# Patient Record
Sex: MALE | Race: WHITE | Employment: OTHER | URBAN - METROPOLITAN AREA
[De-identification: names, ages, dates, MRNs, and addresses within clinical notes are randomized per-mention and may not be internally consistent; named-entity substitution may affect disease eponyms.]

---

## 2017-01-05 ENCOUNTER — OFFICE VISIT (OUTPATIENT)
Dept: SURGERY | Age: 22
End: 2017-01-05

## 2017-01-05 VITALS
HEART RATE: 88 BPM | HEIGHT: 66 IN | DIASTOLIC BLOOD PRESSURE: 66 MMHG | BODY MASS INDEX: 25.23 KG/M2 | WEIGHT: 157 LBS | SYSTOLIC BLOOD PRESSURE: 126 MMHG

## 2017-01-05 DIAGNOSIS — E10.9 TYPE 1 DIABETES MELLITUS WITHOUT COMPLICATION (HCC): Primary | ICD-10-CM

## 2017-01-05 LAB
GLUCOSE BLD-MCNC: 326 MG/DL
HBA1C MFR BLD: 10.5 %

## 2017-01-05 PROCEDURE — 99213 OFFICE O/P EST LOW 20 MIN: CPT | Performed by: INTERNAL MEDICINE

## 2017-01-05 PROCEDURE — 83036 HEMOGLOBIN GLYCOSYLATED A1C: CPT | Performed by: INTERNAL MEDICINE

## 2017-01-05 PROCEDURE — 82962 GLUCOSE BLOOD TEST: CPT | Performed by: INTERNAL MEDICINE

## 2017-01-05 RX ORDER — QUETIAPINE 200 MG/1
400 TABLET, FILM COATED, EXTENDED RELEASE ORAL NIGHTLY
COMMUNITY
End: 2017-10-25 | Stop reason: SDUPTHER

## 2017-02-02 ENCOUNTER — OFFICE VISIT (OUTPATIENT)
Dept: SURGERY | Age: 22
End: 2017-02-02

## 2017-02-02 VITALS
HEIGHT: 66 IN | BODY MASS INDEX: 24.43 KG/M2 | WEIGHT: 152 LBS | HEART RATE: 80 BPM | SYSTOLIC BLOOD PRESSURE: 130 MMHG | DIASTOLIC BLOOD PRESSURE: 70 MMHG

## 2017-02-02 DIAGNOSIS — E10.9 TYPE 1 DIABETES MELLITUS WITHOUT COMPLICATION (HCC): Primary | ICD-10-CM

## 2017-02-02 LAB — GLUCOSE BLD-MCNC: 508 MG/DL

## 2017-02-02 PROCEDURE — 99213 OFFICE O/P EST LOW 20 MIN: CPT | Performed by: INTERNAL MEDICINE

## 2017-02-02 PROCEDURE — 82962 GLUCOSE BLOOD TEST: CPT | Performed by: INTERNAL MEDICINE

## 2017-08-23 ENCOUNTER — OFFICE VISIT (OUTPATIENT)
Dept: SURGERY | Age: 22
End: 2017-08-23

## 2017-08-23 VITALS
HEART RATE: 76 BPM | SYSTOLIC BLOOD PRESSURE: 112 MMHG | WEIGHT: 149 LBS | BODY MASS INDEX: 23.95 KG/M2 | HEIGHT: 66 IN | DIASTOLIC BLOOD PRESSURE: 72 MMHG

## 2017-08-23 DIAGNOSIS — E10.9 TYPE 1 DIABETES MELLITUS WITHOUT COMPLICATION (HCC): Primary | ICD-10-CM

## 2017-08-23 LAB
GLUCOSE BLD-MCNC: 367 MG/DL
HBA1C MFR BLD: 10.9 %

## 2017-08-23 PROCEDURE — 83036 HEMOGLOBIN GLYCOSYLATED A1C: CPT | Performed by: INTERNAL MEDICINE

## 2017-08-23 PROCEDURE — 99213 OFFICE O/P EST LOW 20 MIN: CPT | Performed by: INTERNAL MEDICINE

## 2017-08-23 PROCEDURE — 82962 GLUCOSE BLOOD TEST: CPT | Performed by: INTERNAL MEDICINE

## 2017-10-18 ENCOUNTER — OFFICE VISIT (OUTPATIENT)
Dept: FAMILY MEDICINE CLINIC | Age: 22
End: 2017-10-18

## 2017-10-18 VITALS
SYSTOLIC BLOOD PRESSURE: 120 MMHG | DIASTOLIC BLOOD PRESSURE: 68 MMHG | BODY MASS INDEX: 23.21 KG/M2 | HEART RATE: 90 BPM | WEIGHT: 144.4 LBS | OXYGEN SATURATION: 98 % | TEMPERATURE: 98.3 F | HEIGHT: 66 IN

## 2017-10-18 DIAGNOSIS — F19.10 DRUG ABUSE, EPISODIC USE (HCC): ICD-10-CM

## 2017-10-18 DIAGNOSIS — Z11.3 SCREENING EXAMINATION FOR STD (SEXUALLY TRANSMITTED DISEASE): ICD-10-CM

## 2017-10-18 DIAGNOSIS — E10.9 TYPE 1 DIABETES MELLITUS WITHOUT COMPLICATION (HCC): ICD-10-CM

## 2017-10-18 DIAGNOSIS — Z20.2 EXPOSURE TO CHLAMYDIA: Primary | ICD-10-CM

## 2017-10-18 PROCEDURE — 99213 OFFICE O/P EST LOW 20 MIN: CPT | Performed by: NURSE PRACTITIONER

## 2017-10-18 RX ORDER — AZITHROMYCIN 500 MG/1
1000 TABLET, FILM COATED ORAL ONCE
Qty: 2 TABLET | Refills: 0 | Status: SHIPPED | OUTPATIENT
Start: 2017-10-18 | End: 2017-10-18

## 2017-10-18 RX ORDER — LANCETS 30 GAUGE
EACH MISCELLANEOUS
Qty: 100 EACH | Refills: 3 | Status: SHIPPED | OUTPATIENT
Start: 2017-10-18 | End: 2017-10-20 | Stop reason: ALTCHOICE

## 2017-10-18 NOTE — PROGRESS NOTES
History     Social History    Marital status: Single     Spouse name: N/A    Number of children: N/A    Years of education: N/A     Social History Main Topics    Smoking status: Current Every Day Smoker     Packs/day: 1.00     Years: 5.00     Types: Cigarettes    Smokeless tobacco: Never Used      Comment: pt aware of risk    Alcohol use 0.0 oz/week      Comment: occasionally    Drug use:      Types: Methamphetamines, Marijuana    Sexual activity: Not Asked     Other Topics Concern    None     Social History Narrative    ** Merged History Encounter **          Current Outpatient Prescriptions on File Prior to Visit   Medication Sig Dispense Refill    insulin aspart (NOVOLOG) 100 UNIT/ML injection vial USE PER INSULIN PUMP MAXIMUM DOSE 100 UNITS DAILY please give 3 vials per 30 days 3 vial 3    LUIS CONTOUR NEXT TEST strip TEST BLOOD SUGARS 6 TIMES A  strip 3    Blood Glucose Monitoring Suppl (LUIS CONTOUR NEXT LINK) W/DEVICE KIT As directed 1 kit 00    Blood Glucose Monitoring Suppl (LDR BLOOD GLUCOSE TRUETEST) W/DEVICE KIT 1 each by Does not apply route 4 times daily DX :  E10.9, IDDM 1 kit prn    melatonin 3 MG TABS tablet Take 3 mg by mouth nightly.  QUEtiapine (SEROQUEL XR) 200 MG extended release tablet Take 400 mg by mouth nightly       albuterol (VENTOLIN HFA) 108 (90 BASE) MCG/ACT inhaler Inhale 2 puffs into the lungs every 6 hours as needed for Wheezing 1 Inhaler 3     No current facility-administered medications on file prior to visit. Allergies   Allergen Reactions    Dust Mite Extract      nasal & sinus congestion, itchy watery eyes, sneezing    Mirtazapine      blisters    Other      dander causes him to sneeze, have nasal/sinus congestion, itchy, watery eyes.  Oxcarbazepine Rash       Review of Systems   Constitutional: Negative for chills, fatigue and fever.    Genitourinary: Negative for difficulty urinating, discharge, dysuria, frequency, genital sores and Dispense:  100 each     Refill:  3    glucose blood VI test strips (ASCENSIA AUTODISC VI;ONE TOUCH ULTRA TEST VI) strip     Si each by In Vitro route daily As needed. Dispense:  100 each     Refill:  3     Side effects, adverse effects of the medication prescribed today, as well as treatment plan/ rationale and result expectations have been discussed with the patient who expresses understanding and desires to proceed. Close follow up to evaluate treatment results and for coordination of care. I have reviewed the patient's medical history in detail and updated the computerized patient record. As always, patient is advised that if symptoms worsen in any way they will proceed to the nearest emergency room. Return if symptoms worsen or fail to improve.     Jessica Givens NP

## 2017-10-19 DIAGNOSIS — Z11.3 SCREENING EXAMINATION FOR STD (SEXUALLY TRANSMITTED DISEASE): ICD-10-CM

## 2017-10-19 DIAGNOSIS — Z20.2 EXPOSURE TO CHLAMYDIA: ICD-10-CM

## 2017-10-19 DIAGNOSIS — F19.10 DRUG ABUSE, EPISODIC USE (HCC): ICD-10-CM

## 2017-10-19 LAB
HAV IGM SER IA-ACNC: ABNORMAL
HEPATITIS B CORE IGM ANTIBODY: ABNORMAL
HEPATITIS B SURFACE ANTIGEN INTERPRETATION: ABNORMAL
HEPATITIS C ANTIBODY INTERPRETATION: REACTIVE
HEPATITIS INTERPRETATION:: ABNORMAL

## 2017-10-20 ENCOUNTER — OFFICE VISIT (OUTPATIENT)
Dept: SURGERY | Age: 22
End: 2017-10-20

## 2017-10-20 VITALS
HEART RATE: 94 BPM | BODY MASS INDEX: 22.5 KG/M2 | SYSTOLIC BLOOD PRESSURE: 104 MMHG | HEIGHT: 66 IN | DIASTOLIC BLOOD PRESSURE: 71 MMHG | WEIGHT: 140 LBS

## 2017-10-20 DIAGNOSIS — E10.9 TYPE 1 DIABETES MELLITUS WITHOUT COMPLICATION (HCC): Primary | ICD-10-CM

## 2017-10-20 LAB — GLUCOSE BLD-MCNC: 488 MG/DL

## 2017-10-20 PROCEDURE — 82962 GLUCOSE BLOOD TEST: CPT | Performed by: INTERNAL MEDICINE

## 2017-10-20 PROCEDURE — 99213 OFFICE O/P EST LOW 20 MIN: CPT | Performed by: INTERNAL MEDICINE

## 2017-10-20 RX ORDER — GLUCOSAMINE HCL/CHONDROITIN SU 500-400 MG
1 CAPSULE ORAL 4 TIMES DAILY
Qty: 150 STRIP | Refills: 6 | Status: SHIPPED | OUTPATIENT
Start: 2017-10-20 | End: 2017-11-13

## 2017-10-20 RX ORDER — LANCETS 30 GAUGE
1 EACH MISCELLANEOUS 4 TIMES DAILY
Qty: 150 EACH | Refills: 6 | Status: SHIPPED | OUTPATIENT
Start: 2017-10-20 | End: 2017-11-13

## 2017-10-20 NOTE — PROGRESS NOTES
Subjective:      Patient ID: Leroy Hoffman is a 23 y.o. male. Diabetes   He presents for his follow-up diabetic visit. He has type 1 diabetes mellitus. His disease course has been fluctuating. Hypoglycemia symptoms include dizziness, hunger, nervousness/anxiousness, sweats and tremors. Associated symptoms include fatigue. Hypoglycemia complications include required assistance. Symptoms are worsening. Risk factors for coronary artery disease include diabetes mellitus and male sex. Current diabetic treatment includes insulin injections. He is compliant with treatment most of the time (poor complaince ). He is currently taking insulin pre-breakfast, pre-lunch, pre-dinner and at bedtime. Insulin injections are given by patient. His weight is fluctuating minimally. Blood glucose monitoring compliance is poor. His home blood glucose trend is fluctuating minimally. His breakfast blood glucose range is generally >200 mg/dl. His highest blood glucose is >200 mg/dl. His overall blood glucose range is >200 mg/dl.  (Pt not using pump for the last   2 weeks   Glucose was 488 today )     Patient Active Problem List   Diagnosis    Type 1 diabetes mellitus ([address]    ADHD (attention deficit hyperactivity disorder)    Oppositional defiant disorder    Seasonal allergies    Asthma    Atopic rhinitis    Closed fracture of neck of metacarpal bone    Congenital anomaly of cerebrovascular system    Generalized convulsive epilepsy ([address]    Contusion of hand    Closed fracture of base of other metacarpal bone(s)    Atopic dermatitis    Behavior problem    Congenital vascular nevus       Current Outpatient Prescriptions:     Blood Glucose Monitoring Suppl JUAN, 1 Device by Does not apply route daily, Disp: 1 Device, Rfl: 0    Lancets MISC, Test 4 times a day diabetes mellitus type 1, Disp: 100 each, Rfl: 3    glucose blood VI test strips (ASCENSIA AUTODISC VI;ONE TOUCH ULTRA TEST VI) strip, 1 each by In Vitro route daily As needed. , Disp: 100 each, Rfl: 3    insulin aspart (NOVOLOG) 100 UNIT/ML injection vial, USE PER INSULIN PUMP MAXIMUM DOSE 100 UNITS DAILY please give 3 vials per 30 days, Disp: 3 vial, Rfl: 3    QUEtiapine (SEROQUEL XR) 200 MG extended release tablet, Take 400 mg by mouth nightly , Disp: , Rfl:     Blood Glucose Monitoring Suppl (LUIS CONTOUR NEXT LINK) W/DEVICE KIT, As directed, Disp: 1 kit, Rfl: 00    Blood Glucose Monitoring Suppl (LDR BLOOD GLUCOSE TRUETEST) W/DEVICE KIT, 1 each by Does not apply route 4 times daily DX :  E10.9, IDDM, Disp: 1 kit, Rfl: prn    albuterol (VENTOLIN HFA) 108 (90 BASE) MCG/ACT inhaler, Inhale 2 puffs into the lungs every 6 hours as needed for Wheezing, Disp: 1 Inhaler, Rfl: 3    melatonin 3 MG TABS tablet, Take 3 mg by mouth nightly., Disp: , Rfl:       Review of Systems   Constitutional: Positive for fatigue. Neurological: Positive for dizziness and tremors. Psychiatric/Behavioral: The patient is nervous/anxious. All other systems reviewed and are negative. Vitals:    10/20/17 1300   BP: 104/71   Site: Left Arm   Position: Sitting   Cuff Size: Medium Adult   Pulse: 94   Weight: 140 lb (63.5 kg)   Height: 5' 6\" (1.676 m)       Objective:   Physical Exam   Constitutional: He appears well-developed and well-nourished. HENT:   Head: Normocephalic and atraumatic. Eyes: Conjunctivae are normal.   Neck: Neck supple. Cardiovascular: Normal rate. Pulmonary/Chest: Effort normal.   Musculoskeletal: Normal range of motion. Neurological: He is alert. Psychiatric: His mood appears anxious. Assessment:      1.  Type 1 diabetes mellitus without complication ([address]  POCT Glucose            Plan:        Orders Placed This Encounter   Procedures    Referral To Ophthalmology - ([place]) Bethanie Gonzalez MD - [facility]     Referral Priority:   Routine     Referral Type:   Consult for Advice and Opinion     Referral Reason:   Specialty Services Required Referred to Provider:   Wilmon Boas, MD     Requested Specialty:   Ophthalmology     Number of Visits Requested:   1    POCT Glucose     Orders Placed This Encounter   Medications    insulin glargine (LANTUS SOLOSTAR) 100 UNIT/ML injection pen     Si units at bedtime     Dispense:  15 Pen     Refill:  3    insulin aspart (NOVOLOG FLEXPEN) 100 UNIT/ML injection pen     Sig: 15-20 units at each meals     Dispense:  10 Pen     Refill:  3    Insulin Pen Needle (NOVOFINE) 32G X 6 MM MISC     Sig: qid     Dispense:  300 each     Refill:  3    Blood Glucose Monitoring Suppl JUAN     Si each by Does not apply route 4 times daily DX: E10.65, IDDM (please dispense covered brand)     Dispense:  1 Device     Refill:  0    Glucose Blood (BLOOD GLUCOSE TEST STRIPS) STRP     Si each by In Vitro route 4 times daily DX: E10.65, IDDM (please dispense covered brand)     Dispense:  150 strip     Refill:  6    Lancets MISC     Si each by Does not apply route 4 times daily DX: E10.65, IDDM (please dispense covered brand)     Dispense:  150 each     Refill:  6

## 2017-10-21 LAB — HIV-1 AND HIV-2 ANTIBODIES: NEGATIVE

## 2017-10-24 LAB
C. TRACHOMATIS DNA ,URINE: NEGATIVE
N. GONORRHOEAE DNA, URINE: NEGATIVE

## 2017-10-25 ENCOUNTER — OFFICE VISIT (OUTPATIENT)
Dept: FAMILY MEDICINE CLINIC | Age: 22
End: 2017-10-25

## 2017-10-25 VITALS
OXYGEN SATURATION: 97 % | HEART RATE: 119 BPM | BODY MASS INDEX: 22.28 KG/M2 | HEIGHT: 66 IN | TEMPERATURE: 98.9 F | DIASTOLIC BLOOD PRESSURE: 88 MMHG | SYSTOLIC BLOOD PRESSURE: 120 MMHG | WEIGHT: 138.6 LBS

## 2017-10-25 DIAGNOSIS — F91.3 OPPOSITIONAL DEFIANT DISORDER: ICD-10-CM

## 2017-10-25 DIAGNOSIS — J45.20 MILD INTERMITTENT ASTHMA WITHOUT COMPLICATION: ICD-10-CM

## 2017-10-25 DIAGNOSIS — R76.8 HEPATITIS C ANTIBODY TEST POSITIVE: Primary | ICD-10-CM

## 2017-10-25 DIAGNOSIS — F90.9 ATTENTION DEFICIT HYPERACTIVITY DISORDER (ADHD), UNSPECIFIED ADHD TYPE: ICD-10-CM

## 2017-10-25 PROCEDURE — 99213 OFFICE O/P EST LOW 20 MIN: CPT | Performed by: NURSE PRACTITIONER

## 2017-10-25 RX ORDER — QUETIAPINE 200 MG/1
400 TABLET, FILM COATED, EXTENDED RELEASE ORAL NIGHTLY
Qty: 60 TABLET | Refills: 3 | Status: SHIPPED | OUTPATIENT
Start: 2017-10-25 | End: 2017-11-13

## 2017-10-25 RX ORDER — ALBUTEROL SULFATE 90 UG/1
2 AEROSOL, METERED RESPIRATORY (INHALATION) EVERY 6 HOURS PRN
Qty: 1 INHALER | Refills: 3 | Status: SHIPPED | OUTPATIENT
Start: 2017-10-25 | End: 2018-10-23 | Stop reason: SDUPTHER

## 2017-10-25 ASSESSMENT — ENCOUNTER SYMPTOMS: ABDOMINAL PAIN: 0

## 2017-11-08 NOTE — TELEPHONE ENCOUNTER
Seroquel PA done through [employer]. CMM indicates that medication is available with out authorization. Called [employer] and they attempted to run. On their end alternative was indicated. She named Ritalin and a few others. I ask her to send that information to me, please see 3rd pg of attached. Can you switch med?

## 2017-11-09 ENCOUNTER — HOSPITAL ENCOUNTER (INPATIENT)
Age: 22
LOS: 1 days | Discharge: LEFT BEFORE DISCHARGE | DRG: 638 | End: 2017-11-09
Attending: INTERNAL MEDICINE | Admitting: INTERNAL MEDICINE
Payer: MEDICARE

## 2017-11-09 ENCOUNTER — APPOINTMENT (OUTPATIENT)
Dept: GENERAL RADIOLOGY | Age: 22
DRG: 638 | End: 2017-11-09
Payer: MEDICARE

## 2017-11-09 VITALS
BODY MASS INDEX: 21.33 KG/M2 | TEMPERATURE: 97.5 F | RESPIRATION RATE: 21 BRPM | WEIGHT: 132.72 LBS | DIASTOLIC BLOOD PRESSURE: 60 MMHG | OXYGEN SATURATION: 100 % | HEIGHT: 66 IN | HEART RATE: 101 BPM | SYSTOLIC BLOOD PRESSURE: 107 MMHG

## 2017-11-09 DIAGNOSIS — E10.10 DIABETIC KETOACIDOSIS WITHOUT COMA ASSOCIATED WITH TYPE 1 DIABETES MELLITUS (HCC): Primary | ICD-10-CM

## 2017-11-09 DIAGNOSIS — F11.10 HEROIN ABUSE (HCC): ICD-10-CM

## 2017-11-09 PROBLEM — F84.5 ASPERGER SYNDROME: Chronic | Status: ACTIVE | Noted: 2017-11-09

## 2017-11-09 PROBLEM — E87.5 HYPERKALEMIA, DIMINISHED RENAL EXCRETION: Status: ACTIVE | Noted: 2017-11-09

## 2017-11-09 PROBLEM — E87.20 METABOLIC ACIDOSIS WITH INCREASED ANION GAP AND ACCUMULATION OF ORGANIC ACIDS: Status: ACTIVE | Noted: 2017-11-09

## 2017-11-09 PROBLEM — F19.20 CHEMICAL DEPENDENCY (HCC): Status: ACTIVE | Noted: 2017-11-09

## 2017-11-09 PROBLEM — Z86.59 HISTORY OF OPPOSITIONAL DEFIANT DISORDER: Chronic | Status: ACTIVE | Noted: 2017-11-09

## 2017-11-09 PROBLEM — E10.65 HYPERGLYCEMIA DUE TO TYPE 1 DIABETES MELLITUS (HCC): Status: ACTIVE | Noted: 2017-11-09

## 2017-11-09 PROBLEM — N17.9 AKI (ACUTE KIDNEY INJURY) (HCC): Status: ACTIVE | Noted: 2017-11-09

## 2017-11-09 PROBLEM — E86.0 DEHYDRATION: Status: ACTIVE | Noted: 2017-11-09

## 2017-11-09 PROBLEM — E87.5 HYPERKALEMIA, TRANSCELLULAR SHIFTS: Status: ACTIVE | Noted: 2017-11-09

## 2017-11-09 PROBLEM — E87.20 LACTIC ACID ACIDOSIS: Status: ACTIVE | Noted: 2017-11-09

## 2017-11-09 LAB
AMPHETAMINE SCREEN, URINE: ABNORMAL
ANION GAP SERPL CALCULATED.3IONS-SCNC: 23 MEQ/L (ref 7–13)
ANION GAP SERPL CALCULATED.3IONS-SCNC: 30 MEQ/L (ref 7–13)
ANION GAP SERPL CALCULATED.3IONS-SCNC: 45 MEQ/L (ref 7–13)
ANISOCYTOSIS: ABNORMAL
BACTERIA: NORMAL /HPF
BANDED NEUTROPHILS RELATIVE PERCENT: 8 %
BARBITURATE SCREEN URINE: ABNORMAL
BASE EXCESS ARTERIAL: -20 (ref -3–3)
BASOPHILS ABSOLUTE: 0.3 K/UL (ref 0–0.2)
BASOPHILS RELATIVE PERCENT: 1 %
BENZODIAZEPINE SCREEN, URINE: ABNORMAL
BETA-HYDROXYBUTYRATE: 77 MG/DL (ref 0.2–2.8)
BILIRUBIN URINE: NEGATIVE
BLOOD, URINE: ABNORMAL
BUN BLDV-MCNC: 17 MG/DL (ref 6–20)
BUN BLDV-MCNC: 22 MG/DL (ref 6–20)
BUN BLDV-MCNC: 27 MG/DL (ref 6–20)
CALCIUM IONIZED: 1.17 MMOL/L (ref 1.12–1.32)
CALCIUM SERPL-MCNC: 10.2 MG/DL (ref 8.6–10.2)
CALCIUM SERPL-MCNC: 7.9 MG/DL (ref 8.6–10.2)
CALCIUM SERPL-MCNC: 8.3 MG/DL (ref 8.6–10.2)
CANNABINOID SCREEN URINE: POSITIVE
CHLORIDE BLD-SCNC: 100 MEQ/L (ref 98–107)
CHLORIDE BLD-SCNC: 103 MEQ/L (ref 98–107)
CHLORIDE BLD-SCNC: 84 MEQ/L (ref 98–107)
CLARITY: CLEAR
CO2: 13 MEQ/L (ref 22–29)
CO2: 7 MEQ/L (ref 22–29)
CO2: 9 MEQ/L (ref 22–29)
COCAINE METABOLITE SCREEN URINE: ABNORMAL
COLOR: YELLOW
CREAT SERPL-MCNC: 1.2 MG/DL (ref 0.7–1.2)
CREAT SERPL-MCNC: 1.38 MG/DL (ref 0.7–1.2)
CREAT SERPL-MCNC: 1.75 MG/DL (ref 0.7–1.2)
EKG ATRIAL RATE: 111 BPM
EKG P AXIS: 79 DEGREES
EKG P-R INTERVAL: 130 MS
EKG Q-T INTERVAL: 328 MS
EKG QRS DURATION: 104 MS
EKG QTC CALCULATION (BAZETT): 446 MS
EKG R AXIS: 81 DEGREES
EKG T AXIS: 52 DEGREES
EKG VENTRICULAR RATE: 111 BPM
EOSINOPHILS ABSOLUTE: 0 K/UL (ref 0–0.7)
EOSINOPHILS RELATIVE PERCENT: 0.1 %
GFR AFRICAN AMERICAN: >60
GFR NON-AFRICAN AMERICAN: 49.6
GFR NON-AFRICAN AMERICAN: >60
GLUCOSE BLD-MCNC: 146 MG/DL (ref 60–115)
GLUCOSE BLD-MCNC: 169 MG/DL (ref 60–115)
GLUCOSE BLD-MCNC: 171 MG/DL (ref 60–115)
GLUCOSE BLD-MCNC: 182 MG/DL (ref 74–109)
GLUCOSE BLD-MCNC: 195 MG/DL (ref 60–115)
GLUCOSE BLD-MCNC: 238 MG/DL (ref 60–115)
GLUCOSE BLD-MCNC: 252 MG/DL (ref 74–109)
GLUCOSE BLD-MCNC: 310 MG/DL (ref 60–115)
GLUCOSE BLD-MCNC: 335 MG/DL (ref 60–115)
GLUCOSE BLD-MCNC: 338 MG/DL
GLUCOSE BLD-MCNC: 338 MG/DL (ref 60–115)
GLUCOSE BLD-MCNC: 460 MG/DL
GLUCOSE BLD-MCNC: 460 MG/DL (ref 60–115)
GLUCOSE BLD-MCNC: 462 MG/DL (ref 60–115)
GLUCOSE BLD-MCNC: 563 MG/DL
GLUCOSE BLD-MCNC: 563 MG/DL (ref 60–115)
GLUCOSE BLD-MCNC: 636 MG/DL (ref 74–109)
GLUCOSE BLD-MCNC: >600 MG/DL (ref 60–115)
GLUCOSE BLD-MCNC: >600 MG/DL (ref 60–115)
GLUCOSE URINE: >=1000 MG/DL
HBA1C MFR BLD: 9.6 % (ref 4.8–5.9)
HCO3 ARTERIAL: 8.3 MMOL/L (ref 21–29)
HCT VFR BLD CALC: 56.9 % (ref 42–52)
HEMOGLOBIN: 14.9 GM/DL (ref 13.5–17.5)
HEMOGLOBIN: 17.5 G/DL (ref 14–18)
HYPOCHROMIA: 0
KETONES, URINE: >=80 MG/DL
LACTATE: 1.1 MMOL/L (ref 0.4–2)
LACTIC ACID: 1.5 MMOL/L (ref 0.5–2.2)
LACTIC ACID: 7.7 MMOL/L (ref 0.5–2.2)
LEUKOCYTE ESTERASE, URINE: NEGATIVE
LYMPHOCYTES ABSOLUTE: 1.8 K/UL (ref 1–4.8)
LYMPHOCYTES RELATIVE PERCENT: 7 %
Lab: ABNORMAL
MACROCYTES: 0
MAGNESIUM: 2.1 MG/DL (ref 1.7–2.3)
MCH RBC QN AUTO: 29.8 PG (ref 27–31.3)
MCHC RBC AUTO-ENTMCNC: 30.8 % (ref 33–37)
MCV RBC AUTO: 96.7 FL (ref 80–100)
MICROCYTES: 0
MONOCYTES ABSOLUTE: 1.8 K/UL (ref 0.2–0.8)
MONOCYTES RELATIVE PERCENT: 6.6 %
MYELOCYTE PERCENT: 1 %
NEUTROPHILS ABSOLUTE: 22.1 K/UL (ref 1.4–6.5)
NEUTROPHILS RELATIVE PERCENT: 77 %
NITRITE, URINE: NEGATIVE
O2 SAT, ARTERIAL: 97 % (ref 93–100)
OPIATE SCREEN URINE: ABNORMAL
PCO2 ARTERIAL: 21 MM HG (ref 35–45)
PDW BLD-RTO: 15.1 % (ref 11.5–14.5)
PERFORMED ON: ABNORMAL
PH ARTERIAL: 7.21 (ref 7.35–7.45)
PH UA: 5.5 (ref 5–9)
PHENCYCLIDINE SCREEN URINE: ABNORMAL
PLATELET # BLD: 328 K/UL (ref 130–400)
PLATELET SLIDE REVIEW: ADEQUATE
PO2 ARTERIAL: 111 MM HG (ref 75–108)
POC CHLORIDE: 110 MEQ/L (ref 99–110)
POC CREATININE: 1 MG/DL (ref 0.9–1.3)
POC FIO2: 21
POC HEMATOCRIT: 44 % (ref 41–53)
POC POTASSIUM: 4.3 MEQ/L (ref 3.5–5.1)
POC SAMPLE TYPE: ABNORMAL
POC SODIUM: 131 MEQ/L (ref 136–145)
POIKILOCYTES: 0
POLYCHROMASIA: 0
POTASSIUM SERPL-SCNC: 5 MEQ/L (ref 3.5–5.1)
POTASSIUM SERPL-SCNC: 5.1 MEQ/L (ref 3.5–5.1)
POTASSIUM SERPL-SCNC: 6.1 MEQ/L (ref 3.5–5.1)
PROTEIN UA: 30 MG/DL
RBC # BLD: 5.88 M/UL (ref 4.7–6.1)
RBC UA: NORMAL /HPF (ref 0–2)
SLIDE REVIEW: ABNORMAL
SODIUM BLD-SCNC: 136 MEQ/L (ref 132–144)
SODIUM BLD-SCNC: 139 MEQ/L (ref 132–144)
SODIUM BLD-SCNC: 139 MEQ/L (ref 132–144)
SPECIFIC GRAVITY UA: 1.02 (ref 1–1.03)
TCO2 ARTERIAL: 9 (ref 22–29)
TOTAL CK: 58 U/L (ref 0–190)
TROPONIN: <0.01 NG/ML (ref 0–0.01)
UROBILINOGEN, URINE: 0.2 E.U./DL
WBC # BLD: 25.7 K/UL (ref 4.5–11)
WBC UA: NORMAL /HPF (ref 0–5)

## 2017-11-09 PROCEDURE — 6370000000 HC RX 637 (ALT 250 FOR IP): Performed by: NURSE PRACTITIONER

## 2017-11-09 PROCEDURE — 82565 ASSAY OF CREATININE: CPT

## 2017-11-09 PROCEDURE — 83735 ASSAY OF MAGNESIUM: CPT

## 2017-11-09 PROCEDURE — 99285 EMERGENCY DEPT VISIT HI MDM: CPT

## 2017-11-09 PROCEDURE — 36600 WITHDRAWAL OF ARTERIAL BLOOD: CPT

## 2017-11-09 PROCEDURE — 82435 ASSAY OF BLOOD CHLORIDE: CPT

## 2017-11-09 PROCEDURE — 84132 ASSAY OF SERUM POTASSIUM: CPT

## 2017-11-09 PROCEDURE — 84484 ASSAY OF TROPONIN QUANT: CPT

## 2017-11-09 PROCEDURE — 85025 COMPLETE CBC W/AUTO DIFF WBC: CPT

## 2017-11-09 PROCEDURE — 83036 HEMOGLOBIN GLYCOSYLATED A1C: CPT

## 2017-11-09 PROCEDURE — 83605 ASSAY OF LACTIC ACID: CPT

## 2017-11-09 PROCEDURE — 96375 TX/PRO/DX INJ NEW DRUG ADDON: CPT

## 2017-11-09 PROCEDURE — 6360000002 HC RX W HCPCS: Performed by: NURSE PRACTITIONER

## 2017-11-09 PROCEDURE — 82948 REAGENT STRIP/BLOOD GLUCOSE: CPT

## 2017-11-09 PROCEDURE — 2000000000 HC ICU R&B

## 2017-11-09 PROCEDURE — 6370000000 HC RX 637 (ALT 250 FOR IP): Performed by: INTERNAL MEDICINE

## 2017-11-09 PROCEDURE — 82330 ASSAY OF CALCIUM: CPT

## 2017-11-09 PROCEDURE — 81001 URINALYSIS AUTO W/SCOPE: CPT

## 2017-11-09 PROCEDURE — 2580000003 HC RX 258: Performed by: NURSE PRACTITIONER

## 2017-11-09 PROCEDURE — 82550 ASSAY OF CK (CPK): CPT

## 2017-11-09 PROCEDURE — 99222 1ST HOSP IP/OBS MODERATE 55: CPT | Performed by: INTERNAL MEDICINE

## 2017-11-09 PROCEDURE — 80048 BASIC METABOLIC PNL TOTAL CA: CPT

## 2017-11-09 PROCEDURE — 6360000002 HC RX W HCPCS: Performed by: ANESTHESIOLOGY

## 2017-11-09 PROCEDURE — 36415 COLL VENOUS BLD VENIPUNCTURE: CPT

## 2017-11-09 PROCEDURE — 71010 XR CHEST PORTABLE: CPT

## 2017-11-09 PROCEDURE — 96374 THER/PROPH/DIAG INJ IV PUSH: CPT

## 2017-11-09 PROCEDURE — 82010 KETONE BODYS QUAN: CPT

## 2017-11-09 PROCEDURE — 80307 DRUG TEST PRSMV CHEM ANLYZR: CPT

## 2017-11-09 PROCEDURE — 93005 ELECTROCARDIOGRAM TRACING: CPT

## 2017-11-09 PROCEDURE — 85014 HEMATOCRIT: CPT

## 2017-11-09 PROCEDURE — 93010 ELECTROCARDIOGRAM REPORT: CPT | Performed by: INTERNAL MEDICINE

## 2017-11-09 PROCEDURE — 82803 BLOOD GASES ANY COMBINATION: CPT

## 2017-11-09 PROCEDURE — 2580000003 HC RX 258: Performed by: INTERNAL MEDICINE

## 2017-11-09 RX ORDER — NICOTINE 21 MG/24HR
1 PATCH, TRANSDERMAL 24 HOURS TRANSDERMAL ONCE
Status: DISCONTINUED | OUTPATIENT
Start: 2017-11-09 | End: 2017-11-09 | Stop reason: HOSPADM

## 2017-11-09 RX ORDER — DEXTROSE, SODIUM CHLORIDE, AND POTASSIUM CHLORIDE 5; .45; .15 G/100ML; G/100ML; G/100ML
INJECTION INTRAVENOUS CONTINUOUS PRN
Status: DISCONTINUED | OUTPATIENT
Start: 2017-11-09 | End: 2017-11-09 | Stop reason: HOSPADM

## 2017-11-09 RX ORDER — POTASSIUM CHLORIDE 7.45 MG/ML
10 INJECTION INTRAVENOUS PRN
Status: DISCONTINUED | OUTPATIENT
Start: 2017-11-09 | End: 2017-11-09 | Stop reason: HOSPADM

## 2017-11-09 RX ORDER — 0.9 % SODIUM CHLORIDE 0.9 %
2000 INTRAVENOUS SOLUTION INTRAVENOUS ONCE
Status: COMPLETED | OUTPATIENT
Start: 2017-11-09 | End: 2017-11-09

## 2017-11-09 RX ORDER — ONDANSETRON 2 MG/ML
INJECTION INTRAMUSCULAR; INTRAVENOUS
Status: DISCONTINUED
Start: 2017-11-09 | End: 2017-11-09

## 2017-11-09 RX ORDER — KETOROLAC TROMETHAMINE 30 MG/ML
30 INJECTION, SOLUTION INTRAMUSCULAR; INTRAVENOUS ONCE
Status: COMPLETED | OUTPATIENT
Start: 2017-11-09 | End: 2017-11-09

## 2017-11-09 RX ORDER — DEXTROSE MONOHYDRATE 50 MG/ML
100 INJECTION, SOLUTION INTRAVENOUS PRN
Status: DISCONTINUED | OUTPATIENT
Start: 2017-11-09 | End: 2017-11-09 | Stop reason: HOSPADM

## 2017-11-09 RX ORDER — ONDANSETRON 2 MG/ML
4 INJECTION INTRAMUSCULAR; INTRAVENOUS ONCE
Status: COMPLETED | OUTPATIENT
Start: 2017-11-09 | End: 2017-11-09

## 2017-11-09 RX ORDER — DEXTROSE MONOHYDRATE 25 G/50ML
12.5 INJECTION, SOLUTION INTRAVENOUS PRN
Status: DISCONTINUED | OUTPATIENT
Start: 2017-11-09 | End: 2017-11-09 | Stop reason: HOSPADM

## 2017-11-09 RX ORDER — 0.9 % SODIUM CHLORIDE 0.9 %
1000 INTRAVENOUS SOLUTION INTRAVENOUS ONCE
Status: COMPLETED | OUTPATIENT
Start: 2017-11-09 | End: 2017-11-09

## 2017-11-09 RX ORDER — NICOTINE POLACRILEX 4 MG
15 LOZENGE BUCCAL PRN
Status: DISCONTINUED | OUTPATIENT
Start: 2017-11-09 | End: 2017-11-09 | Stop reason: HOSPADM

## 2017-11-09 RX ORDER — 0.9 % SODIUM CHLORIDE 0.9 %
1000 INTRAVENOUS SOLUTION INTRAVENOUS ONCE
Status: DISCONTINUED | OUTPATIENT
Start: 2017-11-09 | End: 2017-11-09 | Stop reason: HOSPADM

## 2017-11-09 RX ORDER — ONDANSETRON 2 MG/ML
4 INJECTION INTRAMUSCULAR; INTRAVENOUS EVERY 6 HOURS PRN
Status: DISCONTINUED | OUTPATIENT
Start: 2017-11-09 | End: 2017-11-09 | Stop reason: HOSPADM

## 2017-11-09 RX ORDER — SODIUM CHLORIDE 9 MG/ML
INJECTION, SOLUTION INTRAVENOUS CONTINUOUS
Status: DISCONTINUED | OUTPATIENT
Start: 2017-11-09 | End: 2017-11-09 | Stop reason: HOSPADM

## 2017-11-09 RX ADMIN — ONDANSETRON 4 MG: 2 INJECTION INTRAMUSCULAR; INTRAVENOUS at 06:37

## 2017-11-09 RX ADMIN — POTASSIUM CHLORIDE, DEXTROSE MONOHYDRATE AND SODIUM CHLORIDE: 150; 5; 450 INJECTION, SOLUTION INTRAVENOUS at 12:38

## 2017-11-09 RX ADMIN — INSULIN HUMAN 10 UNITS: 100 INJECTION, SOLUTION PARENTERAL at 07:39

## 2017-11-09 RX ADMIN — SODIUM CHLORIDE 2 UNITS: 9 INJECTION, SOLUTION INTRAVENOUS at 08:16

## 2017-11-09 RX ADMIN — SODIUM CHLORIDE 2000 ML: 9 INJECTION, SOLUTION INTRAVENOUS at 06:25

## 2017-11-09 RX ADMIN — SODIUM CHLORIDE 2000 ML: 9 INJECTION, SOLUTION INTRAVENOUS at 09:33

## 2017-11-09 RX ADMIN — SODIUM CHLORIDE 1000 ML: 9 INJECTION, SOLUTION INTRAVENOUS at 11:11

## 2017-11-09 RX ADMIN — INSULIN HUMAN 7 UNITS: 100 INJECTION, SOLUTION PARENTERAL at 11:35

## 2017-11-09 RX ADMIN — KETOROLAC TROMETHAMINE 30 MG: 30 INJECTION, SOLUTION INTRAMUSCULAR at 09:34

## 2017-11-09 RX ADMIN — ONDANSETRON 4 MG: 2 INJECTION INTRAMUSCULAR; INTRAVENOUS at 15:17

## 2017-11-09 ASSESSMENT — PAIN DESCRIPTION - LOCATION
LOCATION: ABDOMEN;CHEST;BACK
LOCATION: BACK;CHEST;ABDOMEN

## 2017-11-09 ASSESSMENT — PAIN DESCRIPTION - ONSET: ONSET: GRADUAL

## 2017-11-09 ASSESSMENT — PAIN DESCRIPTION - DESCRIPTORS: DESCRIPTORS: ACHING;STABBING;THROBBING

## 2017-11-09 ASSESSMENT — PAIN SCALES - GENERAL
PAINLEVEL_OUTOF10: 9
PAINLEVEL_OUTOF10: 0
PAINLEVEL_OUTOF10: 10
PAINLEVEL_OUTOF10: 10

## 2017-11-09 ASSESSMENT — ENCOUNTER SYMPTOMS
DIARRHEA: 0
ABDOMINAL PAIN: 1
VOMITING: 1
NAUSEA: 1
COUGH: 0
SHORTNESS OF BREATH: 0

## 2017-11-09 ASSESSMENT — PAIN DESCRIPTION - FREQUENCY: FREQUENCY: CONTINUOUS

## 2017-11-09 ASSESSMENT — PAIN DESCRIPTION - PAIN TYPE
TYPE: ACUTE PAIN
TYPE: ACUTE PAIN

## 2017-11-09 NOTE — ED PROVIDER NOTES
[facility] ED  eMERGENCY dEPARTMENT eNCOUnter      Pt Name: Adriel Ch  MRN: [id number]  Armsmargarethgfheron 1995  Date of evaluation: 11/9/2017  Provider: Rosalind David       Chief Complaint   Patient presents with    Abdominal Pain     patient complaning of generalzed pain everywhere. Patient last used ghassan yesterday after 8 month sober    Chest Pain    Back Pain         HISTORY OF PRESENT ILLNESS   (Location/Symptom, Timing/Onset, Context/Setting, Quality, Duration, Modifying Factors, Severity)  Note limiting factors. Adriel Ch is a 23 y.o. male who presents to the emergency department With complaint of diffuse body pain for the last several hours. Patient does admit to resuming the use of heroin daily after being sober for 8 months. He also admits to using heroin more frequently over the last few days and not taking his insulin. He admits that he stopped checking his blood sugars after his insulin pump was taken off and that he was just giving himself whatever amount of insulin he felt he needed. He modifying factor is not having insulin as well as heroin use. The history is provided by the patient. Nursing Notes were reviewed. REVIEW OF SYSTEMS    (2-9 systems for level 4, 10 or more for level 5)     Review of Systems   Constitutional: Positive for fatigue. Negative for chills, diaphoresis and fever. HENT: Negative for congestion. Respiratory: Negative for cough and shortness of breath. Cardiovascular: Positive for chest pain. Negative for palpitations. Gastrointestinal: Positive for abdominal pain, nausea and vomiting. Negative for diarrhea. Genitourinary: Negative for dysuria and flank pain. Skin: Negative for rash. Neurological: Negative for dizziness, light-headedness and headaches. Except as noted above the remainder of the review of systems was reviewed and negative.        PAST MEDICAL HISTORY     Past Medical intact. No rash noted. He is diaphoretic. Nursing note and vitals reviewed. DIAGNOSTIC RESULTS     EKG: All EKG's are interpreted by the Emergency Department Physician who either signs or Co-signs this chart in the absence of a cardiologist.    Sinus tachycardia at 111 bpm.  Peaked T waves are present. RADIOLOGY:   Non-plain film images such as CT, Ultrasound and MRI are read by the radiologist. Plain radiographic images are visualized and preliminarily interpreted by the emergency physician with the below findings:    Chest X-Ray demonstrates no acute infiltrate, effusion or pneumothorax.     Interpretation per the Radiologist below, if available at the time of this note:    XR Chest Portable    (Results Pending)         ED BEDSIDE ULTRASOUND:   Performed by ED Physician - none    LABS:  Labs Reviewed   CBC WITH AUTO DIFFERENTIAL - Abnormal; Notable for the following:        Result Value    WBC 25.7 (*)     Hematocrit 56.9 (*)     MCHC 30.8 (*)     RDW 15.1 (*)     Neutrophils # 22.1 (*)     Monocytes # 1.8 (*)     Basophils # 0.3 (*)     All other components within normal limits   BASIC METABOLIC PANEL - Abnormal; Notable for the following:     Potassium 6.1 (*)     Chloride 84 (*)     CO2 7 (*)     Anion Gap 45 (*)     Glucose 636 (*)     BUN 27 (*)     CREATININE 1.75 (*)     GFR Non- 49.6 (*)     All other components within normal limits    Narrative:     CALL  Roland  [facility] tel. [phone],  Critical Glucose,CO2 & Potassium results called to and read back by [facility], 11/09/2017 07:17, by Michael Murillo   LACTIC ACID, PLASMA - Abnormal; Notable for the following:     Lactic Acid 7.7 (*)     All other components within normal limits    Narrative:     CALL  Roland  [facility] tel. [phone],  Critical Lactic Acid results called to and read back by [facility], 11/09/2017  07:16, by Michael Murillo   POCT GLUCOSE - Abnormal; Notable for the following:     POC Glucose >600 (*)     All other components within normal limits   POCT GLUCOSE - Normal   CK    Narrative:     CALL  Roland  [facility] tel. [phone],  Critical Glucose,CO2 & Potassium results called to and read back by [facility], 11/09/2017 07:17, by Johnathan Santos   TROPONIN   URINALYSIS   URINE DRUG SCREEN   LACTIC ACID, PLASMA       All other labs were within normal range or not returned as of this dictation. EMERGENCY DEPARTMENT COURSE and DIFFERENTIAL DIAGNOSIS/MDM:   Vitals:    Vitals:    11/09/17 0554 11/09/17 0644   BP: (!) 145/84 122/78   Pulse: 111 121   Resp: 22 24   Temp: 98.6 °F (37 °C)    SpO2: 100% 99%   Weight: 150 lb (68 kg)    Height: 5' 6\" (1.676 m)          ED Course as of Nov 09 0739   Thu Nov 09, 2017   0722 I discussed the case with Dr. Osman Miramontes, she is agreeable to accepting the patient to her service. The patient will be admitted to the ICU. [DH]      ED Course User Index  [DH] Nona Cruz, MARIEL     MDM  On evaluation the patient is mildly distressed 30 is tachycardic and tachypneic. An immediate concern that the patient may be in DKA. Fluid boluses were ordered and EKG was obtained which did demonstrate peaked T waves. Laboratory studies confirmed the patient to be in DKA and he was given regular insulin bolus as well as drip initiated. Repeat studies will be ordered 2 hours after the drip starts. Patient will be kept nothing by mouth as his bicarb is only 7. Patient will need admitted to the [facility] intensive care unit. This plan of care was discussed with the patient is agreeable to this plan. I called and discussed the case with Dr. Osman Miramontes. She is agreeable to accepting the patient to her service and we'll place the patient in the intensive care unit. At the time of disposition and transition to the floor patient's condition remains critical.      CRITICAL CARE TIME   Critical Care:  The high probability of sudden, clinically significant deterioration in the patient's condition required the highest level of my preparedness to intervene urgently. The services I provided to this patient were to treat and/or prevent clinically significant deterioration. Services included the following: chart data review, reviewing nursing notes and/or old charts, documentation time, consultant collaboration regarding findings and treatment options, medication orders and management, direct patient care, vital sign assessments and ordering, interpreting and reviewing diagnostic studies/lab tests. Aggregate critical care time includes only time during which I was engaged in work directly related to the patient's care, as described above, whether at the bedside or elsewhere in the Emergency Department. It did not include time spent performing other reported procedures. Critical Care: 43 minutes. CONSULTS:  IP CONSULT TO HOSPITALIST    PROCEDURES:  Unless otherwise noted below, none     Procedures    FINAL IMPRESSION      1. Diabetic ketoacidosis without coma associated with type 1 diabetes mellitus ([facility]    2. Heroin abuse          DISPOSITION/PLAN   DISPOSITION Decision to Admit    PATIENT REFERRED TO:  No follow-up provider specified.     DISCHARGE MEDICATIONS:  New Prescriptions    No medications on file          (Please note that portions of this note were completed with a voice recognition program.  Efforts were made to edit the dictations but occasionally words are mis-transcribed.)    Fritz Mustafa CNP (electronically signed)  Attending Emergency Physician         Malcom Oliveira  11/09/17 0740

## 2017-11-09 NOTE — ED TRIAGE NOTES
Patient complaining of generalized pain all over but worse in chest, back and abdomen  Ekg obtained, Accucheck read high  Patient states has been out of his insulin for 3 months

## 2017-11-09 NOTE — ED NOTES
Up to void pale yellow urine UA sent c/o all over cramping Addison James NP informed     Michael Camacho RN  11/09/17 7021

## 2017-11-09 NOTE — FLOWSHEET NOTE
Pt received from er via w/c accompanied by this nurse.  Pt a/ox3 no c/o pain and is w/o s/s of any distress, IVF infusing with Insulin gtt

## 2017-11-09 NOTE — ED NOTES
Bed: 11  Expected date: 11/9/17  Expected time: 5:44 AM  Means of arrival: [facility]  Comments:  20-30m heroin withdrawal, dm, ot [id number] Trae Rasmussen, RN  11/09/17 0547

## 2017-11-09 NOTE — H&P
BMI 24.21 kg/m²   General appearance: alert, appears stated age and cooperative  Skin: Skin color, texture, turgor normal. No rashes or lesions  HEENT: Head: Normal, normocephalic, atraumatic. Neck: no adenopathy, no JVD, supple, symmetrical, trachea midline and thyroid not enlarged, symmetric, no tenderness/mass/nodules  Lungs: clear to auscultation bilaterally  Heart: regular rate and rhythm, S1, S2 normal, no murmur, click, rub or gallop  Abdomen: soft, non-tender; bowel sounds normal; no masses,  no organomegaly  Extremities: extremities normal, atraumatic, no cyanosis or edema  Neurologic: Mental status: alertness: lethargic    Recent Labs      11/09/17 0615   WBC  25.7*   HGB  17.5   PLT  328     Recent Labs      11/09/17 0615 11/09/17   0731   NA  136   --    K  6.1*   --    CL  84*   --    CO2  7*   --    BUN  27*   --    CREATININE  1.75*   --    GLUCOSE  636*  563     Troponin T:   Recent Labs      11/09/17 0615   TROPONINI  <0.010       ABGs:   Lab Results   Component Value Date    PHART 7.322 04/11/2015    PO2ART 79 04/11/2015    OAR0CCS 38 04/11/2015     INR: No results for input(s): INR in the last 72 hours. URINALYSIS:  Recent Labs      11/09/17 0615   COLORU  Yellow   PHUR  5.5   CLARITYU  Clear   SPECGRAV  1.022   LEUKOCYTESUR  Negative   UROBILINOGEN  0.2   BILIRUBINUR  Negative   BLOODU  TRACE*   GLUCOSEU  >=1000*     -----------------------------------------------------------------   Xr Chest Portable    Result Date: 11/9/2017  EXAMINATION: CHEST PORTABLE VIEW  CLINICAL HISTORY: Midsternal chest pain for 2 days COMPARISONS: May 25, 2016  FINDINGS: Single  views of the chest is submitted. The cardiac silhouette is of normal size configuration. The mediastinum is unremarkable. Pulmonary vascular unremarkable. Right sided trachea. No focal infiltrates. No Pneumothoraces.                                                                                    NO ACUTE ACTIVE CARDIOPULMONARY

## 2017-11-10 NOTE — CONSULTS
heroin. ALLERGIES:  Include DUST MITE, MIRTAZAPINE, OXCARBAZEPINE. HOME MEDICATIONS:  Included Ventolin inhaler, Seroquel, Lantus 40 at  bedtime, NovoLog 15-20 at each meals, melatonin. REVIEW OF SYSTEMS:  Other than generalized body aches, hyperglycemia,  14-point of review of system was negative. PHYSICAL EXAMINATION:  GENERAL:  The patient is alert, awake, appears anxious. VITAL SIGNS:  Blood pressure was 127/70, pulse rate was 111, respiratory  was 15, temperature was 97.8. NECK:  Supple. No goiter. Thyromegaly was noted. The tongue was moist.  LUNGS:  Clear to auscultation. HEART:  Sounds were showing tachycardia. ABDOMEN:  Soft, nonobese, nontender. Bowel sounds are present. EXTREMITIES:  Reveal no edema. Scratch is noted over his left arm,  probably due to drug needle tracks. ASSESSMENT:  DKA, type 1 diabetes, dehydration, poor compliance. Polysubstance abuse. PLAN:  Continue the patient on IV fluids, IV insulin drip till he is out of  DKA and then after that we will switch him to Lantus and NovoLog  compliance. Discussed with the patient and advised the patient also to  quit taking different drugs. Advance diet to 2000 kilocalories for now. Monitor BMP every 4-6 hours. Thank you for the consult.   Total time spent  was 50 minutes        Severo Bickers, MD    D: 11/09/2017 15:34:54       T: 11/09/2017 15:52:17     [id number]  Job#: [id number]     Doc#: [id number]

## 2017-11-13 RX ORDER — QUETIAPINE FUMARATE 100 MG/1
100 TABLET, FILM COATED ORAL NIGHTLY
Qty: 30 TABLET | Refills: 3 | Status: ON HOLD | OUTPATIENT
Start: 2017-11-13 | End: 2018-02-06 | Stop reason: HOSPADM

## 2017-11-13 RX ORDER — LANCETS 30 GAUGE
EACH MISCELLANEOUS
Qty: 100 EACH | Refills: 3 | Status: SHIPPED | OUTPATIENT
Start: 2017-11-13 | End: 2019-10-01

## 2017-11-13 NOTE — TELEPHONE ENCOUNTER
Spoke to pt. Pt states that he was not given anything at hospital nor is psych giving him anything for this? Pt states that he has been with [facility] for a yr? Pt states that he needs something, helps with insomnia? Pt also requesting Diabetic supplies.  Notes that he is without machine and that was the main reason he was in hospital?

## 2017-11-15 NOTE — PROGRESS NOTES
Results in chart
Years: 5.00     Types: Cigarettes    Smokeless tobacco: Never Used      Comment: pt aware of risk    Alcohol use 0.0 oz/week      Comment: occasionally    Drug use:      Types: Methamphetamines, Marijuana    Sexual activity: Not Asked     Other Topics Concern    None     Social History Narrative    ** Merged History Encounter **          Current Outpatient Prescriptions on File Prior to Visit   Medication Sig Dispense Refill    insulin glargine (LANTUS SOLOSTAR) 100 UNIT/ML injection pen 40 units at bedtime 15 Pen 3    insulin aspart (NOVOLOG FLEXPEN) 100 UNIT/ML injection pen 15-20 units at each meals 10 Pen 3    Insulin Pen Needle (NOVOFINE) 32G X 6 MM MISC qid 300 each 3    Blood Glucose Monitoring Suppl JUAN 1 each by Does not apply route 4 times daily DX: E10.65, IDDM (please dispense covered brand) 1 Device 0    Glucose Blood (BLOOD GLUCOSE TEST STRIPS) STRP 1 each by In Vitro route 4 times daily DX: E10.65, IDDM (please dispense covered brand) 150 strip 6    Lancets MISC 1 each by Does not apply route 4 times daily DX: E10.65, IDDM (please dispense covered brand) 150 each 6    insulin aspart (NOVOLOG) 100 UNIT/ML injection vial USE PER INSULIN PUMP MAXIMUM DOSE 100 UNITS DAILY please give 3 vials per 30 days 3 vial 3    Blood Glucose Monitoring Suppl (LUIS CONTOUR NEXT LINK) W/DEVICE KIT As directed 1 kit 00    Blood Glucose Monitoring Suppl (LDR BLOOD GLUCOSE TRUETEST) W/DEVICE KIT 1 each by Does not apply route 4 times daily DX :  E10.9, IDDM 1 kit prn    melatonin 3 MG TABS tablet Take 3 mg by mouth nightly. No current facility-administered medications on file prior to visit. Allergies   Allergen Reactions    Dust Mite Extract      nasal & sinus congestion, itchy watery eyes, sneezing    Mirtazapine      blisters    Other      dander causes him to sneeze, have nasal/sinus congestion, itchy, watery eyes.     Oxcarbazepine Rash       Review of Systems   Gastrointestinal:

## 2017-11-16 ENCOUNTER — OFFICE VISIT (OUTPATIENT)
Dept: SURGERY | Age: 22
End: 2017-11-16

## 2017-11-16 VITALS
WEIGHT: 141 LBS | BODY MASS INDEX: 22.66 KG/M2 | DIASTOLIC BLOOD PRESSURE: 82 MMHG | HEART RATE: 97 BPM | HEIGHT: 66 IN | SYSTOLIC BLOOD PRESSURE: 132 MMHG

## 2017-11-16 DIAGNOSIS — E10.65 HYPERGLYCEMIA DUE TO TYPE 1 DIABETES MELLITUS (HCC): Primary | ICD-10-CM

## 2017-11-16 LAB — GLUCOSE BLD-MCNC: 335 MG/DL

## 2017-11-16 PROCEDURE — 82962 GLUCOSE BLOOD TEST: CPT | Performed by: INTERNAL MEDICINE

## 2017-11-16 PROCEDURE — 99213 OFFICE O/P EST LOW 20 MIN: CPT | Performed by: INTERNAL MEDICINE

## 2017-11-16 RX ORDER — LANCETS 28 GAUGE
1 EACH MISCELLANEOUS DAILY
Qty: 300 EACH | Refills: 3 | Status: SHIPPED | OUTPATIENT
Start: 2017-11-16 | End: 2019-10-01

## 2017-11-16 RX ORDER — BLOOD-GLUCOSE METER
KIT MISCELLANEOUS
Qty: 1 DEVICE | Refills: 0 | Status: SHIPPED | OUTPATIENT
Start: 2017-11-16 | End: 2018-09-20 | Stop reason: ALTCHOICE

## 2017-11-24 NOTE — PROGRESS NOTES
Subjective:      Patient ID: Jessica Gar is a 23 y.o. male. Diabetes   He presents for his follow-up diabetic visit. He has type 1 diabetes mellitus. His disease course has been fluctuating. Hypoglycemia symptoms include dizziness, hunger, nervousness/anxiousness, sweats and tremors. Associated symptoms include fatigue. Hypoglycemia complications include required assistance. Symptoms are worsening. Risk factors for coronary artery disease include diabetes mellitus and male sex. Current diabetic treatment includes insulin injections. He is compliant with treatment most of the time (poor complaince ). He is currently taking insulin pre-breakfast, pre-lunch, pre-dinner and at bedtime. Insulin injections are given by patient. His weight is fluctuating minimally. Blood glucose monitoring compliance is poor. His home blood glucose trend is fluctuating minimally. His breakfast blood glucose range is generally >200 mg/dl. His highest blood glucose is >200 mg/dl. His overall blood glucose range is >200 mg/dl.  (Lab Results       Component                Value               Date                       LABA1C                   9.6 (H)             11/09/2017            )     Patient Active Problem List   Diagnosis    DKA, type 1, not at goal [facility])    ADHD (attention deficit hyperactivity disorder)    Oppositional defiant disorder    Seasonal allergies    Asthma    Atopic rhinitis    Closed fracture of neck of metacarpal bone    Congenital anomaly of cerebrovascular system    Generalized convulsive epilepsy ([address]    Contusion of hand    Closed fracture of base of other metacarpal bone(s)    Atopic dermatitis    Adult behavior problems    Congenital vascular nevus    Epilepsy ([address]    Congenital vascular hamartomas    Generalized tonic clonic epilepsy ([address]    Asperger syndrome    Chemical dependency ([address]    History of oppositional defiant disorder    Hyperglycemia due to type 1 diabetes mellitus ([address]  Metabolic acidosis with increased anion gap and accumulation of organic acids    Lactic acid acidosis    Dehydration    Hyperkalemia, diminished renal excretion    Hyperkalemia, transcellular shifts    [facility]WIN [facility]acute [facility]) [facility]    Heroin abuse       Current Outpatient Prescriptions:     insulin glargine (LANTUS SOLOSTAR) 100 UNIT/ML injection pen, 50 units at bedtime, Disp: 15 Pen, Rfl: 3    insulin aspart (NOVOLOG FLEXPEN) 100 UNIT/ML injection pen, 15-20 units at each meals, Disp: 10 Pen, Rfl: 3    Insulin Pen Needle (NOVOFINE) 32G X 6 MM MISC, qid, Disp: 300 each, Rfl: 3    Blood Glucose Monitoring Suppl (FREESTYLE LITE) JUAN, As directed, Disp: 1 Device, Rfl: 0    FREESTYLE LANCETS MISC, 1 each by Does not apply route daily, Disp: 300 each, Rfl: 3    glucose blood VI test strips (FREESTYLE LITE) strip, Qid, Disp: 200 each, Rfl: 3    Blood Glucose Monitoring Suppl JUAN, 1 kit by Does not apply route 3 times daily BRAND Covered by INS, Disp: 1 Device, Rfl: 0    Lancets MISC, testing TID, please order Brand covered by  INS, Disp: 100 each, Rfl: 3    glucose blood VI test strips (ASCENSIA AUTODISC VI;ONE TOUCH ULTRA TEST VI) strip, 1 each by In Vitro route daily TID, please order brand covered by INS, Disp: 100 each, Rfl: 3    QUEtiapine (SEROQUEL) 100 MG tablet, Take 1 tablet by mouth nightly, Disp: 30 tablet, Rfl: 3    albuterol sulfate HFA (VENTOLIN HFA) 108 (90 Base) MCG/ACT inhaler, Inhale 2 puffs into the lungs every 6 hours as needed for Wheezing, Disp: 1 Inhaler, Rfl: 3    Blood Glucose Monitoring Suppl (LUIS CONTOUR NEXT LINK) W/DEVICE KIT, As directed, Disp: 1 kit, Rfl: 00    Blood Glucose Monitoring Suppl (LDR BLOOD GLUCOSE TRUETEST) W/DEVICE KIT, 1 each by Does not apply route 4 times daily DX :  E10.9, IDDM, Disp: 1 kit, Rfl: prn    melatonin 3 MG TABS tablet, Take 3 mg by mouth nightly., Disp: , Rfl:       Review of Systems   Constitutional: Positive for fatigue.

## 2017-12-11 ENCOUNTER — OFFICE VISIT (OUTPATIENT)
Dept: FAMILY MEDICINE CLINIC | Age: 22
End: 2017-12-11

## 2017-12-11 VITALS
BODY MASS INDEX: 23.3 KG/M2 | DIASTOLIC BLOOD PRESSURE: 68 MMHG | WEIGHT: 145 LBS | HEIGHT: 66 IN | SYSTOLIC BLOOD PRESSURE: 116 MMHG | OXYGEN SATURATION: 98 % | HEART RATE: 86 BPM | TEMPERATURE: 97.4 F

## 2017-12-11 DIAGNOSIS — R76.8 HEPATITIS C ANTIBODY POSITIVE IN BLOOD: Primary | ICD-10-CM

## 2017-12-11 DIAGNOSIS — E10.10 DKA, TYPE 1, NOT AT GOAL (HCC): ICD-10-CM

## 2017-12-11 DIAGNOSIS — E10.65 HYPERGLYCEMIA DUE TO TYPE 1 DIABETES MELLITUS (HCC): ICD-10-CM

## 2017-12-11 PROCEDURE — G8427 DOCREV CUR MEDS BY ELIG CLIN: HCPCS | Performed by: NURSE PRACTITIONER

## 2017-12-11 PROCEDURE — G8484 FLU IMMUNIZE NO ADMIN: HCPCS | Performed by: NURSE PRACTITIONER

## 2017-12-11 PROCEDURE — G8420 CALC BMI NORM PARAMETERS: HCPCS | Performed by: NURSE PRACTITIONER

## 2017-12-11 PROCEDURE — 3046F HEMOGLOBIN A1C LEVEL >9.0%: CPT | Performed by: NURSE PRACTITIONER

## 2017-12-11 PROCEDURE — 4004F PT TOBACCO SCREEN RCVD TLK: CPT | Performed by: NURSE PRACTITIONER

## 2017-12-11 PROCEDURE — 99212 OFFICE O/P EST SF 10 MIN: CPT | Performed by: NURSE PRACTITIONER

## 2017-12-11 ASSESSMENT — ENCOUNTER SYMPTOMS
RESPIRATORY NEGATIVE: 1
ABDOMINAL PAIN: 1

## 2017-12-11 NOTE — PROGRESS NOTES
Date    ADHD (attention deficit hyperactivity disorder)     Asperger syndrome     Asthma     Asthma 3/31/2015    Chemical dependency ([facility]     marijuana    Diabetes mellitus ([facility]     Diabetes mellitus type 1 ([facility]     Hyperkalemia, diminished renal excretion 11/9/2017    Mental disorder     ODD (oppositional defiant disorder)     Seasonal allergies 3/31/2015    Seizures ([facility]      No past surgical history on file.   Family History   Problem Relation Age of Onset    Cancer Maternal Aunt      breast    Diabetes Maternal Uncle     Diabetes Paternal Uncle     Cancer Maternal Grandmother     Diabetes Maternal Grandmother     Cancer Maternal Grandfather     Diabetes Maternal Grandfather      Social History     Social History    Marital status: Single     Spouse name: N/A    Number of children: N/A    Years of education: N/A     Social History Main Topics    Smoking status: Current Every Day Smoker     Packs/day: 1.00     Years: 5.00     Types: Cigarettes    Smokeless tobacco: Never Used      Comment: pt aware of risk    Alcohol use 0.0 oz/week      Comment: occasionally    Drug use: Yes     Types: Methamphetamines, Marijuana, IV      Comment: heroin     Sexual activity: No     Other Topics Concern    None     Social History Narrative    ** Merged History Encounter **          Current Outpatient Prescriptions on File Prior to Visit   Medication Sig Dispense Refill    insulin glargine (LANTUS SOLOSTAR) 100 UNIT/ML injection pen 50 units at bedtime 15 Pen 3    insulin aspart (NOVOLOG FLEXPEN) 100 UNIT/ML injection pen 15-20 units at each meals 10 Pen 3    Insulin Pen Needle (NOVOFINE) 32G X 6 MM MISC qid 300 each 3    Blood Glucose Monitoring Suppl (FREESTYLE LITE) JUAN As directed 1 Device 0    FREESTYLE LANCETS MISC 1 each by Does not apply route daily 300 each 3    glucose blood VI test strips (FREESTYLE LITE) strip Qid 200 each 3    Blood Glucose Monitoring Suppl JUAN 1 kit by Does not

## 2017-12-20 ENCOUNTER — OFFICE VISIT (OUTPATIENT)
Dept: SURGERY | Age: 22
End: 2017-12-20

## 2017-12-20 VITALS
DIASTOLIC BLOOD PRESSURE: 68 MMHG | BODY MASS INDEX: 23.14 KG/M2 | SYSTOLIC BLOOD PRESSURE: 111 MMHG | WEIGHT: 144 LBS | HEIGHT: 66 IN | HEART RATE: 70 BPM

## 2017-12-20 LAB — GLUCOSE BLD-MCNC: 600 MG/DL

## 2017-12-20 PROCEDURE — G8428 CUR MEDS NOT DOCUMENT: HCPCS | Performed by: INTERNAL MEDICINE

## 2017-12-20 PROCEDURE — 3046F HEMOGLOBIN A1C LEVEL >9.0%: CPT | Performed by: INTERNAL MEDICINE

## 2017-12-20 PROCEDURE — 4004F PT TOBACCO SCREEN RCVD TLK: CPT | Performed by: INTERNAL MEDICINE

## 2017-12-20 PROCEDURE — 99213 OFFICE O/P EST LOW 20 MIN: CPT | Performed by: INTERNAL MEDICINE

## 2017-12-20 PROCEDURE — G8484 FLU IMMUNIZE NO ADMIN: HCPCS | Performed by: INTERNAL MEDICINE

## 2017-12-20 PROCEDURE — 82962 GLUCOSE BLOOD TEST: CPT | Performed by: INTERNAL MEDICINE

## 2017-12-20 PROCEDURE — G8420 CALC BMI NORM PARAMETERS: HCPCS | Performed by: INTERNAL MEDICINE

## 2017-12-27 NOTE — PROGRESS NOTES
Subjective:      Patient ID: Kartik Tellez is a 23 y.o. male. Diabetes   He presents for his follow-up diabetic visit. He has type 1 diabetes mellitus. His disease course has been fluctuating. Hypoglycemia symptoms include dizziness, hunger, nervousness/anxiousness, sweats and tremors. Hypoglycemia complications include required assistance. Symptoms are worsening. Risk factors for coronary artery disease include diabetes mellitus and male sex. Current diabetic treatment includes insulin injections. He is compliant with treatment most of the time (poor complaince ). He is currently taking insulin pre-breakfast, pre-lunch, pre-dinner and at bedtime. Insulin injections are given by patient. His weight is fluctuating minimally. Blood glucose monitoring compliance is poor. His home blood glucose trend is fluctuating minimally. His breakfast blood glucose range is generally >200 mg/dl. His highest blood glucose is >200 mg/dl. His overall blood glucose range is >200 mg/dl.  (Poor compliance glucose was 600 plus today )     Patient Active Problem List   Diagnosis    DKA, type 1, not at goal [facility])    ADHD (attention deficit hyperactivity disorder)    Oppositional defiant disorder    Seasonal allergies    Asthma    Atopic rhinitis    Closed fracture of neck of metacarpal bone    Congenital anomaly of cerebrovascular system    Generalized convulsive epilepsy ([address]    Contusion of hand    Closed fracture of base of other metacarpal bone(s)    Atopic dermatitis    Adult behavior problems    Congenital vascular nevus    Epilepsy ([address]    Congenital vascular hamartomas    Generalized tonic clonic epilepsy ([address]    Asperger syndrome    Chemical dependency ([address]    History of oppositional defiant disorder    Hyperglycemia due to type 1 diabetes mellitus ([address]    Metabolic acidosis with increased anion gap and accumulation of organic acids    Lactic acid acidosis    Dehydration    Hyperkalemia, diminished Site: Right Arm   Position: Sitting   Cuff Size: Medium Adult   Pulse: 70   Weight: 144 lb (65.3 kg)   Height: 5' 6\" (1.676 m)       Objective:   Physical Exam   Constitutional: He appears well-developed and well-nourished. HENT:   Head: Normocephalic and atraumatic. Eyes: Conjunctivae are normal.   Neck: Neck supple. Cardiovascular: Normal rate. Pulmonary/Chest: Effort normal.   Musculoskeletal: Normal range of motion. Neurological: He is alert. Psychiatric: His mood appears anxious. Assessment:      1.  DM w/ coma type I, uncontrolled ([address]  Basic Metabolic Panel            Plan:        Orders Placed This Encounter   Procedures    Basic Metabolic Panel     Standing Status:   Future     Standing Expiration Date:   12/20/2018    POCT Glucose     continue current insulin regimen   compliance stressed   Discussed compliances of uncontrolled diabetes

## 2018-01-23 ENCOUNTER — OFFICE VISIT (OUTPATIENT)
Dept: ENDOCRINOLOGY | Age: 23
End: 2018-01-23
Payer: MEDICARE

## 2018-01-23 VITALS
WEIGHT: 152 LBS | HEART RATE: 90 BPM | DIASTOLIC BLOOD PRESSURE: 64 MMHG | BODY MASS INDEX: 24.43 KG/M2 | HEIGHT: 66 IN | SYSTOLIC BLOOD PRESSURE: 97 MMHG

## 2018-01-23 LAB — GLUCOSE BLD-MCNC: 57 MG/DL

## 2018-01-23 PROCEDURE — G8420 CALC BMI NORM PARAMETERS: HCPCS | Performed by: INTERNAL MEDICINE

## 2018-01-23 PROCEDURE — 99213 OFFICE O/P EST LOW 20 MIN: CPT | Performed by: INTERNAL MEDICINE

## 2018-01-23 PROCEDURE — 3046F HEMOGLOBIN A1C LEVEL >9.0%: CPT | Performed by: INTERNAL MEDICINE

## 2018-01-23 PROCEDURE — G8427 DOCREV CUR MEDS BY ELIG CLIN: HCPCS | Performed by: INTERNAL MEDICINE

## 2018-01-23 PROCEDURE — 4004F PT TOBACCO SCREEN RCVD TLK: CPT | Performed by: INTERNAL MEDICINE

## 2018-01-23 PROCEDURE — G8484 FLU IMMUNIZE NO ADMIN: HCPCS | Performed by: INTERNAL MEDICINE

## 2018-01-23 PROCEDURE — 82962 GLUCOSE BLOOD TEST: CPT | Performed by: INTERNAL MEDICINE

## 2018-01-31 ENCOUNTER — APPOINTMENT (OUTPATIENT)
Dept: GENERAL RADIOLOGY | Age: 23
DRG: 638 | End: 2018-01-31
Payer: MEDICARE

## 2018-01-31 ENCOUNTER — HOSPITAL ENCOUNTER (INPATIENT)
Age: 23
LOS: 1 days | Discharge: PSYCHIATRIC OR SUBSTANCE USE FACILITY | DRG: 638 | End: 2018-02-01
Attending: EMERGENCY MEDICINE | Admitting: INTERNAL MEDICINE
Payer: MEDICARE

## 2018-01-31 DIAGNOSIS — R45.851 SUICIDAL IDEATION: ICD-10-CM

## 2018-01-31 DIAGNOSIS — E08.10 DIABETIC KETOACIDOSIS WITHOUT COMA ASSOCIATED WITH DIABETES MELLITUS DUE TO UNDERLYING CONDITION (HCC): ICD-10-CM

## 2018-01-31 DIAGNOSIS — F32.1 MODERATE SINGLE CURRENT EPISODE OF MAJOR DEPRESSIVE DISORDER (HCC): ICD-10-CM

## 2018-01-31 DIAGNOSIS — F19.10 POLYSUBSTANCE ABUSE (HCC): Primary | ICD-10-CM

## 2018-01-31 LAB
ACETAMINOPHEN LEVEL: <15 UG/ML (ref 10–30)
ALBUMIN SERPL-MCNC: 4.6 G/DL (ref 3.9–4.9)
ALP BLD-CCNC: 85 U/L (ref 35–104)
ALT SERPL-CCNC: 11 U/L (ref 0–41)
AMPHETAMINE SCREEN, URINE: ABNORMAL
ANION GAP SERPL CALCULATED.3IONS-SCNC: 32 MEQ/L (ref 7–13)
AST SERPL-CCNC: 12 U/L (ref 0–40)
BARBITURATE SCREEN URINE: ABNORMAL
BASE EXCESS ARTERIAL: -10 (ref -3–3)
BASOPHILS ABSOLUTE: 0.1 K/UL (ref 0–0.2)
BASOPHILS RELATIVE PERCENT: 0.8 %
BENZODIAZEPINE SCREEN, URINE: ABNORMAL
BILIRUB SERPL-MCNC: 0.6 MG/DL (ref 0–1.2)
BILIRUBIN URINE: NEGATIVE
BLOOD, URINE: NEGATIVE
BUN BLDV-MCNC: 19 MG/DL (ref 6–20)
CALCIUM SERPL-MCNC: 9.7 MG/DL (ref 8.6–10.2)
CANNABINOID SCREEN URINE: POSITIVE
CHLORIDE BLD-SCNC: 88 MEQ/L (ref 98–107)
CLARITY: CLEAR
CO2: 14 MEQ/L (ref 22–29)
COCAINE METABOLITE SCREEN URINE: POSITIVE
COLOR: YELLOW
CREAT SERPL-MCNC: 0.96 MG/DL (ref 0.7–1.2)
EKG ATRIAL RATE: 109 BPM
EKG P AXIS: 68 DEGREES
EKG P-R INTERVAL: 114 MS
EKG Q-T INTERVAL: 340 MS
EKG QRS DURATION: 106 MS
EKG QTC CALCULATION (BAZETT): 457 MS
EKG R AXIS: 68 DEGREES
EKG T AXIS: 18 DEGREES
EKG VENTRICULAR RATE: 109 BPM
EOSINOPHILS ABSOLUTE: 0.1 K/UL (ref 0–0.7)
EOSINOPHILS RELATIVE PERCENT: 1.5 %
ETHANOL PERCENT: NORMAL G/DL
ETHANOL: <10 MG/DL (ref 0–0.08)
GFR AFRICAN AMERICAN: >60
GFR NON-AFRICAN AMERICAN: >60
GLOBULIN: 2.5 G/DL (ref 2.3–3.5)
GLUCOSE BLD-MCNC: 318 MG/DL
GLUCOSE BLD-MCNC: 398 MG/DL (ref 60–115)
GLUCOSE BLD-MCNC: 523 MG/DL
GLUCOSE BLD-MCNC: 523 MG/DL (ref 60–115)
GLUCOSE BLD-MCNC: 531 MG/DL (ref 74–109)
GLUCOSE URINE: >=1000 MG/DL
HCO3 ARTERIAL: 16.3 MMOL/L (ref 21–29)
HCT VFR BLD CALC: 43.2 % (ref 42–52)
HEMOGLOBIN: 14.5 G/DL (ref 14–18)
KETONES, URINE: >=80 MG/DL
LACTATE: 0.94 MMOL/L (ref 0.4–2)
LACTIC ACID: 2 MMOL/L (ref 0.5–2.2)
LEUKOCYTE ESTERASE, URINE: NEGATIVE
LYMPHOCYTES ABSOLUTE: 1.6 K/UL (ref 1–4.8)
LYMPHOCYTES RELATIVE PERCENT: 24.3 %
Lab: ABNORMAL
MCH RBC QN AUTO: 30.7 PG (ref 27–31.3)
MCHC RBC AUTO-ENTMCNC: 33.6 % (ref 33–37)
MCV RBC AUTO: 91.1 FL (ref 80–100)
MONOCYTES ABSOLUTE: 0.4 K/UL (ref 0.2–0.8)
MONOCYTES RELATIVE PERCENT: 6.2 %
NEUTROPHILS ABSOLUTE: 4.4 K/UL (ref 1.4–6.5)
NEUTROPHILS RELATIVE PERCENT: 67.2 %
NITRITE, URINE: NEGATIVE
O2 SAT, ARTERIAL: 98 % (ref 93–100)
OPIATE SCREEN URINE: ABNORMAL
PCO2 ARTERIAL: 32 MM HG (ref 35–45)
PDW BLD-RTO: 13.2 % (ref 11.5–14.5)
PERFORMED ON: ABNORMAL
PH ARTERIAL: 7.32 (ref 7.35–7.45)
PH UA: 5.5 (ref 5–9)
PHENCYCLIDINE SCREEN URINE: ABNORMAL
PLATELET # BLD: 224 K/UL (ref 130–400)
PO2 ARTERIAL: 103 MM HG (ref 75–108)
POC SAMPLE TYPE: ABNORMAL
POTASSIUM SERPL-SCNC: 4 MEQ/L (ref 3.5–5.1)
PROTEIN UA: NEGATIVE MG/DL
RBC # BLD: 4.74 M/UL (ref 4.7–6.1)
SALICYLATE, SERUM: <0.3 MG/DL (ref 15–30)
SODIUM BLD-SCNC: 134 MEQ/L (ref 132–144)
SPECIFIC GRAVITY UA: 1.03 (ref 1–1.03)
TCO2 ARTERIAL: 17 (ref 22–29)
TOTAL PROTEIN: 7.1 G/DL (ref 6.4–8.1)
URINE REFLEX TO CULTURE: ABNORMAL
UROBILINOGEN, URINE: 0.2 E.U./DL
WBC # BLD: 6.5 K/UL (ref 4.5–11)

## 2018-01-31 PROCEDURE — 80053 COMPREHEN METABOLIC PANEL: CPT

## 2018-01-31 PROCEDURE — 80307 DRUG TEST PRSMV CHEM ANLYZR: CPT

## 2018-01-31 PROCEDURE — 93005 ELECTROCARDIOGRAM TRACING: CPT

## 2018-01-31 PROCEDURE — 36415 COLL VENOUS BLD VENIPUNCTURE: CPT

## 2018-01-31 PROCEDURE — 96374 THER/PROPH/DIAG INJ IV PUSH: CPT

## 2018-01-31 PROCEDURE — 99285 EMERGENCY DEPT VISIT HI MDM: CPT

## 2018-01-31 PROCEDURE — 81003 URINALYSIS AUTO W/O SCOPE: CPT

## 2018-01-31 PROCEDURE — 36600 WITHDRAWAL OF ARTERIAL BLOOD: CPT

## 2018-01-31 PROCEDURE — 82803 BLOOD GASES ANY COMBINATION: CPT

## 2018-01-31 PROCEDURE — G0480 DRUG TEST DEF 1-7 CLASSES: HCPCS

## 2018-01-31 PROCEDURE — 6370000000 HC RX 637 (ALT 250 FOR IP): Performed by: EMERGENCY MEDICINE

## 2018-01-31 PROCEDURE — 82948 REAGENT STRIP/BLOOD GLUCOSE: CPT

## 2018-01-31 PROCEDURE — 83605 ASSAY OF LACTIC ACID: CPT

## 2018-01-31 PROCEDURE — 71045 X-RAY EXAM CHEST 1 VIEW: CPT

## 2018-01-31 PROCEDURE — 85025 COMPLETE CBC W/AUTO DIFF WBC: CPT

## 2018-01-31 PROCEDURE — 96375 TX/PRO/DX INJ NEW DRUG ADDON: CPT

## 2018-01-31 PROCEDURE — 6360000002 HC RX W HCPCS: Performed by: EMERGENCY MEDICINE

## 2018-01-31 PROCEDURE — 2580000003 HC RX 258: Performed by: EMERGENCY MEDICINE

## 2018-01-31 RX ORDER — SODIUM CHLORIDE 0.9 % (FLUSH) 0.9 %
3 SYRINGE (ML) INJECTION EVERY 8 HOURS
Status: DISCONTINUED | OUTPATIENT
Start: 2018-01-31 | End: 2018-02-01 | Stop reason: ALTCHOICE

## 2018-01-31 RX ORDER — 0.9 % SODIUM CHLORIDE 0.9 %
200 INTRAVENOUS SOLUTION INTRAVENOUS ONCE
Status: DISCONTINUED | OUTPATIENT
Start: 2018-01-31 | End: 2018-02-01 | Stop reason: HOSPADM

## 2018-01-31 RX ORDER — NALOXONE HYDROCHLORIDE 1 MG/ML
2 INJECTION INTRAMUSCULAR; INTRAVENOUS; SUBCUTANEOUS ONCE
Status: COMPLETED | OUTPATIENT
Start: 2018-01-31 | End: 2018-01-31

## 2018-01-31 RX ADMIN — NALOXONE HYDROCHLORIDE 2 MG: 1 INJECTION PARENTERAL at 22:18

## 2018-01-31 RX ADMIN — Medication 10 UNITS/HR: at 22:34

## 2018-01-31 RX ADMIN — INSULIN HUMAN 10 UNITS: 100 INJECTION, SOLUTION PARENTERAL at 22:18

## 2018-01-31 RX ADMIN — SODIUM CHLORIDE, PRESERVATIVE FREE 3 ML: 5 INJECTION INTRAVENOUS at 22:21

## 2018-02-01 ENCOUNTER — HOSPITAL ENCOUNTER (INPATIENT)
Age: 23
LOS: 5 days | Discharge: HOME | DRG: 885 | End: 2018-02-06
Attending: PSYCHIATRY & NEUROLOGY | Admitting: PSYCHIATRY & NEUROLOGY
Payer: MEDICARE

## 2018-02-01 VITALS
WEIGHT: 150 LBS | OXYGEN SATURATION: 98 % | SYSTOLIC BLOOD PRESSURE: 108 MMHG | HEIGHT: 66 IN | RESPIRATION RATE: 16 BRPM | TEMPERATURE: 98.1 F | DIASTOLIC BLOOD PRESSURE: 49 MMHG | HEART RATE: 66 BPM | BODY MASS INDEX: 24.11 KG/M2

## 2018-02-01 PROBLEM — R73.9 HYPERGLYCEMIA: Status: ACTIVE | Noted: 2018-02-01

## 2018-02-01 PROBLEM — F19.94 SUBSTANCE INDUCED MOOD DISORDER (HCC): Status: ACTIVE | Noted: 2018-02-01

## 2018-02-01 LAB
ALBUMIN SERPL-MCNC: 4 G/DL (ref 3.9–4.9)
ALBUMIN SERPL-MCNC: 4.3 G/DL (ref 3.9–4.9)
ALP BLD-CCNC: 72 U/L (ref 35–104)
ALP BLD-CCNC: 73 U/L (ref 35–104)
ALT SERPL-CCNC: 9 U/L (ref 0–41)
ALT SERPL-CCNC: 9 U/L (ref 0–41)
ANION GAP SERPL CALCULATED.3IONS-SCNC: 23 MEQ/L (ref 7–13)
ANION GAP SERPL CALCULATED.3IONS-SCNC: 27 MEQ/L (ref 7–13)
AST SERPL-CCNC: 11 U/L (ref 0–40)
AST SERPL-CCNC: 11 U/L (ref 0–40)
BILIRUB SERPL-MCNC: 0.3 MG/DL (ref 0–1.2)
BILIRUB SERPL-MCNC: 0.5 MG/DL (ref 0–1.2)
BUN BLDV-MCNC: 17 MG/DL (ref 6–20)
BUN BLDV-MCNC: 17 MG/DL (ref 6–20)
CALCIUM SERPL-MCNC: 8.6 MG/DL (ref 8.6–10.2)
CALCIUM SERPL-MCNC: 8.9 MG/DL (ref 8.6–10.2)
CHLORIDE BLD-SCNC: 96 MEQ/L (ref 98–107)
CHLORIDE BLD-SCNC: 96 MEQ/L (ref 98–107)
CO2: 13 MEQ/L (ref 22–29)
CO2: 14 MEQ/L (ref 22–29)
CREAT SERPL-MCNC: 0.93 MG/DL (ref 0.7–1.2)
CREAT SERPL-MCNC: 0.97 MG/DL (ref 0.7–1.2)
GFR AFRICAN AMERICAN: >60
GFR AFRICAN AMERICAN: >60
GFR NON-AFRICAN AMERICAN: >60
GFR NON-AFRICAN AMERICAN: >60
GLOBULIN: 2.1 G/DL (ref 2.3–3.5)
GLOBULIN: 2.2 G/DL (ref 2.3–3.5)
GLUCOSE BLD-MCNC: 134 MG/DL (ref 60–115)
GLUCOSE BLD-MCNC: 196 MG/DL (ref 60–115)
GLUCOSE BLD-MCNC: 244 MG/DL (ref 60–115)
GLUCOSE BLD-MCNC: 281 MG/DL (ref 60–115)
GLUCOSE BLD-MCNC: 318 MG/DL (ref 60–115)
GLUCOSE BLD-MCNC: 347 MG/DL (ref 74–109)
GLUCOSE BLD-MCNC: 390 MG/DL (ref 60–115)
GLUCOSE BLD-MCNC: 397 MG/DL (ref 74–109)
GLUCOSE BLD-MCNC: 410 MG/DL (ref 60–115)
HBA1C MFR BLD: 10.9 % (ref 4.8–5.9)
PERFORMED ON: ABNORMAL
POTASSIUM SERPL-SCNC: 4.3 MEQ/L (ref 3.5–5.1)
POTASSIUM SERPL-SCNC: 5.4 MEQ/L (ref 3.5–5.1)
SODIUM BLD-SCNC: 133 MEQ/L (ref 132–144)
SODIUM BLD-SCNC: 136 MEQ/L (ref 132–144)
TOTAL PROTEIN: 6.1 G/DL (ref 6.4–8.1)
TOTAL PROTEIN: 6.5 G/DL (ref 6.4–8.1)

## 2018-02-01 PROCEDURE — 6370000000 HC RX 637 (ALT 250 FOR IP): Performed by: INTERNAL MEDICINE

## 2018-02-01 PROCEDURE — 83036 HEMOGLOBIN GLYCOSYLATED A1C: CPT

## 2018-02-01 PROCEDURE — 80053 COMPREHEN METABOLIC PANEL: CPT

## 2018-02-01 PROCEDURE — S0028 INJECTION, FAMOTIDINE, 20 MG: HCPCS | Performed by: INTERNAL MEDICINE

## 2018-02-01 PROCEDURE — 6360000002 HC RX W HCPCS: Performed by: CLINICAL NURSE SPECIALIST

## 2018-02-01 PROCEDURE — 1210000000 HC MED SURG R&B

## 2018-02-01 PROCEDURE — 1240000000 HC EMOTIONAL WELLNESS R&B

## 2018-02-01 PROCEDURE — 99222 1ST HOSP IP/OBS MODERATE 55: CPT | Performed by: INTERNAL MEDICINE

## 2018-02-01 PROCEDURE — 6360000002 HC RX W HCPCS: Performed by: INTERNAL MEDICINE

## 2018-02-01 PROCEDURE — 36415 COLL VENOUS BLD VENIPUNCTURE: CPT

## 2018-02-01 PROCEDURE — 99222 1ST HOSP IP/OBS MODERATE 55: CPT | Performed by: CLINICAL NURSE SPECIALIST

## 2018-02-01 PROCEDURE — 2500000003 HC RX 250 WO HCPCS: Performed by: INTERNAL MEDICINE

## 2018-02-01 PROCEDURE — 2580000003 HC RX 258: Performed by: INTERNAL MEDICINE

## 2018-02-01 RX ORDER — BENZTROPINE MESYLATE 1 MG/ML
2 INJECTION INTRAMUSCULAR; INTRAVENOUS 2 TIMES DAILY PRN
Status: CANCELLED | OUTPATIENT
Start: 2018-02-01

## 2018-02-01 RX ORDER — DIPHENHYDRAMINE HYDROCHLORIDE 50 MG/ML
50 INJECTION INTRAMUSCULAR; INTRAVENOUS
Status: DISCONTINUED | OUTPATIENT
Start: 2018-02-01 | End: 2018-02-01

## 2018-02-01 RX ORDER — LORAZEPAM 2 MG/ML
2 INJECTION INTRAMUSCULAR
Status: CANCELLED | OUTPATIENT
Start: 2018-02-01 | End: 2018-02-01

## 2018-02-01 RX ORDER — NICOTINE 21 MG/24HR
1 PATCH, TRANSDERMAL 24 HOURS TRANSDERMAL DAILY
Status: CANCELLED | OUTPATIENT
Start: 2018-02-01

## 2018-02-01 RX ORDER — LANOLIN ALCOHOL/MO/W.PET/CERES
3 CREAM (GRAM) TOPICAL NIGHTLY
Status: DISCONTINUED | OUTPATIENT
Start: 2018-02-01 | End: 2018-02-01 | Stop reason: HOSPADM

## 2018-02-01 RX ORDER — DEXTROSE MONOHYDRATE 50 MG/ML
100 INJECTION, SOLUTION INTRAVENOUS PRN
Status: DISCONTINUED | OUTPATIENT
Start: 2018-02-01 | End: 2018-02-01 | Stop reason: HOSPADM

## 2018-02-01 RX ORDER — INSULIN GLARGINE 100 [IU]/ML
40 INJECTION, SOLUTION SUBCUTANEOUS NIGHTLY
Status: DISCONTINUED | OUTPATIENT
Start: 2018-02-01 | End: 2018-02-01 | Stop reason: HOSPADM

## 2018-02-01 RX ORDER — DEXTROSE MONOHYDRATE 25 G/50ML
12.5 INJECTION, SOLUTION INTRAVENOUS PRN
Status: DISCONTINUED | OUTPATIENT
Start: 2018-02-01 | End: 2018-02-01 | Stop reason: HOSPADM

## 2018-02-01 RX ORDER — DEXTROSE MONOHYDRATE 25 G/50ML
12.5 INJECTION, SOLUTION INTRAVENOUS PRN
Status: DISCONTINUED | OUTPATIENT
Start: 2018-02-01 | End: 2018-02-01 | Stop reason: SDUPTHER

## 2018-02-01 RX ORDER — INSULIN GLARGINE 100 [IU]/ML
40 INJECTION, SOLUTION SUBCUTANEOUS NIGHTLY
Status: DISCONTINUED | OUTPATIENT
Start: 2018-02-02 | End: 2018-02-02

## 2018-02-01 RX ORDER — HYDROXYZINE PAMOATE 50 MG/1
50 CAPSULE ORAL EVERY 6 HOURS PRN
Status: DISCONTINUED | OUTPATIENT
Start: 2018-02-01 | End: 2018-02-02

## 2018-02-01 RX ORDER — NICOTINE POLACRILEX 4 MG
15 LOZENGE BUCCAL PRN
Status: DISCONTINUED | OUTPATIENT
Start: 2018-02-01 | End: 2018-02-06 | Stop reason: HOSPADM

## 2018-02-01 RX ORDER — DIPHENHYDRAMINE HYDROCHLORIDE 50 MG/ML
50 INJECTION INTRAMUSCULAR; INTRAVENOUS
Status: CANCELLED | OUTPATIENT
Start: 2018-02-01 | End: 2018-02-01

## 2018-02-01 RX ORDER — ACETAMINOPHEN 325 MG/1
650 TABLET ORAL EVERY 4 HOURS PRN
Status: DISCONTINUED | OUTPATIENT
Start: 2018-02-01 | End: 2018-02-01 | Stop reason: HOSPADM

## 2018-02-01 RX ORDER — INSULIN GLARGINE 100 [IU]/ML
50 INJECTION, SOLUTION SUBCUTANEOUS NIGHTLY
Status: DISCONTINUED | OUTPATIENT
Start: 2018-02-01 | End: 2018-02-01

## 2018-02-01 RX ORDER — MAGNESIUM HYDROXIDE/ALUMINUM HYDROXICE/SIMETHICONE 120; 1200; 1200 MG/30ML; MG/30ML; MG/30ML
30 SUSPENSION ORAL PRN
Status: DISCONTINUED | OUTPATIENT
Start: 2018-02-01 | End: 2018-02-06 | Stop reason: HOSPADM

## 2018-02-01 RX ORDER — NICOTINE POLACRILEX 4 MG
15 LOZENGE BUCCAL PRN
Status: DISCONTINUED | OUTPATIENT
Start: 2018-02-01 | End: 2018-02-01 | Stop reason: HOSPADM

## 2018-02-01 RX ORDER — LORAZEPAM 2 MG/ML
2 INJECTION INTRAMUSCULAR PRN
Status: DISCONTINUED | OUTPATIENT
Start: 2018-02-01 | End: 2018-02-06 | Stop reason: HOSPADM

## 2018-02-01 RX ORDER — INSULIN GLARGINE 100 [IU]/ML
40 INJECTION, SOLUTION SUBCUTANEOUS NIGHTLY
Status: CANCELLED | OUTPATIENT
Start: 2018-02-01

## 2018-02-01 RX ORDER — SODIUM CHLORIDE 9 MG/ML
INJECTION, SOLUTION INTRAVENOUS CONTINUOUS
Status: DISCONTINUED | OUTPATIENT
Start: 2018-02-01 | End: 2018-02-01 | Stop reason: HOSPADM

## 2018-02-01 RX ORDER — ONDANSETRON 2 MG/ML
4 INJECTION INTRAMUSCULAR; INTRAVENOUS EVERY 6 HOURS PRN
Status: DISCONTINUED | OUTPATIENT
Start: 2018-02-01 | End: 2018-02-01 | Stop reason: HOSPADM

## 2018-02-01 RX ORDER — HALOPERIDOL 5 MG/ML
5 INJECTION INTRAMUSCULAR EVERY 4 HOURS PRN
Status: DISCONTINUED | OUTPATIENT
Start: 2018-02-01 | End: 2018-02-01 | Stop reason: HOSPADM

## 2018-02-01 RX ORDER — LORAZEPAM 2 MG/ML
2 INJECTION INTRAMUSCULAR
Status: DISCONTINUED | OUTPATIENT
Start: 2018-02-01 | End: 2018-02-01

## 2018-02-01 RX ORDER — NICOTINE POLACRILEX 4 MG
15 LOZENGE BUCCAL PRN
Status: CANCELLED | OUTPATIENT
Start: 2018-02-01

## 2018-02-01 RX ORDER — SODIUM CHLORIDE 0.9 % (FLUSH) 0.9 %
10 SYRINGE (ML) INJECTION EVERY 12 HOURS SCHEDULED
Status: DISCONTINUED | OUTPATIENT
Start: 2018-02-01 | End: 2018-02-01 | Stop reason: HOSPADM

## 2018-02-01 RX ORDER — NICOTINE POLACRILEX 4 MG
15 LOZENGE BUCCAL PRN
Status: DISCONTINUED | OUTPATIENT
Start: 2018-02-01 | End: 2018-02-01 | Stop reason: SDUPTHER

## 2018-02-01 RX ORDER — TRAZODONE HYDROCHLORIDE 50 MG/1
50 TABLET ORAL NIGHTLY PRN
Status: DISCONTINUED | OUTPATIENT
Start: 2018-02-01 | End: 2018-02-03

## 2018-02-01 RX ORDER — HALOPERIDOL 5 MG/ML
5 INJECTION INTRAMUSCULAR EVERY 4 HOURS PRN
Status: CANCELLED | OUTPATIENT
Start: 2018-02-01

## 2018-02-01 RX ORDER — TRAZODONE HYDROCHLORIDE 50 MG/1
50 TABLET ORAL NIGHTLY PRN
Status: CANCELLED | OUTPATIENT
Start: 2018-02-01

## 2018-02-01 RX ORDER — HALOPERIDOL 5 MG/ML
5 INJECTION INTRAMUSCULAR EVERY 4 HOURS PRN
Status: DISCONTINUED | OUTPATIENT
Start: 2018-02-01 | End: 2018-02-02

## 2018-02-01 RX ORDER — ACETAMINOPHEN 325 MG/1
650 TABLET ORAL EVERY 4 HOURS PRN
Status: CANCELLED | OUTPATIENT
Start: 2018-02-01

## 2018-02-01 RX ORDER — HALOPERIDOL 5 MG
5 TABLET ORAL EVERY 4 HOURS PRN
Status: DISCONTINUED | OUTPATIENT
Start: 2018-02-01 | End: 2018-02-01 | Stop reason: HOSPADM

## 2018-02-01 RX ORDER — MAGNESIUM HYDROXIDE/ALUMINUM HYDROXICE/SIMETHICONE 120; 1200; 1200 MG/30ML; MG/30ML; MG/30ML
30 SUSPENSION ORAL PRN
Status: CANCELLED | OUTPATIENT
Start: 2018-02-01

## 2018-02-01 RX ORDER — HYDROXYZINE PAMOATE 25 MG/1
50 CAPSULE ORAL EVERY 6 HOURS PRN
Status: CANCELLED | OUTPATIENT
Start: 2018-02-01

## 2018-02-01 RX ORDER — DEXTROSE MONOHYDRATE 50 MG/ML
100 INJECTION, SOLUTION INTRAVENOUS PRN
Status: DISCONTINUED | OUTPATIENT
Start: 2018-02-01 | End: 2018-02-01 | Stop reason: SDUPTHER

## 2018-02-01 RX ORDER — SODIUM CHLORIDE 0.9 % (FLUSH) 0.9 %
10 SYRINGE (ML) INJECTION PRN
Status: DISCONTINUED | OUTPATIENT
Start: 2018-02-01 | End: 2018-02-01 | Stop reason: HOSPADM

## 2018-02-01 RX ORDER — BENZTROPINE MESYLATE 1 MG/ML
2 INJECTION INTRAMUSCULAR; INTRAVENOUS 2 TIMES DAILY PRN
Status: DISCONTINUED | OUTPATIENT
Start: 2018-02-01 | End: 2018-02-06 | Stop reason: HOSPADM

## 2018-02-01 RX ORDER — ACETAMINOPHEN 325 MG/1
650 TABLET ORAL EVERY 4 HOURS PRN
Status: DISCONTINUED | OUTPATIENT
Start: 2018-02-01 | End: 2018-02-06 | Stop reason: HOSPADM

## 2018-02-01 RX ORDER — NICOTINE 21 MG/24HR
1 PATCH, TRANSDERMAL 24 HOURS TRANSDERMAL DAILY
Status: DISCONTINUED | OUTPATIENT
Start: 2018-02-02 | End: 2018-02-06 | Stop reason: HOSPADM

## 2018-02-01 RX ORDER — DIPHENHYDRAMINE HYDROCHLORIDE 50 MG/ML
50 INJECTION INTRAMUSCULAR; INTRAVENOUS PRN
Status: DISCONTINUED | OUTPATIENT
Start: 2018-02-01 | End: 2018-02-02

## 2018-02-01 RX ADMIN — Medication 10 ML: at 09:09

## 2018-02-01 RX ADMIN — FAMOTIDINE 20 MG: 10 INJECTION INTRAVENOUS at 09:09

## 2018-02-01 RX ADMIN — HALOPERIDOL LACTATE 5 MG: 5 INJECTION, SOLUTION INTRAMUSCULAR at 16:57

## 2018-02-01 RX ADMIN — SODIUM CHLORIDE: 9 INJECTION, SOLUTION INTRAVENOUS at 03:27

## 2018-02-01 RX ADMIN — ENOXAPARIN SODIUM 40 MG: 40 INJECTION SUBCUTANEOUS at 09:09

## 2018-02-01 RX ADMIN — INSULIN LISPRO 10 UNITS: 100 INJECTION, SOLUTION INTRAVENOUS; SUBCUTANEOUS at 12:38

## 2018-02-01 RX ADMIN — INSULIN HUMAN 15 UNITS: 100 INJECTION, SOLUTION PARENTERAL at 11:41

## 2018-02-01 RX ADMIN — MELATONIN TAB 3 MG 3 MG: 3 TAB at 20:46

## 2018-02-01 RX ADMIN — INSULIN LISPRO 12 UNITS: 100 INJECTION, SOLUTION INTRAVENOUS; SUBCUTANEOUS at 08:48

## 2018-02-01 RX ADMIN — INSULIN GLARGINE 40 UNITS: 100 INJECTION, SOLUTION SUBCUTANEOUS at 20:46

## 2018-02-02 PROBLEM — Z86.59 H/O BIPOLAR DISORDER: Status: ACTIVE | Noted: 2018-02-02

## 2018-02-02 LAB
ALBUMIN SERPL-MCNC: 3.9 G/DL (ref 3.9–4.9)
ALP BLD-CCNC: 84 U/L (ref 35–104)
ALT SERPL-CCNC: 10 U/L (ref 0–41)
ANION GAP SERPL CALCULATED.3IONS-SCNC: 14 MEQ/L (ref 7–13)
AST SERPL-CCNC: 15 U/L (ref 0–40)
BILIRUB SERPL-MCNC: 0.2 MG/DL (ref 0–1.2)
BUN BLDV-MCNC: 14 MG/DL (ref 6–20)
CALCIUM SERPL-MCNC: 8.9 MG/DL (ref 8.6–10.2)
CHLORIDE BLD-SCNC: 102 MEQ/L (ref 98–107)
CO2: 23 MEQ/L (ref 22–29)
CREAT SERPL-MCNC: 0.8 MG/DL (ref 0.7–1.2)
GFR AFRICAN AMERICAN: >60
GFR NON-AFRICAN AMERICAN: >60
GLOBULIN: 2.1 G/DL (ref 2.3–3.5)
GLUCOSE BLD-MCNC: 109 MG/DL (ref 60–115)
GLUCOSE BLD-MCNC: 210 MG/DL (ref 60–115)
GLUCOSE BLD-MCNC: 252 MG/DL (ref 60–115)
GLUCOSE BLD-MCNC: 262 MG/DL (ref 74–109)
GLUCOSE BLD-MCNC: 82 MG/DL (ref 60–115)
PERFORMED ON: ABNORMAL
PERFORMED ON: ABNORMAL
PERFORMED ON: NORMAL
PERFORMED ON: NORMAL
POTASSIUM REFLEX MAGNESIUM: 4.2 MEQ/L (ref 3.5–5.1)
SODIUM BLD-SCNC: 139 MEQ/L (ref 132–144)
TOTAL PROTEIN: 6 G/DL (ref 6.4–8.1)

## 2018-02-02 PROCEDURE — 99223 1ST HOSP IP/OBS HIGH 75: CPT | Performed by: PSYCHIATRY & NEUROLOGY

## 2018-02-02 PROCEDURE — 6370000000 HC RX 637 (ALT 250 FOR IP): Performed by: CLINICAL NURSE SPECIALIST

## 2018-02-02 PROCEDURE — 6370000000 HC RX 637 (ALT 250 FOR IP): Performed by: INTERNAL MEDICINE

## 2018-02-02 PROCEDURE — 99232 SBSQ HOSP IP/OBS MODERATE 35: CPT | Performed by: INTERNAL MEDICINE

## 2018-02-02 PROCEDURE — 6370000000 HC RX 637 (ALT 250 FOR IP): Performed by: PSYCHIATRY & NEUROLOGY

## 2018-02-02 PROCEDURE — 80053 COMPREHEN METABOLIC PANEL: CPT

## 2018-02-02 PROCEDURE — 36415 COLL VENOUS BLD VENIPUNCTURE: CPT

## 2018-02-02 PROCEDURE — 1240000000 HC EMOTIONAL WELLNESS R&B

## 2018-02-02 RX ORDER — HALOPERIDOL 5 MG/ML
5 INJECTION INTRAMUSCULAR EVERY 6 HOURS PRN
Status: DISCONTINUED | OUTPATIENT
Start: 2018-02-02 | End: 2018-02-06 | Stop reason: HOSPADM

## 2018-02-02 RX ORDER — HYDROXYZINE HYDROCHLORIDE 50 MG/ML
50 INJECTION, SOLUTION INTRAMUSCULAR EVERY 6 HOURS PRN
Status: DISCONTINUED | OUTPATIENT
Start: 2018-02-02 | End: 2018-02-06 | Stop reason: HOSPADM

## 2018-02-02 RX ORDER — INSULIN GLARGINE 100 [IU]/ML
30 INJECTION, SOLUTION SUBCUTANEOUS NIGHTLY
Status: DISCONTINUED | OUTPATIENT
Start: 2018-02-02 | End: 2018-02-06 | Stop reason: HOSPADM

## 2018-02-02 RX ORDER — HYDROXYZINE PAMOATE 50 MG/1
50 CAPSULE ORAL EVERY 6 HOURS PRN
Status: DISCONTINUED | OUTPATIENT
Start: 2018-02-02 | End: 2018-02-06 | Stop reason: HOSPADM

## 2018-02-02 RX ORDER — HALOPERIDOL 5 MG
5 TABLET ORAL EVERY 6 HOURS PRN
Status: DISCONTINUED | OUTPATIENT
Start: 2018-02-02 | End: 2018-02-06 | Stop reason: HOSPADM

## 2018-02-02 RX ORDER — DIVALPROEX SODIUM 500 MG/1
500 TABLET, EXTENDED RELEASE ORAL DAILY
Status: DISCONTINUED | OUTPATIENT
Start: 2018-02-02 | End: 2018-02-05

## 2018-02-02 RX ADMIN — HYDROXYZINE PAMOATE 50 MG: 50 CAPSULE ORAL at 18:30

## 2018-02-02 RX ADMIN — DIVALPROEX SODIUM 500 MG: 500 TABLET, FILM COATED, EXTENDED RELEASE ORAL at 11:44

## 2018-02-02 RX ADMIN — INSULIN LISPRO 14 UNITS: 100 INJECTION, SOLUTION INTRAVENOUS; SUBCUTANEOUS at 12:20

## 2018-02-02 RX ADMIN — HYDROXYZINE PAMOATE 50 MG: 50 CAPSULE ORAL at 11:45

## 2018-02-02 RX ADMIN — TRAZODONE HYDROCHLORIDE 50 MG: 50 TABLET ORAL at 22:20

## 2018-02-02 RX ADMIN — INSULIN GLARGINE 30 UNITS: 100 INJECTION, SOLUTION SUBCUTANEOUS at 22:17

## 2018-02-02 RX ADMIN — INSULIN LISPRO 14 UNITS: 100 INJECTION, SOLUTION INTRAVENOUS; SUBCUTANEOUS at 09:29

## 2018-02-02 ASSESSMENT — SLEEP AND FATIGUE QUESTIONNAIRES
DO YOU HAVE DIFFICULTY SLEEPING: NO
DO YOU USE A SLEEP AID: YES
DIFFICULTY STAYING ASLEEP: NO
DIFFICULTY ARISING: NO
DIFFICULTY FALLING ASLEEP: NO
RESTFUL SLEEP: YES
AVERAGE NUMBER OF SLEEP HOURS: 8

## 2018-02-02 ASSESSMENT — LIFESTYLE VARIABLES: HISTORY_ALCOHOL_USE: YES

## 2018-02-02 NOTE — PROGRESS NOTES
Informed mita finch while clothing check did not od. Denies si,hi,hallucinations. Admits to anger management issues. had hs meds before arrival to unit

## 2018-02-02 NOTE — FLOWSHEET NOTE
Pt  Noted pacing in unit. Pt approached, admits to \"some\" improvement from vistaril but admits he just found out a close friend of his just  of an overdose. Pt says this is making him feel depressed.

## 2018-02-02 NOTE — H&P
1 each by Does not apply route daily  glucose blood VI test strips (FREESTYLE LITE) strip, Qid  Blood Glucose Monitoring Suppl JUAN, 1 kit by Does not apply route 3 times daily BRAND Covered by INS  Lancets MISC, testing TID, please order Brand covered by  INS  glucose blood VI test strips (ASCENSIA AUTODISC VI;ONE TOUCH ULTRA TEST VI) strip, 1 each by In Vitro route daily TID, please order brand covered by INS  QUEtiapine (SEROQUEL) 100 MG tablet, Take 1 tablet by mouth nightly  albuterol sulfate HFA (VENTOLIN HFA) 108 (90 Base) MCG/ACT inhaler, Inhale 2 puffs into the lungs every 6 hours as needed for Wheezing  Blood Glucose Monitoring Suppl (LUIS CONTOUR NEXT LINK) W/DEVICE KIT, As directed  Blood Glucose Monitoring Suppl (LDR BLOOD GLUCOSE TRUETEST) W/DEVICE KIT, 1 each by Does not apply route 4 times daily DX :  E10.9, IDDM  melatonin 3 MG TABS tablet, Take 3 mg by mouth nightly.     Compliance:yes    Psychiatric Review of Systems       Depression: denies     Jessica or Hypomania:  no     Panic Attacks:  no     Phobias:  no     Obsessions and Compulsions:  no     PTSD : no     Hallucinations:  no     Delusions:  no    Substance Abuse History:  ETOH: no  Marijuana: on and off  Opiates: no  Other Drugs: no    Past Psychiatric History:  Prior Diagnosis:  Aspergers, ODD, bipolar NOS  Psychiatrist: no  Therapist:no  Hospitalization: yes  Hx of Suicidal Attempts: no, made threats in the past  Hx of violence:  no  ECT: no  Previous discontinued Psychiatric Med Trials:     Past Medical History:        Diagnosis Date    ADHD (attention deficit hyperactivity disorder)     Asperger syndrome     Asthma     Asthma 3/31/2015    Chemical dependency ([facility]     marijuana    Diabetes mellitus ([facility]     Diabetes mellitus type 1 ([facility]     Hepatitis C     Hyperkalemia, diminished renal excretion 11/9/2017    Mental disorder     ODD (oppositional defiant disorder)     Seasonal allergies 3/31/2015    Seizures ([facility] Fund of Knowledge adequate      DIAGNOSIS:     (Axis I): Bipolar disorder Not Otherwise Specified   Autism Spectrum disorder   H/O ADD    Axis II:     Personality D/O Unspecified    RISK ASSESSMENT:    SUICIDE: low, high risk of impuslivity   HOMICIDE: no  AGITATION/VIOLENCE: no  ELOPEMENT: no    LABS:  Recent Labs      01/31/18   2200   WBC  6.5   HGB  14.5   PLT  224     Recent Labs      02/01/18   0551  02/01/18   1112  02/02/18   0558   NA  136  133  139   K  5.4*  4.3  4.2   CL  96*  96*  102   CO2  13*  14*  23   BUN  17  17  14   CREATININE  0.93  0.97  0.80   GLUCOSE  347*  397*  262*     Recent Labs      02/01/18   0551  02/01/18   1112  02/02/18   0558   BILITOT  0.5  0.3  0.2   ALKPHOS  73  72  84   AST  11  11  15   ALT  9  9  10     Lab Results   Component Value Date    LABAMPH Neg 01/31/2018    BARBSCNU Neg 01/31/2018    LABBENZ Neg 01/31/2018    LABBENZ NEGATIVE 09/21/2012    LABMETH NEGATIVE 02/11/2015    OPIATESCREENURINE Neg 01/31/2018    PHENCYCLIDINESCREENURINE Neg 01/31/2018    PPXUR NOT REPORTED 02/11/2015    ETOH <10 01/31/2018     Lab Results   Component Value Date    TSH 1.220 05/25/2016     Lab Results   Component Value Date    LITHIUM <0.1 (L) 05/25/2016     No results found for: VALPROATE, CBMZ  Lab Results   Component Value Date    LITHIUM <0.1 05/25/2016       Radiology  Xr Chest Portable    Result Date: 2/1/2018  EXAMINATION: Portable AP ERECT view of the chest. CLINICAL HISTORY: Hyperglycemia. COMPARISONS: 11/9/2017 FINDINGS: Film(s) was obtained with a poor depth of inspiration. Normal cardiac silhouette. Lungs are clear without consolidation. No pleural effusion or pneumothorax. The bony thorax is unremarkable. NO ACUTE CARDIOPULMONARY ABNORMALITY. NO CHANGE.        TREATMENT PLAN:    Risk Management:  close watch and suicide risk    Collateral Information:  Will obtain collateral information from the family or friends - Vicki Bernal [phone]  Will obtain medical records as appropriate from out patient providers  Will consult the hospitalist for a physical exam to rule out any co-morbid physical condition. Medications:    See orders    Prn Haldol 5mg and Vistaril 50mg q6hr for extreme agitation. Discussed with the patient risk, benefit, alternative and common side effects for the  proposed medication treatment. Patient is consenting to the treatment. Psychotherapy:   Encourage participation in milieu and group therapy  Individual therapy as needed      GENERAL PATIENT/FAMILY EDUCATION    Goals:    Patient will understand basic signs and symptoms  Patient will understand their role in recovery          Behavioral Services  Medicare Certification      Admission Day 1  I certify that this patient's inpatient psychiatric hospital admission is medically necessary for:     (1) treatment which could reasonably be expected to improve this patient's condition, or     (2) diagnostic study or its equivalent.        Electronically signed by Brianna Garber MD on 2/2/2018 at 10:08 AM

## 2018-02-03 LAB
GLUCOSE BLD-MCNC: 113 MG/DL (ref 60–115)
GLUCOSE BLD-MCNC: 270 MG/DL (ref 60–115)
GLUCOSE BLD-MCNC: 293 MG/DL (ref 60–115)
GLUCOSE BLD-MCNC: 364 MG/DL (ref 60–115)
GLUCOSE BLD-MCNC: 70 MG/DL (ref 60–115)
PERFORMED ON: ABNORMAL
PERFORMED ON: NORMAL
PERFORMED ON: NORMAL

## 2018-02-03 PROCEDURE — 1240000000 HC EMOTIONAL WELLNESS R&B

## 2018-02-03 PROCEDURE — 6370000000 HC RX 637 (ALT 250 FOR IP): Performed by: PSYCHIATRY & NEUROLOGY

## 2018-02-03 PROCEDURE — 6370000000 HC RX 637 (ALT 250 FOR IP): Performed by: INTERNAL MEDICINE

## 2018-02-03 PROCEDURE — 6370000000 HC RX 637 (ALT 250 FOR IP): Performed by: CLINICAL NURSE SPECIALIST

## 2018-02-03 RX ORDER — TRAZODONE HYDROCHLORIDE 150 MG/1
75 TABLET ORAL NIGHTLY PRN
Status: DISCONTINUED | OUTPATIENT
Start: 2018-02-03 | End: 2018-02-06 | Stop reason: HOSPADM

## 2018-02-03 RX ORDER — ACETAMINOPHEN 80 MG
TABLET,CHEWABLE ORAL ONCE
Status: COMPLETED | OUTPATIENT
Start: 2018-02-03 | End: 2018-02-03

## 2018-02-03 RX ADMIN — DIVALPROEX SODIUM 500 MG: 500 TABLET, FILM COATED, EXTENDED RELEASE ORAL at 08:50

## 2018-02-03 RX ADMIN — HYDROXYZINE PAMOATE 50 MG: 50 CAPSULE ORAL at 09:55

## 2018-02-03 RX ADMIN — HALOPERIDOL 5 MG: 5 TABLET ORAL at 09:55

## 2018-02-03 RX ADMIN — TRAZODONE HYDROCHLORIDE 75 MG: 150 TABLET ORAL at 20:19

## 2018-02-03 RX ADMIN — HYDROXYZINE PAMOATE 50 MG: 50 CAPSULE ORAL at 16:17

## 2018-02-03 RX ADMIN — ACETAMINOPHEN 650 MG: 325 TABLET, FILM COATED ORAL at 15:37

## 2018-02-03 RX ADMIN — ACETAMINOPHEN 650 MG: 325 TABLET, FILM COATED ORAL at 20:18

## 2018-02-03 RX ADMIN — Medication: at 21:58

## 2018-02-03 RX ADMIN — INSULIN GLARGINE 30 UNITS: 100 INJECTION, SOLUTION SUBCUTANEOUS at 20:21

## 2018-02-03 ASSESSMENT — PAIN SCALES - GENERAL
PAINLEVEL_OUTOF10: 6
PAINLEVEL_OUTOF10: 7

## 2018-02-03 NOTE — PROGRESS NOTES
Pt reported feeling weak ,sweatie B.S. Was 79 checked by Rn , pt requested B.S. Be rechecked after pepsi was given and B.S. 113.

## 2018-02-03 NOTE — PROGRESS NOTES
Pt. attended the 0900 community meeting.  Electronically signed by Lissy Sanchez on 2/3/2018 at 11:09 AM

## 2018-02-04 LAB
GLUCOSE BLD-MCNC: 160 MG/DL (ref 60–115)
GLUCOSE BLD-MCNC: 226 MG/DL (ref 60–115)
GLUCOSE BLD-MCNC: 277 MG/DL (ref 60–115)
GLUCOSE BLD-MCNC: 385 MG/DL (ref 60–115)
GLUCOSE BLD-MCNC: 409 MG/DL (ref 60–115)
PERFORMED ON: ABNORMAL

## 2018-02-04 PROCEDURE — 1240000000 HC EMOTIONAL WELLNESS R&B

## 2018-02-04 PROCEDURE — 6370000000 HC RX 637 (ALT 250 FOR IP): Performed by: CLINICAL NURSE SPECIALIST

## 2018-02-04 PROCEDURE — 6370000000 HC RX 637 (ALT 250 FOR IP): Performed by: PSYCHIATRY & NEUROLOGY

## 2018-02-04 PROCEDURE — 6370000000 HC RX 637 (ALT 250 FOR IP): Performed by: INTERNAL MEDICINE

## 2018-02-04 RX ADMIN — HYDROXYZINE PAMOATE 50 MG: 50 CAPSULE ORAL at 19:51

## 2018-02-04 RX ADMIN — HYDROXYZINE PAMOATE 50 MG: 50 CAPSULE ORAL at 09:17

## 2018-02-04 RX ADMIN — TRAZODONE HYDROCHLORIDE 75 MG: 150 TABLET ORAL at 22:30

## 2018-02-04 RX ADMIN — INSULIN GLARGINE 30 UNITS: 100 INJECTION, SOLUTION SUBCUTANEOUS at 21:12

## 2018-02-04 RX ADMIN — DIVALPROEX SODIUM 500 MG: 500 TABLET, FILM COATED, EXTENDED RELEASE ORAL at 08:31

## 2018-02-04 NOTE — PROGRESS NOTES
Pt. attended the 0900 community meeting. Electronically signed by Abraham Hodge on 2/4/2018 at 9:35 AM

## 2018-02-04 NOTE — CARE COORDINATION
Denies all. Bright affect. Visiting with girlfriend. Denies depression, but anxiety 3/10. Reports ok sleep at 5 hours and good appetite. Anxious about living situation, but feels it will work out. Pt. States grandparents are evicting him, but he will move in with his dad for a few weeks to get back on his feet. Pt. States dad is a \"bad\" person and can't stay long. Pt. Is not permitted to live with mother per her landlord. Pt. Is restless and pacing still.

## 2018-02-05 VITALS
RESPIRATION RATE: 18 BRPM | SYSTOLIC BLOOD PRESSURE: 136 MMHG | OXYGEN SATURATION: 98 % | DIASTOLIC BLOOD PRESSURE: 95 MMHG | TEMPERATURE: 97 F | HEART RATE: 78 BPM

## 2018-02-05 PROBLEM — F31.63 BIPOLAR 1 DISORDER, MIXED, SEVERE (HCC): Status: ACTIVE | Noted: 2018-02-05

## 2018-02-05 LAB
GLUCOSE BLD-MCNC: 111 MG/DL (ref 60–115)
GLUCOSE BLD-MCNC: 113 MG/DL (ref 60–115)
GLUCOSE BLD-MCNC: 282 MG/DL (ref 60–115)
GLUCOSE BLD-MCNC: 79 MG/DL (ref 60–115)
PERFORMED ON: ABNORMAL
PERFORMED ON: NORMAL
VALPROIC ACID LEVEL: 12 UG/ML (ref 50–100)

## 2018-02-05 PROCEDURE — 80164 ASSAY DIPROPYLACETIC ACD TOT: CPT

## 2018-02-05 PROCEDURE — 6370000000 HC RX 637 (ALT 250 FOR IP): Performed by: INTERNAL MEDICINE

## 2018-02-05 PROCEDURE — 6370000000 HC RX 637 (ALT 250 FOR IP): Performed by: PSYCHIATRY & NEUROLOGY

## 2018-02-05 PROCEDURE — 1240000000 HC EMOTIONAL WELLNESS R&B

## 2018-02-05 PROCEDURE — 99232 SBSQ HOSP IP/OBS MODERATE 35: CPT | Performed by: PSYCHIATRY & NEUROLOGY

## 2018-02-05 PROCEDURE — 90833 PSYTX W PT W E/M 30 MIN: CPT | Performed by: PSYCHIATRY & NEUROLOGY

## 2018-02-05 PROCEDURE — 36415 COLL VENOUS BLD VENIPUNCTURE: CPT

## 2018-02-05 PROCEDURE — 6370000000 HC RX 637 (ALT 250 FOR IP): Performed by: CLINICAL NURSE SPECIALIST

## 2018-02-05 PROCEDURE — 99232 SBSQ HOSP IP/OBS MODERATE 35: CPT | Performed by: INTERNAL MEDICINE

## 2018-02-05 RX ORDER — DIVALPROEX SODIUM 500 MG/1
500 TABLET, EXTENDED RELEASE ORAL DAILY
Status: DISCONTINUED | OUTPATIENT
Start: 2018-02-05 | End: 2018-02-06 | Stop reason: HOSPADM

## 2018-02-05 RX ORDER — DIVALPROEX SODIUM 500 MG/1
500 TABLET, EXTENDED RELEASE ORAL ONCE
Status: COMPLETED | OUTPATIENT
Start: 2018-02-05 | End: 2018-02-05

## 2018-02-05 RX ADMIN — TRAZODONE HYDROCHLORIDE 75 MG: 150 TABLET ORAL at 22:23

## 2018-02-05 RX ADMIN — INSULIN GLARGINE 30 UNITS: 100 INJECTION, SOLUTION SUBCUTANEOUS at 21:09

## 2018-02-05 RX ADMIN — DIVALPROEX SODIUM 500 MG: 500 TABLET, FILM COATED, EXTENDED RELEASE ORAL at 12:34

## 2018-02-05 RX ADMIN — DIVALPROEX SODIUM 500 MG: 500 TABLET, FILM COATED, EXTENDED RELEASE ORAL at 09:01

## 2018-02-05 RX ADMIN — HYDROXYZINE PAMOATE 50 MG: 50 CAPSULE ORAL at 18:13

## 2018-02-05 NOTE — FLOWSHEET NOTE
Pt requesting vistaril for anxiety. States he has been talking with his girlfriend who is 16 and his friends will not allow her to come to their home since she is a minor. States he needs to figure out what to do before he is discharged.

## 2018-02-05 NOTE — PROGRESS NOTES
Endocrinology Progress Note    Assessment and Plan:   Assessment-  1. Type 1 diabetes  2. Hyperglycemia  3. Medical noncompliance        Plan-  1. Decrease Lantus to 30 units at bedtime, humalog 10 units 3 times a day with meals  2. Monitor glucose closely    POC Glucose:   Recent Labs      02/04/18   1102  02/04/18   1633  02/04/18   2032  02/05/18   0634  02/05/18   1104   POCGLU  226*  160*  277*  282*  79     HGBA1C:  No results for input(s): LABA1C in the last 72 hours. CBC:   No results for input(s): WBC, HGB, PLT in the last 72 hours. CMP:    No results for input(s): NA, K, CL, CO2, BUN, CREATININE, GLUCOSE, CALCIUM, LABGLOM in the last 72 hours. CC: No chief complaint on file. Subjective: Interval History: The patient is a 82-year-old male known history of type 1 diabetes with multiple psych  history, presenting with high blood sugars. The patient has not been compliant to taking his insulin and his thinking of committing suicide. He was admitted to our behavioral health unit yesterday for. He appears agitated and is pacing walking around constantly.      Review of systems: denies polyuria, polydipsia, ABD pain, flank pain, N/V/D, or diaphoresis  Medications:   Scheduled Meds:   divalproex  500 mg Oral Daily    insulin glargine  30 Units Subcutaneous Nightly    insulin lispro  10 Units Subcutaneous TID WC    nicotine  1 patch Transdermal Daily    insulin lispro  0-12 Units Subcutaneous TID WC    insulin lispro  0-6 Units Subcutaneous Nightly     Continuous Infusions:     Objective:   Vitals: BP (!) 136/95 Comment: Chelsea ELIZABETH notified  Pulse 78   Temp 97 °F (36.1 °C) (Oral)   Resp 18   SpO2 98%    Wt Readings from Last 3 Encounters:   01/31/18 150 lb (68 kg)   01/23/18 152 lb (68.9 kg)   12/20/17 144 lb (65.3 kg)        General appearance: alert, appears stated age, cooperative, distracted and Agitated and restless  Skin: Skin color, texture, turgor normal. No rashes or lesions. Neck: no lymphadenopathy  Lungs: clear to auscultation bilaterally  Heart: regular rate and rhythm, S1, S2 normal, no murmur, click, rub or gallop  Abdomen: soft, non-tender. Bowel sounds normal. No masses,  no organomegaly.   Extremities: extremities normal, atraumatic, no cyanosis or edema    Patient Active Problem List:     DKA, type 1, not at goal [facility])     ADHD (attention deficit hyperactivity disorder)     Oppositional defiant disorder     Seasonal allergies     Asthma     Atopic rhinitis     Closed fracture of neck of metacarpal bone     Congenital anomaly of cerebrovascular system     Generalized convulsive epilepsy ([address]     Contusion of hand     Closed fracture of base of other metacarpal bone(s)     Atopic dermatitis     Adult behavior problems     Congenital vascular nevus     Epilepsy ([address]     Congenital vascular hamartomas     Generalized tonic clonic epilepsy ([address]     Asperger syndrome     Chemical dependency ([address]     History of oppositional defiant disorder     Hyperglycemia due to type 1 diabetes mellitus ([address]     Metabolic acidosis with increased anion gap and accumulation of organic acids     Lactic acid acidosis     Dehydration     Hyperkalemia, diminished renal excretion     Hyperkalemia, transcellular shifts     TWIN (acute kidney injury) ([address]     Heroin abuse     Hyperglycemia     Substance induced mood disorder ([address]     H/O bipolar disorder     Polysubstance abuse     Suicide attempt            Electronically signed by RADHA Interiano on 2/5/2018 at 1:52 PM

## 2018-02-05 NOTE — CARE COORDINATION
Spoke to patients grandfather who sated patient can come to live there upon discharge and he will pick him up when discharged.    Electronically signed by Kiersten Chavez on 2/5/2018 at 12:42 PM

## 2018-02-05 NOTE — PROGRESS NOTES
Grandmother     Cancer Maternal Grandfather     Diabetes Maternal Grandfather      Social History     Social History    Marital status: Single     Spouse name: N/A    Number of children: N/A    Years of education: N/A     Occupational History    Not on file. Social History Main Topics    Smoking status: Current Every Day Smoker     Packs/day: 1.00     Years: 5.00     Types: Cigarettes    Smokeless tobacco: Never Used      Comment: pt aware of risk    Alcohol use 0.0 oz/week      Comment: occasionally    Drug use: Yes     Types: Methamphetamines, Marijuana, IV, Cocaine      Comment: heroin     Sexual activity: No     Other Topics Concern    Not on file     Social History Narrative    ** Merged History Encounter **                ROS:  [x] All negative/unchanged except if checked.  Explain positive(checked items) below:  [] Constitutional  [] Eyes  [] Ear/Nose/Mouth/Throat  [] Respiratory  [] CV  [] GI  []   [] Musculoskeletal  [] Skin/Breast  [] Neurological  [] Endocrine  [] Heme/Lymph  [] Allergic/Immunologic    Explanation:     MEDICATIONS:    Current Facility-Administered Medications:     traZODone (DESYREL) tablet 75 mg, 75 mg, Oral, Nightly PRN, Steven Pollock MD, 75 mg at 02/04/18 2230    haloperidol lactate (HALDOL) injection 5 mg, 5 mg, Intramuscular, Q6H PRN **OR** haloperidol (HALDOL) tablet 5 mg, 5 mg, Oral, Q6H PRN, Hughie Holter, MD, 5 mg at 02/03/18 0955    hydrOXYzine (VISTARIL) injection 50 mg, 50 mg, Intramuscular, Q6H PRN **OR** hydrOXYzine (VISTARIL) capsule 50 mg, 50 mg, Oral, Q6H PRN, Hughie Holter, MD, 50 mg at 02/04/18 1951    divalproex (DEPAKOTE ER) extended release tablet 500 mg, 500 mg, Oral, Daily, Hughie Holter, MD, 500 mg at 02/05/18 0901    insulin glargine (LANTUS) injection vial 30 Units, 30 Units, Subcutaneous, Nightly, Kostas Linn MD, 30 Units at 02/04/18 2112    insulin lispro (HUMALOG) injection vial 10 Units, 10 Units, Subcutaneous, TID

## 2018-02-05 NOTE — PROGRESS NOTES
Department of Psychiatry  Attending Progress Note  Reason for admission:  suicide attempt by overdose  Stole grandfathers gun as well allegedly to sell it and get money for weed  clearly   SUBJECTIVE:  States he has to get out by Tuesday  Feels better not as loud  However paces really fast  We discussed his irresponsible behavior and attitude toward his health  OBJECTIVE very active, pacing, agitated    Physical  VITALS:  /83   Pulse 84   Temp 97 °F (36.1 °C)   Resp 18   SpO2 97%   CONSTITUTIONAL:  awake, alert, cooperative, no apparent distress, and appears stated age  Mental Status Examination:  Level of consciousness:  within normal limits  Appearance:  well-appearing  Behavior/Motor:  Increased physical and psychomotor tone  Attitude toward examiner:  withdrawn, hostile, playful and seductive  Speech:  hyperverbal and pressured  Mood:  irritable  Affect:  mood congruent, intense and angry  Thought processes:  rapid and overabundance of ideas  Thought content:  Preoccupied with going home  Obsessions/instrusive thoughts:  none  Homocidal ideation denies  Suicidal Ideation:  denies suicidal ideation  Delusions:  no evidence of delusions, paranoid and persecutory  Perceptual Disturbance:  denies any perceptual disturbance  Cognition:  oriented to person, place, and time  Concentration flighty  Memory intact  Fund of knowledge:  ok  Abstract thinking:  fair  Insight:  poor  Judgment:  poor    Data  Labs:    CBC:   Lab Results   Component Value Date    WBC 6.5 01/31/2018    RBC 4.74 01/31/2018    RBC 5.79 04/25/2012    HGB 14.5 01/31/2018    HCT 43.2 01/31/2018    MCV 91.1 01/31/2018    MCH 30.7 01/31/2018    MCHC 33.6 01/31/2018    RDW 13.2 01/31/2018     01/31/2018    MPV 8.7 08/20/2015     CMP:    Lab Results   Component Value Date     02/02/2018    K 4.2 02/02/2018     02/02/2018    CO2 23 02/02/2018    BUN 14 02/02/2018    CREATININE 0.80 02/02/2018    GFRAA >60.0 02/02/2018 LABGLOM >60.0 02/02/2018    GLUCOSE 262 02/02/2018    GLUCOSE 208 04/25/2012    PROT 6.0 02/02/2018    LABALBU 3.9 02/02/2018    LABALBU 5.5 04/25/2012    CALCIUM 8.9 02/02/2018    BILITOT 0.2 02/02/2018    ALKPHOS 84 02/02/2018    AST 15 02/02/2018    ALT 10 02/02/2018     Hepatic Function Panel:    Lab Results   Component Value Date    ALKPHOS 84 02/02/2018    ALT 10 02/02/2018    AST 15 02/02/2018    PROT 6.0 02/02/2018    BILITOT 0.2 02/02/2018    BILIDIR 0.0 05/26/2016    IBILI 0.3 05/26/2016    LABALBU 3.9 02/02/2018    LABALBU 5.5 04/25/2012     TSH:    Lab Results   Component Value Date    TSH 1.220 05/25/2016     VITAMIN B12: No components found for: B12  FOLATE:  No results found for: FOLATE    Medications  Current Facility-Administered Medications: traZODone (DESYREL) tablet 75 mg, 75 mg, Oral, Nightly PRN  haloperidol lactate (HALDOL) injection 5 mg, 5 mg, Intramuscular, Q6H PRN **OR** haloperidol (HALDOL) tablet 5 mg, 5 mg, Oral, Q6H PRN  hydrOXYzine (VISTARIL) injection 50 mg, 50 mg, Intramuscular, Q6H PRN **OR** hydrOXYzine (VISTARIL) capsule 50 mg, 50 mg, Oral, Q6H PRN  divalproex (DEPAKOTE ER) extended release tablet 500 mg, 500 mg, Oral, Daily  insulin glargine (LANTUS) injection vial 30 Units, 30 Units, Subcutaneous, Nightly  insulin lispro (HUMALOG) injection vial 10 Units, 10 Units, Subcutaneous, TID WC  acetaminophen (TYLENOL) tablet 650 mg, 650 mg, Oral, Q4H PRN  aluminum & magnesium hydroxide-simethicone (MAALOX) 200-200-20 MG/5ML suspension 30 mL, 30 mL, Oral, PRN  benztropine mesylate (COGENTIN) injection 2 mg, 2 mg, Intramuscular, BID PRN  magnesium hydroxide (MILK OF MAGNESIA) 400 MG/5ML suspension 30 mL, 30 mL, Oral, Daily PRN  nicotine (NICODERM CQ) 21 MG/24HR 1 patch, 1 patch, Transdermal, Daily  glucagon (rDNA) injection 1 mg, 1 mg, Intramuscular, PRN  glucose (GLUTOSE) 40 % oral gel 15 g, 15 g, Oral, PRN  insulin lispro (HUMALOG) injection vial 0-12 Units, 0-12 Units, Subcutaneous,

## 2018-02-05 NOTE — PROGRESS NOTES
Group Therapy Note    Date: 2/5/2018  Start Time: 1440  End Time: 2361    Number of Participants: 9    Type of Group: Psychoeducation    Patient's Goal:  To participate in mood management group. Notes:  Patient learned to set achievable goals. Status After Intervention:  Unchanged    Participation Level: Active Listener    Participation Quality: Appropriate      Speech:  normal      Thought Process/Content: Logical      Affective Functioning: Congruent      Mood: anxious      Level of consciousness:  Alert      Response to Learning: Able to verbalize current knowledge/experience      Endings: None Reported    Modes of Intervention: Education      Discipline Responsible: /Counselor      Signature:   ASHLEY Winn

## 2018-02-05 NOTE — PROGRESS NOTES
Patient complaining of anxiety. Pt pacing in front of nursing desk with irritable affect. Pt accepted PRN vistaril. Pt denies further needs. Will continue to monitor.  Electronically signed by Pricila Rodriguez RN on 2/4/18 at 7:53 PM

## 2018-02-06 LAB
GLUCOSE BLD-MCNC: 308 MG/DL (ref 60–115)
GLUCOSE BLD-MCNC: 58 MG/DL (ref 60–115)
GLUCOSE BLD-MCNC: 87 MG/DL (ref 60–115)
PERFORMED ON: ABNORMAL
PERFORMED ON: ABNORMAL
PERFORMED ON: NORMAL

## 2018-02-06 PROCEDURE — 6370000000 HC RX 637 (ALT 250 FOR IP): Performed by: CLINICAL NURSE SPECIALIST

## 2018-02-06 PROCEDURE — 99239 HOSP IP/OBS DSCHRG MGMT >30: CPT | Performed by: PSYCHIATRY & NEUROLOGY

## 2018-02-06 PROCEDURE — 6370000000 HC RX 637 (ALT 250 FOR IP): Performed by: PSYCHIATRY & NEUROLOGY

## 2018-02-06 RX ORDER — DIVALPROEX SODIUM 500 MG/1
500 TABLET, EXTENDED RELEASE ORAL DAILY
Qty: 15 TABLET | Refills: 1 | Status: SHIPPED | OUTPATIENT
Start: 2018-02-07 | End: 2019-11-27

## 2018-02-06 RX ORDER — TRAZODONE HYDROCHLORIDE 150 MG/1
75 TABLET ORAL NIGHTLY PRN
Qty: 15 TABLET | Refills: 1 | Status: ON HOLD | OUTPATIENT
Start: 2018-02-06 | End: 2019-02-20 | Stop reason: HOSPADM

## 2018-02-06 RX ADMIN — HYDROXYZINE PAMOATE 50 MG: 50 CAPSULE ORAL at 09:04

## 2018-02-06 RX ADMIN — ACETAMINOPHEN 650 MG: 325 TABLET, FILM COATED ORAL at 09:03

## 2018-02-06 RX ADMIN — DIVALPROEX SODIUM 500 MG: 500 TABLET, FILM COATED, EXTENDED RELEASE ORAL at 08:31

## 2018-02-06 ASSESSMENT — PAIN SCALES - GENERAL
PAINLEVEL_OUTOF10: 0
PAINLEVEL_OUTOF10: 10

## 2018-02-06 NOTE — DISCHARGE INSTR - DIET

## 2018-02-06 NOTE — DISCHARGE SUMMARY
DISCHARGE SUMMARY      Patient ID:  Loki Jacques  [id number]  52 y.o.  1995    Admit date: 2018    Discharge date and time: 18    Admitting Physician: Hughie Holter, MD     Discharge Physician: Dr Harish Lutz MD    Admission Diagnoses: Substance induced mood disorder ([facility] [address] [F19.94]  Substance induced mood disorder ([facility] [address] [F19.94]    Admission Condition: poor    Discharged Condition: stable    Admission Circumstance: The patient is a 23 y.o. male with significant past history of bipolar, ODD, aspergers  Pt live with his grandfather  Pt was initially admitted to hospital with raised BS and ketoacidosis  Pt denies that he was ever suicidal or took any overdose before he came here  Pt report that he took seroquel 1 tab to calm his nerves  He also admit that he did not take DM medication. Pt stole a gun from him after finding the safe passcode  His friends was asking for a gun and he knew that was a most expensive item at the house  He was planning to sell it or rent it to his friend for 15 $ for a day. He was planning to use the money for MJ  Use MJ on and off.   He report that his grand father found about his plan even before he took the gun away      Pt denies feeling depressed or suicidal  He report that his Jd Mike is mad at him  Grandfather just bailed him out 2 months ago for fighting, on parole  Car got impounded for  plates  Pt is anxious to get his car out.     Has been talking with his grandfather since coming here  Not hearing or seeing things  Denies feeling paranoid  Denies hopeless and worthless feeling  Pt is anxious being pink slipped  Gun is currently secured  Stressors:relationship issue with grand father     The patient is currently receiving care for the above psychiatric illness.     Medications Prior to Admission:   Prescriptions Prior to Admission: insulin aspart (NOVOLOG FLEXPEN) 100 UNIT/ML injection pen, 15-20 units at each meals  Insulin Pen Needle (NOVOFINE) 32G X 6 MM MISC, qid  insulin glargine (LANTUS SOLOSTAR) 100 UNIT/ML injection pen, 50 units at bedtime  Blood Glucose Monitoring Suppl (FREESTYLE LITE) JUAN, As directed  FREESTYLE LANCETS MISC, 1 each by Does not apply route daily  glucose blood VI test strips (FREESTYLE LITE) strip, Qid  Blood Glucose Monitoring Suppl JUAN, 1 kit by Does not apply route 3 times daily BRAND Covered by INS  Lancets MISC, testing TID, please order Brand covered by  INS  glucose blood VI test strips (ASCENSIA AUTODISC VI;ONE TOUCH ULTRA TEST VI) strip, 1 each by In Vitro route daily TID, please order brand covered by INS  QUEtiapine (SEROQUEL) 100 MG tablet, Take 1 tablet by mouth nightly  albuterol sulfate HFA (VENTOLIN HFA) 108 (90 Base) MCG/ACT inhaler, Inhale 2 puffs into the lungs every 6 hours as needed for Wheezing  Blood Glucose Monitoring Suppl (LUIS CONTOUR NEXT LINK) W/DEVICE KIT, As directed  Blood Glucose Monitoring Suppl (LDR BLOOD GLUCOSE TRUETEST) W/DEVICE KIT, 1 each by Does not apply route 4 times daily DX :  E10.9, IDDM  melatonin 3 MG TABS tablet, Take 3 mg by mouth nightly.     Compliance:yes     Psychiatric Review of Systems       Depression: denies     Jessica or Hypomania:  no     Panic Attacks:  no     Phobias:  no     Obsessions and Compulsions:  no     PTSD : no     Hallucinations:  no     Delusions:  no     Substance Abuse History:  ETOH: no  Marijuana: on and off  Opiates: no  Other Drugs: no     Past Psychiatric History:  Prior Diagnosis:  Aspergers, ODD, bipolar NOS  Psychiatrist: no  Therapist:no  Hospitalization: yes  Hx of Suicidal Attempts: no, made threats in the past  Hx of violence:  no  ECT: no  Previous discontinued Psychiatric Med Trials        PAST MEDICAL/PSYCHIATRIC HISTORY:   Past Medical History:   Diagnosis Date    ADHD (attention deficit hyperactivity disorder)     Asperger syndrome     Asthma     Asthma 3/31/2015    Bipolar 1 disorder, mixed, severe (HCC) 2/5/2018    Chemical

## 2018-02-06 NOTE — PROGRESS NOTES
Affect and mood stable  Denied suicidal/homicidal ideation   reviewed and verbalized understanding of all instructions  belongings returned to patient    D/c with friend for transport home

## 2018-02-06 NOTE — PLAN OF CARE
Problem: Anger Management/Homicidal Ideation  Goal: LTG-Able to display appropriate communication and problem solving  Outcome: Ongoing    Goal: STG-Able to express anger through appropriate verbalization and healthy physical outlets  Outcome: Ongoing    Goal: LTG-Absence of angry outbursts  Outcome: Met This Shift    Goal: STG-Knowledge of positive coping patterns  Outcome: Ongoing      Problem: Depressive Behavior with or without Suicide precautions  Goal: STG-Knowledge of positive coping patterns  Outcome: Ongoing

## 2018-02-07 ENCOUNTER — TELEPHONE (OUTPATIENT)
Dept: FAMILY MEDICINE CLINIC | Age: 23
End: 2018-02-07

## 2018-03-03 ENCOUNTER — HOSPITAL ENCOUNTER (EMERGENCY)
Age: 23
Discharge: HOME | End: 2018-03-03
Payer: MEDICARE

## 2018-03-03 VITALS
RESPIRATION RATE: 16 BRPM | WEIGHT: 145 LBS | BODY MASS INDEX: 23.4 KG/M2 | DIASTOLIC BLOOD PRESSURE: 62 MMHG | TEMPERATURE: 98 F | SYSTOLIC BLOOD PRESSURE: 108 MMHG | HEART RATE: 60 BPM | OXYGEN SATURATION: 97 %

## 2018-03-03 DIAGNOSIS — R45.851 DEPRESSION WITH SUICIDAL IDEATION: ICD-10-CM

## 2018-03-03 DIAGNOSIS — F32.A DEPRESSION WITH SUICIDAL IDEATION: ICD-10-CM

## 2018-03-03 DIAGNOSIS — E05.90 HYPERTHYROIDISM: ICD-10-CM

## 2018-03-03 DIAGNOSIS — F19.10 POLYSUBSTANCE ABUSE (HCC): ICD-10-CM

## 2018-03-03 DIAGNOSIS — F19.94 SUBSTANCE INDUCED MOOD DISORDER (HCC): Primary | ICD-10-CM

## 2018-03-03 LAB
ALBUMIN SERPL-MCNC: 4.4 G/DL (ref 3.9–4.9)
ALP BLD-CCNC: 68 U/L (ref 35–104)
ALT SERPL-CCNC: 10 U/L (ref 0–41)
AMPHETAMINE SCREEN, URINE: ABNORMAL
ANION GAP SERPL CALCULATED.3IONS-SCNC: 12 MEQ/L (ref 7–13)
AST SERPL-CCNC: 15 U/L (ref 0–40)
BARBITURATE SCREEN URINE: ABNORMAL
BASOPHILS ABSOLUTE: 0.1 K/UL (ref 0–0.2)
BASOPHILS RELATIVE PERCENT: 0.7 %
BENZODIAZEPINE SCREEN, URINE: ABNORMAL
BILIRUB SERPL-MCNC: 0.4 MG/DL (ref 0–1.2)
BILIRUBIN URINE: NEGATIVE
BLOOD, URINE: NEGATIVE
BUN BLDV-MCNC: 13 MG/DL (ref 6–20)
CALCIUM SERPL-MCNC: 9.4 MG/DL (ref 8.6–10.2)
CANNABINOID SCREEN URINE: POSITIVE
CHLORIDE BLD-SCNC: 106 MEQ/L (ref 98–107)
CLARITY: CLEAR
CO2: 25 MEQ/L (ref 22–29)
COCAINE METABOLITE SCREEN URINE: POSITIVE
COLOR: YELLOW
CREAT SERPL-MCNC: 0.73 MG/DL (ref 0.7–1.2)
EOSINOPHILS ABSOLUTE: 0 K/UL (ref 0–0.7)
EOSINOPHILS RELATIVE PERCENT: 0.5 %
ETHANOL PERCENT: NORMAL G/DL
ETHANOL: <10 MG/DL (ref 0–0.08)
GFR AFRICAN AMERICAN: >60
GFR NON-AFRICAN AMERICAN: >60
GLOBULIN: 2.1 G/DL (ref 2.3–3.5)
GLUCOSE BLD-MCNC: 140 MG/DL (ref 74–109)
GLUCOSE URINE: 500 MG/DL
HCT VFR BLD CALC: 44 % (ref 42–52)
HEMOGLOBIN: 14.8 G/DL (ref 14–18)
KETONES, URINE: NEGATIVE MG/DL
LEUKOCYTE ESTERASE, URINE: NEGATIVE
LYMPHOCYTES ABSOLUTE: 1.7 K/UL (ref 1–4.8)
LYMPHOCYTES RELATIVE PERCENT: 18.9 %
Lab: ABNORMAL
MCH RBC QN AUTO: 30.1 PG (ref 27–31.3)
MCHC RBC AUTO-ENTMCNC: 33.7 % (ref 33–37)
MCV RBC AUTO: 89.3 FL (ref 80–100)
MONOCYTES ABSOLUTE: 0.6 K/UL (ref 0.2–0.8)
MONOCYTES RELATIVE PERCENT: 6.5 %
NEUTROPHILS ABSOLUTE: 6.7 K/UL (ref 1.4–6.5)
NEUTROPHILS RELATIVE PERCENT: 73.4 %
NITRITE, URINE: NEGATIVE
OPIATE SCREEN URINE: ABNORMAL
PDW BLD-RTO: 13.6 % (ref 11.5–14.5)
PH UA: 6.5 (ref 5–9)
PHENCYCLIDINE SCREEN URINE: ABNORMAL
PLATELET # BLD: 209 K/UL (ref 130–400)
POTASSIUM SERPL-SCNC: 4 MEQ/L (ref 3.5–5.1)
PROTEIN UA: NEGATIVE MG/DL
RBC # BLD: 4.93 M/UL (ref 4.7–6.1)
SODIUM BLD-SCNC: 143 MEQ/L (ref 132–144)
SPECIFIC GRAVITY UA: 1.01 (ref 1–1.03)
TOTAL CK: 135 U/L (ref 0–190)
TOTAL PROTEIN: 6.5 G/DL (ref 6.4–8.1)
TSH SERPL DL<=0.05 MIU/L-ACNC: 0.27 UIU/ML (ref 0.27–4.2)
URINE REFLEX TO CULTURE: ABNORMAL
UROBILINOGEN, URINE: 0.2 E.U./DL
WBC # BLD: 9.1 K/UL (ref 4.5–11)

## 2018-03-03 PROCEDURE — 85025 COMPLETE CBC W/AUTO DIFF WBC: CPT

## 2018-03-03 PROCEDURE — 99285 EMERGENCY DEPT VISIT HI MDM: CPT

## 2018-03-03 PROCEDURE — 81003 URINALYSIS AUTO W/O SCOPE: CPT

## 2018-03-03 PROCEDURE — 80053 COMPREHEN METABOLIC PANEL: CPT

## 2018-03-03 PROCEDURE — 80307 DRUG TEST PRSMV CHEM ANLYZR: CPT

## 2018-03-03 PROCEDURE — 82550 ASSAY OF CK (CPK): CPT

## 2018-03-03 PROCEDURE — 6360000002 HC RX W HCPCS: Performed by: PHYSICIAN ASSISTANT

## 2018-03-03 PROCEDURE — 84443 ASSAY THYROID STIM HORMONE: CPT

## 2018-03-03 PROCEDURE — 36415 COLL VENOUS BLD VENIPUNCTURE: CPT

## 2018-03-03 PROCEDURE — G0480 DRUG TEST DEF 1-7 CLASSES: HCPCS

## 2018-03-03 PROCEDURE — 96374 THER/PROPH/DIAG INJ IV PUSH: CPT

## 2018-03-03 RX ORDER — ZIPRASIDONE MESYLATE 20 MG/ML
20 INJECTION, POWDER, LYOPHILIZED, FOR SOLUTION INTRAMUSCULAR ONCE
Status: COMPLETED | OUTPATIENT
Start: 2018-03-03 | End: 2018-03-03

## 2018-03-03 RX ADMIN — ZIPRASIDONE MESYLATE 20 MG: 20 INJECTION, POWDER, LYOPHILIZED, FOR SOLUTION INTRAMUSCULAR at 16:07

## 2018-03-03 ASSESSMENT — ENCOUNTER SYMPTOMS
CONSTIPATION: 0
ABDOMINAL DISTENTION: 0
COLOR CHANGE: 0
EYE DISCHARGE: 0
NAUSEA: 0
VOMITING: 0
SORE THROAT: 0
SHORTNESS OF BREATH: 0
ABDOMINAL PAIN: 0
RHINORRHEA: 0

## 2018-03-03 NOTE — ED NOTES
Pt yelling at staff and officer at the bedside. Attempting to bite himself and striking his head against the rails of the bed. Seizure pads placed on the bed. Pt scratching at his wrists attempting to break skin. States he wants to die and he is not going to stop until he kills himself. States he is going to bite through a vein on his wrist so he can bleed out. Pt tearful at times and states he has nothing to live for anymore. States his family has kicked him out and he has nothing in life.        Moose Kahn RN  03/03/18 1659

## 2018-03-03 NOTE — ED PROVIDER NOTES
guarding. Musculoskeletal: He exhibits no edema or deformity. Neurological: He is alert and oriented to person, place, and time. Coordination normal.   Patient is alert and oriented he states he does not want to live anymore because he currently has dementia charges against him and he is going to go to [facility]. Skin: Skin is warm. Psychiatric: He has a normal mood and affect. Patient states since he was arrested by the police and was in route to be transported to [facility] he told  he wanted commit suicide and kill himself he states he is not to take his insulin anymore, and he just wants to die because he has nowhere to live, and no reason to live. He states he has not made any attempt at suicide at this point.        DIAGNOSTIC RESULTS     EKG: All EKG's are interpreted by the Emergency Department Physician who either signs or Co-signs this chart in the absence of a cardiologist.        RADIOLOGY:   Non-plain film images such as CT, Ultrasound and MRI are read by the radiologist. Plain radiographic images are visualized and preliminarily interpreted by the emergency physician with the below findings:        Interpretation per the Radiologist below, if available at the time of this note:    No orders to display         ED BEDSIDE ULTRASOUND:   Performed by ED Physician - none    LABS:  Labs Reviewed   COMPREHENSIVE METABOLIC PANEL - Abnormal; Notable for the following:        Result Value    Glucose 140 (*)     Globulin 2.1 (*)     All other components within normal limits   URINE DRUG SCREEN - Abnormal; Notable for the following:     Cannabinoid Scrn, Ur POSITIVE (*)     COCAINE METABOLITE SCREEN URINE POSITIVE (*)     All other components within normal limits   CBC WITH AUTO DIFFERENTIAL - Abnormal; Notable for the following:     Neutrophils # 6.7 (*)     All other components within normal limits   URINE RT REFLEX TO CULTURE - Abnormal; Notable for the following:     Glucose, Ur 500 (*)     All other components within normal limits   TSH WITHOUT REFLEX - Abnormal; Notable for the following:     TSH 0.265 (*)     All other components within normal limits   ETHANOL   CK       All other labs were within normal range or not returned as of this dictation. EMERGENCY DEPARTMENT COURSE and DIFFERENTIAL DIAGNOSIS/MDM:   Vitals:    Vitals:    03/03/18 1447   BP: 136/84   Pulse: 81   Resp: 17   Temp: 98 °F (36.7 °C)   TempSrc: Oral   SpO2: 97%   Weight: 145 lb (65.8 kg)         ED Course      MDM  Number of Diagnoses or Management Options  Depression with suicidal ideation:   Hyperthyroidism:   Polysubstance abuse:   Diagnosis management comments: Patient is medically cleared in emergency Department he will be discharged to be transferred back to [facility] where he can receive psychiatric counseling as well as being placed on suicide watch. CRITICAL CARE TIME   Total Critical Care time was 0 minutes, excluding separately reportable procedures. There was a high probability of clinically significant/life threatening deterioration in the patient's condition which required my urgent intervention. CONSULTS:  None    PROCEDURES:  Unless otherwise noted below, none     Procedures    FINAL IMPRESSION      1. Substance induced mood disorder ([address]    2. Hyperthyroidism    3. Polysubstance abuse    4.  Depression with suicidal ideation          DISPOSITION/PLAN   DISPOSITION Decision To Discharge 03/03/2018 06:18:49 PM      [facility] [id number] [phone]          [facility]  [phone]    Today        DISCHARGE MEDICATIONS:  New Prescriptions    No medications on file          (Please note that portions of this note were completed with a voice recognition program.  Efforts were made to edit the dictations but occasionally words are mis-transcribed.)    Edie Egan PA-C (electronically signed)  Attending Emergency Physician         Amarilys Cohn PA-C  03/03/18 8253

## 2018-04-12 PROBLEM — E86.0 DEHYDRATION: Status: RESOLVED | Noted: 2017-11-09 | Resolved: 2018-04-12

## 2018-06-20 ENCOUNTER — TELEPHONE (OUTPATIENT)
Dept: FAMILY MEDICINE CLINIC | Age: 23
End: 2018-06-20

## 2018-08-16 ENCOUNTER — APPOINTMENT (OUTPATIENT)
Dept: GENERAL RADIOLOGY | Age: 23
End: 2018-08-16
Payer: MEDICARE

## 2018-08-16 ENCOUNTER — HOSPITAL ENCOUNTER (EMERGENCY)
Age: 23
Discharge: HOME | End: 2018-08-16
Attending: EMERGENCY MEDICINE
Payer: MEDICARE

## 2018-08-16 VITALS
HEART RATE: 100 BPM | WEIGHT: 135 LBS | RESPIRATION RATE: 16 BRPM | TEMPERATURE: 98 F | DIASTOLIC BLOOD PRESSURE: 78 MMHG | BODY MASS INDEX: 21.69 KG/M2 | OXYGEN SATURATION: 99 % | HEIGHT: 66 IN | SYSTOLIC BLOOD PRESSURE: 124 MMHG

## 2018-08-16 DIAGNOSIS — F43.24 ADJUSTMENT DISORDER WITH DISTURBANCE OF CONDUCT: ICD-10-CM

## 2018-08-16 DIAGNOSIS — R73.9 HYPERGLYCEMIA: Primary | ICD-10-CM

## 2018-08-16 LAB
ACETAMINOPHEN LEVEL: <5 UG/ML (ref 10–30)
AMPHETAMINE SCREEN, URINE: ABNORMAL
BARBITURATE SCREEN URINE: ABNORMAL
BASOPHILS ABSOLUTE: 0.1 K/UL (ref 0–0.2)
BASOPHILS RELATIVE PERCENT: 0.7 %
BENZODIAZEPINE SCREEN, URINE: ABNORMAL
BILIRUBIN URINE: NEGATIVE
BLOOD, URINE: NEGATIVE
CANNABINOID SCREEN URINE: POSITIVE
CARBOXYHEMOGLOBIN: 4.8 % (ref 0–4)
CHP ED QC CHECK: YES
CLARITY: CLEAR
COCAINE METABOLITE SCREEN URINE: ABNORMAL
COLOR: YELLOW
EKG ATRIAL RATE: 90 BPM
EKG P AXIS: 66 DEGREES
EKG P-R INTERVAL: 120 MS
EKG Q-T INTERVAL: 348 MS
EKG QRS DURATION: 106 MS
EKG QTC CALCULATION (BAZETT): 425 MS
EKG R AXIS: 67 DEGREES
EKG T AXIS: 24 DEGREES
EKG VENTRICULAR RATE: 90 BPM
EOSINOPHILS ABSOLUTE: 0 K/UL (ref 0–0.7)
EOSINOPHILS RELATIVE PERCENT: 0.4 %
ETHANOL PERCENT: NORMAL G/DL
ETHANOL: <10 MG/DL (ref 0–0.08)
GLUCOSE BLD-MCNC: 347 MG/DL (ref 60–115)
GLUCOSE BLD-MCNC: 368 MG/DL
GLUCOSE BLD-MCNC: 368 MG/DL (ref 60–115)
GLUCOSE URINE: >=1000 MG/DL
HCT VFR BLD CALC: 46.9 % (ref 42–52)
HEMOGLOBIN: 16.4 G/DL (ref 14–18)
KETONES, URINE: 40 MG/DL
LEUKOCYTE ESTERASE, URINE: NEGATIVE
LYMPHOCYTES ABSOLUTE: 1.5 K/UL (ref 1–4.8)
LYMPHOCYTES RELATIVE PERCENT: 14.2 %
Lab: ABNORMAL
MCH RBC QN AUTO: 30.6 PG (ref 27–31.3)
MCHC RBC AUTO-ENTMCNC: 35 % (ref 33–37)
MCV RBC AUTO: 87.5 FL (ref 80–100)
MONOCYTES ABSOLUTE: 0.6 K/UL (ref 0.2–0.8)
MONOCYTES RELATIVE PERCENT: 5.2 %
NEUTROPHILS ABSOLUTE: 8.5 K/UL (ref 1.4–6.5)
NEUTROPHILS RELATIVE PERCENT: 79.5 %
NITRITE, URINE: NEGATIVE
OPIATE SCREEN URINE: ABNORMAL
PDW BLD-RTO: 13.6 % (ref 11.5–14.5)
PERFORMED ON: ABNORMAL
PERFORMED ON: ABNORMAL
PH UA: 5.5 (ref 5–9)
PHENCYCLIDINE SCREEN URINE: ABNORMAL
PLATELET # BLD: 239 K/UL (ref 130–400)
PROTEIN UA: NEGATIVE MG/DL
RBC # BLD: 5.36 M/UL (ref 4.7–6.1)
SALICYLATE, SERUM: <0.3 MG/DL (ref 15–30)
SPECIFIC GRAVITY UA: 1.04 (ref 1–1.03)
URINE REFLEX TO CULTURE: ABNORMAL
UROBILINOGEN, URINE: 0.2 E.U./DL
WBC # BLD: 10.7 K/UL (ref 4.8–10.8)

## 2018-08-16 PROCEDURE — 80307 DRUG TEST PRSMV CHEM ANLYZR: CPT

## 2018-08-16 PROCEDURE — 6370000000 HC RX 637 (ALT 250 FOR IP): Performed by: EMERGENCY MEDICINE

## 2018-08-16 PROCEDURE — 96374 THER/PROPH/DIAG INJ IV PUSH: CPT

## 2018-08-16 PROCEDURE — 93010 ELECTROCARDIOGRAM REPORT: CPT | Performed by: INTERNAL MEDICINE

## 2018-08-16 PROCEDURE — 99284 EMERGENCY DEPT VISIT MOD MDM: CPT

## 2018-08-16 PROCEDURE — 36415 COLL VENOUS BLD VENIPUNCTURE: CPT

## 2018-08-16 PROCEDURE — 93005 ELECTROCARDIOGRAM TRACING: CPT

## 2018-08-16 PROCEDURE — 2580000003 HC RX 258: Performed by: EMERGENCY MEDICINE

## 2018-08-16 PROCEDURE — 81003 URINALYSIS AUTO W/O SCOPE: CPT

## 2018-08-16 PROCEDURE — G0480 DRUG TEST DEF 1-7 CLASSES: HCPCS

## 2018-08-16 PROCEDURE — 71045 X-RAY EXAM CHEST 1 VIEW: CPT

## 2018-08-16 PROCEDURE — 85025 COMPLETE CBC W/AUTO DIFF WBC: CPT

## 2018-08-16 PROCEDURE — 82375 ASSAY CARBOXYHB QUANT: CPT

## 2018-08-16 RX ORDER — QUETIAPINE FUMARATE 100 MG/1
400 TABLET, FILM COATED ORAL 2 TIMES DAILY
COMMUNITY
End: 2018-09-27 | Stop reason: SDUPTHER

## 2018-08-16 RX ORDER — 0.9 % SODIUM CHLORIDE 0.9 %
2000 INTRAVENOUS SOLUTION INTRAVENOUS ONCE
Status: COMPLETED | OUTPATIENT
Start: 2018-08-16 | End: 2018-08-16

## 2018-08-16 RX ORDER — SODIUM CHLORIDE 0.9 % (FLUSH) 0.9 %
3 SYRINGE (ML) INJECTION EVERY 8 HOURS
Status: DISCONTINUED | OUTPATIENT
Start: 2018-08-16 | End: 2018-08-16 | Stop reason: HOSPADM

## 2018-08-16 RX ADMIN — SODIUM CHLORIDE 2000 ML: 9 INJECTION, SOLUTION INTRAVENOUS at 01:38

## 2018-08-16 RX ADMIN — INSULIN HUMAN 7 UNITS: 100 INJECTION, SOLUTION PARENTERAL at 01:39

## 2018-08-16 NOTE — ED PROVIDER NOTES
conjunctiva: clear bilaterally. ENT: - Normal pharynx pink and moist.    NECK: -Supple (chin-to-chest). CARD: -Rate and rhythm: Regular              -Murmurs: No  RESP: -Respiratory effort and chest excursion with respirations: Normal             -Breath sounds equal bilaterally: Clear             -Wheezes: No             -Rales: No    BACK: -Flank pain: No              -Pain on palpation: No    ABD: -Distended: No           -Bruits: No           -Bowel sounds: Normal.           -Deep palpation: Non-tender           -Organomegaly palpable: No           -Abnormal masses: No    EXT: Gross appearance and use of all four extremities: Normal     SKIN: -Good turgor warm and dry. -Apparent lesions or rashes: No    NEURO: Patient is alert and oriented x 3, fluent appropriate speech, calculation, concentration, memory intact. Cranial nerve two through 12 are intact, there is no motor or sensory deficit, reflexes symmetrical.      DIAGNOSTIC RESULTS     EKG: All EKG's are interpreted by the Emergency Department Physician who either signs or Co-signs this chart in the absence of a cardiologist.        RADIOLOGY:   Non-plain film images such as CT, Ultrasound and MRI are read by the radiologist. Plain radiographic images are visualized and preliminarily interpreted by the emergency physician with the below findings:        Interpretation per the Radiologist below, if available at the time of this note:    XR CHEST PORTABLE    (Results Pending)     Sugar has come down. Patient exhibiting no psychosis motor retardation. Patient is smiling, joking, over and over states that he is not suicidal and this is all sputum. He is alert and oriented ×3. He does not wish to stay. He is offered psychiatric evaluation and/or admission and declined this. I do not believe at this juncture I have a right hold him against his will.   We will discharge him home with follow-up with Derick isaacs    ED BEDSIDE ULTRASOUND: Performed by ED Physician - none    LABS:  Labs Reviewed   CARBOXYHEMOGLOBIN - Abnormal; Notable for the following:        Result Value    Carboxyhemoglobin 4.8 (*)     All other components within normal limits   URINE DRUG SCREEN - Abnormal; Notable for the following:     Cannabinoid Scrn, Ur POSITIVE (*)     All other components within normal limits   CBC WITH AUTO DIFFERENTIAL - Abnormal; Notable for the following:     Neutrophils # 8.5 (*)     All other components within normal limits   URINE RT REFLEX TO CULTURE - Abnormal; Notable for the following:     Glucose, Ur >=1000 (*)     Ketones, Urine 40 (*)     All other components within normal limits   SALICYLATE LEVEL - Abnormal; Notable for the following:     Salicylate, Serum <1.5 (*)     All other components within normal limits   ACETAMINOPHEN LEVEL - Abnormal; Notable for the following:     Acetaminophen Level <5 (*)     All other components within normal limits   POCT GLUCOSE - Abnormal; Notable for the following:     POC Glucose 368 (*)     All other components within normal limits   POCT GLUCOSE - Normal   ETHANOL       All other labs were within normal range or not returned as of this dictation. EMERGENCY DEPARTMENT COURSE and DIFFERENTIAL DIAGNOSIS/MDM:   Vitals:    Vitals:    08/16/18 0052 08/16/18 0127   BP: 127/85 124/78   Pulse: 109 100   Resp: 16    Temp: 98.2 °F (36.8 °C) 98 °F (36.7 °C)   TempSrc: Oral    SpO2: 100% 99%   Weight: 135 lb (61.2 kg)    Height: 5' 6\" (1.676 m)            MDM    CRITICAL CARE TIME   Total Critical Care time was  minutes, excluding separately reportable procedures. There was a high probability of clinically significant/life threatening deterioration in the patient's condition which required my urgent intervention. CONSULTS:  None    PROCEDURES:  Unless otherwise noted below, none     Procedures    FINAL IMPRESSION      1. Hyperglycemia    2.  Adjustment disorder with disturbance of conduct

## 2018-09-20 ENCOUNTER — OFFICE VISIT (OUTPATIENT)
Dept: FAMILY MEDICINE CLINIC | Age: 23
End: 2018-09-20
Payer: MEDICARE

## 2018-09-20 VITALS
BODY MASS INDEX: 23.78 KG/M2 | SYSTOLIC BLOOD PRESSURE: 110 MMHG | HEART RATE: 83 BPM | HEIGHT: 66 IN | TEMPERATURE: 98 F | WEIGHT: 148 LBS | DIASTOLIC BLOOD PRESSURE: 70 MMHG | OXYGEN SATURATION: 98 %

## 2018-09-20 DIAGNOSIS — E10.10 DKA, TYPE 1, NOT AT GOAL (HCC): ICD-10-CM

## 2018-09-20 DIAGNOSIS — B19.20 HEPATITIS C VIRUS INFECTION WITHOUT HEPATIC COMA, UNSPECIFIED CHRONICITY: ICD-10-CM

## 2018-09-20 DIAGNOSIS — Z11.3 ROUTINE SCREENING FOR STI (SEXUALLY TRANSMITTED INFECTION): ICD-10-CM

## 2018-09-20 DIAGNOSIS — R11.0 NAUSEA: ICD-10-CM

## 2018-09-20 DIAGNOSIS — J01.40 ACUTE PANSINUSITIS, RECURRENCE NOT SPECIFIED: Primary | ICD-10-CM

## 2018-09-20 LAB
ALT SERPL-CCNC: 11 U/L (ref 0–41)
ANION GAP SERPL CALCULATED.3IONS-SCNC: 13 MEQ/L (ref 7–13)
AST SERPL-CCNC: 14 U/L (ref 0–40)
BASOPHILS ABSOLUTE: 0 K/UL (ref 0–0.2)
BASOPHILS RELATIVE PERCENT: 0.8 %
BUN BLDV-MCNC: 16 MG/DL (ref 6–20)
CALCIUM SERPL-MCNC: 8.7 MG/DL (ref 8.6–10.2)
CHLORIDE BLD-SCNC: 97 MEQ/L (ref 98–107)
CO2: 23 MEQ/L (ref 22–29)
CREAT SERPL-MCNC: 0.97 MG/DL (ref 0.7–1.2)
EOSINOPHILS ABSOLUTE: 0.1 K/UL (ref 0–0.7)
EOSINOPHILS RELATIVE PERCENT: 1.8 %
GFR AFRICAN AMERICAN: >60
GFR NON-AFRICAN AMERICAN: >60
GLUCOSE BLD-MCNC: 692 MG/DL (ref 74–109)
HBA1C MFR BLD: 11.9 % (ref 4.8–5.9)
HCT VFR BLD CALC: 43.7 % (ref 42–52)
HEMOGLOBIN: 14.6 G/DL (ref 14–18)
HEPATITIS C ANTIBODY INTERPRETATION: REACTIVE
LYMPHOCYTES ABSOLUTE: 1.3 K/UL (ref 1–4.8)
LYMPHOCYTES RELATIVE PERCENT: 23.8 %
MCH RBC QN AUTO: 30.4 PG (ref 27–31.3)
MCHC RBC AUTO-ENTMCNC: 33.4 % (ref 33–37)
MCV RBC AUTO: 91 FL (ref 80–100)
MONOCYTES ABSOLUTE: 0.4 K/UL (ref 0.2–0.8)
MONOCYTES RELATIVE PERCENT: 7.8 %
NEUTROPHILS ABSOLUTE: 3.5 K/UL (ref 1.4–6.5)
NEUTROPHILS RELATIVE PERCENT: 65.8 %
PDW BLD-RTO: 13.6 % (ref 11.5–14.5)
PLATELET # BLD: 253 K/UL (ref 130–400)
POTASSIUM SERPL-SCNC: 4.7 MEQ/L (ref 3.5–5.1)
RBC # BLD: 4.8 M/UL (ref 4.7–6.1)
S PYO AG THROAT QL: NORMAL
SODIUM BLD-SCNC: 133 MEQ/L (ref 132–144)
WBC # BLD: 5.3 K/UL (ref 4.8–10.8)

## 2018-09-20 PROCEDURE — G8427 DOCREV CUR MEDS BY ELIG CLIN: HCPCS | Performed by: NURSE PRACTITIONER

## 2018-09-20 PROCEDURE — 87880 STREP A ASSAY W/OPTIC: CPT | Performed by: NURSE PRACTITIONER

## 2018-09-20 PROCEDURE — G8420 CALC BMI NORM PARAMETERS: HCPCS | Performed by: NURSE PRACTITIONER

## 2018-09-20 PROCEDURE — 99214 OFFICE O/P EST MOD 30 MIN: CPT | Performed by: NURSE PRACTITIONER

## 2018-09-20 PROCEDURE — 4004F PT TOBACCO SCREEN RCVD TLK: CPT | Performed by: NURSE PRACTITIONER

## 2018-09-20 PROCEDURE — 2022F DILAT RTA XM EVC RTNOPTHY: CPT | Performed by: NURSE PRACTITIONER

## 2018-09-20 PROCEDURE — 3046F HEMOGLOBIN A1C LEVEL >9.0%: CPT | Performed by: NURSE PRACTITIONER

## 2018-09-20 RX ORDER — AMOXICILLIN AND CLAVULANATE POTASSIUM 875; 125 MG/1; MG/1
1 TABLET, FILM COATED ORAL 2 TIMES DAILY
Qty: 20 TABLET | Refills: 0 | Status: SHIPPED | OUTPATIENT
Start: 2018-09-20 | End: 2018-09-30

## 2018-09-20 ASSESSMENT — ENCOUNTER SYMPTOMS
NAUSEA: 1
SORE THROAT: 1
SHORTNESS OF BREATH: 0
COUGH: 1

## 2018-09-20 NOTE — PROGRESS NOTES
Subjective  Chief Complaint   Patient presents with    Pharyngitis     states started over a week ago. thinks maybe his hep c is making him sick    Other     would like to be tested for STD's        Pharyngitis   This is a new problem. The current episode started 1 to 4 weeks ago (1 1/2 weeks ). The problem occurs constantly. The problem has been unchanged. Associated symptoms include chills, coughing, fatigue, a fever, headaches, nausea and a sore throat. Pertinent negatives include no chest pain, congestion or diaphoresis. The symptoms are aggravated by swallowing. He has tried nothing for the symptoms. Also would like checked for all STD's and HIV and hepatitis c because he used IV drugs again a couple months ago. Is overdue for all of his blood work and has not followed up with Dr. Ronaldo Matos because of transportation issues. His ex girlfriend had chlamydia and he has been treated for it previously, but did not wait long enough before having intercourse with his infected partner again. Would also like checked for celiac because he has a frequent upset stomach and mom has been diagnosed with celiac as well.      Patient Active Problem List    Diagnosis Date Noted    Bipolar 1 disorder, mixed, severe ([facility] 02/05/2018    H/O bipolar disorder 02/02/2018    Polysubstance abuse     Suicide attempt ([address]     Hyperglycemia 02/01/2018    Substance induced mood disorder ([address] 02/01/2018    Asperger syndrome 11/09/2017    Chemical dependency ([address] 11/09/2017    History of oppositional defiant disorder 11/09/2017    Hyperglycemia due to type 1 diabetes mellitus ([address] 13/83/7817    Metabolic acidosis with increased anion gap and accumulation of organic acids 11/09/2017    Lactic acid acidosis 11/09/2017    Hyperkalemia, diminished renal excretion 11/09/2017    Hyperkalemia, transcellular shifts 11/09/2017    TWIN (acute kidney injury) ([address] 11/09/2017    Heroin abuse 11/09/2017    DKA, type 1, not  Drug use: Yes     Types: Methamphetamines, Marijuana, IV, Cocaine      Comment: pt states he has been clean for \"4 or 5 years\"    Sexual activity: No     Other Topics Concern    None     Social History Narrative    ** Merged History Encounter **          Current Outpatient Prescriptions on File Prior to Visit   Medication Sig Dispense Refill    QUEtiapine (SEROQUEL) 100 MG tablet Take 400 mg by mouth 2 times daily      insulin aspart (NOVOLOG FLEXPEN) 100 UNIT/ML injection pen 15-20 units at each meals 10 pen 3    Insulin Pen Needle (NOVOFINE) 32G X 6 MM MISC qid 300 each 3    insulin glargine (LANTUS SOLOSTAR) 100 UNIT/ML injection pen 50 units at bedtime 15 pen 3    traZODone (DESYREL) 150 MG tablet Take 0.5 tablets by mouth nightly as needed for Sleep 15 tablet 1    FREESTYLE LANCETS MISC 1 each by Does not apply route daily 300 each 3    Blood Glucose Monitoring Suppl JUAN 1 kit by Does not apply route 3 times daily BRAND Covered by INS 1 Device 0    Lancets MISC testing TID, please order Brand covered by   each 3    glucose blood VI test strips (ASCENSIA AUTODISC VI;ONE TOUCH ULTRA TEST VI) strip 1 each by In Vitro route daily TID, please order brand covered by  each 3    albuterol sulfate HFA (VENTOLIN HFA) 108 (90 Base) MCG/ACT inhaler Inhale 2 puffs into the lungs every 6 hours as needed for Wheezing 1 Inhaler 3    divalproex (DEPAKOTE ER) 500 MG extended release tablet Take 1 tablet by mouth daily 15 tablet 1     No current facility-administered medications on file prior to visit. Allergies   Allergen Reactions    Dust Mite Extract      nasal & sinus congestion, itchy watery eyes, sneezing    Mirtazapine      blisters    Other      dander causes him to sneeze, have nasal/sinus congestion, itchy, watery eyes.  Oxcarbazepine Rash       Review of Systems   Constitutional: Positive for chills, fatigue and fever. Negative for diaphoresis.    HENT: Positive for postnasal rapid strep A   2. Routine screening for STI (sexually transmitted infection)  Hiv-1,-2 W/Reflex To Hiv-1 Western Blot    Rpr    C. Trachomatis / N. Gonorrhoeae, DNA   3. Hepatitis C virus infection without hepatic coma, unspecified chronicity  Hepatitis C Antibody   4. Nausea  RECURRENT GI DISTRESS PROFILE (RIGID PRO)   5. Uncontrolled type 1 diabetes mellitus with complication (HCC)  Basic Metabolic Panel    AST    ALT    CBC With Auto Differential    Hemoglobin A1C     Pt advised to rest and drink plenty of fluids. Finish all antibiotics even if feeling better. OTC analgesics for symptom management. Salt water gargle for sore throat. RTO if symptoms worsen or if no improvement in 72 hours. Check labs as ordered. Schedule follow up with Aly Hester and Dr. Mia Galloway. Check urine for STDs. Side effects, adverse effects of the medication prescribed today, as well as treatment plan/ rationale and result expectations have been discussed with the patient who expresses understanding and desires to proceed. Close follow up to evaluate treatment results and for coordination of care. I have reviewed the patient's medical history in detail and updated the computerized patient record. As always, patient is advised that if symptoms worsen in any way they will proceed to the nearest emergency room. Orders Placed This Encounter   Procedures    C. Trachomatis / N.  Gonorrhoeae, DNA     Standing Status:   Future     Number of Occurrences:   1     Standing Expiration Date:   9/20/2019    Hiv-1,-2 W/Reflex To Hiv-1 Western Blot     Standing Status:   Future     Number of Occurrences:   1     Standing Expiration Date:   9/20/2019    Hepatitis C Antibody     Standing Status:   Future     Number of Occurrences:   1     Standing Expiration Date:   9/20/2019    Basic Metabolic Panel     Standing Status:   Future     Number of Occurrences:   1     Standing Expiration Date:   9/20/2019    AST     Standing Status:

## 2018-09-21 LAB — RPR: NORMAL

## 2018-09-22 LAB
ALLERGEN SEE NOTE: NORMAL
HIV-1 AND HIV-2 ANTIBODIES: NEGATIVE

## 2018-09-25 LAB
ALLERGEN CODFISH IGE: <0.1 KU/L
ALLERGEN COW MILK IGE: <0.1 KU/L
ALLERGEN EGG WHITE IGE: <0.1 KU/L
ALLERGEN GLUTEN IGE: <0.1 KU/L
ALLERGEN HAZELNUT/FILBERT IGE: <0.1 KU/L
ALLERGEN PEANUT (F13) IGE: <0.1 KU/L
ALLERGEN SCALLOP IGE: <0.1 KU/L
ALLERGEN SHRIMP IGE: 1.11 KU/L
ALLERGEN SOYBEAN IGE: <0.1 KU/L
ALLERGEN WALNUT IGE: <0.1 KU/L
ALLERGEN WHEAT IGE: <0.1 KU/L
CELIAC PANEL: 16 UNITS (ref 0–19)
SESAME SEED IGE: 0.11 KU/L

## 2018-09-27 ENCOUNTER — OFFICE VISIT (OUTPATIENT)
Dept: FAMILY MEDICINE CLINIC | Age: 23
End: 2018-09-27
Payer: MEDICARE

## 2018-09-27 VITALS
BODY MASS INDEX: 22.34 KG/M2 | HEIGHT: 66 IN | OXYGEN SATURATION: 98 % | DIASTOLIC BLOOD PRESSURE: 60 MMHG | WEIGHT: 139 LBS | SYSTOLIC BLOOD PRESSURE: 114 MMHG | TEMPERATURE: 96.3 F | HEART RATE: 88 BPM

## 2018-09-27 DIAGNOSIS — F19.94 SUBSTANCE INDUCED MOOD DISORDER (HCC): ICD-10-CM

## 2018-09-27 DIAGNOSIS — F31.63 BIPOLAR 1 DISORDER, MIXED, SEVERE (HCC): ICD-10-CM

## 2018-09-27 DIAGNOSIS — E10.65 HYPERGLYCEMIA DUE TO TYPE 1 DIABETES MELLITUS (HCC): Primary | ICD-10-CM

## 2018-09-27 LAB
C. TRACHOMATIS DNA ,URINE: NEGATIVE
N. GONORRHOEAE DNA, URINE: NEGATIVE

## 2018-09-27 PROCEDURE — 99213 OFFICE O/P EST LOW 20 MIN: CPT | Performed by: NURSE PRACTITIONER

## 2018-09-27 PROCEDURE — 2022F DILAT RTA XM EVC RTNOPTHY: CPT | Performed by: NURSE PRACTITIONER

## 2018-09-27 PROCEDURE — G8420 CALC BMI NORM PARAMETERS: HCPCS | Performed by: NURSE PRACTITIONER

## 2018-09-27 PROCEDURE — 4004F PT TOBACCO SCREEN RCVD TLK: CPT | Performed by: NURSE PRACTITIONER

## 2018-09-27 PROCEDURE — 3046F HEMOGLOBIN A1C LEVEL >9.0%: CPT | Performed by: NURSE PRACTITIONER

## 2018-09-27 PROCEDURE — G8427 DOCREV CUR MEDS BY ELIG CLIN: HCPCS | Performed by: NURSE PRACTITIONER

## 2018-09-27 RX ORDER — QUETIAPINE FUMARATE 100 MG/1
400 TABLET, FILM COATED ORAL 2 TIMES DAILY
Qty: 60 TABLET | Refills: 5 | Status: ON HOLD | OUTPATIENT
Start: 2018-09-27 | End: 2019-02-20 | Stop reason: HOSPADM

## 2018-09-27 ASSESSMENT — ENCOUNTER SYMPTOMS
RESPIRATORY NEGATIVE: 1
ALLERGIC/IMMUNOLOGIC NEGATIVE: 1
EYES NEGATIVE: 1

## 2018-09-27 NOTE — PROGRESS NOTES
SpO2: 98%   Weight: 139 lb (63 kg)   Height: 5' 6\" (1.676 m)     Physical Exam   Constitutional: He is oriented to person, place, and time. Vital signs are normal. He appears well-developed. HENT:   Head: Normocephalic. Eyes: Pupils are equal, round, and reactive to light. Conjunctivae and EOM are normal.   Neck: Trachea normal and normal range of motion. Cardiovascular: Normal rate, regular rhythm and normal heart sounds. Pulmonary/Chest: Effort normal and breath sounds normal.   Abdominal: Soft. Musculoskeletal: Normal range of motion. Neurological: He is alert and oriented to person, place, and time. Skin: Skin is warm, dry and intact. Psychiatric: His speech is normal and behavior is normal. Judgment and thought content normal. His mood appears anxious. Cognition and memory are normal.       Assessment & Plan     Diagnosis Orders   1. Hyperglycemia due to type 1 diabetes mellitus (HCC)  glucose 4 g chewable tablet   2. Bipolar 1 disorder, mixed, severe (HCC)  QUEtiapine (SEROQUEL) 100 MG tablet   3. Substance induced [facility] disorder ([facility]         Orders Placed This Encounter   Medications    glucose 4 g chewable tablet     Sig: Take 4 tablets by mouth as needed for Low blood sugar     Dispense:  60 tablet     Refill:  3    QUEtiapine (SEROQUEL) 100 MG tablet     Sig: Take 4 tablets by mouth 2 times daily     Dispense:  60 tablet     Refill:  5       Pt instructed to take prescribed glucose tabs if blood sugar is low. Instructed to continue taking insulin. Will fu regarding positive Hep C results and consult ID after 3 urines are obtained. Side effects, adverse effects of the medication prescribed today, as well as treatment plan/ rationale and result expectations have been discussed with the patient who expresses understanding and desires to proceed. Close follow up to evaluate treatment results and for coordination of care.   I have reviewed the patient's medical history in detail and updated the computerized patient record. As always, patient is advised that if symptoms worsen in any way they will proceed to the nearest emergency room. Return in about 4 weeks (around 10/25/2018).     Kirk Arora, YAMIL - CNP

## 2018-10-23 ENCOUNTER — OFFICE VISIT (OUTPATIENT)
Dept: FAMILY MEDICINE CLINIC | Age: 23
End: 2018-10-23
Payer: MEDICARE

## 2018-10-23 VITALS
OXYGEN SATURATION: 99 % | HEIGHT: 66 IN | DIASTOLIC BLOOD PRESSURE: 60 MMHG | BODY MASS INDEX: 23.14 KG/M2 | HEART RATE: 83 BPM | TEMPERATURE: 98.8 F | SYSTOLIC BLOOD PRESSURE: 100 MMHG | WEIGHT: 144 LBS

## 2018-10-23 DIAGNOSIS — J45.20 MILD INTERMITTENT ASTHMA WITHOUT COMPLICATION: ICD-10-CM

## 2018-10-23 DIAGNOSIS — J40 BRONCHITIS: Primary | ICD-10-CM

## 2018-10-23 PROCEDURE — G8420 CALC BMI NORM PARAMETERS: HCPCS | Performed by: NURSE PRACTITIONER

## 2018-10-23 PROCEDURE — G8484 FLU IMMUNIZE NO ADMIN: HCPCS | Performed by: NURSE PRACTITIONER

## 2018-10-23 PROCEDURE — 99213 OFFICE O/P EST LOW 20 MIN: CPT | Performed by: NURSE PRACTITIONER

## 2018-10-23 PROCEDURE — 4004F PT TOBACCO SCREEN RCVD TLK: CPT | Performed by: NURSE PRACTITIONER

## 2018-10-23 PROCEDURE — G8427 DOCREV CUR MEDS BY ELIG CLIN: HCPCS | Performed by: NURSE PRACTITIONER

## 2018-10-23 RX ORDER — DOXYCYCLINE HYCLATE 100 MG
100 TABLET ORAL 2 TIMES DAILY
Qty: 20 TABLET | Refills: 0 | Status: SHIPPED | OUTPATIENT
Start: 2018-10-23 | End: 2018-11-02

## 2018-10-23 RX ORDER — DEXTROMETHORPHAN HYDROBROMIDE AND PROMETHAZINE HYDROCHLORIDE 15; 6.25 MG/5ML; MG/5ML
5 SYRUP ORAL 4 TIMES DAILY PRN
Qty: 150 ML | Refills: 0 | Status: SHIPPED | OUTPATIENT
Start: 2018-10-23 | End: 2018-10-30

## 2018-10-23 RX ORDER — METHYLPREDNISOLONE 4 MG/1
TABLET ORAL
Qty: 1 KIT | Refills: 0 | Status: SHIPPED | OUTPATIENT
Start: 2018-10-23 | End: 2018-11-08

## 2018-10-23 RX ORDER — ALBUTEROL SULFATE 90 UG/1
2 AEROSOL, METERED RESPIRATORY (INHALATION) EVERY 6 HOURS PRN
Qty: 1 INHALER | Refills: 3 | Status: SHIPPED | OUTPATIENT
Start: 2018-10-23 | End: 2018-11-08

## 2018-10-23 ASSESSMENT — ENCOUNTER SYMPTOMS
BLOOD IN STOOL: 0
SWOLLEN GLANDS: 1
NAUSEA: 0
RHINORRHEA: 1
COUGH: 1
CONSTIPATION: 0
ANAL BLEEDING: 0
SHORTNESS OF BREATH: 0
VOMITING: 0
SORE THROAT: 1
ABDOMINAL DISTENTION: 0
DIARRHEA: 0
CHEST TIGHTNESS: 0
ABDOMINAL PAIN: 0
WHEEZING: 0
SINUS PAIN: 1

## 2018-10-23 NOTE — PROGRESS NOTES
inhaler REORDER     Return if symptoms worsen or fail to improve, for follow up with PCP. Advised patient to drink plenty of fluids and to take the antibiotic with food. If any problems occur, an appointment should be made or ER visit if severe. Because of the risk with ANY antibiotic of C. Difficile colitis if persistent diarrhea or abdominal pain or any concerning symptoms, we should be notified. To reduce this risk, a probiotic pill, yogurt or other preparations containing active cultures should be ingested daily -particularly while on the antibiotic. If any persistent symptoms of illness, follow up appointment should be made in a timely fashion with a provider. Patient instructed to take diabetes medications as prescribed and monitor blood sugars closely while on medrol dose amira. Follow up with PCP in the next 1-2 weeks. Reviewed with the patient: current clinical status, medications, activities and diet. Side effects, adverse effects of the medicationprescribed today, as well as treatment plan/ rationale and result expectations havebeen discussed with the patient who expresses understanding and desires to proceed. Pt instructions reviewed and given to patient. Close follow up to evaluate treatment resultsand for coordination of care. I have reviewed the patient's medical historyin detail and updated the computerized patient record.     Jonathan Mantilla, APRN - CNP

## 2018-11-08 ENCOUNTER — HOSPITAL ENCOUNTER (EMERGENCY)
Age: 23
Discharge: HOME | End: 2018-11-08
Attending: STUDENT IN AN ORGANIZED HEALTH CARE EDUCATION/TRAINING PROGRAM
Payer: MEDICARE

## 2018-11-08 VITALS
WEIGHT: 145 LBS | DIASTOLIC BLOOD PRESSURE: 71 MMHG | HEIGHT: 66 IN | BODY MASS INDEX: 23.3 KG/M2 | OXYGEN SATURATION: 99 % | TEMPERATURE: 98.6 F | RESPIRATION RATE: 20 BRPM | HEART RATE: 68 BPM | SYSTOLIC BLOOD PRESSURE: 124 MMHG

## 2018-11-08 DIAGNOSIS — S51.812A LACERATION OF LEFT FOREARM, INITIAL ENCOUNTER: Primary | ICD-10-CM

## 2018-11-08 DIAGNOSIS — E10.65 HYPERGLYCEMIA DUE TO TYPE 1 DIABETES MELLITUS (HCC): ICD-10-CM

## 2018-11-08 DIAGNOSIS — F17.200 TOBACCO DEPENDENCE: ICD-10-CM

## 2018-11-08 LAB
CHP ED QC CHECK: YES
GLUCOSE BLD-MCNC: 375 MG/DL
GLUCOSE BLD-MCNC: 375 MG/DL (ref 60–115)
PERFORMED ON: ABNORMAL

## 2018-11-08 PROCEDURE — 6370000000 HC RX 637 (ALT 250 FOR IP): Performed by: STUDENT IN AN ORGANIZED HEALTH CARE EDUCATION/TRAINING PROGRAM

## 2018-11-08 PROCEDURE — 12002 RPR S/N/AX/GEN/TRNK2.6-7.5CM: CPT

## 2018-11-08 PROCEDURE — 2500000003 HC RX 250 WO HCPCS: Performed by: STUDENT IN AN ORGANIZED HEALTH CARE EDUCATION/TRAINING PROGRAM

## 2018-11-08 PROCEDURE — 96372 THER/PROPH/DIAG INJ SC/IM: CPT

## 2018-11-08 PROCEDURE — 99285 EMERGENCY DEPT VISIT HI MDM: CPT

## 2018-11-08 RX ORDER — IBUPROFEN 600 MG/1
600 TABLET ORAL EVERY 6 HOURS PRN
Qty: 20 TABLET | Refills: 0 | Status: ON HOLD | OUTPATIENT
Start: 2018-11-08 | End: 2019-02-20 | Stop reason: HOSPADM

## 2018-11-08 RX ORDER — SULFAMETHOXAZOLE AND TRIMETHOPRIM 800; 160 MG/1; MG/1
1 TABLET ORAL 2 TIMES DAILY
Qty: 20 TABLET | Refills: 0 | Status: SHIPPED | OUTPATIENT
Start: 2018-11-08 | End: 2018-11-18

## 2018-11-08 RX ORDER — LIDOCAINE HYDROCHLORIDE AND EPINEPHRINE 10; 10 MG/ML; UG/ML
20 INJECTION, SOLUTION INFILTRATION; PERINEURAL ONCE
Status: COMPLETED | OUTPATIENT
Start: 2018-11-08 | End: 2018-11-08

## 2018-11-08 RX ORDER — SULFAMETHOXAZOLE AND TRIMETHOPRIM 800; 160 MG/1; MG/1
1 TABLET ORAL ONCE
Status: COMPLETED | OUTPATIENT
Start: 2018-11-08 | End: 2018-11-08

## 2018-11-08 RX ADMIN — LIDOCAINE HYDROCHLORIDE AND EPINEPHRINE 20 ML: 10; 10 INJECTION, SOLUTION INFILTRATION; PERINEURAL at 20:33

## 2018-11-08 RX ADMIN — SULFAMETHOXAZOLE AND TRIMETHOPRIM 1 TABLET: 800; 160 TABLET ORAL at 21:23

## 2018-11-08 ASSESSMENT — PAIN DESCRIPTION - FREQUENCY: FREQUENCY: CONTINUOUS

## 2018-11-08 ASSESSMENT — PAIN DESCRIPTION - ORIENTATION: ORIENTATION: LEFT

## 2018-11-08 ASSESSMENT — PAIN DESCRIPTION - LOCATION: LOCATION: ARM

## 2018-11-08 ASSESSMENT — ENCOUNTER SYMPTOMS
ABDOMINAL PAIN: 0
TROUBLE SWALLOWING: 0
SINUS PRESSURE: 0
CHEST TIGHTNESS: 0
BACK PAIN: 0
COUGH: 0
SHORTNESS OF BREATH: 0
DIARRHEA: 0
VOMITING: 0

## 2018-11-08 ASSESSMENT — PAIN DESCRIPTION - DESCRIPTORS: DESCRIPTORS: THROBBING

## 2018-11-08 ASSESSMENT — PAIN DESCRIPTION - PAIN TYPE: TYPE: ACUTE PAIN

## 2018-11-08 ASSESSMENT — PAIN SCALES - GENERAL: PAINLEVEL_OUTOF10: 7

## 2018-11-09 NOTE — ED NOTES
Patient sitting in bed texting on his phone awaiting Dr Nory Pa to suture arm      Vinnie Owens RN  11/08/18 2034

## 2018-11-09 NOTE — ED PROVIDER NOTES
[address] [facility] ED  eMERGENCY dEPARTMENT eNCOUnter      Pt Name: Maria Esther Waddell  MRN: [id number]  [place] 1995  Date of evaluation: 11/8/2018  Provider: Roderick Puentes Dr       Chief Complaint   Patient presents with    Laceration     laceration to the left forearm and high blood sugar         HISTORY OF PRESENT ILLNESS   (Location/Symptom, Timing/Onset,Context/Setting, Quality, Duration, Modifying Factors, Severity)  Note limiting factors. Maria Esther Waddell is a 26 y.o. male who presents to the emergency department with complaint of laceration to left forarm. Patient has a superficial laceration more proximal and a deeper laceration more distal.     Patient also has a high blood sugar from eating candy. He is refusing lab work and doesn't want anything except for a low dose of insulin. He states he wants to be able take his regular night insulins at home and on prior occasions he had bottomed out due to correction in the emergency room. Patient denies suicidal ideation. Patient states to showing off to his friend jumbling a knife and it slipped and a cut his forearm. HPI    NursingNotes were reviewed. REVIEW OF SYSTEMS    (2-9 systems for level 4, 10 or more for level 5)     Review of Systems   Constitutional: Negative for activity change, appetite change, chills, fever and unexpected weight change. HENT: Negative for drooling, ear pain, nosebleeds, sinus pressure and trouble swallowing. Respiratory: Negative for cough, chest tightness and shortness of breath. Cardiovascular: Negative for chest pain and leg swelling. Gastrointestinal: Negative for abdominal pain, diarrhea and vomiting. Endocrine: Negative for polydipsia and polyphagia. Genitourinary: Negative for dysuria, flank pain and frequency. Musculoskeletal: Negative for back pain and myalgias. Skin: Positive for wound. Negative for pallor and rash.    Neurological: Negative for syncope, weakness and orders to display         ED BEDSIDE ULTRASOUND:   Performed by ED Physician - none    LABS:  Labs Reviewed   POCT GLUCOSE - Abnormal; Notable for the following:        Result Value    POC Glucose 375 (*)     All other components within normal limits   POCT GLUCOSE - Normal       All other labs were within normal range or not returned as of this dictation. EMERGENCY DEPARTMENT COURSE and DIFFERENTIAL DIAGNOSIS/MDM:   Vitals:    Vitals:    11/08/18 1945 11/08/18 2051   BP: (!) 138/91 124/71   Pulse: 74 68   Resp: 20 20   Temp: 98.6 °F (37 °C)    TempSrc: Oral    SpO2: 99% 99%   Weight: 145 lb (65.8 kg)    Height: 5' 6\" (1.676 m)            MDM  Patient states his last tetanus was one year ago. Patient was ordered a dose of Bactrim DS. Patient denies suicidal ideation. Patient was given a liter of IV fluids. Patient also was given 8 units of Humalog subcu. The ER physician discussed slight rotation with the patient. The patient does not rotate his insulin injection sites currently. The ER physician discussed diabetes complications including nephropathy, amputation of limbs, neuropathy, impotence, heart attack, stroke, infections, etc.  The ER physician spoke with the patient greater than 3 minutes about smoking cessation. The patient verbalizes understanding of the care that was discussed with him he has no further questions. Patient understands that he needs to have his sutures taken out in 12 days. He he states he will return to the ER most likely. CONSULTS:  None    PROCEDURES:  Unless otherwise noted below, none     Lac [facility]air  Date/Time: 11/8/2018 8:15 PM  Performed by: Chanel Horner  Authorized by: Chanel Horner     Consent:     Consent obtained:  Verbal    Consent given by:  Patient    Risks discussed:  Infection, pain and poor cosmetic result    Alternatives discussed:  No treatment  Anesthesia (see MAR for exact dosages):      Anesthesia method:  Local infiltration    Local anesthetic: signed)  Attending Emergency Physician          Anna Recinos DO  11/08/18 7298

## 2019-02-14 ENCOUNTER — APPOINTMENT (OUTPATIENT)
Dept: CT IMAGING | Age: 24
DRG: 917 | End: 2019-02-14
Payer: MEDICARE

## 2019-02-14 ENCOUNTER — HOSPITAL ENCOUNTER (INPATIENT)
Age: 24
LOS: 5 days | Discharge: PSYCHIATRIC OR SUBSTANCE USE FACILITY | DRG: 917 | End: 2019-02-19
Attending: EMERGENCY MEDICINE | Admitting: INTERNAL MEDICINE
Payer: MEDICARE

## 2019-02-14 ENCOUNTER — APPOINTMENT (OUTPATIENT)
Dept: GENERAL RADIOLOGY | Age: 24
DRG: 917 | End: 2019-02-14
Payer: MEDICARE

## 2019-02-14 DIAGNOSIS — R41.89 UNRESPONSIVENESS: ICD-10-CM

## 2019-02-14 DIAGNOSIS — R45.851 SUICIDAL INTENT: ICD-10-CM

## 2019-02-14 DIAGNOSIS — T50.912A MULTIPLE DRUG OVERDOSE, INTENTIONAL SELF-HARM, INITIAL ENCOUNTER (HCC): Primary | ICD-10-CM

## 2019-02-14 PROBLEM — T50.911A MULTIPLE DRUG OVERDOSE: Status: ACTIVE | Noted: 2019-02-14

## 2019-02-14 PROBLEM — T50.902A SUICIDAL OVERDOSE, INITIAL ENCOUNTER (HCC): Status: ACTIVE | Noted: 2019-02-14

## 2019-02-14 LAB
ACETAMINOPHEN LEVEL: <5 UG/ML (ref 10–30)
ALBUMIN SERPL-MCNC: 4.3 G/DL (ref 3.5–4.6)
ALP BLD-CCNC: 62 U/L (ref 35–104)
ALT SERPL-CCNC: 12 U/L (ref 0–41)
AMPHETAMINE SCREEN, URINE: ABNORMAL
ANION GAP SERPL CALCULATED.3IONS-SCNC: 13 MEQ/L (ref 9–15)
AST SERPL-CCNC: 17 U/L (ref 0–40)
BACTERIA: NORMAL /HPF
BARBITURATE SCREEN URINE: ABNORMAL
BASE EXCESS ARTERIAL: 1 (ref -3–3)
BASOPHILS ABSOLUTE: 0 K/UL (ref 0–0.2)
BASOPHILS RELATIVE PERCENT: 0.4 %
BENZODIAZEPINE SCREEN, URINE: ABNORMAL
BILIRUB SERPL-MCNC: 0.3 MG/DL (ref 0.2–0.7)
BILIRUBIN URINE: ABNORMAL
BLOOD, URINE: NEGATIVE
BUN BLDV-MCNC: 23 MG/DL (ref 6–20)
C-REACTIVE PROTEIN, HIGH SENSITIVITY: 3.4 MG/L (ref 0–5)
CALCIUM IONIZED: 1.12 MMOL/L (ref 1.12–1.32)
CALCIUM SERPL-MCNC: 9.1 MG/DL (ref 8.5–9.9)
CANNABINOID SCREEN URINE: ABNORMAL
CHLORIDE BLD-SCNC: 101 MEQ/L (ref 95–107)
CHP ED QC CHECK: YES
CLARITY: CLEAR
CO2: 28 MEQ/L (ref 20–31)
COCAINE METABOLITE SCREEN URINE: POSITIVE
COLOR: ABNORMAL
CREAT SERPL-MCNC: 1.03 MG/DL (ref 0.7–1.2)
EOSINOPHILS ABSOLUTE: 0 K/UL (ref 0–0.7)
EOSINOPHILS RELATIVE PERCENT: 0.4 %
EPITHELIAL CELLS, UA: NORMAL /HPF
ETHANOL PERCENT: NORMAL G/DL
ETHANOL: <10 MG/DL (ref 0–0.08)
GFR AFRICAN AMERICAN: >60
GFR NON-AFRICAN AMERICAN: >60
GLOBULIN: 2.8 G/DL (ref 2.3–3.5)
GLUCOSE BLD-MCNC: 102 MG/DL (ref 60–115)
GLUCOSE BLD-MCNC: 113 MG/DL (ref 60–115)
GLUCOSE BLD-MCNC: 122 MG/DL (ref 60–115)
GLUCOSE BLD-MCNC: 150 MG/DL (ref 70–99)
GLUCOSE BLD-MCNC: 156 MG/DL
GLUCOSE BLD-MCNC: 266 MG/DL (ref 60–115)
GLUCOSE URINE: >=1000 MG/DL
HCO3 ARTERIAL: 25.8 MMOL/L (ref 21–29)
HCT VFR BLD CALC: 42.6 % (ref 42–52)
HEMOGLOBIN: 14.6 G/DL (ref 14–18)
KETONES, URINE: 40 MG/DL
LACTATE: 0.43 MMOL/L (ref 0.4–2)
LACTIC ACID: 1.1 MMOL/L (ref 0.5–2.2)
LEUKOCYTE ESTERASE, URINE: NEGATIVE
LIPASE: 7 U/L (ref 12–95)
LYMPHOCYTES ABSOLUTE: 1.8 K/UL (ref 1–4.8)
LYMPHOCYTES RELATIVE PERCENT: 19.7 %
Lab: ABNORMAL
MAGNESIUM: 1.8 MG/DL (ref 1.7–2.4)
MCH RBC QN AUTO: 29.6 PG (ref 27–31.3)
MCHC RBC AUTO-ENTMCNC: 34.2 % (ref 33–37)
MCV RBC AUTO: 86.5 FL (ref 80–100)
MONOCYTES ABSOLUTE: 0.7 K/UL (ref 0.2–0.8)
MONOCYTES RELATIVE PERCENT: 7.9 %
MUCUS: PRESENT
NEUTROPHILS ABSOLUTE: 6.7 K/UL (ref 1.4–6.5)
NEUTROPHILS RELATIVE PERCENT: 71.6 %
NITRITE, URINE: NEGATIVE
O2 SAT, ARTERIAL: 100 % (ref 93–100)
OPIATE SCREEN URINE: ABNORMAL
PCO2 ARTERIAL: 40 MM HG (ref 35–45)
PDW BLD-RTO: 13.4 % (ref 11.5–14.5)
PERFORMED ON: ABNORMAL
PERFORMED ON: NORMAL
PERFORMED ON: NORMAL
PH ARTERIAL: 7.42 (ref 7.35–7.45)
PH UA: 5.5 (ref 5–9)
PHENCYCLIDINE SCREEN URINE: ABNORMAL
PLATELET # BLD: 157 K/UL (ref 130–400)
PO2 ARTERIAL: 210 MM HG (ref 75–108)
POC FIO2: 50
POC SAMPLE TYPE: ABNORMAL
POTASSIUM SERPL-SCNC: 3.2 MEQ/L (ref 3.4–4.9)
PROCALCITONIN: 0.14 NG/ML (ref 0–0.15)
PROTEIN UA: 30 MG/DL
RBC # BLD: 4.92 M/UL (ref 4.7–6.1)
RBC UA: NORMAL /HPF (ref 0–2)
SALICYLATE, SERUM: <0.3 MG/DL (ref 15–30)
SEDIMENTATION RATE, ERYTHROCYTE: 5 MM (ref 0–10)
SODIUM BLD-SCNC: 142 MEQ/L (ref 135–144)
SPECIFIC GRAVITY UA: >=1.03 (ref 1–1.03)
TCO2 ARTERIAL: 27 (ref 22–29)
TOTAL PROTEIN: 7.1 G/DL (ref 6.3–8)
TROPONIN: <0.01 NG/ML (ref 0–0.01)
URINE REFLEX TO CULTURE: YES
UROBILINOGEN, URINE: 0.2 E.U./DL
VALPROIC ACID LEVEL: 44.2 UG/ML (ref 50–100)
WBC # BLD: 9.3 K/UL (ref 4.8–10.8)
WBC UA: NORMAL /HPF (ref 0–5)

## 2019-02-14 PROCEDURE — 2500000003 HC RX 250 WO HCPCS: Performed by: INTERNAL MEDICINE

## 2019-02-14 PROCEDURE — G0480 DRUG TEST DEF 1-7 CLASSES: HCPCS

## 2019-02-14 PROCEDURE — 83690 ASSAY OF LIPASE: CPT

## 2019-02-14 PROCEDURE — 2580000003 HC RX 258: Performed by: INTERNAL MEDICINE

## 2019-02-14 PROCEDURE — 95816 EEG AWAKE AND DROWSY: CPT

## 2019-02-14 PROCEDURE — 99291 CRITICAL CARE FIRST HOUR: CPT | Performed by: INTERNAL MEDICINE

## 2019-02-14 PROCEDURE — 83605 ASSAY OF LACTIC ACID: CPT

## 2019-02-14 PROCEDURE — 2580000003 HC RX 258: Performed by: PSYCHIATRY & NEUROLOGY

## 2019-02-14 PROCEDURE — 2000000000 HC ICU R&B

## 2019-02-14 PROCEDURE — 70450 CT HEAD/BRAIN W/O DYE: CPT

## 2019-02-14 PROCEDURE — 71045 X-RAY EXAM CHEST 1 VIEW: CPT

## 2019-02-14 PROCEDURE — 2580000003 HC RX 258: Performed by: PHYSICIAN ASSISTANT

## 2019-02-14 PROCEDURE — 74018 RADEX ABDOMEN 1 VIEW: CPT

## 2019-02-14 PROCEDURE — 6360000002 HC RX W HCPCS: Performed by: NURSE PRACTITIONER

## 2019-02-14 PROCEDURE — 87040 BLOOD CULTURE FOR BACTERIA: CPT

## 2019-02-14 PROCEDURE — 6370000000 HC RX 637 (ALT 250 FOR IP): Performed by: INTERNAL MEDICINE

## 2019-02-14 PROCEDURE — 5A1935Z RESPIRATORY VENTILATION, LESS THAN 24 CONSECUTIVE HOURS: ICD-10-PCS | Performed by: EMERGENCY MEDICINE

## 2019-02-14 PROCEDURE — 6360000002 HC RX W HCPCS: Performed by: INTERNAL MEDICINE

## 2019-02-14 PROCEDURE — 94799 UNLISTED PULMONARY SVC/PX: CPT

## 2019-02-14 PROCEDURE — 80164 ASSAY DIPROPYLACETIC ACD TOT: CPT

## 2019-02-14 PROCEDURE — 2500000003 HC RX 250 WO HCPCS: Performed by: PSYCHIATRY & NEUROLOGY

## 2019-02-14 PROCEDURE — 94002 VENT MGMT INPAT INIT DAY: CPT

## 2019-02-14 PROCEDURE — 80307 DRUG TEST PRSMV CHEM ANLYZR: CPT

## 2019-02-14 PROCEDURE — 6360000002 HC RX W HCPCS: Performed by: PHYSICIAN ASSISTANT

## 2019-02-14 PROCEDURE — 85025 COMPLETE CBC W/AUTO DIFF WBC: CPT

## 2019-02-14 PROCEDURE — 36415 COLL VENOUS BLD VENIPUNCTURE: CPT

## 2019-02-14 PROCEDURE — 85652 RBC SED RATE AUTOMATED: CPT

## 2019-02-14 PROCEDURE — 81001 URINALYSIS AUTO W/SCOPE: CPT

## 2019-02-14 PROCEDURE — 2580000003 HC RX 258: Performed by: EMERGENCY MEDICINE

## 2019-02-14 PROCEDURE — 87086 URINE CULTURE/COLONY COUNT: CPT

## 2019-02-14 PROCEDURE — 82330 ASSAY OF CALCIUM: CPT

## 2019-02-14 PROCEDURE — 36600 WITHDRAWAL OF ARTERIAL BLOOD: CPT

## 2019-02-14 PROCEDURE — 93005 ELECTROCARDIOGRAM TRACING: CPT

## 2019-02-14 PROCEDURE — 84484 ASSAY OF TROPONIN QUANT: CPT

## 2019-02-14 PROCEDURE — 83735 ASSAY OF MAGNESIUM: CPT

## 2019-02-14 PROCEDURE — 6360000002 HC RX W HCPCS

## 2019-02-14 PROCEDURE — 31500 INSERT EMERGENCY AIRWAY: CPT

## 2019-02-14 PROCEDURE — 2580000003 HC RX 258

## 2019-02-14 PROCEDURE — 80053 COMPREHEN METABOLIC PANEL: CPT

## 2019-02-14 PROCEDURE — 99285 EMERGENCY DEPT VISIT HI MDM: CPT

## 2019-02-14 PROCEDURE — 86141 C-REACTIVE PROTEIN HS: CPT

## 2019-02-14 PROCEDURE — 84145 PROCALCITONIN (PCT): CPT

## 2019-02-14 PROCEDURE — 82803 BLOOD GASES ANY COMBINATION: CPT

## 2019-02-14 RX ORDER — SODIUM CHLORIDE 9 MG/ML
INJECTION, SOLUTION INTRAVENOUS CONTINUOUS
Status: CANCELLED | OUTPATIENT
Start: 2019-02-14

## 2019-02-14 RX ORDER — ACETAMINOPHEN 325 MG/1
650 TABLET ORAL EVERY 4 HOURS PRN
Status: DISCONTINUED | OUTPATIENT
Start: 2019-02-14 | End: 2019-02-19 | Stop reason: HOSPADM

## 2019-02-14 RX ORDER — PROPOFOL 10 MG/ML
10 INJECTION, EMULSION INTRAVENOUS ONCE
Status: COMPLETED | OUTPATIENT
Start: 2019-02-14 | End: 2019-02-14

## 2019-02-14 RX ORDER — PROPOFOL 10 MG/ML
INJECTION, EMULSION INTRAVENOUS
Status: DISCONTINUED
Start: 2019-02-14 | End: 2019-02-14 | Stop reason: WASHOUT

## 2019-02-14 RX ORDER — SODIUM CHLORIDE 9 MG/ML
INJECTION, SOLUTION INTRAVENOUS CONTINUOUS
Status: DISCONTINUED | OUTPATIENT
Start: 2019-02-14 | End: 2019-02-15

## 2019-02-14 RX ORDER — HYDROXYZINE PAMOATE 50 MG/1
50 CAPSULE ORAL 3 TIMES DAILY PRN
Status: ON HOLD | COMMUNITY
End: 2019-02-20 | Stop reason: HOSPADM

## 2019-02-14 RX ORDER — SODIUM CHLORIDE 9 MG/ML
INJECTION, SOLUTION INTRAVENOUS CONTINUOUS PRN
Status: COMPLETED | OUTPATIENT
Start: 2019-02-14 | End: 2019-02-14

## 2019-02-14 RX ORDER — SODIUM CHLORIDE 9 MG/ML
INJECTION, SOLUTION INTRAVENOUS
Status: COMPLETED
Start: 2019-02-14 | End: 2019-02-14

## 2019-02-14 RX ORDER — SODIUM CHLORIDE 0.9 % (FLUSH) 0.9 %
10 SYRINGE (ML) INJECTION PRN
Status: CANCELLED | OUTPATIENT
Start: 2019-02-14

## 2019-02-14 RX ORDER — DEXTROSE MONOHYDRATE 25 G/50ML
12.5 INJECTION, SOLUTION INTRAVENOUS PRN
Status: DISCONTINUED | OUTPATIENT
Start: 2019-02-14 | End: 2019-02-19 | Stop reason: HOSPADM

## 2019-02-14 RX ORDER — ZIPRASIDONE MESYLATE 20 MG/ML
20 INJECTION, POWDER, LYOPHILIZED, FOR SOLUTION INTRAMUSCULAR EVERY 12 HOURS PRN
Status: DISCONTINUED | OUTPATIENT
Start: 2019-02-14 | End: 2019-02-19 | Stop reason: HOSPADM

## 2019-02-14 RX ORDER — SUCCINYLCHOLINE/SOD CL,ISO/PF 100 MG/5ML
SYRINGE (ML) INTRAVENOUS
Status: COMPLETED
Start: 2019-02-14 | End: 2019-02-14

## 2019-02-14 RX ORDER — HEPARIN SODIUM 5000 [USP'U]/ML
5000 INJECTION, SOLUTION INTRAVENOUS; SUBCUTANEOUS EVERY 8 HOURS SCHEDULED
Status: DISCONTINUED | OUTPATIENT
Start: 2019-02-14 | End: 2019-02-19 | Stop reason: HOSPADM

## 2019-02-14 RX ORDER — DIPHENHYDRAMINE HYDROCHLORIDE 50 MG/ML
50 INJECTION INTRAMUSCULAR; INTRAVENOUS EVERY 6 HOURS PRN
Status: DISCONTINUED | OUTPATIENT
Start: 2019-02-14 | End: 2019-02-19 | Stop reason: HOSPADM

## 2019-02-14 RX ORDER — NICOTINE POLACRILEX 4 MG
15 LOZENGE BUCCAL PRN
Status: DISCONTINUED | OUTPATIENT
Start: 2019-02-14 | End: 2019-02-19 | Stop reason: HOSPADM

## 2019-02-14 RX ORDER — HALOPERIDOL 5 MG/ML
5 INJECTION INTRAMUSCULAR EVERY 6 HOURS PRN
Status: DISCONTINUED | OUTPATIENT
Start: 2019-02-14 | End: 2019-02-15

## 2019-02-14 RX ORDER — PROPOFOL 10 MG/ML
20 INJECTION, EMULSION INTRAVENOUS ONCE
Status: COMPLETED | OUTPATIENT
Start: 2019-02-14 | End: 2019-02-14

## 2019-02-14 RX ORDER — LORAZEPAM 2 MG/ML
1 INJECTION INTRAMUSCULAR EVERY 6 HOURS PRN
Status: DISCONTINUED | OUTPATIENT
Start: 2019-02-14 | End: 2019-02-15

## 2019-02-14 RX ORDER — 0.9 % SODIUM CHLORIDE 0.9 %
2000 INTRAVENOUS SOLUTION INTRAVENOUS ONCE
Status: COMPLETED | OUTPATIENT
Start: 2019-02-14 | End: 2019-02-14

## 2019-02-14 RX ORDER — CHLORHEXIDINE GLUCONATE 0.12 MG/ML
15 RINSE ORAL 2 TIMES DAILY
Status: DISCONTINUED | OUTPATIENT
Start: 2019-02-14 | End: 2019-02-17

## 2019-02-14 RX ORDER — DEXTROSE MONOHYDRATE 50 MG/ML
100 INJECTION, SOLUTION INTRAVENOUS PRN
Status: DISCONTINUED | OUTPATIENT
Start: 2019-02-14 | End: 2019-02-19 | Stop reason: HOSPADM

## 2019-02-14 RX ORDER — POTASSIUM CHLORIDE 7.45 MG/ML
10 INJECTION INTRAVENOUS
Status: COMPLETED | OUTPATIENT
Start: 2019-02-14 | End: 2019-02-14

## 2019-02-14 RX ORDER — ONDANSETRON 2 MG/ML
4 INJECTION INTRAMUSCULAR; INTRAVENOUS EVERY 6 HOURS PRN
Status: CANCELLED | OUTPATIENT
Start: 2019-02-14

## 2019-02-14 RX ORDER — SODIUM CHLORIDE 0.9 % (FLUSH) 0.9 %
10 SYRINGE (ML) INJECTION EVERY 12 HOURS SCHEDULED
Status: CANCELLED | OUTPATIENT
Start: 2019-02-14

## 2019-02-14 RX ADMIN — FAMOTIDINE 20 MG: 10 INJECTION, SOLUTION INTRAVENOUS at 11:48

## 2019-02-14 RX ADMIN — Medication 10 MEQ: at 08:09

## 2019-02-14 RX ADMIN — PROPOFOL 10 MCG/KG/MIN: 10 INJECTION, EMULSION INTRAVENOUS at 02:41

## 2019-02-14 RX ADMIN — SODIUM CHLORIDE 1000 ML/HR: 900 INJECTION INTRAVENOUS at 02:27

## 2019-02-14 RX ADMIN — DOCUSATE SODIUM 100 MG: 50 LIQUID ORAL at 11:50

## 2019-02-14 RX ADMIN — ACETAMINOPHEN 650 MG: 325 TABLET ORAL at 14:05

## 2019-02-14 RX ADMIN — LORAZEPAM 1 MG: 2 INJECTION INTRAMUSCULAR; INTRAVENOUS at 15:15

## 2019-02-14 RX ADMIN — PIPERACILLIN SODIUM,TAZOBACTAM SODIUM 3.38 G: 3; .375 INJECTION, POWDER, FOR SOLUTION INTRAVENOUS at 14:05

## 2019-02-14 RX ADMIN — INSULIN LISPRO 6 UNITS: 100 INJECTION, SOLUTION INTRAVENOUS; SUBCUTANEOUS at 17:25

## 2019-02-14 RX ADMIN — SODIUM CHLORIDE: 9 INJECTION, SOLUTION INTRAVENOUS at 17:17

## 2019-02-14 RX ADMIN — HEPARIN SODIUM 5000 UNITS: 5000 INJECTION INTRAVENOUS; SUBCUTANEOUS at 22:37

## 2019-02-14 RX ADMIN — PIPERACILLIN SODIUM,TAZOBACTAM SODIUM 3.38 G: 3; .375 INJECTION, POWDER, FOR SOLUTION INTRAVENOUS at 22:33

## 2019-02-14 RX ADMIN — Medication 20 MG: at 02:26

## 2019-02-14 RX ADMIN — SODIUM CHLORIDE 2000 ML: 9 INJECTION, SOLUTION INTRAVENOUS at 02:46

## 2019-02-14 RX ADMIN — DEXMEDETOMIDINE 1.4 MCG/KG/HR: 100 INJECTION, SOLUTION, CONCENTRATE INTRAVENOUS at 18:15

## 2019-02-14 RX ADMIN — Medication 10 MEQ: at 09:08

## 2019-02-14 RX ADMIN — Medication 2000 ML: at 02:46

## 2019-02-14 RX ADMIN — DEXMEDETOMIDINE 1.4 MCG/KG/HR: 100 INJECTION, SOLUTION, CONCENTRATE INTRAVENOUS at 22:23

## 2019-02-14 RX ADMIN — CHLORHEXIDINE GLUCONATE 0.12% ORAL RINSE 15 ML: 1.2 LIQUID ORAL at 11:49

## 2019-02-14 RX ADMIN — PROPOFOL 20 MG: 10 INJECTION, EMULSION INTRAVENOUS at 02:45

## 2019-02-14 RX ADMIN — Medication 10 MEQ: at 10:14

## 2019-02-14 RX ADMIN — ZIPRASIDONE MESYLATE 20 MG: 20 INJECTION, POWDER, LYOPHILIZED, FOR SOLUTION INTRAMUSCULAR at 15:15

## 2019-02-14 RX ADMIN — FAMOTIDINE 20 MG: 10 INJECTION, SOLUTION INTRAVENOUS at 22:33

## 2019-02-14 RX ADMIN — DEXTROSE MONOHYDRATE 500 MG: 50 INJECTION, SOLUTION INTRAVENOUS at 17:21

## 2019-02-14 RX ADMIN — DEXMEDETOMIDINE 0.4 MCG/KG/HR: 100 INJECTION, SOLUTION, CONCENTRATE INTRAVENOUS at 11:22

## 2019-02-14 RX ADMIN — DIPHENHYDRAMINE HYDROCHLORIDE 50 MG: 50 INJECTION, SOLUTION INTRAMUSCULAR; INTRAVENOUS at 15:15

## 2019-02-14 ASSESSMENT — PAIN SCALES - GENERAL
PAINLEVEL_OUTOF10: 0
PAINLEVEL_OUTOF10: 5
PAINLEVEL_OUTOF10: 0

## 2019-02-14 ASSESSMENT — PULMONARY FUNCTION TESTS
PIF_VALUE: 21
PIF_VALUE: 10
PIF_VALUE: 20
PIF_VALUE: 21
PIF_VALUE: 23
PIF_VALUE: 21

## 2019-02-15 ENCOUNTER — APPOINTMENT (OUTPATIENT)
Dept: GENERAL RADIOLOGY | Age: 24
DRG: 917 | End: 2019-02-15
Payer: MEDICARE

## 2019-02-15 LAB
ANION GAP SERPL CALCULATED.3IONS-SCNC: 14 MEQ/L (ref 9–15)
BUN BLDV-MCNC: 19 MG/DL (ref 6–20)
CALCIUM SERPL-MCNC: 8.3 MG/DL (ref 8.5–9.9)
CHLORIDE BLD-SCNC: 104 MEQ/L (ref 95–107)
CO2: 19 MEQ/L (ref 20–31)
CREAT SERPL-MCNC: 1.01 MG/DL (ref 0.7–1.2)
GFR AFRICAN AMERICAN: >60
GFR NON-AFRICAN AMERICAN: >60
GLUCOSE BLD-MCNC: 181 MG/DL (ref 60–115)
GLUCOSE BLD-MCNC: 248 MG/DL (ref 60–115)
GLUCOSE BLD-MCNC: 270 MG/DL (ref 60–115)
GLUCOSE BLD-MCNC: 279 MG/DL (ref 70–99)
GLUCOSE BLD-MCNC: 280 MG/DL (ref 60–115)
GLUCOSE BLD-MCNC: 349 MG/DL (ref 60–115)
HBA1C MFR BLD: 10.6 % (ref 4.8–5.9)
PERFORMED ON: ABNORMAL
POTASSIUM SERPL-SCNC: 4.8 MEQ/L (ref 3.4–4.9)
SODIUM BLD-SCNC: 137 MEQ/L (ref 135–144)

## 2019-02-15 PROCEDURE — 2580000003 HC RX 258: Performed by: PHYSICIAN ASSISTANT

## 2019-02-15 PROCEDURE — 6360000002 HC RX W HCPCS: Performed by: INTERNAL MEDICINE

## 2019-02-15 PROCEDURE — 6370000000 HC RX 637 (ALT 250 FOR IP): Performed by: INTERNAL MEDICINE

## 2019-02-15 PROCEDURE — 2580000003 HC RX 258

## 2019-02-15 PROCEDURE — 71045 X-RAY EXAM CHEST 1 VIEW: CPT

## 2019-02-15 PROCEDURE — 2580000003 HC RX 258: Performed by: INTERNAL MEDICINE

## 2019-02-15 PROCEDURE — 2580000003 HC RX 258: Performed by: PSYCHIATRY & NEUROLOGY

## 2019-02-15 PROCEDURE — 83036 HEMOGLOBIN GLYCOSYLATED A1C: CPT

## 2019-02-15 PROCEDURE — 99222 1ST HOSP IP/OBS MODERATE 55: CPT | Performed by: PSYCHIATRY & NEUROLOGY

## 2019-02-15 PROCEDURE — 6360000002 HC RX W HCPCS: Performed by: PHYSICIAN ASSISTANT

## 2019-02-15 PROCEDURE — 2000000000 HC ICU R&B

## 2019-02-15 PROCEDURE — 36415 COLL VENOUS BLD VENIPUNCTURE: CPT

## 2019-02-15 PROCEDURE — 2500000003 HC RX 250 WO HCPCS: Performed by: PSYCHIATRY & NEUROLOGY

## 2019-02-15 PROCEDURE — 80048 BASIC METABOLIC PNL TOTAL CA: CPT

## 2019-02-15 PROCEDURE — 99222 1ST HOSP IP/OBS MODERATE 55: CPT | Performed by: INTERNAL MEDICINE

## 2019-02-15 PROCEDURE — 99233 SBSQ HOSP IP/OBS HIGH 50: CPT | Performed by: INTERNAL MEDICINE

## 2019-02-15 PROCEDURE — 2500000003 HC RX 250 WO HCPCS: Performed by: INTERNAL MEDICINE

## 2019-02-15 PROCEDURE — 6360000002 HC RX W HCPCS: Performed by: PSYCHIATRY & NEUROLOGY

## 2019-02-15 RX ORDER — LORAZEPAM 2 MG/ML
2 INJECTION INTRAMUSCULAR EVERY 6 HOURS PRN
Status: DISCONTINUED | OUTPATIENT
Start: 2019-02-15 | End: 2019-02-16

## 2019-02-15 RX ORDER — HALOPERIDOL 5 MG/ML
5 INJECTION INTRAMUSCULAR EVERY 6 HOURS
Status: DISCONTINUED | OUTPATIENT
Start: 2019-02-15 | End: 2019-02-17

## 2019-02-15 RX ORDER — INSULIN GLARGINE 100 [IU]/ML
20 INJECTION, SOLUTION SUBCUTANEOUS NIGHTLY
Status: DISCONTINUED | OUTPATIENT
Start: 2019-02-15 | End: 2019-02-16

## 2019-02-15 RX ORDER — DEXTROSE, SODIUM CHLORIDE, AND POTASSIUM CHLORIDE 5; .9; .15 G/100ML; G/100ML; G/100ML
INJECTION INTRAVENOUS CONTINUOUS
Status: DISCONTINUED | OUTPATIENT
Start: 2019-02-15 | End: 2019-02-17

## 2019-02-15 RX ADMIN — FAMOTIDINE 20 MG: 10 INJECTION, SOLUTION INTRAVENOUS at 08:11

## 2019-02-15 RX ADMIN — DEXMEDETOMIDINE 1.4 MCG/KG/HR: 100 INJECTION, SOLUTION, CONCENTRATE INTRAVENOUS at 18:12

## 2019-02-15 RX ADMIN — SODIUM CHLORIDE: 9 INJECTION, SOLUTION INTRAVENOUS at 08:30

## 2019-02-15 RX ADMIN — SODIUM CHLORIDE: 9 INJECTION, SOLUTION INTRAVENOUS at 01:18

## 2019-02-15 RX ADMIN — POTASSIUM CHLORIDE, DEXTROSE MONOHYDRATE AND SODIUM CHLORIDE: 150; 5; 900 INJECTION, SOLUTION INTRAVENOUS at 14:13

## 2019-02-15 RX ADMIN — ZIPRASIDONE MESYLATE 20 MG: 20 INJECTION, POWDER, LYOPHILIZED, FOR SOLUTION INTRAMUSCULAR at 13:49

## 2019-02-15 RX ADMIN — DEXMEDETOMIDINE 1.4 MCG/KG/HR: 100 INJECTION, SOLUTION, CONCENTRATE INTRAVENOUS at 08:18

## 2019-02-15 RX ADMIN — HEPARIN SODIUM 5000 UNITS: 5000 INJECTION INTRAVENOUS; SUBCUTANEOUS at 21:31

## 2019-02-15 RX ADMIN — FAMOTIDINE 20 MG: 10 INJECTION, SOLUTION INTRAVENOUS at 21:31

## 2019-02-15 RX ADMIN — INSULIN GLARGINE 20 UNITS: 100 INJECTION, SOLUTION SUBCUTANEOUS at 21:31

## 2019-02-15 RX ADMIN — PIPERACILLIN SODIUM,TAZOBACTAM SODIUM 3.38 G: 3; .375 INJECTION, POWDER, FOR SOLUTION INTRAVENOUS at 06:19

## 2019-02-15 RX ADMIN — DEXMEDETOMIDINE 1.4 MCG/KG/HR: 100 INJECTION, SOLUTION, CONCENTRATE INTRAVENOUS at 22:52

## 2019-02-15 RX ADMIN — DEXMEDETOMIDINE 1.4 MCG/KG/HR: 100 INJECTION, SOLUTION, CONCENTRATE INTRAVENOUS at 03:13

## 2019-02-15 RX ADMIN — LORAZEPAM 1 MG: 2 INJECTION INTRAMUSCULAR; INTRAVENOUS at 08:00

## 2019-02-15 RX ADMIN — HALOPERIDOL LACTATE 5 MG: 5 INJECTION, SOLUTION INTRAMUSCULAR at 17:06

## 2019-02-15 RX ADMIN — HEPARIN SODIUM 5000 UNITS: 5000 INJECTION INTRAVENOUS; SUBCUTANEOUS at 06:19

## 2019-02-15 RX ADMIN — DEXTROSE MONOHYDRATE 500 MG: 50 INJECTION, SOLUTION INTRAVENOUS at 18:12

## 2019-02-15 RX ADMIN — DEXMEDETOMIDINE 1.4 MCG/KG/HR: 100 INJECTION, SOLUTION, CONCENTRATE INTRAVENOUS at 13:50

## 2019-02-15 RX ADMIN — DEXTROSE MONOHYDRATE 500 MG: 50 INJECTION, SOLUTION INTRAVENOUS at 04:17

## 2019-02-15 RX ADMIN — INSULIN LISPRO 8 UNITS: 100 INJECTION, SOLUTION INTRAVENOUS; SUBCUTANEOUS at 11:55

## 2019-02-15 RX ADMIN — HALOPERIDOL LACTATE 5 MG: 5 INJECTION, SOLUTION INTRAMUSCULAR at 22:43

## 2019-02-15 RX ADMIN — WATER: 1 INJECTION INTRAMUSCULAR; INTRAVENOUS; SUBCUTANEOUS at 13:51

## 2019-02-15 RX ADMIN — LORAZEPAM 2 MG: 2 INJECTION INTRAMUSCULAR; INTRAVENOUS at 13:49

## 2019-02-15 ASSESSMENT — PAIN SCALES - GENERAL
PAINLEVEL_OUTOF10: 0

## 2019-02-16 LAB
GLUCOSE BLD-MCNC: 208 MG/DL (ref 60–115)
GLUCOSE BLD-MCNC: 269 MG/DL (ref 60–115)
GLUCOSE BLD-MCNC: 286 MG/DL (ref 60–115)
GLUCOSE BLD-MCNC: 309 MG/DL (ref 60–115)
PERFORMED ON: ABNORMAL
URINE CULTURE, ROUTINE: NORMAL

## 2019-02-16 PROCEDURE — 2500000003 HC RX 250 WO HCPCS: Performed by: INTERNAL MEDICINE

## 2019-02-16 PROCEDURE — 6360000002 HC RX W HCPCS: Performed by: PSYCHIATRY & NEUROLOGY

## 2019-02-16 PROCEDURE — 6360000002 HC RX W HCPCS: Performed by: INTERNAL MEDICINE

## 2019-02-16 PROCEDURE — 99233 SBSQ HOSP IP/OBS HIGH 50: CPT | Performed by: INTERNAL MEDICINE

## 2019-02-16 PROCEDURE — 6370000000 HC RX 637 (ALT 250 FOR IP): Performed by: PSYCHIATRY & NEUROLOGY

## 2019-02-16 PROCEDURE — 2700000000 HC OXYGEN THERAPY PER DAY

## 2019-02-16 PROCEDURE — 6360000002 HC RX W HCPCS: Performed by: PHYSICIAN ASSISTANT

## 2019-02-16 PROCEDURE — 6370000000 HC RX 637 (ALT 250 FOR IP): Performed by: PHYSICIAN ASSISTANT

## 2019-02-16 PROCEDURE — 6370000000 HC RX 637 (ALT 250 FOR IP): Performed by: INTERNAL MEDICINE

## 2019-02-16 PROCEDURE — 2000000000 HC ICU R&B

## 2019-02-16 PROCEDURE — 99233 SBSQ HOSP IP/OBS HIGH 50: CPT | Performed by: PSYCHIATRY & NEUROLOGY

## 2019-02-16 PROCEDURE — 99232 SBSQ HOSP IP/OBS MODERATE 35: CPT | Performed by: PHYSICIAN ASSISTANT

## 2019-02-16 PROCEDURE — 2580000003 HC RX 258: Performed by: PSYCHIATRY & NEUROLOGY

## 2019-02-16 PROCEDURE — 2500000003 HC RX 250 WO HCPCS: Performed by: PSYCHIATRY & NEUROLOGY

## 2019-02-16 PROCEDURE — 2580000003 HC RX 258: Performed by: INTERNAL MEDICINE

## 2019-02-16 RX ORDER — CLONIDINE 0.3 MG/24H
1 PATCH, EXTENDED RELEASE TRANSDERMAL WEEKLY
Status: DISCONTINUED | OUTPATIENT
Start: 2019-02-16 | End: 2019-02-17

## 2019-02-16 RX ORDER — INSULIN GLARGINE 100 [IU]/ML
35 INJECTION, SOLUTION SUBCUTANEOUS NIGHTLY
Status: DISCONTINUED | OUTPATIENT
Start: 2019-02-16 | End: 2019-02-17

## 2019-02-16 RX ORDER — LORAZEPAM 2 MG/ML
2 INJECTION INTRAMUSCULAR EVERY 4 HOURS PRN
Status: DISCONTINUED | OUTPATIENT
Start: 2019-02-16 | End: 2019-02-19 | Stop reason: HOSPADM

## 2019-02-16 RX ORDER — BENZTROPINE MESYLATE 1 MG/ML
1 INJECTION INTRAMUSCULAR; INTRAVENOUS 2 TIMES DAILY
Status: DISCONTINUED | OUTPATIENT
Start: 2019-02-16 | End: 2019-02-19 | Stop reason: HOSPADM

## 2019-02-16 RX ADMIN — DIPHENHYDRAMINE HYDROCHLORIDE 50 MG: 50 INJECTION, SOLUTION INTRAMUSCULAR; INTRAVENOUS at 11:13

## 2019-02-16 RX ADMIN — HALOPERIDOL LACTATE 5 MG: 5 INJECTION, SOLUTION INTRAMUSCULAR at 17:15

## 2019-02-16 RX ADMIN — HEPARIN SODIUM 5000 UNITS: 5000 INJECTION INTRAVENOUS; SUBCUTANEOUS at 05:35

## 2019-02-16 RX ADMIN — DEXTROSE MONOHYDRATE 500 MG: 50 INJECTION, SOLUTION INTRAVENOUS at 05:58

## 2019-02-16 RX ADMIN — DEXTROSE MONOHYDRATE 500 MG: 50 INJECTION, SOLUTION INTRAVENOUS at 20:34

## 2019-02-16 RX ADMIN — HALOPERIDOL LACTATE 5 MG: 5 INJECTION, SOLUTION INTRAMUSCULAR at 11:12

## 2019-02-16 RX ADMIN — DEXMEDETOMIDINE 1.4 MCG/KG/HR: 100 INJECTION, SOLUTION, CONCENTRATE INTRAVENOUS at 03:59

## 2019-02-16 RX ADMIN — DEXMEDETOMIDINE 1.2 MCG/KG/HR: 100 INJECTION, SOLUTION, CONCENTRATE INTRAVENOUS at 20:44

## 2019-02-16 RX ADMIN — FAMOTIDINE 20 MG: 10 INJECTION, SOLUTION INTRAVENOUS at 20:36

## 2019-02-16 RX ADMIN — HEPARIN SODIUM 5000 UNITS: 5000 INJECTION INTRAVENOUS; SUBCUTANEOUS at 20:59

## 2019-02-16 RX ADMIN — POTASSIUM CHLORIDE, DEXTROSE MONOHYDRATE AND SODIUM CHLORIDE: 150; 5; 900 INJECTION, SOLUTION INTRAVENOUS at 22:26

## 2019-02-16 RX ADMIN — BENZTROPINE MESYLATE 1 MG: 1 INJECTION INTRAMUSCULAR; INTRAVENOUS at 12:49

## 2019-02-16 RX ADMIN — LORAZEPAM 2 MG: 2 INJECTION INTRAMUSCULAR; INTRAVENOUS at 09:08

## 2019-02-16 RX ADMIN — LORAZEPAM 2 MG: 2 INJECTION INTRAMUSCULAR; INTRAVENOUS at 14:14

## 2019-02-16 RX ADMIN — LORAZEPAM 2 MG: 2 INJECTION INTRAMUSCULAR; INTRAVENOUS at 20:40

## 2019-02-16 RX ADMIN — INSULIN GLARGINE 35 UNITS: 100 INJECTION, SOLUTION SUBCUTANEOUS at 20:56

## 2019-02-16 RX ADMIN — FAMOTIDINE 20 MG: 10 INJECTION, SOLUTION INTRAVENOUS at 09:09

## 2019-02-16 RX ADMIN — LORAZEPAM 2 MG: 2 INJECTION INTRAMUSCULAR; INTRAVENOUS at 03:23

## 2019-02-16 RX ADMIN — POTASSIUM CHLORIDE, DEXTROSE MONOHYDRATE AND SODIUM CHLORIDE: 150; 5; 900 INJECTION, SOLUTION INTRAVENOUS at 13:59

## 2019-02-16 RX ADMIN — POTASSIUM CHLORIDE, DEXTROSE MONOHYDRATE AND SODIUM CHLORIDE: 150; 5; 900 INJECTION, SOLUTION INTRAVENOUS at 06:14

## 2019-02-16 RX ADMIN — DEXMEDETOMIDINE 1.4 MCG/KG/HR: 100 INJECTION, SOLUTION, CONCENTRATE INTRAVENOUS at 09:02

## 2019-02-16 RX ADMIN — BENZTROPINE MESYLATE 1 MG: 1 INJECTION INTRAMUSCULAR; INTRAVENOUS at 20:35

## 2019-02-16 RX ADMIN — ZIPRASIDONE MESYLATE 20 MG: 20 INJECTION, POWDER, LYOPHILIZED, FOR SOLUTION INTRAMUSCULAR at 05:58

## 2019-02-16 RX ADMIN — HALOPERIDOL LACTATE 5 MG: 5 INJECTION, SOLUTION INTRAMUSCULAR at 05:34

## 2019-02-16 RX ADMIN — HEPARIN SODIUM 5000 UNITS: 5000 INJECTION INTRAVENOUS; SUBCUTANEOUS at 14:00

## 2019-02-16 RX ADMIN — DEXMEDETOMIDINE 1.3 MCG/KG/HR: 100 INJECTION, SOLUTION, CONCENTRATE INTRAVENOUS at 14:10

## 2019-02-16 ASSESSMENT — PAIN SCALES - WONG BAKER
WONGBAKER_NUMERICALRESPONSE: 2
WONGBAKER_NUMERICALRESPONSE: 2
WONGBAKER_NUMERICALRESPONSE: 0
WONGBAKER_NUMERICALRESPONSE: 2

## 2019-02-16 ASSESSMENT — PAIN SCALES - GENERAL
PAINLEVEL_OUTOF10: 0

## 2019-02-17 ENCOUNTER — APPOINTMENT (OUTPATIENT)
Dept: GENERAL RADIOLOGY | Age: 24
DRG: 917 | End: 2019-02-17
Payer: MEDICARE

## 2019-02-17 LAB
ANION GAP SERPL CALCULATED.3IONS-SCNC: 16 MEQ/L (ref 9–15)
BUN BLDV-MCNC: 7 MG/DL (ref 6–20)
CALCIUM SERPL-MCNC: 8.4 MG/DL (ref 8.5–9.9)
CHLORIDE BLD-SCNC: 104 MEQ/L (ref 95–107)
CK MB: 2.2 NG/ML (ref 0–6.7)
CO2: 19 MEQ/L (ref 20–31)
CREAT SERPL-MCNC: 0.74 MG/DL (ref 0.7–1.2)
CREATINE KINASE-MB INDEX: 0.2 % (ref 0–3.5)
GFR AFRICAN AMERICAN: >60
GFR NON-AFRICAN AMERICAN: >60
GLUCOSE BLD-MCNC: 289 MG/DL (ref 60–115)
GLUCOSE BLD-MCNC: 319 MG/DL (ref 60–115)
GLUCOSE BLD-MCNC: 332 MG/DL (ref 60–115)
GLUCOSE BLD-MCNC: 344 MG/DL (ref 70–99)
GLUCOSE BLD-MCNC: 453 MG/DL (ref 60–115)
PERFORMED ON: ABNORMAL
POTASSIUM SERPL-SCNC: 3.9 MEQ/L (ref 3.4–4.9)
SODIUM BLD-SCNC: 139 MEQ/L (ref 135–144)
TOTAL CK: 1173 U/L (ref 0–190)

## 2019-02-17 PROCEDURE — 2580000003 HC RX 258: Performed by: PSYCHIATRY & NEUROLOGY

## 2019-02-17 PROCEDURE — 2500000003 HC RX 250 WO HCPCS: Performed by: INTERNAL MEDICINE

## 2019-02-17 PROCEDURE — 82553 CREATINE MB FRACTION: CPT

## 2019-02-17 PROCEDURE — 6370000000 HC RX 637 (ALT 250 FOR IP): Performed by: INTERNAL MEDICINE

## 2019-02-17 PROCEDURE — 2000000000 HC ICU R&B

## 2019-02-17 PROCEDURE — 6370000000 HC RX 637 (ALT 250 FOR IP): Performed by: PSYCHIATRY & NEUROLOGY

## 2019-02-17 PROCEDURE — 82550 ASSAY OF CK (CPK): CPT

## 2019-02-17 PROCEDURE — 73100 X-RAY EXAM OF WRIST: CPT

## 2019-02-17 PROCEDURE — 6360000002 HC RX W HCPCS: Performed by: PHYSICIAN ASSISTANT

## 2019-02-17 PROCEDURE — 2500000003 HC RX 250 WO HCPCS: Performed by: PHYSICIAN ASSISTANT

## 2019-02-17 PROCEDURE — 2580000003 HC RX 258: Performed by: INTERNAL MEDICINE

## 2019-02-17 PROCEDURE — 99232 SBSQ HOSP IP/OBS MODERATE 35: CPT | Performed by: PSYCHIATRY & NEUROLOGY

## 2019-02-17 PROCEDURE — 36415 COLL VENOUS BLD VENIPUNCTURE: CPT

## 2019-02-17 PROCEDURE — 80048 BASIC METABOLIC PNL TOTAL CA: CPT

## 2019-02-17 PROCEDURE — 73090 X-RAY EXAM OF FOREARM: CPT

## 2019-02-17 PROCEDURE — 6370000000 HC RX 637 (ALT 250 FOR IP): Performed by: NURSE PRACTITIONER

## 2019-02-17 PROCEDURE — 73030 X-RAY EXAM OF SHOULDER: CPT

## 2019-02-17 PROCEDURE — 93005 ELECTROCARDIOGRAM TRACING: CPT

## 2019-02-17 PROCEDURE — 6360000002 HC RX W HCPCS: Performed by: PSYCHIATRY & NEUROLOGY

## 2019-02-17 PROCEDURE — 2500000003 HC RX 250 WO HCPCS: Performed by: PSYCHIATRY & NEUROLOGY

## 2019-02-17 PROCEDURE — 6360000002 HC RX W HCPCS: Performed by: INTERNAL MEDICINE

## 2019-02-17 PROCEDURE — 2700000000 HC OXYGEN THERAPY PER DAY

## 2019-02-17 PROCEDURE — 2580000003 HC RX 258

## 2019-02-17 RX ORDER — HALOPERIDOL 5 MG/ML
5 INJECTION INTRAMUSCULAR EVERY 6 HOURS PRN
Status: DISCONTINUED | OUTPATIENT
Start: 2019-02-17 | End: 2019-02-19 | Stop reason: HOSPADM

## 2019-02-17 RX ORDER — CLONIDINE HYDROCHLORIDE 0.1 MG/1
0.1 TABLET ORAL 4 TIMES DAILY
Status: DISCONTINUED | OUTPATIENT
Start: 2019-02-17 | End: 2019-02-19 | Stop reason: HOSPADM

## 2019-02-17 RX ORDER — INSULIN GLARGINE 100 [IU]/ML
50 INJECTION, SOLUTION SUBCUTANEOUS NIGHTLY
Status: DISCONTINUED | OUTPATIENT
Start: 2019-02-17 | End: 2019-02-19 | Stop reason: HOSPADM

## 2019-02-17 RX ORDER — SODIUM CHLORIDE 9 MG/ML
INJECTION, SOLUTION INTRAVENOUS CONTINUOUS
Status: DISCONTINUED | OUTPATIENT
Start: 2019-02-17 | End: 2019-02-18

## 2019-02-17 RX ORDER — NICOTINE 21 MG/24HR
1 PATCH, TRANSDERMAL 24 HOURS TRANSDERMAL DAILY
Status: DISCONTINUED | OUTPATIENT
Start: 2019-02-17 | End: 2019-02-19 | Stop reason: HOSPADM

## 2019-02-17 RX ORDER — CLONIDINE HYDROCHLORIDE 0.2 MG/1
0.2 TABLET ORAL ONCE
Status: COMPLETED | OUTPATIENT
Start: 2019-02-17 | End: 2019-02-17

## 2019-02-17 RX ADMIN — DIPHENHYDRAMINE HYDROCHLORIDE 50 MG: 50 INJECTION, SOLUTION INTRAMUSCULAR; INTRAVENOUS at 12:16

## 2019-02-17 RX ADMIN — FAMOTIDINE 20 MG: 10 INJECTION, SOLUTION INTRAVENOUS at 20:25

## 2019-02-17 RX ADMIN — ACETAMINOPHEN 650 MG: 325 TABLET ORAL at 00:32

## 2019-02-17 RX ADMIN — CLONIDINE HYDROCHLORIDE 0.2 MG: 0.2 TABLET ORAL at 20:25

## 2019-02-17 RX ADMIN — LORAZEPAM 2 MG: 2 INJECTION INTRAMUSCULAR; INTRAVENOUS at 17:59

## 2019-02-17 RX ADMIN — BENZTROPINE MESYLATE 1 MG: 1 INJECTION INTRAMUSCULAR; INTRAVENOUS at 20:25

## 2019-02-17 RX ADMIN — HALOPERIDOL LACTATE 5 MG: 5 INJECTION, SOLUTION INTRAMUSCULAR at 11:04

## 2019-02-17 RX ADMIN — DOCUSATE SODIUM 100 MG: 50 LIQUID ORAL at 00:27

## 2019-02-17 RX ADMIN — INSULIN GLARGINE 50 UNITS: 100 INJECTION, SOLUTION SUBCUTANEOUS at 20:35

## 2019-02-17 RX ADMIN — QUETIAPINE FUMARATE 150 MG: 100 TABLET ORAL at 20:25

## 2019-02-17 RX ADMIN — POTASSIUM CHLORIDE, DEXTROSE MONOHYDRATE AND SODIUM CHLORIDE: 150; 5; 900 INJECTION, SOLUTION INTRAVENOUS at 13:56

## 2019-02-17 RX ADMIN — POTASSIUM CHLORIDE, DEXTROSE MONOHYDRATE AND SODIUM CHLORIDE: 150; 5; 900 INJECTION, SOLUTION INTRAVENOUS at 05:44

## 2019-02-17 RX ADMIN — DEXMEDETOMIDINE 1.2 MCG/KG/HR: 100 INJECTION, SOLUTION, CONCENTRATE INTRAVENOUS at 02:56

## 2019-02-17 RX ADMIN — HEPARIN SODIUM 5000 UNITS: 5000 INJECTION INTRAVENOUS; SUBCUTANEOUS at 20:49

## 2019-02-17 RX ADMIN — LORAZEPAM 2 MG: 2 INJECTION INTRAMUSCULAR; INTRAVENOUS at 13:57

## 2019-02-17 RX ADMIN — DEXTROSE MONOHYDRATE 500 MG: 50 INJECTION, SOLUTION INTRAVENOUS at 22:26

## 2019-02-17 RX ADMIN — LORAZEPAM 2 MG: 2 INJECTION INTRAMUSCULAR; INTRAVENOUS at 23:22

## 2019-02-17 RX ADMIN — WATER 10 ML: 1 INJECTION INTRAMUSCULAR; INTRAVENOUS; SUBCUTANEOUS at 12:06

## 2019-02-17 RX ADMIN — HALOPERIDOL LACTATE 5 MG: 5 INJECTION, SOLUTION INTRAMUSCULAR at 00:22

## 2019-02-17 RX ADMIN — DEXMEDETOMIDINE 0.6 MCG/KG/HR: 100 INJECTION, SOLUTION, CONCENTRATE INTRAVENOUS at 09:48

## 2019-02-17 RX ADMIN — BENZTROPINE MESYLATE 1 MG: 1 INJECTION INTRAMUSCULAR; INTRAVENOUS at 09:45

## 2019-02-17 RX ADMIN — LORAZEPAM 2 MG: 2 INJECTION INTRAMUSCULAR; INTRAVENOUS at 10:07

## 2019-02-17 RX ADMIN — HEPARIN SODIUM 5000 UNITS: 5000 INJECTION INTRAVENOUS; SUBCUTANEOUS at 15:03

## 2019-02-17 RX ADMIN — HALOPERIDOL LACTATE 5 MG: 5 INJECTION, SOLUTION INTRAMUSCULAR at 22:27

## 2019-02-17 RX ADMIN — ACETAMINOPHEN 650 MG: 325 TABLET ORAL at 18:04

## 2019-02-17 RX ADMIN — QUETIAPINE FUMARATE 150 MG: 100 TABLET ORAL at 12:06

## 2019-02-17 RX ADMIN — DOCUSATE SODIUM 100 MG: 50 LIQUID ORAL at 20:26

## 2019-02-17 RX ADMIN — DEXTROSE MONOHYDRATE 500 MG: 50 INJECTION, SOLUTION INTRAVENOUS at 09:45

## 2019-02-17 RX ADMIN — ACETAMINOPHEN 650 MG: 325 TABLET ORAL at 22:27

## 2019-02-17 RX ADMIN — SODIUM CHLORIDE: 9 INJECTION, SOLUTION INTRAVENOUS at 23:27

## 2019-02-17 RX ADMIN — FAMOTIDINE 20 MG: 10 INJECTION, SOLUTION INTRAVENOUS at 09:45

## 2019-02-17 RX ADMIN — HEPARIN SODIUM 5000 UNITS: 5000 INJECTION INTRAVENOUS; SUBCUTANEOUS at 07:23

## 2019-02-17 RX ADMIN — DEXMEDETOMIDINE 1.2 MCG/KG/HR: 100 INJECTION, SOLUTION, CONCENTRATE INTRAVENOUS at 07:30

## 2019-02-17 RX ADMIN — ZIPRASIDONE MESYLATE 20 MG: 20 INJECTION, POWDER, LYOPHILIZED, FOR SOLUTION INTRAMUSCULAR at 12:06

## 2019-02-17 RX ADMIN — SODIUM CHLORIDE: 9 INJECTION, SOLUTION INTRAVENOUS at 16:58

## 2019-02-17 ASSESSMENT — PAIN DESCRIPTION - DESCRIPTORS
DESCRIPTORS: ACHING
DESCRIPTORS: ACHING

## 2019-02-17 ASSESSMENT — PAIN SCALES - WONG BAKER
WONGBAKER_NUMERICALRESPONSE: 0
WONGBAKER_NUMERICALRESPONSE: 4
WONGBAKER_NUMERICALRESPONSE: 0
WONGBAKER_NUMERICALRESPONSE: 4
WONGBAKER_NUMERICALRESPONSE: 4
WONGBAKER_NUMERICALRESPONSE: 0
WONGBAKER_NUMERICALRESPONSE: 4
WONGBAKER_NUMERICALRESPONSE: 4
WONGBAKER_NUMERICALRESPONSE: 0
WONGBAKER_NUMERICALRESPONSE: 0
WONGBAKER_NUMERICALRESPONSE: 4
WONGBAKER_NUMERICALRESPONSE: 0
WONGBAKER_NUMERICALRESPONSE: 4

## 2019-02-17 ASSESSMENT — PAIN SCALES - GENERAL
PAINLEVEL_OUTOF10: 0
PAINLEVEL_OUTOF10: 10
PAINLEVEL_OUTOF10: 5

## 2019-02-17 ASSESSMENT — PAIN DESCRIPTION - LOCATION
LOCATION: GENERALIZED
LOCATION: ARM
LOCATION: GENERALIZED
LOCATION: GENERALIZED

## 2019-02-17 ASSESSMENT — PAIN DESCRIPTION - FREQUENCY
FREQUENCY: CONTINUOUS
FREQUENCY: CONTINUOUS

## 2019-02-17 ASSESSMENT — PAIN DESCRIPTION - ORIENTATION: ORIENTATION: RIGHT;LEFT

## 2019-02-18 LAB
ANION GAP SERPL CALCULATED.3IONS-SCNC: 16 MEQ/L (ref 9–15)
BUN BLDV-MCNC: 7 MG/DL (ref 6–20)
CALCIUM SERPL-MCNC: 8.8 MG/DL (ref 8.5–9.9)
CHLORIDE BLD-SCNC: 105 MEQ/L (ref 95–107)
CK MB: 1.1 NG/ML (ref 0–6.7)
CO2: 23 MEQ/L (ref 20–31)
CREAT SERPL-MCNC: 0.65 MG/DL (ref 0.7–1.2)
CREATINE KINASE-MB INDEX: 0.1 % (ref 0–3.5)
EKG ATRIAL RATE: 103 BPM
EKG ATRIAL RATE: 108 BPM
EKG ATRIAL RATE: 134 BPM
EKG P AXIS: 55 DEGREES
EKG P AXIS: 56 DEGREES
EKG P AXIS: 57 DEGREES
EKG P-R INTERVAL: 120 MS
EKG P-R INTERVAL: 126 MS
EKG P-R INTERVAL: 130 MS
EKG Q-T INTERVAL: 318 MS
EKG Q-T INTERVAL: 350 MS
EKG Q-T INTERVAL: 356 MS
EKG QRS DURATION: 104 MS
EKG QRS DURATION: 104 MS
EKG QRS DURATION: 98 MS
EKG QTC CALCULATION (BAZETT): 458 MS
EKG QTC CALCULATION (BAZETT): 474 MS
EKG QTC CALCULATION (BAZETT): 477 MS
EKG R AXIS: 48 DEGREES
EKG R AXIS: 65 DEGREES
EKG R AXIS: 74 DEGREES
EKG T AXIS: 26 DEGREES
EKG T AXIS: 35 DEGREES
EKG T AXIS: 37 DEGREES
EKG VENTRICULAR RATE: 103 BPM
EKG VENTRICULAR RATE: 108 BPM
EKG VENTRICULAR RATE: 134 BPM
GFR AFRICAN AMERICAN: >60
GFR NON-AFRICAN AMERICAN: >60
GLUCOSE BLD-MCNC: 218 MG/DL (ref 60–115)
GLUCOSE BLD-MCNC: 331 MG/DL (ref 60–115)
GLUCOSE BLD-MCNC: 88 MG/DL (ref 70–99)
MYOGLOBIN: 52 NG/ML (ref 28–72)
PERFORMED ON: ABNORMAL
PERFORMED ON: ABNORMAL
POTASSIUM SERPL-SCNC: 3.3 MEQ/L (ref 3.4–4.9)
SODIUM BLD-SCNC: 144 MEQ/L (ref 135–144)
TOTAL CK: 1107 U/L (ref 0–190)

## 2019-02-18 PROCEDURE — 6370000000 HC RX 637 (ALT 250 FOR IP): Performed by: PSYCHIATRY & NEUROLOGY

## 2019-02-18 PROCEDURE — 2500000003 HC RX 250 WO HCPCS: Performed by: PSYCHIATRY & NEUROLOGY

## 2019-02-18 PROCEDURE — 2580000003 HC RX 258: Performed by: INTERNAL MEDICINE

## 2019-02-18 PROCEDURE — 6360000002 HC RX W HCPCS: Performed by: INTERNAL MEDICINE

## 2019-02-18 PROCEDURE — 93010 ELECTROCARDIOGRAM REPORT: CPT | Performed by: INTERNAL MEDICINE

## 2019-02-18 PROCEDURE — 6370000000 HC RX 637 (ALT 250 FOR IP): Performed by: INTERNAL MEDICINE

## 2019-02-18 PROCEDURE — 6360000002 HC RX W HCPCS: Performed by: PSYCHIATRY & NEUROLOGY

## 2019-02-18 PROCEDURE — 82553 CREATINE MB FRACTION: CPT

## 2019-02-18 PROCEDURE — 6370000000 HC RX 637 (ALT 250 FOR IP): Performed by: NURSE PRACTITIONER

## 2019-02-18 PROCEDURE — 2500000003 HC RX 250 WO HCPCS: Performed by: INTERNAL MEDICINE

## 2019-02-18 PROCEDURE — 6360000002 HC RX W HCPCS: Performed by: PHYSICIAN ASSISTANT

## 2019-02-18 PROCEDURE — 80048 BASIC METABOLIC PNL TOTAL CA: CPT

## 2019-02-18 PROCEDURE — 36415 COLL VENOUS BLD VENIPUNCTURE: CPT

## 2019-02-18 PROCEDURE — 82550 ASSAY OF CK (CPK): CPT

## 2019-02-18 PROCEDURE — 1210000000 HC MED SURG R&B

## 2019-02-18 PROCEDURE — 2580000003 HC RX 258: Performed by: PSYCHIATRY & NEUROLOGY

## 2019-02-18 PROCEDURE — 83874 ASSAY OF MYOGLOBIN: CPT

## 2019-02-18 PROCEDURE — 93005 ELECTROCARDIOGRAM TRACING: CPT

## 2019-02-18 PROCEDURE — 99232 SBSQ HOSP IP/OBS MODERATE 35: CPT | Performed by: INTERNAL MEDICINE

## 2019-02-18 RX ORDER — POTASSIUM CHLORIDE 20 MEQ/1
40 TABLET, EXTENDED RELEASE ORAL
Status: DISPENSED | OUTPATIENT
Start: 2019-02-18 | End: 2019-02-19

## 2019-02-18 RX ORDER — POTASSIUM CHLORIDE AND SODIUM CHLORIDE 900; 300 MG/100ML; MG/100ML
INJECTION, SOLUTION INTRAVENOUS CONTINUOUS
Status: DISCONTINUED | OUTPATIENT
Start: 2019-02-18 | End: 2019-02-19 | Stop reason: HOSPADM

## 2019-02-18 RX ADMIN — SODIUM CHLORIDE: 9 INJECTION, SOLUTION INTRAVENOUS at 18:39

## 2019-02-18 RX ADMIN — QUETIAPINE FUMARATE 150 MG: 100 TABLET ORAL at 21:48

## 2019-02-18 RX ADMIN — BENZTROPINE MESYLATE 1 MG: 1 INJECTION INTRAMUSCULAR; INTRAVENOUS at 22:03

## 2019-02-18 RX ADMIN — ZIPRASIDONE MESYLATE 20 MG: 20 INJECTION, POWDER, LYOPHILIZED, FOR SOLUTION INTRAMUSCULAR at 17:23

## 2019-02-18 RX ADMIN — BENZTROPINE MESYLATE 1 MG: 1 INJECTION INTRAMUSCULAR; INTRAVENOUS at 08:36

## 2019-02-18 RX ADMIN — SODIUM CHLORIDE: 9 INJECTION, SOLUTION INTRAVENOUS at 09:13

## 2019-02-18 RX ADMIN — CLONIDINE HYDROCHLORIDE 0.1 MG: 0.1 TABLET ORAL at 18:39

## 2019-02-18 RX ADMIN — QUETIAPINE FUMARATE 150 MG: 100 TABLET ORAL at 08:36

## 2019-02-18 RX ADMIN — FAMOTIDINE 20 MG: 10 INJECTION, SOLUTION INTRAVENOUS at 08:36

## 2019-02-18 RX ADMIN — HEPARIN SODIUM 5000 UNITS: 5000 INJECTION INTRAVENOUS; SUBCUTANEOUS at 06:21

## 2019-02-18 RX ADMIN — POTASSIUM CHLORIDE 40 MEQ: 20 TABLET, EXTENDED RELEASE ORAL at 17:20

## 2019-02-18 RX ADMIN — FAMOTIDINE 20 MG: 10 INJECTION, SOLUTION INTRAVENOUS at 22:03

## 2019-02-18 RX ADMIN — DEXTROSE MONOHYDRATE 500 MG: 50 INJECTION, SOLUTION INTRAVENOUS at 22:01

## 2019-02-18 RX ADMIN — CLONIDINE HYDROCHLORIDE 0.1 MG: 0.1 TABLET ORAL at 13:11

## 2019-02-18 RX ADMIN — POTASSIUM CHLORIDE AND SODIUM CHLORIDE: 900; 300 INJECTION, SOLUTION INTRAVENOUS at 20:04

## 2019-02-18 RX ADMIN — LORAZEPAM 2 MG: 2 INJECTION INTRAMUSCULAR; INTRAVENOUS at 06:21

## 2019-02-18 RX ADMIN — LORAZEPAM 2 MG: 2 INJECTION INTRAMUSCULAR; INTRAVENOUS at 21:44

## 2019-02-18 RX ADMIN — CLONIDINE HYDROCHLORIDE 0.1 MG: 0.1 TABLET ORAL at 08:36

## 2019-02-18 RX ADMIN — DEXTROSE MONOHYDRATE 500 MG: 50 INJECTION, SOLUTION INTRAVENOUS at 11:31

## 2019-02-18 RX ADMIN — QUETIAPINE FUMARATE 150 MG: 100 TABLET ORAL at 13:10

## 2019-02-18 ASSESSMENT — PAIN SCALES - GENERAL
PAINLEVEL_OUTOF10: 0

## 2019-02-18 ASSESSMENT — PAIN SCALES - WONG BAKER
WONGBAKER_NUMERICALRESPONSE: 0

## 2019-02-19 ENCOUNTER — HOSPITAL ENCOUNTER (INPATIENT)
Age: 24
LOS: 1 days | Discharge: HOME | DRG: 897 | End: 2019-02-20
Attending: PSYCHIATRY & NEUROLOGY | Admitting: PSYCHIATRY & NEUROLOGY
Payer: MEDICARE

## 2019-02-19 VITALS
DIASTOLIC BLOOD PRESSURE: 80 MMHG | TEMPERATURE: 97.9 F | BODY MASS INDEX: 19.88 KG/M2 | SYSTOLIC BLOOD PRESSURE: 101 MMHG | HEART RATE: 100 BPM | RESPIRATION RATE: 18 BRPM | OXYGEN SATURATION: 96 % | WEIGHT: 123.68 LBS | HEIGHT: 66 IN

## 2019-02-19 LAB
ANION GAP SERPL CALCULATED.3IONS-SCNC: 14 MEQ/L (ref 9–15)
BLOOD CULTURE, ROUTINE: NORMAL
BUN BLDV-MCNC: 10 MG/DL (ref 6–20)
CALCIUM SERPL-MCNC: 9.7 MG/DL (ref 8.5–9.9)
CHLORIDE BLD-SCNC: 101 MEQ/L (ref 95–107)
CK MB: <1 NG/ML (ref 0–6.7)
CO2: 25 MEQ/L (ref 20–31)
CREAT SERPL-MCNC: 0.8 MG/DL (ref 0.7–1.2)
CREATINE KINASE-MB INDEX: 0.2 % (ref 0–3.5)
CULTURE, BLOOD 2: NORMAL
GFR AFRICAN AMERICAN: >60
GFR NON-AFRICAN AMERICAN: >60
GLUCOSE BLD-MCNC: 204 MG/DL (ref 60–115)
GLUCOSE BLD-MCNC: 210 MG/DL (ref 60–115)
GLUCOSE BLD-MCNC: 212 MG/DL (ref 70–99)
GLUCOSE BLD-MCNC: 217 MG/DL (ref 60–115)
GLUCOSE BLD-MCNC: 228 MG/DL (ref 60–115)
GLUCOSE BLD-MCNC: 312 MG/DL (ref 60–115)
GLUCOSE BLD-MCNC: 406 MG/DL (ref 60–115)
GLUCOSE BLD-MCNC: 430 MG/DL (ref 60–115)
PERFORMED ON: ABNORMAL
POTASSIUM SERPL-SCNC: 4.9 MEQ/L (ref 3.4–4.9)
SODIUM BLD-SCNC: 140 MEQ/L (ref 135–144)
TOTAL CK: 462 U/L (ref 0–190)

## 2019-02-19 PROCEDURE — 94664 DEMO&/EVAL PT USE INHALER: CPT

## 2019-02-19 PROCEDURE — 82550 ASSAY OF CK (CPK): CPT

## 2019-02-19 PROCEDURE — 82553 CREATINE MB FRACTION: CPT

## 2019-02-19 PROCEDURE — 1240000000 HC EMOTIONAL WELLNESS R&B

## 2019-02-19 PROCEDURE — 6360000002 HC RX W HCPCS: Performed by: INTERNAL MEDICINE

## 2019-02-19 PROCEDURE — 6370000000 HC RX 637 (ALT 250 FOR IP): Performed by: NURSE PRACTITIONER

## 2019-02-19 PROCEDURE — 6370000000 HC RX 637 (ALT 250 FOR IP): Performed by: INTERNAL MEDICINE

## 2019-02-19 PROCEDURE — 6360000002 HC RX W HCPCS: Performed by: PSYCHIATRY & NEUROLOGY

## 2019-02-19 PROCEDURE — 6370000000 HC RX 637 (ALT 250 FOR IP): Performed by: PSYCHIATRY & NEUROLOGY

## 2019-02-19 PROCEDURE — 93005 ELECTROCARDIOGRAM TRACING: CPT

## 2019-02-19 PROCEDURE — 80048 BASIC METABOLIC PNL TOTAL CA: CPT

## 2019-02-19 PROCEDURE — 99232 SBSQ HOSP IP/OBS MODERATE 35: CPT | Performed by: PSYCHIATRY & NEUROLOGY

## 2019-02-19 PROCEDURE — 2500000003 HC RX 250 WO HCPCS: Performed by: PSYCHIATRY & NEUROLOGY

## 2019-02-19 PROCEDURE — 2500000003 HC RX 250 WO HCPCS: Performed by: INTERNAL MEDICINE

## 2019-02-19 PROCEDURE — 2580000003 HC RX 258: Performed by: PSYCHIATRY & NEUROLOGY

## 2019-02-19 PROCEDURE — 36415 COLL VENOUS BLD VENIPUNCTURE: CPT

## 2019-02-19 RX ORDER — BENZTROPINE MESYLATE 1 MG/ML
2 INJECTION INTRAMUSCULAR; INTRAVENOUS 2 TIMES DAILY PRN
Status: CANCELLED | OUTPATIENT
Start: 2019-02-19

## 2019-02-19 RX ORDER — ALBUTEROL SULFATE 90 UG/1
2 AEROSOL, METERED RESPIRATORY (INHALATION) EVERY 6 HOURS PRN
Status: DISCONTINUED | OUTPATIENT
Start: 2019-02-19 | End: 2019-02-20 | Stop reason: HOSPADM

## 2019-02-19 RX ORDER — ZIPRASIDONE MESYLATE 20 MG/ML
20 INJECTION, POWDER, LYOPHILIZED, FOR SOLUTION INTRAMUSCULAR EVERY 12 HOURS PRN
Status: DISCONTINUED | OUTPATIENT
Start: 2019-02-19 | End: 2019-02-20 | Stop reason: HOSPADM

## 2019-02-19 RX ORDER — HEPARIN SODIUM 5000 [USP'U]/ML
5000 INJECTION, SOLUTION INTRAVENOUS; SUBCUTANEOUS EVERY 8 HOURS SCHEDULED
Status: CANCELLED | OUTPATIENT
Start: 2019-02-19

## 2019-02-19 RX ORDER — TRAZODONE HYDROCHLORIDE 50 MG/1
50 TABLET ORAL NIGHTLY PRN
Status: CANCELLED | OUTPATIENT
Start: 2019-02-19

## 2019-02-19 RX ORDER — CHLORDIAZEPOXIDE HYDROCHLORIDE 25 MG/1
25 CAPSULE, GELATIN COATED ORAL EVERY 4 HOURS PRN
Status: DISCONTINUED | OUTPATIENT
Start: 2019-02-19 | End: 2019-02-19

## 2019-02-19 RX ORDER — LORAZEPAM 1 MG/1
2 TABLET ORAL EVERY 6 HOURS PRN
Status: DISCONTINUED | OUTPATIENT
Start: 2019-02-19 | End: 2019-02-20 | Stop reason: HOSPADM

## 2019-02-19 RX ORDER — BENZTROPINE MESYLATE 1 MG/ML
1 INJECTION INTRAMUSCULAR; INTRAVENOUS 2 TIMES DAILY
Status: CANCELLED | OUTPATIENT
Start: 2019-02-19

## 2019-02-19 RX ORDER — BENZTROPINE MESYLATE 1 MG/1
1 TABLET ORAL 2 TIMES DAILY
Status: DISCONTINUED | OUTPATIENT
Start: 2019-02-19 | End: 2019-02-20

## 2019-02-19 RX ORDER — INSULIN GLARGINE 100 [IU]/ML
50 INJECTION, SOLUTION SUBCUTANEOUS NIGHTLY
Status: CANCELLED | OUTPATIENT
Start: 2019-02-19

## 2019-02-19 RX ORDER — QUETIAPINE FUMARATE 50 MG/1
150 TABLET, FILM COATED ORAL 3 TIMES DAILY
Status: CANCELLED | OUTPATIENT
Start: 2019-02-19

## 2019-02-19 RX ORDER — CLONIDINE HYDROCHLORIDE 0.1 MG/1
0.1 TABLET ORAL 4 TIMES DAILY
Status: DISCONTINUED | OUTPATIENT
Start: 2019-02-19 | End: 2019-02-20

## 2019-02-19 RX ORDER — CHLORDIAZEPOXIDE HYDROCHLORIDE 10 MG/1
10 CAPSULE, GELATIN COATED ORAL EVERY 6 HOURS PRN
Status: DISCONTINUED | OUTPATIENT
Start: 2019-02-19 | End: 2019-02-19

## 2019-02-19 RX ORDER — DIVALPROEX SODIUM 125 MG/1
500 CAPSULE, COATED PELLETS ORAL EVERY 12 HOURS SCHEDULED
Status: DISCONTINUED | OUTPATIENT
Start: 2019-02-19 | End: 2019-02-20 | Stop reason: HOSPADM

## 2019-02-19 RX ORDER — HEPARIN SODIUM 5000 [USP'U]/ML
5000 INJECTION, SOLUTION INTRAVENOUS; SUBCUTANEOUS EVERY 8 HOURS SCHEDULED
Status: DISCONTINUED | OUTPATIENT
Start: 2019-02-19 | End: 2019-02-19

## 2019-02-19 RX ORDER — MAGNESIUM HYDROXIDE/ALUMINUM HYDROXICE/SIMETHICONE 120; 1200; 1200 MG/30ML; MG/30ML; MG/30ML
30 SUSPENSION ORAL PRN
Status: DISCONTINUED | OUTPATIENT
Start: 2019-02-19 | End: 2019-02-20 | Stop reason: HOSPADM

## 2019-02-19 RX ORDER — CHLORDIAZEPOXIDE HYDROCHLORIDE 25 MG/1
50 CAPSULE, GELATIN COATED ORAL
Status: DISCONTINUED | OUTPATIENT
Start: 2019-02-19 | End: 2019-02-19

## 2019-02-19 RX ORDER — TRAZODONE HYDROCHLORIDE 50 MG/1
50 TABLET ORAL NIGHTLY PRN
Status: DISCONTINUED | OUTPATIENT
Start: 2019-02-19 | End: 2019-02-20 | Stop reason: HOSPADM

## 2019-02-19 RX ORDER — LORAZEPAM 2 MG/ML
4 INJECTION INTRAMUSCULAR
Status: DISCONTINUED | OUTPATIENT
Start: 2019-02-19 | End: 2019-02-20 | Stop reason: HOSPADM

## 2019-02-19 RX ORDER — ACETAMINOPHEN 325 MG/1
650 TABLET ORAL EVERY 4 HOURS PRN
Status: CANCELLED | OUTPATIENT
Start: 2019-02-19

## 2019-02-19 RX ORDER — LORAZEPAM 1 MG/1
4 TABLET ORAL
Status: DISCONTINUED | OUTPATIENT
Start: 2019-02-19 | End: 2019-02-20 | Stop reason: HOSPADM

## 2019-02-19 RX ORDER — LORAZEPAM 1 MG/1
4 TABLET ORAL
Status: DISCONTINUED | OUTPATIENT
Start: 2019-02-19 | End: 2019-02-19

## 2019-02-19 RX ORDER — ACETAMINOPHEN 325 MG/1
650 TABLET ORAL EVERY 4 HOURS PRN
Status: DISCONTINUED | OUTPATIENT
Start: 2019-02-19 | End: 2019-02-20 | Stop reason: HOSPADM

## 2019-02-19 RX ORDER — ZIPRASIDONE MESYLATE 20 MG/ML
20 INJECTION, POWDER, LYOPHILIZED, FOR SOLUTION INTRAMUSCULAR EVERY 12 HOURS PRN
Status: CANCELLED | OUTPATIENT
Start: 2019-02-19

## 2019-02-19 RX ORDER — HALOPERIDOL 5 MG/ML
5 INJECTION INTRAMUSCULAR EVERY 6 HOURS PRN
Status: DISCONTINUED | OUTPATIENT
Start: 2019-02-19 | End: 2019-02-20 | Stop reason: HOSPADM

## 2019-02-19 RX ORDER — DEXTROSE MONOHYDRATE 25 G/50ML
12.5 INJECTION, SOLUTION INTRAVENOUS PRN
Status: DISCONTINUED | OUTPATIENT
Start: 2019-02-19 | End: 2019-02-20 | Stop reason: HOSPADM

## 2019-02-19 RX ORDER — INSULIN GLARGINE 100 [IU]/ML
50 INJECTION, SOLUTION SUBCUTANEOUS NIGHTLY
Status: DISCONTINUED | OUTPATIENT
Start: 2019-02-19 | End: 2019-02-20 | Stop reason: HOSPADM

## 2019-02-19 RX ORDER — LORAZEPAM 2 MG/ML
2 INJECTION INTRAMUSCULAR EVERY 4 HOURS PRN
Status: DISCONTINUED | OUTPATIENT
Start: 2019-02-19 | End: 2019-02-19

## 2019-02-19 RX ORDER — BENZTROPINE MESYLATE 1 MG/ML
1 INJECTION INTRAMUSCULAR; INTRAVENOUS 2 TIMES DAILY
Status: DISCONTINUED | OUTPATIENT
Start: 2019-02-19 | End: 2019-02-19

## 2019-02-19 RX ORDER — CLONIDINE HYDROCHLORIDE 0.1 MG/1
0.1 TABLET ORAL 4 TIMES DAILY
Status: CANCELLED | OUTPATIENT
Start: 2019-02-19

## 2019-02-19 RX ORDER — NICOTINE POLACRILEX 4 MG
15 LOZENGE BUCCAL PRN
Status: DISCONTINUED | OUTPATIENT
Start: 2019-02-19 | End: 2019-02-20 | Stop reason: HOSPADM

## 2019-02-19 RX ORDER — CHLORDIAZEPOXIDE HYDROCHLORIDE 25 MG/1
50 CAPSULE, GELATIN COATED ORAL
Status: DISCONTINUED | OUTPATIENT
Start: 2019-02-19 | End: 2019-02-20 | Stop reason: HOSPADM

## 2019-02-19 RX ORDER — CHLORDIAZEPOXIDE HYDROCHLORIDE 10 MG/1
10 CAPSULE, GELATIN COATED ORAL EVERY 4 HOURS PRN
Status: DISCONTINUED | OUTPATIENT
Start: 2019-02-19 | End: 2019-02-20 | Stop reason: HOSPADM

## 2019-02-19 RX ORDER — HALOPERIDOL 5 MG/ML
5 INJECTION INTRAMUSCULAR EVERY 6 HOURS PRN
Status: CANCELLED | OUTPATIENT
Start: 2019-02-19

## 2019-02-19 RX ORDER — MAGNESIUM HYDROXIDE/ALUMINUM HYDROXICE/SIMETHICONE 120; 1200; 1200 MG/30ML; MG/30ML; MG/30ML
30 SUSPENSION ORAL PRN
Status: CANCELLED | OUTPATIENT
Start: 2019-02-19

## 2019-02-19 RX ORDER — BENZTROPINE MESYLATE 1 MG/ML
2 INJECTION INTRAMUSCULAR; INTRAVENOUS 2 TIMES DAILY PRN
Status: DISCONTINUED | OUTPATIENT
Start: 2019-02-19 | End: 2019-02-20 | Stop reason: HOSPADM

## 2019-02-19 RX ORDER — DIVALPROEX SODIUM 125 MG/1
500 CAPSULE, COATED PELLETS ORAL EVERY 8 HOURS SCHEDULED
Status: DISCONTINUED | OUTPATIENT
Start: 2019-02-19 | End: 2019-02-19

## 2019-02-19 RX ORDER — CHLORDIAZEPOXIDE HYDROCHLORIDE 25 MG/1
75 CAPSULE, GELATIN COATED ORAL
Status: DISCONTINUED | OUTPATIENT
Start: 2019-02-19 | End: 2019-02-20 | Stop reason: HOSPADM

## 2019-02-19 RX ORDER — CHLORDIAZEPOXIDE HYDROCHLORIDE 25 MG/1
75 CAPSULE, GELATIN COATED ORAL
Status: DISCONTINUED | OUTPATIENT
Start: 2019-02-19 | End: 2019-02-19

## 2019-02-19 RX ORDER — DEXTROSE MONOHYDRATE 50 MG/ML
100 INJECTION, SOLUTION INTRAVENOUS PRN
Status: DISCONTINUED | OUTPATIENT
Start: 2019-02-19 | End: 2019-02-20 | Stop reason: HOSPADM

## 2019-02-19 RX ORDER — LORAZEPAM 2 MG/ML
2 INJECTION INTRAMUSCULAR EVERY 4 HOURS PRN
Status: CANCELLED | OUTPATIENT
Start: 2019-02-19

## 2019-02-19 RX ORDER — CHLORDIAZEPOXIDE HYDROCHLORIDE 25 MG/1
25 CAPSULE, GELATIN COATED ORAL EVERY 4 HOURS PRN
Status: DISCONTINUED | OUTPATIENT
Start: 2019-02-19 | End: 2019-02-20 | Stop reason: HOSPADM

## 2019-02-19 RX ADMIN — LORAZEPAM 2 MG: 2 INJECTION INTRAMUSCULAR; INTRAVENOUS at 03:14

## 2019-02-19 RX ADMIN — CLONIDINE HYDROCHLORIDE 0.1 MG: 0.1 TABLET ORAL at 12:43

## 2019-02-19 RX ADMIN — BENZTROPINE MESYLATE 1 MG: 1 INJECTION INTRAMUSCULAR; INTRAVENOUS at 10:08

## 2019-02-19 RX ADMIN — DIVALPROEX SODIUM 500 MG: 125 CAPSULE, COATED PELLETS ORAL at 20:51

## 2019-02-19 RX ADMIN — BENZTROPINE MESYLATE 1 MG: 1 TABLET ORAL at 20:39

## 2019-02-19 RX ADMIN — HEPARIN SODIUM 5000 UNITS: 5000 INJECTION INTRAVENOUS; SUBCUTANEOUS at 12:42

## 2019-02-19 RX ADMIN — QUETIAPINE FUMARATE 150 MG: 100 TABLET ORAL at 20:37

## 2019-02-19 RX ADMIN — LORAZEPAM 2 MG: 2 INJECTION INTRAMUSCULAR; INTRAVENOUS at 07:43

## 2019-02-19 RX ADMIN — DEXTROSE MONOHYDRATE 500 MG: 50 INJECTION, SOLUTION INTRAVENOUS at 09:18

## 2019-02-19 RX ADMIN — POTASSIUM CHLORIDE AND SODIUM CHLORIDE: 900; 300 INJECTION, SOLUTION INTRAVENOUS at 06:43

## 2019-02-19 RX ADMIN — CHLORASEPTIC 1 SPRAY: 1.5 LIQUID ORAL at 20:51

## 2019-02-19 RX ADMIN — TRAZODONE HYDROCHLORIDE 50 MG: 50 TABLET ORAL at 20:39

## 2019-02-19 RX ADMIN — CLONIDINE HYDROCHLORIDE 0.1 MG: 0.1 TABLET ORAL at 07:42

## 2019-02-19 RX ADMIN — INSULIN GLARGINE 50 UNITS: 100 INJECTION, SOLUTION SUBCUTANEOUS at 20:40

## 2019-02-19 RX ADMIN — HALOPERIDOL LACTATE 5 MG: 5 INJECTION, SOLUTION INTRAMUSCULAR at 09:12

## 2019-02-19 RX ADMIN — QUETIAPINE FUMARATE 150 MG: 100 TABLET ORAL at 07:42

## 2019-02-19 RX ADMIN — CLONIDINE HYDROCHLORIDE 0.1 MG: 0.1 TABLET ORAL at 20:39

## 2019-02-19 RX ADMIN — INSULIN GLARGINE 50 UNITS: 100 INJECTION, SOLUTION SUBCUTANEOUS at 03:28

## 2019-02-19 RX ADMIN — INSULIN LISPRO 10 UNITS: 100 INJECTION, SOLUTION INTRAVENOUS; SUBCUTANEOUS at 20:39

## 2019-02-19 RX ADMIN — QUETIAPINE FUMARATE 150 MG: 100 TABLET ORAL at 12:43

## 2019-02-19 RX ADMIN — FAMOTIDINE 20 MG: 10 INJECTION, SOLUTION INTRAVENOUS at 07:43

## 2019-02-19 RX ADMIN — ACETAMINOPHEN 650 MG: 325 TABLET ORAL at 20:39

## 2019-02-19 ASSESSMENT — PAIN SCALES - GENERAL
PAINLEVEL_OUTOF10: 0
PAINLEVEL_OUTOF10: 0
PAINLEVEL_OUTOF10: 10

## 2019-02-20 VITALS
OXYGEN SATURATION: 97 % | RESPIRATION RATE: 20 BRPM | SYSTOLIC BLOOD PRESSURE: 117 MMHG | TEMPERATURE: 99 F | DIASTOLIC BLOOD PRESSURE: 77 MMHG | HEART RATE: 118 BPM

## 2019-02-20 LAB
GLUCOSE BLD-MCNC: 156 MG/DL (ref 60–115)
GLUCOSE BLD-MCNC: 232 MG/DL (ref 60–115)
GLUCOSE BLD-MCNC: 373 MG/DL (ref 60–115)
HBA1C MFR BLD: 10.1 % (ref 4.8–5.9)
PERFORMED ON: ABNORMAL

## 2019-02-20 PROCEDURE — 99235 HOSP IP/OBS SAME DATE MOD 70: CPT | Performed by: PSYCHIATRY & NEUROLOGY

## 2019-02-20 PROCEDURE — 93010 ELECTROCARDIOGRAM REPORT: CPT | Performed by: INTERNAL MEDICINE

## 2019-02-20 PROCEDURE — 6370000000 HC RX 637 (ALT 250 FOR IP): Performed by: INTERNAL MEDICINE

## 2019-02-20 PROCEDURE — 83036 HEMOGLOBIN GLYCOSYLATED A1C: CPT

## 2019-02-20 PROCEDURE — 36415 COLL VENOUS BLD VENIPUNCTURE: CPT

## 2019-02-20 RX ORDER — INSULIN GLARGINE 100 [IU]/ML
50 INJECTION, SOLUTION SUBCUTANEOUS NIGHTLY
Qty: 1 VIAL | Refills: 3 | Status: SHIPPED | OUTPATIENT
Start: 2019-02-20 | End: 2019-08-10 | Stop reason: SDUPTHER

## 2019-02-20 RX ORDER — QUETIAPINE FUMARATE 50 MG/1
150 TABLET, FILM COATED ORAL 3 TIMES DAILY
Refills: 0 | COMMUNITY
Start: 2019-02-20 | End: 2019-11-13

## 2019-02-20 RX ADMIN — BENZTROPINE MESYLATE 1 MG: 1 TABLET ORAL at 08:53

## 2019-02-20 RX ADMIN — INSULIN LISPRO 10 UNITS: 100 INJECTION, SOLUTION INTRAVENOUS; SUBCUTANEOUS at 09:42

## 2019-02-20 RX ADMIN — DIVALPROEX SODIUM 500 MG: 125 CAPSULE, COATED PELLETS ORAL at 08:53

## 2019-02-20 RX ADMIN — CHLORASEPTIC 1 SPRAY: 1.5 LIQUID ORAL at 06:09

## 2019-02-20 RX ADMIN — CHLORASEPTIC 1 SPRAY: 1.5 LIQUID ORAL at 09:01

## 2019-02-20 RX ADMIN — QUETIAPINE FUMARATE 150 MG: 100 TABLET ORAL at 08:53

## 2019-02-20 RX ADMIN — CLONIDINE HYDROCHLORIDE 0.1 MG: 0.1 TABLET ORAL at 08:53

## 2019-02-20 ASSESSMENT — LIFESTYLE VARIABLES: HISTORY_ALCOHOL_USE: NO

## 2019-03-05 ASSESSMENT — ENCOUNTER SYMPTOMS: SHORTNESS OF BREATH: 0

## 2019-05-30 ENCOUNTER — TELEPHONE (OUTPATIENT)
Dept: FAMILY MEDICINE CLINIC | Age: 24
End: 2019-05-30

## 2019-05-30 ENCOUNTER — CARE COORDINATION (OUTPATIENT)
Dept: CARE COORDINATION | Age: 24
End: 2019-05-30

## 2019-07-24 ENCOUNTER — CARE COORDINATION (OUTPATIENT)
Dept: CARE COORDINATION | Age: 24
End: 2019-07-24

## 2019-08-09 RX ORDER — GLUCOSAMINE HCL/CHONDROITIN SU 500-400 MG
CAPSULE ORAL
Refills: 0 | OUTPATIENT
Start: 2019-08-09

## 2019-08-10 RX ORDER — INSULIN GLARGINE 100 [IU]/ML
50 INJECTION, SOLUTION SUBCUTANEOUS NIGHTLY
Qty: 1 VIAL | Refills: 0 | Status: SHIPPED | OUTPATIENT
Start: 2019-08-10 | End: 2019-10-01 | Stop reason: SDUPTHER

## 2019-08-10 RX ORDER — GLUCOSAMINE HCL/CHONDROITIN SU 500-400 MG
CAPSULE ORAL
Qty: 200 STRIP | Refills: 0 | Status: SHIPPED | OUTPATIENT
Start: 2019-08-10 | End: 2019-10-30

## 2019-08-10 RX ORDER — LANCETS 30 GAUGE
EACH MISCELLANEOUS
Qty: 100 EACH | Refills: 0 | Status: SHIPPED | OUTPATIENT
Start: 2019-08-10 | End: 2019-10-01

## 2019-08-10 RX ORDER — NAPROXEN SODIUM 220 MG
TABLET ORAL
Qty: 100 EACH | Refills: 3 | Status: SHIPPED | OUTPATIENT
Start: 2019-08-10 | End: 2019-10-01 | Stop reason: SDUPTHER

## 2019-08-14 ENCOUNTER — CARE COORDINATION (OUTPATIENT)
Dept: CARE COORDINATION | Age: 24
End: 2019-08-14

## 2019-08-14 NOTE — CARE COORDINATION
[facility] discuss [facility]. He states that currently he is now living and working in [place] and he is looking for a doctor in the [place] area as it is difficult for him to get out to [place]. I did explain that he needs to be vigilant with his health and especially his diabetes in order to decrease any long term complications  He tells me that he now has his meter and insulins and he has been taking them everyday. He has declined care coordination at this time due to changing physicians  I have asked him to make sure that he sees a doctor at least every 3 months for his diabetes.

## 2019-10-01 ENCOUNTER — APPOINTMENT (OUTPATIENT)
Dept: CT IMAGING | Age: 24
DRG: 857 | End: 2019-10-01
Payer: MEDICARE

## 2019-10-01 ENCOUNTER — HOSPITAL ENCOUNTER (EMERGENCY)
Age: 24
Discharge: HOME | DRG: 857 | End: 2019-10-01
Payer: MEDICARE

## 2019-10-01 VITALS
BODY MASS INDEX: 20.89 KG/M2 | WEIGHT: 130 LBS | DIASTOLIC BLOOD PRESSURE: 65 MMHG | SYSTOLIC BLOOD PRESSURE: 117 MMHG | HEART RATE: 107 BPM | RESPIRATION RATE: 28 BRPM | HEIGHT: 66 IN | TEMPERATURE: 98.3 F | OXYGEN SATURATION: 98 %

## 2019-10-01 DIAGNOSIS — L02.211 ABDOMINAL WALL ABSCESS: Primary | ICD-10-CM

## 2019-10-01 DIAGNOSIS — E10.65 HYPERGLYCEMIA DUE TO TYPE 1 DIABETES MELLITUS (HCC): ICD-10-CM

## 2019-10-01 LAB
ALBUMIN SERPL-MCNC: 3.8 G/DL (ref 3.5–4.6)
ALP BLD-CCNC: 156 U/L (ref 35–104)
ALT SERPL-CCNC: 96 U/L (ref 0–41)
ANION GAP SERPL CALCULATED.3IONS-SCNC: 15 MEQ/L (ref 9–15)
AST SERPL-CCNC: 27 U/L (ref 0–40)
BASE EXCESS VENOUS: 1 (ref -3–3)
BASOPHILS ABSOLUTE: 0.1 K/UL (ref 0–0.2)
BASOPHILS RELATIVE PERCENT: 0.6 %
BETA-HYDROXYBUTYRATE: 11.9 MG/DL (ref 0.2–2.8)
BILIRUB SERPL-MCNC: 0.6 MG/DL (ref 0.2–0.7)
BUN BLDV-MCNC: 15 MG/DL (ref 6–20)
CALCIUM IONIZED: 1.04 MMOL/L (ref 1.12–1.32)
CALCIUM SERPL-MCNC: 8.9 MG/DL (ref 8.5–9.9)
CHLORIDE BLD-SCNC: 94 MEQ/L (ref 95–107)
CHP ED QC CHECK: NORMAL
CO2: 24 MEQ/L (ref 20–31)
CREAT SERPL-MCNC: 0.91 MG/DL (ref 0.7–1.2)
EKG ATRIAL RATE: 125 BPM
EKG P AXIS: 64 DEGREES
EKG P-R INTERVAL: 112 MS
EKG Q-T INTERVAL: 314 MS
EKG QRS DURATION: 100 MS
EKG QTC CALCULATION (BAZETT): 453 MS
EKG R AXIS: 56 DEGREES
EKG T AXIS: 26 DEGREES
EKG VENTRICULAR RATE: 125 BPM
EOSINOPHILS ABSOLUTE: 0.1 K/UL (ref 0–0.7)
EOSINOPHILS RELATIVE PERCENT: 1.2 %
GFR AFRICAN AMERICAN: >60
GFR AFRICAN AMERICAN: >60
GFR NON-AFRICAN AMERICAN: >60
GFR NON-AFRICAN AMERICAN: >60
GLOBULIN: 3.2 G/DL (ref 2.3–3.5)
GLUCOSE BLD-MCNC: 366 MG/DL
GLUCOSE BLD-MCNC: 366 MG/DL (ref 60–115)
GLUCOSE BLD-MCNC: 389 MG/DL (ref 60–115)
GLUCOSE BLD-MCNC: 392 MG/DL (ref 60–115)
GLUCOSE BLD-MCNC: 396 MG/DL (ref 70–99)
HBA1C MFR BLD: 12.2 % (ref 4.8–5.9)
HCO3 VENOUS: 24.7 MMOL/L (ref 23–29)
HCT VFR BLD CALC: 46.3 % (ref 42–52)
HEMOGLOBIN: 16.1 G/DL (ref 14–18)
HEMOGLOBIN: 16.4 GM/DL (ref 13.5–17.5)
LACTATE: 1.84 MMOL/L (ref 0.4–2)
LACTIC ACID: 1.5 MMOL/L (ref 0.5–2.2)
LYMPHOCYTES ABSOLUTE: 1.2 K/UL (ref 1–4.8)
LYMPHOCYTES RELATIVE PERCENT: 13.2 %
MCH RBC QN AUTO: 29.8 PG (ref 27–31.3)
MCHC RBC AUTO-ENTMCNC: 34.7 % (ref 33–37)
MCV RBC AUTO: 85.7 FL (ref 80–100)
MONOCYTES ABSOLUTE: 0.3 K/UL (ref 0.2–0.8)
MONOCYTES RELATIVE PERCENT: 3.3 %
NEUTROPHILS ABSOLUTE: 7.5 K/UL (ref 1.4–6.5)
NEUTROPHILS RELATIVE PERCENT: 81.7 %
O2 SAT, VEN: 91 %
PCO2, VEN: 34.7 MM HG (ref 40–50)
PDW BLD-RTO: 13.7 % (ref 11.5–14.5)
PERFORMED ON: ABNORMAL
PH VENOUS: 7.46 (ref 7.35–7.45)
PLATELET # BLD: 157 K/UL (ref 130–400)
PO2, VEN: 58 MM HG
POC CHLORIDE: 99 MEQ/L (ref 99–110)
POC CREATININE WHOLE BLOOD: 0.9
POC CREATININE: 0.8 MG/DL (ref 0.9–1.3)
POC HEMATOCRIT: 48 % (ref 41–53)
POC POTASSIUM: 3.6 MEQ/L (ref 3.5–5.1)
POC SAMPLE TYPE: ABNORMAL
POC SODIUM: 131 MEQ/L (ref 136–145)
POTASSIUM SERPL-SCNC: 3.8 MEQ/L (ref 3.4–4.9)
RBC # BLD: 5.4 M/UL (ref 4.7–6.1)
SODIUM BLD-SCNC: 133 MEQ/L (ref 135–144)
TCO2 CALC VENOUS: 26 MMOL/L
TOTAL PROTEIN: 7 G/DL (ref 6.3–8)
WBC # BLD: 9.2 K/UL (ref 4.8–10.8)

## 2019-10-01 PROCEDURE — 82565 ASSAY OF CREATININE: CPT

## 2019-10-01 PROCEDURE — 93005 ELECTROCARDIOGRAM TRACING: CPT | Performed by: PHYSICIAN ASSISTANT

## 2019-10-01 PROCEDURE — 84295 ASSAY OF SERUM SODIUM: CPT

## 2019-10-01 PROCEDURE — 82435 ASSAY OF BLOOD CHLORIDE: CPT

## 2019-10-01 PROCEDURE — 74177 CT ABD & PELVIS W/CONTRAST: CPT

## 2019-10-01 PROCEDURE — 85025 COMPLETE CBC W/AUTO DIFF WBC: CPT

## 2019-10-01 PROCEDURE — 6370000000 HC RX 637 (ALT 250 FOR IP): Performed by: PHYSICIAN ASSISTANT

## 2019-10-01 PROCEDURE — 85014 HEMATOCRIT: CPT

## 2019-10-01 PROCEDURE — 82803 BLOOD GASES ANY COMBINATION: CPT

## 2019-10-01 PROCEDURE — 96374 THER/PROPH/DIAG INJ IV PUSH: CPT

## 2019-10-01 PROCEDURE — 83036 HEMOGLOBIN GLYCOSYLATED A1C: CPT

## 2019-10-01 PROCEDURE — 36600 WITHDRAWAL OF ARTERIAL BLOOD: CPT

## 2019-10-01 PROCEDURE — 82948 REAGENT STRIP/BLOOD GLUCOSE: CPT

## 2019-10-01 PROCEDURE — 96375 TX/PRO/DX INJ NEW DRUG ADDON: CPT

## 2019-10-01 PROCEDURE — 99284 EMERGENCY DEPT VISIT MOD MDM: CPT

## 2019-10-01 PROCEDURE — 6360000004 HC RX CONTRAST MEDICATION: Performed by: PHYSICIAN ASSISTANT

## 2019-10-01 PROCEDURE — 36415 COLL VENOUS BLD VENIPUNCTURE: CPT

## 2019-10-01 PROCEDURE — 83605 ASSAY OF LACTIC ACID: CPT

## 2019-10-01 PROCEDURE — 84132 ASSAY OF SERUM POTASSIUM: CPT

## 2019-10-01 PROCEDURE — 87040 BLOOD CULTURE FOR BACTERIA: CPT

## 2019-10-01 PROCEDURE — 80053 COMPREHEN METABOLIC PANEL: CPT

## 2019-10-01 PROCEDURE — 82010 KETONE BODYS QUAN: CPT

## 2019-10-01 PROCEDURE — 82330 ASSAY OF CALCIUM: CPT

## 2019-10-01 PROCEDURE — 2580000003 HC RX 258: Performed by: PHYSICIAN ASSISTANT

## 2019-10-01 PROCEDURE — 6360000002 HC RX W HCPCS: Performed by: PHYSICIAN ASSISTANT

## 2019-10-01 RX ORDER — KETOROLAC TROMETHAMINE 30 MG/ML
30 INJECTION, SOLUTION INTRAMUSCULAR; INTRAVENOUS ONCE
Status: COMPLETED | OUTPATIENT
Start: 2019-10-01 | End: 2019-10-01

## 2019-10-01 RX ORDER — 0.9 % SODIUM CHLORIDE 0.9 %
2000 INTRAVENOUS SOLUTION INTRAVENOUS ONCE
Status: COMPLETED | OUTPATIENT
Start: 2019-10-01 | End: 2019-10-01

## 2019-10-01 RX ORDER — ONDANSETRON 2 MG/ML
4 INJECTION INTRAMUSCULAR; INTRAVENOUS ONCE
Status: COMPLETED | OUTPATIENT
Start: 2019-10-01 | End: 2019-10-01

## 2019-10-01 RX ORDER — ETODOLAC 400 MG/1
400 TABLET, FILM COATED ORAL 2 TIMES DAILY
Qty: 14 TABLET | Refills: 0 | Status: ON HOLD | OUTPATIENT
Start: 2019-10-01 | End: 2019-10-04

## 2019-10-01 RX ORDER — SULFAMETHOXAZOLE AND TRIMETHOPRIM 800; 160 MG/1; MG/1
1 TABLET ORAL 2 TIMES DAILY
Qty: 20 TABLET | Refills: 0 | Status: SHIPPED | OUTPATIENT
Start: 2019-10-01 | End: 2019-10-04 | Stop reason: SINTOL

## 2019-10-01 RX ORDER — SULFAMETHOXAZOLE AND TRIMETHOPRIM 800; 160 MG/1; MG/1
1 TABLET ORAL ONCE
Status: COMPLETED | OUTPATIENT
Start: 2019-10-01 | End: 2019-10-01

## 2019-10-01 RX ORDER — NAPROXEN SODIUM 220 MG
TABLET ORAL
Qty: 100 EACH | Refills: 3 | Status: SHIPPED | OUTPATIENT
Start: 2019-10-01 | End: 2019-11-13 | Stop reason: SDUPTHER

## 2019-10-01 RX ORDER — INSULIN GLARGINE 100 [IU]/ML
50 INJECTION, SOLUTION SUBCUTANEOUS NIGHTLY
Qty: 1 VIAL | Refills: 0 | Status: SHIPPED | OUTPATIENT
Start: 2019-10-01 | End: 2019-12-06 | Stop reason: SDUPTHER

## 2019-10-01 RX ADMIN — SULFAMETHOXAZOLE AND TRIMETHOPRIM 1 TABLET: 800; 160 TABLET ORAL at 21:25

## 2019-10-01 RX ADMIN — IOPAMIDOL 100 ML: 612 INJECTION, SOLUTION INTRAVENOUS at 20:29

## 2019-10-01 RX ADMIN — ONDANSETRON 4 MG: 2 INJECTION INTRAMUSCULAR; INTRAVENOUS at 20:02

## 2019-10-01 RX ADMIN — SODIUM CHLORIDE 2000 ML: 9 INJECTION, SOLUTION INTRAVENOUS at 20:02

## 2019-10-01 RX ADMIN — KETOROLAC TROMETHAMINE 30 MG: 30 INJECTION, SOLUTION INTRAMUSCULAR at 20:02

## 2019-10-01 ASSESSMENT — PAIN DESCRIPTION - DESCRIPTORS: DESCRIPTORS: ACHING

## 2019-10-01 ASSESSMENT — ENCOUNTER SYMPTOMS
SHORTNESS OF BREATH: 0
NAUSEA: 0
COLOR CHANGE: 0
APNEA: 0
TROUBLE SWALLOWING: 0
EYE PAIN: 0
DIARRHEA: 0
ALLERGIC/IMMUNOLOGIC NEGATIVE: 1
VOMITING: 0
ABDOMINAL PAIN: 1

## 2019-10-01 ASSESSMENT — PAIN SCALES - GENERAL
PAINLEVEL_OUTOF10: 8

## 2019-10-01 ASSESSMENT — PAIN DESCRIPTION - LOCATION
LOCATION: ABDOMEN
LOCATION: ABDOMEN

## 2019-10-01 ASSESSMENT — PAIN DESCRIPTION - PAIN TYPE
TYPE: ACUTE PAIN
TYPE: ACUTE PAIN

## 2019-10-01 ASSESSMENT — PAIN DESCRIPTION - PROGRESSION: CLINICAL_PROGRESSION: NOT CHANGED

## 2019-10-01 ASSESSMENT — PAIN DESCRIPTION - ORIENTATION: ORIENTATION: LEFT;MID

## 2019-10-01 ASSESSMENT — PAIN DESCRIPTION - FREQUENCY: FREQUENCY: CONTINUOUS

## 2019-10-02 LAB
GFR AFRICAN AMERICAN: >60
GFR NON-AFRICAN AMERICAN: >60
PERFORMED ON: NORMAL
POC CREATININE: 0.9 MG/DL (ref 0.9–1.3)
POC SAMPLE TYPE: NORMAL

## 2019-10-02 PROCEDURE — 93010 ELECTROCARDIOGRAM REPORT: CPT | Performed by: INTERNAL MEDICINE

## 2019-10-04 ENCOUNTER — HOSPITAL ENCOUNTER (INPATIENT)
Age: 24
LOS: 3 days | Discharge: HOME | DRG: 857 | End: 2019-10-07
Attending: EMERGENCY MEDICINE | Admitting: INTERNAL MEDICINE
Payer: MEDICARE

## 2019-10-04 DIAGNOSIS — S30.811A ABRASION OF ABDOMINAL WALL WITH INFECTION, INITIAL ENCOUNTER: Primary | ICD-10-CM

## 2019-10-04 DIAGNOSIS — L08.9 ABRASION OF ABDOMINAL WALL WITH INFECTION, INITIAL ENCOUNTER: Primary | ICD-10-CM

## 2019-10-04 DIAGNOSIS — E87.5 HYPERKALEMIA: ICD-10-CM

## 2019-10-04 PROBLEM — L03.90 CELLULITIS: Status: ACTIVE | Noted: 2019-10-04

## 2019-10-04 LAB
ALBUMIN SERPL-MCNC: 3.6 G/DL (ref 3.5–4.6)
ALP BLD-CCNC: 167 U/L (ref 35–104)
ALT SERPL-CCNC: 34 U/L (ref 0–41)
ANION GAP SERPL CALCULATED.3IONS-SCNC: 12 MEQ/L (ref 9–15)
AST SERPL-CCNC: 17 U/L (ref 0–40)
BASOPHILS ABSOLUTE: 0 K/UL (ref 0–0.2)
BASOPHILS RELATIVE PERCENT: 0.4 %
BETA-HYDROXYBUTYRATE: 7.2 MG/DL (ref 0.2–2.8)
BILIRUB SERPL-MCNC: 0.7 MG/DL (ref 0.2–0.7)
BUN BLDV-MCNC: 10 MG/DL (ref 6–20)
CALCIUM SERPL-MCNC: 9.1 MG/DL (ref 8.5–9.9)
CHLORIDE BLD-SCNC: 90 MEQ/L (ref 95–107)
CO2: 27 MEQ/L (ref 20–31)
CREAT SERPL-MCNC: 1.1 MG/DL (ref 0.7–1.2)
EOSINOPHILS ABSOLUTE: 0.3 K/UL (ref 0–0.7)
EOSINOPHILS RELATIVE PERCENT: 2.4 %
GFR AFRICAN AMERICAN: >60
GFR NON-AFRICAN AMERICAN: >60
GLOBULIN: 3.1 G/DL (ref 2.3–3.5)
GLUCOSE BLD-MCNC: 332 MG/DL (ref 60–115)
GLUCOSE BLD-MCNC: 404 MG/DL (ref 60–115)
GLUCOSE BLD-MCNC: 405 MG/DL (ref 70–99)
HCT VFR BLD CALC: 45.9 % (ref 42–52)
HEMOGLOBIN: 15.4 G/DL (ref 14–18)
LACTIC ACID: 2.5 MMOL/L (ref 0.5–2.2)
LYMPHOCYTES ABSOLUTE: 0.7 K/UL (ref 1–4.8)
LYMPHOCYTES RELATIVE PERCENT: 6 %
MCH RBC QN AUTO: 29 PG (ref 27–31.3)
MCHC RBC AUTO-ENTMCNC: 33.7 % (ref 33–37)
MCV RBC AUTO: 86.2 FL (ref 80–100)
MONOCYTES ABSOLUTE: 0.6 K/UL (ref 0.2–0.8)
MONOCYTES RELATIVE PERCENT: 4.9 %
NEUTROPHILS ABSOLUTE: 10.2 K/UL (ref 1.4–6.5)
NEUTROPHILS RELATIVE PERCENT: 86.3 %
PDW BLD-RTO: 13.5 % (ref 11.5–14.5)
PERFORMED ON: ABNORMAL
PERFORMED ON: ABNORMAL
PLATELET # BLD: 176 K/UL (ref 130–400)
POTASSIUM SERPL-SCNC: 4.3 MEQ/L (ref 3.4–4.9)
RBC # BLD: 5.32 M/UL (ref 4.7–6.1)
SODIUM BLD-SCNC: 129 MEQ/L (ref 135–144)
TOTAL PROTEIN: 6.7 G/DL (ref 6.3–8)
WBC # BLD: 11.8 K/UL (ref 4.8–10.8)

## 2019-10-04 PROCEDURE — 85025 COMPLETE CBC W/AUTO DIFF WBC: CPT

## 2019-10-04 PROCEDURE — 2500000003 HC RX 250 WO HCPCS: Performed by: EMERGENCY MEDICINE

## 2019-10-04 PROCEDURE — 2580000003 HC RX 258: Performed by: EMERGENCY MEDICINE

## 2019-10-04 PROCEDURE — 6370000000 HC RX 637 (ALT 250 FOR IP): Performed by: NURSE PRACTITIONER

## 2019-10-04 PROCEDURE — 96365 THER/PROPH/DIAG IV INF INIT: CPT

## 2019-10-04 PROCEDURE — 87040 BLOOD CULTURE FOR BACTERIA: CPT

## 2019-10-04 PROCEDURE — 82010 KETONE BODYS QUAN: CPT

## 2019-10-04 PROCEDURE — 96375 TX/PRO/DX INJ NEW DRUG ADDON: CPT

## 2019-10-04 PROCEDURE — 6360000002 HC RX W HCPCS: Performed by: NURSE PRACTITIONER

## 2019-10-04 PROCEDURE — 1210000000 HC MED SURG R&B

## 2019-10-04 PROCEDURE — 83605 ASSAY OF LACTIC ACID: CPT

## 2019-10-04 PROCEDURE — 36415 COLL VENOUS BLD VENIPUNCTURE: CPT

## 2019-10-04 PROCEDURE — 99285 EMERGENCY DEPT VISIT HI MDM: CPT

## 2019-10-04 PROCEDURE — 6360000002 HC RX W HCPCS: Performed by: EMERGENCY MEDICINE

## 2019-10-04 PROCEDURE — 6370000000 HC RX 637 (ALT 250 FOR IP): Performed by: EMERGENCY MEDICINE

## 2019-10-04 PROCEDURE — 80053 COMPREHEN METABOLIC PANEL: CPT

## 2019-10-04 PROCEDURE — 2580000003 HC RX 258: Performed by: NURSE PRACTITIONER

## 2019-10-04 RX ORDER — KETOROLAC TROMETHAMINE 30 MG/ML
30 INJECTION, SOLUTION INTRAMUSCULAR; INTRAVENOUS ONCE
Status: COMPLETED | OUTPATIENT
Start: 2019-10-04 | End: 2019-10-04

## 2019-10-04 RX ORDER — DIPHENHYDRAMINE HCL 25 MG
25 TABLET ORAL EVERY 6 HOURS PRN
Status: DISCONTINUED | OUTPATIENT
Start: 2019-10-04 | End: 2019-10-07 | Stop reason: HOSPADM

## 2019-10-04 RX ORDER — ONDANSETRON 2 MG/ML
4 INJECTION INTRAMUSCULAR; INTRAVENOUS EVERY 6 HOURS PRN
Status: DISCONTINUED | OUTPATIENT
Start: 2019-10-04 | End: 2019-10-07 | Stop reason: HOSPADM

## 2019-10-04 RX ORDER — ACETAMINOPHEN 325 MG/1
650 TABLET ORAL EVERY 4 HOURS PRN
Status: CANCELLED | OUTPATIENT
Start: 2019-10-04

## 2019-10-04 RX ORDER — SODIUM CHLORIDE 0.9 % (FLUSH) 0.9 %
10 SYRINGE (ML) INJECTION EVERY 12 HOURS SCHEDULED
Status: DISCONTINUED | OUTPATIENT
Start: 2019-10-04 | End: 2019-10-07 | Stop reason: HOSPADM

## 2019-10-04 RX ORDER — DIPHENHYDRAMINE HYDROCHLORIDE 50 MG/ML
50 INJECTION INTRAMUSCULAR; INTRAVENOUS ONCE
Status: COMPLETED | OUTPATIENT
Start: 2019-10-04 | End: 2019-10-04

## 2019-10-04 RX ORDER — 0.9 % SODIUM CHLORIDE 0.9 %
1000 INTRAVENOUS SOLUTION INTRAVENOUS ONCE
Status: COMPLETED | OUTPATIENT
Start: 2019-10-04 | End: 2019-10-04

## 2019-10-04 RX ORDER — KETOROLAC TROMETHAMINE 30 MG/ML
30 INJECTION, SOLUTION INTRAMUSCULAR; INTRAVENOUS EVERY 6 HOURS PRN
Status: DISCONTINUED | OUTPATIENT
Start: 2019-10-04 | End: 2019-10-07 | Stop reason: HOSPADM

## 2019-10-04 RX ORDER — DEXTROSE MONOHYDRATE 25 G/50ML
12.5 INJECTION, SOLUTION INTRAVENOUS PRN
Status: DISCONTINUED | OUTPATIENT
Start: 2019-10-04 | End: 2019-10-07 | Stop reason: HOSPADM

## 2019-10-04 RX ORDER — NICOTINE POLACRILEX 4 MG
15 LOZENGE BUCCAL PRN
Status: DISCONTINUED | OUTPATIENT
Start: 2019-10-04 | End: 2019-10-07 | Stop reason: HOSPADM

## 2019-10-04 RX ORDER — DEXTROSE MONOHYDRATE 50 MG/ML
100 INJECTION, SOLUTION INTRAVENOUS PRN
Status: DISCONTINUED | OUTPATIENT
Start: 2019-10-04 | End: 2019-10-07 | Stop reason: HOSPADM

## 2019-10-04 RX ORDER — SODIUM CHLORIDE 9 MG/ML
INJECTION, SOLUTION INTRAVENOUS CONTINUOUS
Status: DISCONTINUED | OUTPATIENT
Start: 2019-10-04 | End: 2019-10-07 | Stop reason: HOSPADM

## 2019-10-04 RX ORDER — SODIUM CHLORIDE 0.9 % (FLUSH) 0.9 %
10 SYRINGE (ML) INJECTION PRN
Status: DISCONTINUED | OUTPATIENT
Start: 2019-10-04 | End: 2019-10-07 | Stop reason: HOSPADM

## 2019-10-04 RX ORDER — CLINDAMYCIN PHOSPHATE 600 MG/50ML
600 INJECTION INTRAVENOUS ONCE
Status: COMPLETED | OUTPATIENT
Start: 2019-10-04 | End: 2019-10-04

## 2019-10-04 RX ORDER — LABETALOL HYDROCHLORIDE 5 MG/ML
2.5 INJECTION, SOLUTION INTRAVENOUS EVERY 4 HOURS PRN
Status: DISCONTINUED | OUTPATIENT
Start: 2019-10-04 | End: 2019-10-07 | Stop reason: HOSPADM

## 2019-10-04 RX ORDER — TRAMADOL HYDROCHLORIDE 50 MG/1
50 TABLET ORAL EVERY 6 HOURS PRN
Status: DISCONTINUED | OUTPATIENT
Start: 2019-10-04 | End: 2019-10-07 | Stop reason: HOSPADM

## 2019-10-04 RX ADMIN — VANCOMYCIN HYDROCHLORIDE 1000 MG: 1 INJECTION, POWDER, LYOPHILIZED, FOR SOLUTION INTRAVENOUS at 21:23

## 2019-10-04 RX ADMIN — INSULIN HUMAN 12 UNITS: 100 INJECTION, SOLUTION PARENTERAL at 19:50

## 2019-10-04 RX ADMIN — HYDROMORPHONE HYDROCHLORIDE 0.5 MG: 1 INJECTION, SOLUTION INTRAMUSCULAR; INTRAVENOUS; SUBCUTANEOUS at 19:31

## 2019-10-04 RX ADMIN — SODIUM CHLORIDE 1000 ML: 9 INJECTION, SOLUTION INTRAVENOUS at 19:16

## 2019-10-04 RX ADMIN — DIPHENHYDRAMINE HYDROCHLORIDE 50 MG: 50 INJECTION, SOLUTION INTRAMUSCULAR; INTRAVENOUS at 19:31

## 2019-10-04 RX ADMIN — SODIUM CHLORIDE: 9 INJECTION, SOLUTION INTRAVENOUS at 23:40

## 2019-10-04 RX ADMIN — CLINDAMYCIN PHOSPHATE 600 MG: 600 INJECTION, SOLUTION INTRAVENOUS at 19:30

## 2019-10-04 RX ADMIN — SODIUM CHLORIDE 1000 ML: 9 INJECTION, SOLUTION INTRAVENOUS at 20:26

## 2019-10-04 RX ADMIN — PIPERACILLIN AND TAZOBACTAM 3.38 G: 3; .375 INJECTION, POWDER, LYOPHILIZED, FOR SOLUTION INTRAVENOUS at 23:44

## 2019-10-04 RX ADMIN — TRAMADOL HYDROCHLORIDE 50 MG: 50 TABLET ORAL at 23:44

## 2019-10-04 RX ADMIN — KETOROLAC TROMETHAMINE 30 MG: 30 INJECTION, SOLUTION INTRAMUSCULAR at 19:30

## 2019-10-04 ASSESSMENT — ENCOUNTER SYMPTOMS
SORE THROAT: 0
ABDOMINAL PAIN: 1
SHORTNESS OF BREATH: 0
ABDOMINAL DISTENTION: 0
VOMITING: 0
CHEST TIGHTNESS: 0
COUGH: 0
PHOTOPHOBIA: 0
WHEEZING: 0
EYE DISCHARGE: 0

## 2019-10-04 ASSESSMENT — PAIN SCALES - GENERAL
PAINLEVEL_OUTOF10: 10

## 2019-10-04 ASSESSMENT — PAIN DESCRIPTION - PAIN TYPE: TYPE: ACUTE PAIN

## 2019-10-04 ASSESSMENT — PAIN DESCRIPTION - ORIENTATION: ORIENTATION: LEFT;LOWER

## 2019-10-04 ASSESSMENT — PAIN DESCRIPTION - LOCATION: LOCATION: ABDOMEN

## 2019-10-05 ENCOUNTER — APPOINTMENT (OUTPATIENT)
Dept: ULTRASOUND IMAGING | Age: 24
DRG: 857 | End: 2019-10-05
Payer: MEDICARE

## 2019-10-05 LAB
ANION GAP SERPL CALCULATED.3IONS-SCNC: 13 MEQ/L (ref 9–15)
BUN BLDV-MCNC: 16 MG/DL (ref 6–20)
CALCIUM SERPL-MCNC: 8 MG/DL (ref 8.5–9.9)
CHLORIDE BLD-SCNC: 101 MEQ/L (ref 95–107)
CO2: 22 MEQ/L (ref 20–31)
CREAT SERPL-MCNC: 0.97 MG/DL (ref 0.7–1.2)
CREATININE URINE: 56.5 MG/DL
EKG ATRIAL RATE: 106 BPM
EKG P AXIS: 75 DEGREES
EKG P-R INTERVAL: 122 MS
EKG Q-T INTERVAL: 342 MS
EKG QRS DURATION: 100 MS
EKG QTC CALCULATION (BAZETT): 454 MS
EKG R AXIS: 95 DEGREES
EKG T AXIS: 56 DEGREES
EKG VENTRICULAR RATE: 106 BPM
GFR AFRICAN AMERICAN: >60
GFR NON-AFRICAN AMERICAN: >60
GLUCOSE BLD-MCNC: 201 MG/DL (ref 60–115)
GLUCOSE BLD-MCNC: 287 MG/DL (ref 60–115)
GLUCOSE BLD-MCNC: 300 MG/DL (ref 60–115)
GLUCOSE BLD-MCNC: 331 MG/DL (ref 60–115)
GLUCOSE BLD-MCNC: 336 MG/DL (ref 70–99)
GLUCOSE BLD-MCNC: 524 MG/DL (ref 60–115)
GLUCOSE BLD-MCNC: 91 MG/DL (ref 60–115)
HCT VFR BLD CALC: 40.1 % (ref 42–52)
HEMOGLOBIN: 13.6 G/DL (ref 14–18)
LACTIC ACID: 1 MMOL/L (ref 0.5–2.2)
MCH RBC QN AUTO: 29.8 PG (ref 27–31.3)
MCHC RBC AUTO-ENTMCNC: 33.8 % (ref 33–37)
MCV RBC AUTO: 88.1 FL (ref 80–100)
OSMOLALITY URINE: 630 MOSM/KG (ref 300–900)
PDW BLD-RTO: 13.5 % (ref 11.5–14.5)
PERFORMED ON: ABNORMAL
PERFORMED ON: NORMAL
PLATELET # BLD: 153 K/UL (ref 130–400)
POTASSIUM REFLEX MAGNESIUM: 3.8 MEQ/L (ref 3.4–4.9)
RBC # BLD: 4.55 M/UL (ref 4.7–6.1)
SODIUM BLD-SCNC: 136 MEQ/L (ref 135–144)
SODIUM URINE: <20 MEQ/L
TSH SERPL DL<=0.05 MIU/L-ACNC: 1.28 UIU/ML (ref 0.44–3.86)
WBC # BLD: 9.1 K/UL (ref 4.8–10.8)

## 2019-10-05 PROCEDURE — 84300 ASSAY OF URINE SODIUM: CPT

## 2019-10-05 PROCEDURE — 80048 BASIC METABOLIC PNL TOTAL CA: CPT

## 2019-10-05 PROCEDURE — 85027 COMPLETE CBC AUTOMATED: CPT

## 2019-10-05 PROCEDURE — 82570 ASSAY OF URINE CREATININE: CPT

## 2019-10-05 PROCEDURE — 99222 1ST HOSP IP/OBS MODERATE 55: CPT | Performed by: PHYSICIAN ASSISTANT

## 2019-10-05 PROCEDURE — 6370000000 HC RX 637 (ALT 250 FOR IP): Performed by: INTERNAL MEDICINE

## 2019-10-05 PROCEDURE — 84443 ASSAY THYROID STIM HORMONE: CPT

## 2019-10-05 PROCEDURE — 2580000003 HC RX 258: Performed by: NURSE PRACTITIONER

## 2019-10-05 PROCEDURE — 6360000002 HC RX W HCPCS: Performed by: NURSE PRACTITIONER

## 2019-10-05 PROCEDURE — 99222 1ST HOSP IP/OBS MODERATE 55: CPT | Performed by: INTERNAL MEDICINE

## 2019-10-05 PROCEDURE — 76999 ECHO EXAMINATION PROCEDURE: CPT

## 2019-10-05 PROCEDURE — 6370000000 HC RX 637 (ALT 250 FOR IP): Performed by: NURSE PRACTITIONER

## 2019-10-05 PROCEDURE — 36415 COLL VENOUS BLD VENIPUNCTURE: CPT

## 2019-10-05 PROCEDURE — 83605 ASSAY OF LACTIC ACID: CPT

## 2019-10-05 PROCEDURE — 1210000000 HC MED SURG R&B

## 2019-10-05 PROCEDURE — 83935 ASSAY OF URINE OSMOLALITY: CPT

## 2019-10-05 RX ORDER — IBUPROFEN 400 MG/1
400 TABLET ORAL EVERY 6 HOURS PRN
Status: DISCONTINUED | OUTPATIENT
Start: 2019-10-05 | End: 2019-10-07 | Stop reason: HOSPADM

## 2019-10-05 RX ORDER — NICOTINE 21 MG/24HR
1 PATCH, TRANSDERMAL 24 HOURS TRANSDERMAL DAILY
Status: DISCONTINUED | OUTPATIENT
Start: 2019-10-05 | End: 2019-10-07 | Stop reason: HOSPADM

## 2019-10-05 RX ORDER — INSULIN GLARGINE 100 [IU]/ML
50 INJECTION, SOLUTION SUBCUTANEOUS NIGHTLY
Status: DISCONTINUED | OUTPATIENT
Start: 2019-10-05 | End: 2019-10-07 | Stop reason: HOSPADM

## 2019-10-05 RX ADMIN — KETOROLAC TROMETHAMINE 30 MG: 30 INJECTION, SOLUTION INTRAMUSCULAR; INTRAVENOUS at 06:03

## 2019-10-05 RX ADMIN — VANCOMYCIN HYDROCHLORIDE 1000 MG: 1 INJECTION, POWDER, LYOPHILIZED, FOR SOLUTION INTRAVENOUS at 21:20

## 2019-10-05 RX ADMIN — VANCOMYCIN HYDROCHLORIDE 1000 MG: 1 INJECTION, POWDER, LYOPHILIZED, FOR SOLUTION INTRAVENOUS at 10:23

## 2019-10-05 RX ADMIN — INSULIN GLARGINE 50 UNITS: 100 INJECTION, SOLUTION SUBCUTANEOUS at 13:00

## 2019-10-05 RX ADMIN — PIPERACILLIN AND TAZOBACTAM 3.38 G: 3; .375 INJECTION, POWDER, LYOPHILIZED, FOR SOLUTION INTRAVENOUS at 06:03

## 2019-10-05 RX ADMIN — PIPERACILLIN AND TAZOBACTAM 3.38 G: 3; .375 INJECTION, POWDER, LYOPHILIZED, FOR SOLUTION INTRAVENOUS at 15:32

## 2019-10-05 RX ADMIN — INSULIN LISPRO 18 UNITS: 100 INJECTION, SOLUTION INTRAVENOUS; SUBCUTANEOUS at 13:00

## 2019-10-05 RX ADMIN — TRAMADOL HYDROCHLORIDE 50 MG: 50 TABLET ORAL at 21:20

## 2019-10-05 RX ADMIN — INSULIN LISPRO 9 UNITS: 100 INJECTION, SOLUTION INTRAVENOUS; SUBCUTANEOUS at 17:48

## 2019-10-05 RX ADMIN — TRAMADOL HYDROCHLORIDE 50 MG: 50 TABLET ORAL at 11:02

## 2019-10-05 RX ADMIN — SODIUM CHLORIDE: 9 INJECTION, SOLUTION INTRAVENOUS at 21:20

## 2019-10-05 ASSESSMENT — ENCOUNTER SYMPTOMS
SINUS PRESSURE: 0
VOMITING: 0
EYE PAIN: 0
SORE THROAT: 0
DIARRHEA: 0
ABDOMINAL PAIN: 0
RHINORRHEA: 0
WHEEZING: 0
NAUSEA: 0
COUGH: 0
SHORTNESS OF BREATH: 0
EYE REDNESS: 0

## 2019-10-05 ASSESSMENT — PAIN SCALES - GENERAL
PAINLEVEL_OUTOF10: 10

## 2019-10-06 LAB
ANION GAP SERPL CALCULATED.3IONS-SCNC: 10 MEQ/L (ref 9–15)
BLOOD CULTURE, ROUTINE: NORMAL
BUN BLDV-MCNC: 9 MG/DL (ref 6–20)
CALCIUM SERPL-MCNC: 8.2 MG/DL (ref 8.5–9.9)
CHLORIDE BLD-SCNC: 101 MEQ/L (ref 95–107)
CO2: 24 MEQ/L (ref 20–31)
CREAT SERPL-MCNC: 0.78 MG/DL (ref 0.7–1.2)
CULTURE, BLOOD 2: NORMAL
GFR AFRICAN AMERICAN: >60
GFR NON-AFRICAN AMERICAN: >60
GLUCOSE BLD-MCNC: 121 MG/DL (ref 60–115)
GLUCOSE BLD-MCNC: 212 MG/DL (ref 60–115)
GLUCOSE BLD-MCNC: 227 MG/DL (ref 60–115)
GLUCOSE BLD-MCNC: 276 MG/DL (ref 60–115)
GLUCOSE BLD-MCNC: 324 MG/DL (ref 60–115)
GLUCOSE BLD-MCNC: 388 MG/DL (ref 70–99)
HCT VFR BLD CALC: 38.3 % (ref 42–52)
HEMOGLOBIN: 13 G/DL (ref 14–18)
MCH RBC QN AUTO: 29.6 PG (ref 27–31.3)
MCHC RBC AUTO-ENTMCNC: 34 % (ref 33–37)
MCV RBC AUTO: 87.2 FL (ref 80–100)
PDW BLD-RTO: 13.5 % (ref 11.5–14.5)
PERFORMED ON: ABNORMAL
PLATELET # BLD: 159 K/UL (ref 130–400)
POTASSIUM REFLEX MAGNESIUM: 3.9 MEQ/L (ref 3.4–4.9)
RBC # BLD: 4.39 M/UL (ref 4.7–6.1)
SODIUM BLD-SCNC: 135 MEQ/L (ref 135–144)
VANCOMYCIN TROUGH: <4 UG/ML (ref 10–20)
WBC # BLD: 6.6 K/UL (ref 4.8–10.8)

## 2019-10-06 PROCEDURE — 85027 COMPLETE CBC AUTOMATED: CPT

## 2019-10-06 PROCEDURE — 6360000002 HC RX W HCPCS: Performed by: INTERNAL MEDICINE

## 2019-10-06 PROCEDURE — 99232 SBSQ HOSP IP/OBS MODERATE 35: CPT | Performed by: INTERNAL MEDICINE

## 2019-10-06 PROCEDURE — 6370000000 HC RX 637 (ALT 250 FOR IP): Performed by: INTERNAL MEDICINE

## 2019-10-06 PROCEDURE — 6370000000 HC RX 637 (ALT 250 FOR IP): Performed by: NURSE PRACTITIONER

## 2019-10-06 PROCEDURE — 80048 BASIC METABOLIC PNL TOTAL CA: CPT

## 2019-10-06 PROCEDURE — 36415 COLL VENOUS BLD VENIPUNCTURE: CPT

## 2019-10-06 PROCEDURE — 6360000002 HC RX W HCPCS: Performed by: NURSE PRACTITIONER

## 2019-10-06 PROCEDURE — 2580000003 HC RX 258: Performed by: NURSE PRACTITIONER

## 2019-10-06 PROCEDURE — 1210000000 HC MED SURG R&B

## 2019-10-06 PROCEDURE — 80202 ASSAY OF VANCOMYCIN: CPT

## 2019-10-06 RX ADMIN — DIPHENHYDRAMINE HCL 25 MG: 25 TABLET ORAL at 19:33

## 2019-10-06 RX ADMIN — KETOROLAC TROMETHAMINE 30 MG: 30 INJECTION, SOLUTION INTRAMUSCULAR; INTRAVENOUS at 12:52

## 2019-10-06 RX ADMIN — SODIUM CHLORIDE: 9 INJECTION, SOLUTION INTRAVENOUS at 22:01

## 2019-10-06 RX ADMIN — VANCOMYCIN HYDROCHLORIDE 1000 MG: 1 INJECTION, POWDER, LYOPHILIZED, FOR SOLUTION INTRAVENOUS at 10:13

## 2019-10-06 RX ADMIN — KETOROLAC TROMETHAMINE 30 MG: 30 INJECTION, SOLUTION INTRAMUSCULAR; INTRAVENOUS at 00:37

## 2019-10-06 RX ADMIN — INSULIN LISPRO 9 UNITS: 100 INJECTION, SOLUTION INTRAVENOUS; SUBCUTANEOUS at 17:08

## 2019-10-06 RX ADMIN — INSULIN GLARGINE 50 UNITS: 100 INJECTION, SOLUTION SUBCUTANEOUS at 21:57

## 2019-10-06 RX ADMIN — INSULIN LISPRO 12 UNITS: 100 INJECTION, SOLUTION INTRAVENOUS; SUBCUTANEOUS at 08:30

## 2019-10-06 RX ADMIN — TRAMADOL HYDROCHLORIDE 50 MG: 50 TABLET ORAL at 08:43

## 2019-10-06 RX ADMIN — VANCOMYCIN HYDROCHLORIDE 1500 MG: 5 INJECTION, POWDER, LYOPHILIZED, FOR SOLUTION INTRAVENOUS at 19:30

## 2019-10-06 RX ADMIN — SODIUM CHLORIDE: 9 INJECTION, SOLUTION INTRAVENOUS at 08:28

## 2019-10-06 ASSESSMENT — PAIN DESCRIPTION - DESCRIPTORS
DESCRIPTORS: STABBING;TENDER
DESCRIPTORS: STABBING;TENDER

## 2019-10-06 ASSESSMENT — PAIN DESCRIPTION - LOCATION
LOCATION: ABDOMEN
LOCATION: ABDOMEN

## 2019-10-06 ASSESSMENT — PAIN SCALES - GENERAL
PAINLEVEL_OUTOF10: 10
PAINLEVEL_OUTOF10: 8

## 2019-10-06 ASSESSMENT — PAIN DESCRIPTION - PAIN TYPE
TYPE: ACUTE PAIN
TYPE: ACUTE PAIN

## 2019-10-06 ASSESSMENT — PAIN DESCRIPTION - ORIENTATION
ORIENTATION: LEFT;LOWER
ORIENTATION: LEFT;LOWER

## 2019-10-06 ASSESSMENT — PAIN DESCRIPTION - FREQUENCY
FREQUENCY: CONTINUOUS
FREQUENCY: CONTINUOUS

## 2019-10-07 ENCOUNTER — ANESTHESIA EVENT (OUTPATIENT)
Dept: OPERATING ROOM | Age: 24
DRG: 857 | End: 2019-10-07
Payer: MEDICARE

## 2019-10-07 ENCOUNTER — ANESTHESIA (OUTPATIENT)
Dept: OPERATING ROOM | Age: 24
DRG: 857 | End: 2019-10-07
Payer: MEDICARE

## 2019-10-07 VITALS
HEART RATE: 95 BPM | BODY MASS INDEX: 24.27 KG/M2 | WEIGHT: 151 LBS | OXYGEN SATURATION: 90 % | TEMPERATURE: 99.4 F | HEIGHT: 66 IN | DIASTOLIC BLOOD PRESSURE: 69 MMHG | RESPIRATION RATE: 17 BRPM | SYSTOLIC BLOOD PRESSURE: 115 MMHG

## 2019-10-07 VITALS — SYSTOLIC BLOOD PRESSURE: 86 MMHG | DIASTOLIC BLOOD PRESSURE: 49 MMHG | OXYGEN SATURATION: 97 %

## 2019-10-07 LAB
ANION GAP SERPL CALCULATED.3IONS-SCNC: 11 MEQ/L (ref 9–15)
BUN BLDV-MCNC: 6 MG/DL (ref 6–20)
CALCIUM SERPL-MCNC: 8 MG/DL (ref 8.5–9.9)
CHLORIDE BLD-SCNC: 105 MEQ/L (ref 95–107)
CO2: 22 MEQ/L (ref 20–31)
CREAT SERPL-MCNC: 0.64 MG/DL (ref 0.7–1.2)
GFR AFRICAN AMERICAN: >60
GFR NON-AFRICAN AMERICAN: >60
GLUCOSE BLD-MCNC: 166 MG/DL (ref 60–115)
GLUCOSE BLD-MCNC: 190 MG/DL (ref 70–99)
GLUCOSE BLD-MCNC: 194 MG/DL (ref 60–115)
GLUCOSE BLD-MCNC: 234 MG/DL (ref 60–115)
GLUCOSE BLD-MCNC: 235 MG/DL (ref 60–115)
GLUCOSE BLD-MCNC: 264 MG/DL (ref 60–115)
HCT VFR BLD CALC: 38.2 % (ref 42–52)
HEMOGLOBIN: 13 G/DL (ref 14–18)
MCH RBC QN AUTO: 29.7 PG (ref 27–31.3)
MCHC RBC AUTO-ENTMCNC: 34.1 % (ref 33–37)
MCV RBC AUTO: 87.2 FL (ref 80–100)
PDW BLD-RTO: 13.4 % (ref 11.5–14.5)
PERFORMED ON: ABNORMAL
PLATELET # BLD: 218 K/UL (ref 130–400)
POTASSIUM REFLEX MAGNESIUM: 3.6 MEQ/L (ref 3.4–4.9)
RBC # BLD: 4.38 M/UL (ref 4.7–6.1)
SODIUM BLD-SCNC: 138 MEQ/L (ref 135–144)
WBC # BLD: 8.3 K/UL (ref 4.8–10.8)

## 2019-10-07 PROCEDURE — 85027 COMPLETE CBC AUTOMATED: CPT

## 2019-10-07 PROCEDURE — 6360000002 HC RX W HCPCS: Performed by: INTERNAL MEDICINE

## 2019-10-07 PROCEDURE — 6360000002 HC RX W HCPCS: Performed by: NURSE ANESTHETIST, CERTIFIED REGISTERED

## 2019-10-07 PROCEDURE — 7100000000 HC PACU RECOVERY - FIRST 15 MIN: Performed by: SURGERY

## 2019-10-07 PROCEDURE — 2580000003 HC RX 258: Performed by: SURGERY

## 2019-10-07 PROCEDURE — 80048 BASIC METABOLIC PNL TOTAL CA: CPT

## 2019-10-07 PROCEDURE — 2580000003 HC RX 258: Performed by: NURSE PRACTITIONER

## 2019-10-07 PROCEDURE — 6360000002 HC RX W HCPCS: Performed by: NURSE PRACTITIONER

## 2019-10-07 PROCEDURE — 3600000002 HC SURGERY LEVEL 2 BASE: Performed by: SURGERY

## 2019-10-07 PROCEDURE — 0JB80ZZ EXCISION OF ABDOMEN SUBCUTANEOUS TISSUE AND FASCIA, OPEN APPROACH: ICD-10-PCS | Performed by: SURGERY

## 2019-10-07 PROCEDURE — 3600000012 HC SURGERY LEVEL 2 ADDTL 15MIN: Performed by: SURGERY

## 2019-10-07 PROCEDURE — 2709999900 HC NON-CHARGEABLE SUPPLY: Performed by: SURGERY

## 2019-10-07 PROCEDURE — 87205 SMEAR GRAM STAIN: CPT

## 2019-10-07 PROCEDURE — 99232 SBSQ HOSP IP/OBS MODERATE 35: CPT | Performed by: INTERNAL MEDICINE

## 2019-10-07 PROCEDURE — 87077 CULTURE AEROBIC IDENTIFY: CPT

## 2019-10-07 PROCEDURE — 6370000000 HC RX 637 (ALT 250 FOR IP): Performed by: ANESTHESIOLOGY

## 2019-10-07 PROCEDURE — 87075 CULTR BACTERIA EXCEPT BLOOD: CPT

## 2019-10-07 PROCEDURE — 3700000000 HC ANESTHESIA ATTENDED CARE: Performed by: SURGERY

## 2019-10-07 PROCEDURE — 87186 SC STD MICRODIL/AGAR DIL: CPT

## 2019-10-07 PROCEDURE — 36415 COLL VENOUS BLD VENIPUNCTURE: CPT

## 2019-10-07 PROCEDURE — 6360000002 HC RX W HCPCS: Performed by: SURGERY

## 2019-10-07 PROCEDURE — 6370000000 HC RX 637 (ALT 250 FOR IP): Performed by: SURGERY

## 2019-10-07 PROCEDURE — G0108 DIAB MANAGE TRN  PER INDIV: HCPCS

## 2019-10-07 PROCEDURE — 87147 CULTURE TYPE IMMUNOLOGIC: CPT

## 2019-10-07 PROCEDURE — 7100000001 HC PACU RECOVERY - ADDTL 15 MIN: Performed by: SURGERY

## 2019-10-07 PROCEDURE — 2580000003 HC RX 258: Performed by: NURSE ANESTHETIST, CERTIFIED REGISTERED

## 2019-10-07 PROCEDURE — 3700000001 HC ADD 15 MINUTES (ANESTHESIA): Performed by: SURGERY

## 2019-10-07 PROCEDURE — 87070 CULTURE OTHR SPECIMN AEROBIC: CPT

## 2019-10-07 PROCEDURE — 6360000002 HC RX W HCPCS: Performed by: ANESTHESIOLOGY

## 2019-10-07 RX ORDER — ETODOLAC 400 MG/1
400 TABLET, FILM COATED ORAL 2 TIMES DAILY
Qty: 14 TABLET | Refills: 0 | Status: SHIPPED | OUTPATIENT
Start: 2019-10-07 | End: 2019-11-27

## 2019-10-07 RX ORDER — IBUPROFEN 400 MG/1
400 TABLET ORAL EVERY 6 HOURS PRN
Qty: 120 TABLET | Refills: 3 | Status: SHIPPED | OUTPATIENT
Start: 2019-10-07 | End: 2020-01-25

## 2019-10-07 RX ORDER — MIDAZOLAM HYDROCHLORIDE 1 MG/ML
INJECTION INTRAMUSCULAR; INTRAVENOUS PRN
Status: DISCONTINUED | OUTPATIENT
Start: 2019-10-07 | End: 2019-10-07 | Stop reason: SDUPTHER

## 2019-10-07 RX ORDER — MEPERIDINE HYDROCHLORIDE 25 MG/ML
12.5 INJECTION INTRAMUSCULAR; INTRAVENOUS; SUBCUTANEOUS EVERY 5 MIN PRN
Status: DISCONTINUED | OUTPATIENT
Start: 2019-10-07 | End: 2019-10-07 | Stop reason: HOSPADM

## 2019-10-07 RX ORDER — METOCLOPRAMIDE HYDROCHLORIDE 5 MG/ML
10 INJECTION INTRAMUSCULAR; INTRAVENOUS
Status: DISCONTINUED | OUTPATIENT
Start: 2019-10-07 | End: 2019-10-07 | Stop reason: HOSPADM

## 2019-10-07 RX ORDER — SODIUM CHLORIDE 9 MG/ML
INJECTION, SOLUTION INTRAVENOUS CONTINUOUS PRN
Status: DISCONTINUED | OUTPATIENT
Start: 2019-10-07 | End: 2019-10-07 | Stop reason: SDUPTHER

## 2019-10-07 RX ORDER — HYDROCODONE BITARTRATE AND ACETAMINOPHEN 5; 325 MG/1; MG/1
2 TABLET ORAL PRN
Status: COMPLETED | OUTPATIENT
Start: 2019-10-07 | End: 2019-10-07

## 2019-10-07 RX ORDER — LIDOCAINE HYDROCHLORIDE 20 MG/ML
INJECTION, SOLUTION INTRAVENOUS PRN
Status: DISCONTINUED | OUTPATIENT
Start: 2019-10-07 | End: 2019-10-07 | Stop reason: SDUPTHER

## 2019-10-07 RX ORDER — FENTANYL CITRATE 50 UG/ML
INJECTION, SOLUTION INTRAMUSCULAR; INTRAVENOUS PRN
Status: DISCONTINUED | OUTPATIENT
Start: 2019-10-07 | End: 2019-10-07 | Stop reason: SDUPTHER

## 2019-10-07 RX ORDER — MAGNESIUM HYDROXIDE 1200 MG/15ML
LIQUID ORAL CONTINUOUS PRN
Status: COMPLETED | OUTPATIENT
Start: 2019-10-07 | End: 2019-10-07

## 2019-10-07 RX ORDER — FENTANYL CITRATE 50 UG/ML
50 INJECTION, SOLUTION INTRAMUSCULAR; INTRAVENOUS EVERY 10 MIN PRN
Status: DISCONTINUED | OUTPATIENT
Start: 2019-10-07 | End: 2019-10-07 | Stop reason: HOSPADM

## 2019-10-07 RX ORDER — ONDANSETRON 2 MG/ML
INJECTION INTRAMUSCULAR; INTRAVENOUS PRN
Status: DISCONTINUED | OUTPATIENT
Start: 2019-10-07 | End: 2019-10-07 | Stop reason: SDUPTHER

## 2019-10-07 RX ORDER — DIPHENHYDRAMINE HYDROCHLORIDE 50 MG/ML
12.5 INJECTION INTRAMUSCULAR; INTRAVENOUS
Status: DISCONTINUED | OUTPATIENT
Start: 2019-10-07 | End: 2019-10-07 | Stop reason: HOSPADM

## 2019-10-07 RX ORDER — PROPOFOL 10 MG/ML
INJECTION, EMULSION INTRAVENOUS PRN
Status: DISCONTINUED | OUTPATIENT
Start: 2019-10-07 | End: 2019-10-07 | Stop reason: SDUPTHER

## 2019-10-07 RX ORDER — ONDANSETRON 2 MG/ML
4 INJECTION INTRAMUSCULAR; INTRAVENOUS
Status: DISCONTINUED | OUTPATIENT
Start: 2019-10-07 | End: 2019-10-07 | Stop reason: HOSPADM

## 2019-10-07 RX ORDER — DOXYCYCLINE HYCLATE 100 MG
100 TABLET ORAL 2 TIMES DAILY
Qty: 20 TABLET | Refills: 0 | Status: SHIPPED | OUTPATIENT
Start: 2019-10-07 | End: 2019-10-11

## 2019-10-07 RX ORDER — CEPHALEXIN 500 MG/1
500 CAPSULE ORAL 3 TIMES DAILY
Qty: 30 CAPSULE | Refills: 0 | Status: SHIPPED | OUTPATIENT
Start: 2019-10-07 | End: 2019-10-17

## 2019-10-07 RX ORDER — HYDROCODONE BITARTRATE AND ACETAMINOPHEN 5; 325 MG/1; MG/1
1 TABLET ORAL PRN
Status: COMPLETED | OUTPATIENT
Start: 2019-10-07 | End: 2019-10-07

## 2019-10-07 RX ORDER — KETOROLAC TROMETHAMINE 30 MG/ML
INJECTION, SOLUTION INTRAMUSCULAR; INTRAVENOUS PRN
Status: DISCONTINUED | OUTPATIENT
Start: 2019-10-07 | End: 2019-10-07 | Stop reason: SDUPTHER

## 2019-10-07 RX ADMIN — VANCOMYCIN HYDROCHLORIDE 1500 MG: 5 INJECTION, POWDER, LYOPHILIZED, FOR SOLUTION INTRAVENOUS at 06:44

## 2019-10-07 RX ADMIN — FENTANYL CITRATE 50 MCG: 50 INJECTION, SOLUTION INTRAMUSCULAR; INTRAVENOUS at 10:18

## 2019-10-07 RX ADMIN — KETOROLAC TROMETHAMINE 30 MG: 30 INJECTION, SOLUTION INTRAMUSCULAR; INTRAVENOUS at 06:44

## 2019-10-07 RX ADMIN — MIDAZOLAM HYDROCHLORIDE 2 MG: 1 INJECTION, SOLUTION INTRAMUSCULAR; INTRAVENOUS at 10:14

## 2019-10-07 RX ADMIN — LIDOCAINE HYDROCHLORIDE 100 MG: 20 INJECTION, SOLUTION INTRAVENOUS at 10:20

## 2019-10-07 RX ADMIN — VANCOMYCIN HYDROCHLORIDE 1500 MG: 5 INJECTION, POWDER, LYOPHILIZED, FOR SOLUTION INTRAVENOUS at 16:21

## 2019-10-07 RX ADMIN — ONDANSETRON 4 MG: 2 INJECTION INTRAMUSCULAR; INTRAVENOUS at 10:31

## 2019-10-07 RX ADMIN — HYDROMORPHONE HYDROCHLORIDE 0.5 MG: 1 INJECTION, SOLUTION INTRAMUSCULAR; INTRAVENOUS; SUBCUTANEOUS at 11:33

## 2019-10-07 RX ADMIN — INSULIN LISPRO 3 UNITS: 100 INJECTION, SOLUTION INTRAVENOUS; SUBCUTANEOUS at 18:23

## 2019-10-07 RX ADMIN — INSULIN LISPRO 9 UNITS: 100 INJECTION, SOLUTION INTRAVENOUS; SUBCUTANEOUS at 13:33

## 2019-10-07 RX ADMIN — KETOROLAC TROMETHAMINE 30 MG: 30 INJECTION, SOLUTION INTRAMUSCULAR at 10:31

## 2019-10-07 RX ADMIN — FENTANYL CITRATE 50 MCG: 50 INJECTION, SOLUTION INTRAMUSCULAR; INTRAVENOUS at 10:30

## 2019-10-07 RX ADMIN — PROPOFOL 200 MG: 10 INJECTION, EMULSION INTRAVENOUS at 10:20

## 2019-10-07 RX ADMIN — SODIUM CHLORIDE: 9 INJECTION, SOLUTION INTRAVENOUS at 11:35

## 2019-10-07 RX ADMIN — HYDROCODONE BITARTRATE AND ACETAMINOPHEN 2 TABLET: 5; 325 TABLET ORAL at 12:04

## 2019-10-07 RX ADMIN — SODIUM CHLORIDE: 9 INJECTION, SOLUTION INTRAVENOUS at 10:21

## 2019-10-07 ASSESSMENT — PULMONARY FUNCTION TESTS
PIF_VALUE: 4
PIF_VALUE: 3
PIF_VALUE: 4
PIF_VALUE: 11
PIF_VALUE: 3
PIF_VALUE: 15
PIF_VALUE: 3
PIF_VALUE: 11
PIF_VALUE: 3
PIF_VALUE: 3
PIF_VALUE: 11
PIF_VALUE: 14
PIF_VALUE: 4
PIF_VALUE: 3
PIF_VALUE: 14
PIF_VALUE: 14
PIF_VALUE: 11
PIF_VALUE: 3
PIF_VALUE: 15
PIF_VALUE: 3
PIF_VALUE: 11
PIF_VALUE: 3
PIF_VALUE: 14
PIF_VALUE: 3
PIF_VALUE: 3
PIF_VALUE: 11
PIF_VALUE: 15
PIF_VALUE: 4
PIF_VALUE: 14
PIF_VALUE: 3
PIF_VALUE: 4
PIF_VALUE: 3
PIF_VALUE: 14

## 2019-10-07 ASSESSMENT — PAIN SCALES - GENERAL
PAINLEVEL_OUTOF10: 8
PAINLEVEL_OUTOF10: 10
PAINLEVEL_OUTOF10: 10
PAINLEVEL_OUTOF10: 8

## 2019-10-07 ASSESSMENT — ENCOUNTER SYMPTOMS
GASTROINTESTINAL NEGATIVE: 1
RESPIRATORY NEGATIVE: 1

## 2019-10-08 ENCOUNTER — CARE COORDINATION (OUTPATIENT)
Dept: CASE MANAGEMENT | Age: 24
End: 2019-10-08

## 2019-10-08 ENCOUNTER — OFFICE VISIT (OUTPATIENT)
Dept: ENDOCRINOLOGY | Age: 24
End: 2019-10-08
Payer: MEDICARE

## 2019-10-08 VITALS
OXYGEN SATURATION: 94 % | SYSTOLIC BLOOD PRESSURE: 102 MMHG | HEART RATE: 67 BPM | BODY MASS INDEX: 24.11 KG/M2 | HEIGHT: 66 IN | DIASTOLIC BLOOD PRESSURE: 64 MMHG | RESPIRATION RATE: 16 BRPM | WEIGHT: 150 LBS

## 2019-10-08 DIAGNOSIS — L03.311 CELLULITIS OF ABDOMINAL WALL: Primary | ICD-10-CM

## 2019-10-08 PROCEDURE — 4004F PT TOBACCO SCREEN RCVD TLK: CPT | Performed by: INTERNAL MEDICINE

## 2019-10-08 PROCEDURE — G8427 DOCREV CUR MEDS BY ELIG CLIN: HCPCS | Performed by: INTERNAL MEDICINE

## 2019-10-08 PROCEDURE — 1111F DSCHRG MED/CURRENT MED MERGE: CPT | Performed by: INTERNAL MEDICINE

## 2019-10-08 PROCEDURE — 3046F HEMOGLOBIN A1C LEVEL >9.0%: CPT | Performed by: INTERNAL MEDICINE

## 2019-10-08 PROCEDURE — 99213 OFFICE O/P EST LOW 20 MIN: CPT | Performed by: INTERNAL MEDICINE

## 2019-10-08 PROCEDURE — G8484 FLU IMMUNIZE NO ADMIN: HCPCS | Performed by: INTERNAL MEDICINE

## 2019-10-08 PROCEDURE — 95250 CONT GLUC MNTR PHYS/QHP EQP: CPT | Performed by: INTERNAL MEDICINE

## 2019-10-08 PROCEDURE — G8420 CALC BMI NORM PARAMETERS: HCPCS | Performed by: INTERNAL MEDICINE

## 2019-10-08 PROCEDURE — 2022F DILAT RTA XM EVC RTNOPTHY: CPT | Performed by: INTERNAL MEDICINE

## 2019-10-09 LAB
ANAEROBIC CULTURE: ABNORMAL
CULTURE SURGICAL: ABNORMAL
GRAM STAIN RESULT: ABNORMAL
ORGANISM: ABNORMAL

## 2019-10-10 LAB
BLOOD CULTURE, ROUTINE: NORMAL
CULTURE, BLOOD 2: NORMAL

## 2019-10-11 ENCOUNTER — APPOINTMENT (OUTPATIENT)
Dept: GENERAL RADIOLOGY | Age: 24
End: 2019-10-11
Payer: MEDICARE

## 2019-10-11 ENCOUNTER — APPOINTMENT (OUTPATIENT)
Dept: CT IMAGING | Age: 24
End: 2019-10-11
Payer: MEDICARE

## 2019-10-11 ENCOUNTER — HOSPITAL ENCOUNTER (EMERGENCY)
Age: 24
Discharge: HOME | End: 2019-10-11
Attending: EMERGENCY MEDICINE
Payer: MEDICARE

## 2019-10-11 VITALS
WEIGHT: 150 LBS | HEIGHT: 66 IN | BODY MASS INDEX: 24.11 KG/M2 | DIASTOLIC BLOOD PRESSURE: 70 MMHG | OXYGEN SATURATION: 97 % | RESPIRATION RATE: 19 BRPM | TEMPERATURE: 98.2 F | SYSTOLIC BLOOD PRESSURE: 114 MMHG | HEART RATE: 90 BPM

## 2019-10-11 DIAGNOSIS — L08.9 WOUND INFECTION: ICD-10-CM

## 2019-10-11 DIAGNOSIS — R10.9 ABDOMINAL PAIN, UNSPECIFIED ABDOMINAL LOCATION: Primary | ICD-10-CM

## 2019-10-11 DIAGNOSIS — T14.8XXA WOUND INFECTION: ICD-10-CM

## 2019-10-11 LAB
ALBUMIN SERPL-MCNC: 3.7 G/DL (ref 3.5–4.6)
ALP BLD-CCNC: 219 U/L (ref 35–104)
ALT SERPL-CCNC: 53 U/L (ref 0–41)
ANION GAP SERPL CALCULATED.3IONS-SCNC: 12 MEQ/L (ref 9–15)
AST SERPL-CCNC: 28 U/L (ref 0–40)
BASOPHILS ABSOLUTE: 0 K/UL (ref 0–0.2)
BASOPHILS RELATIVE PERCENT: 0.3 %
BILIRUB SERPL-MCNC: <0.2 MG/DL (ref 0.2–0.7)
BUN BLDV-MCNC: 15 MG/DL (ref 6–20)
CALCIUM SERPL-MCNC: 9.6 MG/DL (ref 8.5–9.9)
CHLORIDE BLD-SCNC: 101 MEQ/L (ref 95–107)
CO2: 27 MEQ/L (ref 20–31)
CREAT SERPL-MCNC: 0.62 MG/DL (ref 0.7–1.2)
EKG ATRIAL RATE: 76 BPM
EKG P AXIS: 51 DEGREES
EKG P-R INTERVAL: 118 MS
EKG Q-T INTERVAL: 376 MS
EKG QRS DURATION: 106 MS
EKG QTC CALCULATION (BAZETT): 423 MS
EKG R AXIS: 75 DEGREES
EKG T AXIS: 50 DEGREES
EKG VENTRICULAR RATE: 76 BPM
EOSINOPHILS ABSOLUTE: 0.9 K/UL (ref 0–0.7)
EOSINOPHILS RELATIVE PERCENT: 8.5 %
GFR AFRICAN AMERICAN: >60
GFR NON-AFRICAN AMERICAN: >60
GLOBULIN: 3.4 G/DL (ref 2.3–3.5)
GLUCOSE BLD-MCNC: 153 MG/DL (ref 70–99)
GLUCOSE BLD-MCNC: 154 MG/DL (ref 60–115)
HCT VFR BLD CALC: 41.2 % (ref 42–52)
HEMOGLOBIN: 14 G/DL (ref 14–18)
LACTIC ACID: 1.3 MMOL/L (ref 0.5–2.2)
LYMPHOCYTES ABSOLUTE: 2.5 K/UL (ref 1–4.8)
LYMPHOCYTES RELATIVE PERCENT: 24 %
MCH RBC QN AUTO: 29.6 PG (ref 27–31.3)
MCHC RBC AUTO-ENTMCNC: 34 % (ref 33–37)
MCV RBC AUTO: 86.9 FL (ref 80–100)
MONOCYTES ABSOLUTE: 0.8 K/UL (ref 0.2–0.8)
MONOCYTES RELATIVE PERCENT: 7.7 %
NEUTROPHILS ABSOLUTE: 6.2 K/UL (ref 1.4–6.5)
NEUTROPHILS RELATIVE PERCENT: 59.5 %
PDW BLD-RTO: 13.5 % (ref 11.5–14.5)
PERFORMED ON: ABNORMAL
PLATELET # BLD: 480 K/UL (ref 130–400)
POTASSIUM SERPL-SCNC: 4.2 MEQ/L (ref 3.4–4.9)
RBC # BLD: 4.75 M/UL (ref 4.7–6.1)
SODIUM BLD-SCNC: 140 MEQ/L (ref 135–144)
TOTAL PROTEIN: 7.1 G/DL (ref 6.3–8)
WBC # BLD: 10.5 K/UL (ref 4.8–10.8)

## 2019-10-11 PROCEDURE — 2580000003 HC RX 258: Performed by: EMERGENCY MEDICINE

## 2019-10-11 PROCEDURE — 83605 ASSAY OF LACTIC ACID: CPT

## 2019-10-11 PROCEDURE — 96374 THER/PROPH/DIAG INJ IV PUSH: CPT

## 2019-10-11 PROCEDURE — 99282 EMERGENCY DEPT VISIT SF MDM: CPT

## 2019-10-11 PROCEDURE — 80053 COMPREHEN METABOLIC PANEL: CPT

## 2019-10-11 PROCEDURE — 93005 ELECTROCARDIOGRAM TRACING: CPT | Performed by: EMERGENCY MEDICINE

## 2019-10-11 PROCEDURE — 71046 X-RAY EXAM CHEST 2 VIEWS: CPT

## 2019-10-11 PROCEDURE — 85025 COMPLETE CBC W/AUTO DIFF WBC: CPT

## 2019-10-11 PROCEDURE — 36415 COLL VENOUS BLD VENIPUNCTURE: CPT

## 2019-10-11 PROCEDURE — 87040 BLOOD CULTURE FOR BACTERIA: CPT

## 2019-10-11 PROCEDURE — 96375 TX/PRO/DX INJ NEW DRUG ADDON: CPT

## 2019-10-11 PROCEDURE — 6360000002 HC RX W HCPCS: Performed by: EMERGENCY MEDICINE

## 2019-10-11 RX ORDER — MORPHINE SULFATE 2 MG/ML
4 INJECTION, SOLUTION INTRAMUSCULAR; INTRAVENOUS
Status: DISCONTINUED | OUTPATIENT
Start: 2019-10-11 | End: 2019-10-11 | Stop reason: HOSPADM

## 2019-10-11 RX ORDER — KETOROLAC TROMETHAMINE 30 MG/ML
30 INJECTION, SOLUTION INTRAMUSCULAR; INTRAVENOUS ONCE
Status: COMPLETED | OUTPATIENT
Start: 2019-10-11 | End: 2019-10-11

## 2019-10-11 RX ORDER — 0.9 % SODIUM CHLORIDE 0.9 %
1000 INTRAVENOUS SOLUTION INTRAVENOUS ONCE
Status: COMPLETED | OUTPATIENT
Start: 2019-10-11 | End: 2019-10-11

## 2019-10-11 RX ORDER — DOXYCYCLINE HYCLATE 100 MG
100 TABLET ORAL 2 TIMES DAILY
Qty: 20 TABLET | Refills: 0 | Status: ON HOLD | OUTPATIENT
Start: 2019-10-11 | End: 2019-10-22 | Stop reason: HOSPADM

## 2019-10-11 RX ORDER — OXYCODONE HYDROCHLORIDE AND ACETAMINOPHEN 5; 325 MG/1; MG/1
1 TABLET ORAL EVERY 6 HOURS PRN
Qty: 12 TABLET | Refills: 0 | Status: SHIPPED | OUTPATIENT
Start: 2019-10-11 | End: 2019-10-14

## 2019-10-11 RX ORDER — ONDANSETRON 2 MG/ML
4 INJECTION INTRAMUSCULAR; INTRAVENOUS ONCE
Status: COMPLETED | OUTPATIENT
Start: 2019-10-11 | End: 2019-10-11

## 2019-10-11 RX ADMIN — ONDANSETRON 4 MG: 2 INJECTION INTRAMUSCULAR; INTRAVENOUS at 13:14

## 2019-10-11 RX ADMIN — SODIUM CHLORIDE 1000 ML: 9 INJECTION, SOLUTION INTRAVENOUS at 13:14

## 2019-10-11 RX ADMIN — KETOROLAC TROMETHAMINE 30 MG: 30 INJECTION, SOLUTION INTRAMUSCULAR at 13:14

## 2019-10-11 RX ADMIN — MORPHINE SULFATE 4 MG: 2 INJECTION, SOLUTION INTRAMUSCULAR; INTRAVENOUS at 13:14

## 2019-10-11 ASSESSMENT — PAIN DESCRIPTION - FREQUENCY: FREQUENCY: CONTINUOUS

## 2019-10-11 ASSESSMENT — PAIN DESCRIPTION - LOCATION: LOCATION: ABDOMEN

## 2019-10-11 ASSESSMENT — PAIN SCALES - GENERAL
PAINLEVEL_OUTOF10: 10
PAINLEVEL_OUTOF10: 10
PAINLEVEL_OUTOF10: 3

## 2019-10-11 ASSESSMENT — PAIN DESCRIPTION - ORIENTATION: ORIENTATION: LEFT;LOWER

## 2019-10-11 ASSESSMENT — ENCOUNTER SYMPTOMS
SORE THROAT: 0
ABDOMINAL PAIN: 1
SHORTNESS OF BREATH: 0
VOMITING: 0
BACK PAIN: 0
COUGH: 0
NAUSEA: 0
DIARRHEA: 0

## 2019-10-11 ASSESSMENT — PAIN DESCRIPTION - PAIN TYPE: TYPE: ACUTE PAIN

## 2019-10-11 ASSESSMENT — PAIN DESCRIPTION - DESCRIPTORS: DESCRIPTORS: ACHING;THROBBING

## 2019-10-14 LAB
GFR AFRICAN AMERICAN: >60
GFR NON-AFRICAN AMERICAN: >60
PERFORMED ON: ABNORMAL
POC CREATININE: 0.6 MG/DL (ref 0.9–1.3)
POC SAMPLE TYPE: ABNORMAL

## 2019-10-14 PROCEDURE — 93010 ELECTROCARDIOGRAM REPORT: CPT | Performed by: INTERNAL MEDICINE

## 2019-10-15 ENCOUNTER — HOSPITAL ENCOUNTER (EMERGENCY)
Age: 24
Discharge: HOME | DRG: 603 | End: 2019-10-15
Payer: MEDICARE

## 2019-10-15 ENCOUNTER — CARE COORDINATION (OUTPATIENT)
Dept: CASE MANAGEMENT | Age: 24
End: 2019-10-15

## 2019-10-15 ENCOUNTER — APPOINTMENT (OUTPATIENT)
Dept: CT IMAGING | Age: 24
DRG: 603 | End: 2019-10-15
Payer: MEDICARE

## 2019-10-15 VITALS
HEART RATE: 89 BPM | WEIGHT: 150 LBS | RESPIRATION RATE: 17 BRPM | SYSTOLIC BLOOD PRESSURE: 124 MMHG | OXYGEN SATURATION: 98 % | DIASTOLIC BLOOD PRESSURE: 84 MMHG | HEIGHT: 66 IN | TEMPERATURE: 98.1 F | BODY MASS INDEX: 24.11 KG/M2

## 2019-10-15 DIAGNOSIS — R10.9 ACUTE POSTOPERATIVE PAIN OF ABDOMEN: Primary | ICD-10-CM

## 2019-10-15 DIAGNOSIS — L02.211 ABSCESS OF ABDOMINAL WALL: ICD-10-CM

## 2019-10-15 DIAGNOSIS — G89.18 ACUTE POSTOPERATIVE PAIN OF ABDOMEN: Primary | ICD-10-CM

## 2019-10-15 DIAGNOSIS — E10.65 HYPERGLYCEMIA DUE TO TYPE 1 DIABETES MELLITUS (HCC): ICD-10-CM

## 2019-10-15 LAB
ALBUMIN SERPL-MCNC: 4.2 G/DL (ref 3.5–4.6)
ALP BLD-CCNC: 195 U/L (ref 35–104)
ALT SERPL-CCNC: 44 U/L (ref 0–41)
ANION GAP SERPL CALCULATED.3IONS-SCNC: 15 MEQ/L (ref 9–15)
AST SERPL-CCNC: 30 U/L (ref 0–40)
BASE EXCESS VENOUS: 3 (ref -3–3)
BASOPHILS ABSOLUTE: 0.1 K/UL (ref 0–0.2)
BASOPHILS RELATIVE PERCENT: 1.3 %
BETA-HYDROXYBUTYRATE: 1.7 MG/DL (ref 0.2–2.8)
BILIRUB SERPL-MCNC: <0.2 MG/DL (ref 0.2–0.7)
BUN BLDV-MCNC: 18 MG/DL (ref 6–20)
CALCIUM IONIZED: 1.2 MMOL/L (ref 1.12–1.32)
CALCIUM SERPL-MCNC: 9.9 MG/DL (ref 8.5–9.9)
CHLORIDE BLD-SCNC: 96 MEQ/L (ref 95–107)
CHP ED QC CHECK: NORMAL
CHP ED QC CHECK: NORMAL
CO2: 26 MEQ/L (ref 20–31)
CREAT SERPL-MCNC: 0.95 MG/DL (ref 0.7–1.2)
EOSINOPHILS ABSOLUTE: 0.1 K/UL (ref 0–0.7)
EOSINOPHILS RELATIVE PERCENT: 1.4 %
GFR AFRICAN AMERICAN: >60
GFR AFRICAN AMERICAN: >60
GFR NON-AFRICAN AMERICAN: >60
GFR NON-AFRICAN AMERICAN: >60
GLOBULIN: 3.4 G/DL (ref 2.3–3.5)
GLUCOSE BLD-MCNC: 384 MG/DL
GLUCOSE BLD-MCNC: 384 MG/DL (ref 60–115)
GLUCOSE BLD-MCNC: 410 MG/DL (ref 60–115)
GLUCOSE BLD-MCNC: 421 MG/DL (ref 70–99)
GLUCOSE BLD-MCNC: 456 MG/DL
GLUCOSE BLD-MCNC: 456 MG/DL (ref 60–115)
HCO3 VENOUS: 27.3 MMOL/L (ref 23–29)
HCT VFR BLD CALC: 41.6 % (ref 42–52)
HEMOGLOBIN: 14.3 G/DL (ref 14–18)
HEMOGLOBIN: 15.3 GM/DL (ref 13.5–17.5)
LACTATE: 2.69 MMOL/L (ref 0.4–2)
LACTIC ACID: 2.9 MMOL/L (ref 0.5–2.2)
LYMPHOCYTES ABSOLUTE: 1.7 K/UL (ref 1–4.8)
LYMPHOCYTES RELATIVE PERCENT: 23.1 %
MCH RBC QN AUTO: 29.8 PG (ref 27–31.3)
MCHC RBC AUTO-ENTMCNC: 34.3 % (ref 33–37)
MCV RBC AUTO: 86.9 FL (ref 80–100)
MONOCYTES ABSOLUTE: 0.7 K/UL (ref 0.2–0.8)
MONOCYTES RELATIVE PERCENT: 9.4 %
NEUTROPHILS ABSOLUTE: 4.7 K/UL (ref 1.4–6.5)
NEUTROPHILS RELATIVE PERCENT: 64.8 %
O2 SAT, VEN: 95 %
PCO2, VEN: 38.4 MM HG (ref 40–50)
PDW BLD-RTO: 13.7 % (ref 11.5–14.5)
PERFORMED ON: ABNORMAL
PH VENOUS: 7.46 (ref 7.35–7.45)
PLATELET # BLD: 484 K/UL (ref 130–400)
PO2, VEN: 72 MM HG
POC CHLORIDE: 97 MEQ/L (ref 99–110)
POC CREATININE WHOLE BLOOD: 0.8
POC CREATININE: 0.8 MG/DL (ref 0.9–1.3)
POC HEMATOCRIT: 45 % (ref 41–53)
POC POTASSIUM: 3.8 MEQ/L (ref 3.5–5.1)
POC SAMPLE TYPE: ABNORMAL
POC SODIUM: 134 MEQ/L (ref 136–145)
POTASSIUM SERPL-SCNC: 4 MEQ/L (ref 3.4–4.9)
RBC # BLD: 4.79 M/UL (ref 4.7–6.1)
SODIUM BLD-SCNC: 137 MEQ/L (ref 135–144)
TCO2 CALC VENOUS: 28 MMOL/L
TOTAL PROTEIN: 7.6 G/DL (ref 6.3–8)
WBC # BLD: 7.2 K/UL (ref 4.8–10.8)

## 2019-10-15 PROCEDURE — 99285 EMERGENCY DEPT VISIT HI MDM: CPT

## 2019-10-15 PROCEDURE — 82330 ASSAY OF CALCIUM: CPT

## 2019-10-15 PROCEDURE — 6360000002 HC RX W HCPCS: Performed by: PHYSICIAN ASSISTANT

## 2019-10-15 PROCEDURE — 87205 SMEAR GRAM STAIN: CPT

## 2019-10-15 PROCEDURE — 96365 THER/PROPH/DIAG IV INF INIT: CPT

## 2019-10-15 PROCEDURE — 2580000003 HC RX 258: Performed by: PHYSICIAN ASSISTANT

## 2019-10-15 PROCEDURE — 87077 CULTURE AEROBIC IDENTIFY: CPT

## 2019-10-15 PROCEDURE — 36600 WITHDRAWAL OF ARTERIAL BLOOD: CPT

## 2019-10-15 PROCEDURE — 84295 ASSAY OF SERUM SODIUM: CPT

## 2019-10-15 PROCEDURE — 2500000003 HC RX 250 WO HCPCS: Performed by: PHYSICIAN ASSISTANT

## 2019-10-15 PROCEDURE — 87186 SC STD MICRODIL/AGAR DIL: CPT

## 2019-10-15 PROCEDURE — 84132 ASSAY OF SERUM POTASSIUM: CPT

## 2019-10-15 PROCEDURE — 6360000004 HC RX CONTRAST MEDICATION: Performed by: PHYSICIAN ASSISTANT

## 2019-10-15 PROCEDURE — 82010 KETONE BODYS QUAN: CPT

## 2019-10-15 PROCEDURE — 82803 BLOOD GASES ANY COMBINATION: CPT

## 2019-10-15 PROCEDURE — 36415 COLL VENOUS BLD VENIPUNCTURE: CPT

## 2019-10-15 PROCEDURE — 87147 CULTURE TYPE IMMUNOLOGIC: CPT

## 2019-10-15 PROCEDURE — 85014 HEMATOCRIT: CPT

## 2019-10-15 PROCEDURE — 80053 COMPREHEN METABOLIC PANEL: CPT

## 2019-10-15 PROCEDURE — 74177 CT ABD & PELVIS W/CONTRAST: CPT

## 2019-10-15 PROCEDURE — 82435 ASSAY OF BLOOD CHLORIDE: CPT

## 2019-10-15 PROCEDURE — 83605 ASSAY OF LACTIC ACID: CPT

## 2019-10-15 PROCEDURE — 85025 COMPLETE CBC W/AUTO DIFF WBC: CPT

## 2019-10-15 PROCEDURE — 82565 ASSAY OF CREATININE: CPT

## 2019-10-15 PROCEDURE — 82948 REAGENT STRIP/BLOOD GLUCOSE: CPT

## 2019-10-15 PROCEDURE — 87075 CULTR BACTERIA EXCEPT BLOOD: CPT

## 2019-10-15 PROCEDURE — 87070 CULTURE OTHR SPECIMN AEROBIC: CPT

## 2019-10-15 PROCEDURE — 96375 TX/PRO/DX INJ NEW DRUG ADDON: CPT

## 2019-10-15 RX ORDER — 0.9 % SODIUM CHLORIDE 0.9 %
1000 INTRAVENOUS SOLUTION INTRAVENOUS ONCE
Status: COMPLETED | OUTPATIENT
Start: 2019-10-15 | End: 2019-10-15

## 2019-10-15 RX ORDER — KETOROLAC TROMETHAMINE 30 MG/ML
30 INJECTION, SOLUTION INTRAMUSCULAR; INTRAVENOUS ONCE
Status: COMPLETED | OUTPATIENT
Start: 2019-10-15 | End: 2019-10-15

## 2019-10-15 RX ADMIN — SODIUM CHLORIDE 1000 ML: 9 INJECTION, SOLUTION INTRAVENOUS at 17:22

## 2019-10-15 RX ADMIN — DOXYCYCLINE 100 MG: 100 INJECTION, POWDER, LYOPHILIZED, FOR SOLUTION INTRAVENOUS at 17:39

## 2019-10-15 RX ADMIN — IOPAMIDOL 100 ML: 612 INJECTION, SOLUTION INTRAVENOUS at 17:48

## 2019-10-15 RX ADMIN — KETOROLAC TROMETHAMINE 30 MG: 30 INJECTION, SOLUTION INTRAMUSCULAR at 17:28

## 2019-10-15 ASSESSMENT — PAIN DESCRIPTION - PROGRESSION: CLINICAL_PROGRESSION: NOT CHANGED

## 2019-10-15 ASSESSMENT — PAIN SCALES - GENERAL
PAINLEVEL_OUTOF10: 10

## 2019-10-15 ASSESSMENT — ENCOUNTER SYMPTOMS
TROUBLE SWALLOWING: 0
APNEA: 0
SHORTNESS OF BREATH: 0
COLOR CHANGE: 0
EYE PAIN: 0
ABDOMINAL PAIN: 1
ALLERGIC/IMMUNOLOGIC NEGATIVE: 1

## 2019-10-15 ASSESSMENT — PAIN DESCRIPTION - PAIN TYPE: TYPE: ACUTE PAIN

## 2019-10-15 ASSESSMENT — PAIN DESCRIPTION - LOCATION: LOCATION: ABDOMEN

## 2019-10-16 ENCOUNTER — CARE COORDINATION (OUTPATIENT)
Dept: CARE COORDINATION | Age: 24
End: 2019-10-16

## 2019-10-16 LAB
BLOOD CULTURE, ROUTINE: NORMAL
CULTURE, BLOOD 2: NORMAL

## 2019-10-17 LAB
GFR AFRICAN AMERICAN: >60
GFR NON-AFRICAN AMERICAN: >60
PERFORMED ON: ABNORMAL
POC CREATININE: 0.8 MG/DL (ref 0.9–1.3)
POC SAMPLE TYPE: ABNORMAL

## 2019-10-18 ENCOUNTER — CARE COORDINATION (OUTPATIENT)
Dept: CASE MANAGEMENT | Age: 24
End: 2019-10-18

## 2019-10-18 ENCOUNTER — HOSPITAL ENCOUNTER (INPATIENT)
Age: 24
LOS: 1 days | Discharge: HOME | DRG: 603 | End: 2019-10-18
Attending: EMERGENCY MEDICINE | Admitting: INTERNAL MEDICINE
Payer: MEDICARE

## 2019-10-18 ENCOUNTER — CARE COORDINATION (OUTPATIENT)
Dept: CARE COORDINATION | Age: 24
End: 2019-10-18

## 2019-10-18 VITALS
DIASTOLIC BLOOD PRESSURE: 75 MMHG | RESPIRATION RATE: 18 BRPM | HEIGHT: 66 IN | WEIGHT: 150 LBS | SYSTOLIC BLOOD PRESSURE: 106 MMHG | OXYGEN SATURATION: 100 % | HEART RATE: 91 BPM | TEMPERATURE: 97.7 F | BODY MASS INDEX: 24.11 KG/M2

## 2019-10-18 DIAGNOSIS — L02.211 ABDOMINAL WALL ABSCESS: Primary | ICD-10-CM

## 2019-10-18 DIAGNOSIS — R73.9 HYPERGLYCEMIA: ICD-10-CM

## 2019-10-18 LAB
ALBUMIN SERPL-MCNC: 4 G/DL (ref 3.5–4.6)
ALP BLD-CCNC: 182 U/L (ref 35–104)
ALT SERPL-CCNC: 29 U/L (ref 0–41)
ANAEROBIC CULTURE: ABNORMAL
ANION GAP SERPL CALCULATED.3IONS-SCNC: 16 MEQ/L (ref 9–15)
AST SERPL-CCNC: 18 U/L (ref 0–40)
BASE EXCESS VENOUS: -1 (ref -3–3)
BASOPHILS ABSOLUTE: 0.1 K/UL (ref 0–0.2)
BASOPHILS RELATIVE PERCENT: 1.4 %
BETA-HYDROXYBUTYRATE: 2.1 MG/DL (ref 0.2–2.8)
BILIRUB SERPL-MCNC: <0.2 MG/DL (ref 0.2–0.7)
BUN BLDV-MCNC: 24 MG/DL (ref 6–20)
CALCIUM SERPL-MCNC: 9.2 MG/DL (ref 8.5–9.9)
CHLORIDE BLD-SCNC: 90 MEQ/L (ref 95–107)
CO2: 23 MEQ/L (ref 20–31)
CREAT SERPL-MCNC: 0.93 MG/DL (ref 0.7–1.2)
EOSINOPHILS ABSOLUTE: 0.3 K/UL (ref 0–0.7)
EOSINOPHILS RELATIVE PERCENT: 4.4 %
GFR AFRICAN AMERICAN: >60
GFR NON-AFRICAN AMERICAN: >60
GLOBULIN: 3 G/DL (ref 2.3–3.5)
GLUCOSE BLD-MCNC: 133 MG/DL (ref 60–115)
GLUCOSE BLD-MCNC: 79 MG/DL (ref 60–115)
GLUCOSE BLD-MCNC: 799 MG/DL (ref 70–99)
GRAM STAIN RESULT: ABNORMAL
HCO3 VENOUS: 23.8 MMOL/L (ref 23–29)
HCT VFR BLD CALC: 42.2 % (ref 42–52)
HEMOGLOBIN: 13.7 G/DL (ref 14–18)
LACTATE: 2.51 MMOL/L (ref 0.4–2)
LYMPHOCYTES ABSOLUTE: 1.6 K/UL (ref 1–4.8)
LYMPHOCYTES RELATIVE PERCENT: 25.7 %
MCH RBC QN AUTO: 29.6 PG (ref 27–31.3)
MCHC RBC AUTO-ENTMCNC: 32.5 % (ref 33–37)
MCV RBC AUTO: 91 FL (ref 80–100)
MONOCYTES ABSOLUTE: 0.5 K/UL (ref 0.2–0.8)
MONOCYTES RELATIVE PERCENT: 8.6 %
NEUTROPHILS ABSOLUTE: 3.7 K/UL (ref 1.4–6.5)
NEUTROPHILS RELATIVE PERCENT: 59.9 %
O2 SAT, VEN: 95 %
ORGANISM: ABNORMAL
ORGANISM: ABNORMAL
PCO2, VEN: 37.4 MM HG (ref 40–50)
PDW BLD-RTO: 13.9 % (ref 11.5–14.5)
PERFORMED ON: ABNORMAL
PERFORMED ON: ABNORMAL
PERFORMED ON: NORMAL
PH VENOUS: 7.41 (ref 7.35–7.45)
PLATELET # BLD: 359 K/UL (ref 130–400)
PO2, VEN: 75 MM HG
POC SAMPLE TYPE: ABNORMAL
POTASSIUM SERPL-SCNC: 4.8 MEQ/L (ref 3.4–4.9)
RBC # BLD: 4.64 M/UL (ref 4.7–6.1)
SODIUM BLD-SCNC: 129 MEQ/L (ref 135–144)
TCO2 CALC VENOUS: 25 MMOL/L
TOTAL PROTEIN: 7 G/DL (ref 6.3–8)
WBC # BLD: 6.2 K/UL (ref 4.8–10.8)
WOUND/ABSCESS: ABNORMAL
WOUND/ABSCESS: ABNORMAL

## 2019-10-18 PROCEDURE — 82010 KETONE BODYS QUAN: CPT

## 2019-10-18 PROCEDURE — 96367 TX/PROPH/DG ADDL SEQ IV INF: CPT

## 2019-10-18 PROCEDURE — 96376 TX/PRO/DX INJ SAME DRUG ADON: CPT

## 2019-10-18 PROCEDURE — 1210000000 HC MED SURG R&B

## 2019-10-18 PROCEDURE — 82803 BLOOD GASES ANY COMBINATION: CPT

## 2019-10-18 PROCEDURE — 2500000003 HC RX 250 WO HCPCS: Performed by: EMERGENCY MEDICINE

## 2019-10-18 PROCEDURE — 6370000000 HC RX 637 (ALT 250 FOR IP): Performed by: EMERGENCY MEDICINE

## 2019-10-18 PROCEDURE — 82948 REAGENT STRIP/BLOOD GLUCOSE: CPT

## 2019-10-18 PROCEDURE — 85025 COMPLETE CBC W/AUTO DIFF WBC: CPT

## 2019-10-18 PROCEDURE — 6360000002 HC RX W HCPCS: Performed by: PHYSICIAN ASSISTANT

## 2019-10-18 PROCEDURE — 80053 COMPREHEN METABOLIC PANEL: CPT

## 2019-10-18 PROCEDURE — 36600 WITHDRAWAL OF ARTERIAL BLOOD: CPT

## 2019-10-18 PROCEDURE — 96375 TX/PRO/DX INJ NEW DRUG ADDON: CPT

## 2019-10-18 PROCEDURE — 6370000000 HC RX 637 (ALT 250 FOR IP): Performed by: INTERNAL MEDICINE

## 2019-10-18 PROCEDURE — 83605 ASSAY OF LACTIC ACID: CPT

## 2019-10-18 PROCEDURE — 99222 1ST HOSP IP/OBS MODERATE 55: CPT | Performed by: INTERNAL MEDICINE

## 2019-10-18 PROCEDURE — 36415 COLL VENOUS BLD VENIPUNCTURE: CPT

## 2019-10-18 PROCEDURE — 99284 EMERGENCY DEPT VISIT MOD MDM: CPT

## 2019-10-18 PROCEDURE — 6360000002 HC RX W HCPCS: Performed by: EMERGENCY MEDICINE

## 2019-10-18 PROCEDURE — 87040 BLOOD CULTURE FOR BACTERIA: CPT

## 2019-10-18 PROCEDURE — 96365 THER/PROPH/DIAG IV INF INIT: CPT

## 2019-10-18 PROCEDURE — 2580000003 HC RX 258: Performed by: EMERGENCY MEDICINE

## 2019-10-18 RX ORDER — KETOROLAC TROMETHAMINE 30 MG/ML
30 INJECTION, SOLUTION INTRAMUSCULAR; INTRAVENOUS ONCE
Status: COMPLETED | OUTPATIENT
Start: 2019-10-18 | End: 2019-10-18

## 2019-10-18 RX ORDER — IBUPROFEN 800 MG/1
400 TABLET ORAL EVERY 6 HOURS PRN
Status: DISCONTINUED | OUTPATIENT
Start: 2019-10-18 | End: 2019-10-19 | Stop reason: HOSPADM

## 2019-10-18 RX ORDER — NICOTINE POLACRILEX 4 MG
15 LOZENGE BUCCAL PRN
Status: DISCONTINUED | OUTPATIENT
Start: 2019-10-18 | End: 2019-10-19 | Stop reason: HOSPADM

## 2019-10-18 RX ORDER — DEXTROSE MONOHYDRATE 50 MG/ML
100 INJECTION, SOLUTION INTRAVENOUS PRN
Status: DISCONTINUED | OUTPATIENT
Start: 2019-10-18 | End: 2019-10-19 | Stop reason: HOSPADM

## 2019-10-18 RX ORDER — INSULIN GLARGINE 100 [IU]/ML
50 INJECTION, SOLUTION SUBCUTANEOUS NIGHTLY
Status: DISCONTINUED | OUTPATIENT
Start: 2019-10-18 | End: 2019-10-19 | Stop reason: HOSPADM

## 2019-10-18 RX ORDER — 0.9 % SODIUM CHLORIDE 0.9 %
1000 INTRAVENOUS SOLUTION INTRAVENOUS ONCE
Status: COMPLETED | OUTPATIENT
Start: 2019-10-18 | End: 2019-10-18

## 2019-10-18 RX ORDER — ONDANSETRON 2 MG/ML
4 INJECTION INTRAMUSCULAR; INTRAVENOUS ONCE
Status: COMPLETED | OUTPATIENT
Start: 2019-10-18 | End: 2019-10-18

## 2019-10-18 RX ORDER — MORPHINE SULFATE 2 MG/ML
4 INJECTION, SOLUTION INTRAMUSCULAR; INTRAVENOUS
Status: COMPLETED | OUTPATIENT
Start: 2019-10-18 | End: 2019-10-18

## 2019-10-18 RX ORDER — DEXTROSE MONOHYDRATE 25 G/50ML
12.5 INJECTION, SOLUTION INTRAVENOUS PRN
Status: DISCONTINUED | OUTPATIENT
Start: 2019-10-18 | End: 2019-10-19 | Stop reason: HOSPADM

## 2019-10-18 RX ORDER — DIPHENHYDRAMINE HYDROCHLORIDE 50 MG/ML
50 INJECTION INTRAMUSCULAR; INTRAVENOUS ONCE
Status: COMPLETED | OUTPATIENT
Start: 2019-10-18 | End: 2019-10-18

## 2019-10-18 RX ADMIN — KETOROLAC TROMETHAMINE 30 MG: 30 INJECTION, SOLUTION INTRAMUSCULAR; INTRAVENOUS at 13:36

## 2019-10-18 RX ADMIN — DIPHENHYDRAMINE HYDROCHLORIDE 50 MG: 50 INJECTION, SOLUTION INTRAMUSCULAR; INTRAVENOUS at 15:08

## 2019-10-18 RX ADMIN — MORPHINE SULFATE 4 MG: 2 INJECTION, SOLUTION INTRAMUSCULAR; INTRAVENOUS at 13:36

## 2019-10-18 RX ADMIN — VANCOMYCIN HYDROCHLORIDE 1000 MG: 1 INJECTION, POWDER, LYOPHILIZED, FOR SOLUTION INTRAVENOUS at 14:21

## 2019-10-18 RX ADMIN — PIPERACILLIN AND TAZOBACTAM 3.38 G: 3; .375 INJECTION, POWDER, LYOPHILIZED, FOR SOLUTION INTRAVENOUS at 13:56

## 2019-10-18 RX ADMIN — INSULIN HUMAN 14 UNITS: 100 INJECTION, SOLUTION PARENTERAL at 15:06

## 2019-10-18 RX ADMIN — FAMOTIDINE 20 MG: 10 INJECTION, SOLUTION INTRAVENOUS at 16:02

## 2019-10-18 RX ADMIN — SODIUM CHLORIDE 1000 ML: 9 INJECTION, SOLUTION INTRAVENOUS at 13:36

## 2019-10-18 RX ADMIN — ONDANSETRON 4 MG: 2 INJECTION INTRAMUSCULAR; INTRAVENOUS at 13:36

## 2019-10-18 RX ADMIN — MORPHINE SULFATE 4 MG: 2 INJECTION, SOLUTION INTRAMUSCULAR; INTRAVENOUS at 15:01

## 2019-10-18 RX ADMIN — IBUPROFEN 400 MG: 800 TABLET, FILM COATED ORAL at 20:18

## 2019-10-18 ASSESSMENT — ENCOUNTER SYMPTOMS
SORE THROAT: 0
VOMITING: 0
NAUSEA: 0
BACK PAIN: 0
COUGH: 0
DIARRHEA: 0
SHORTNESS OF BREATH: 0
ABDOMINAL PAIN: 1
RESPIRATORY NEGATIVE: 1

## 2019-10-18 ASSESSMENT — PAIN SCALES - GENERAL
PAINLEVEL_OUTOF10: 10
PAINLEVEL_OUTOF10: 8
PAINLEVEL_OUTOF10: 10
PAINLEVEL_OUTOF10: 10
PAINLEVEL_OUTOF10: 8
PAINLEVEL_OUTOF10: 10
PAINLEVEL_OUTOF10: 7

## 2019-10-18 ASSESSMENT — PAIN DESCRIPTION - PAIN TYPE: TYPE: SURGICAL PAIN

## 2019-10-18 ASSESSMENT — PAIN DESCRIPTION - LOCATION: LOCATION: ABDOMEN

## 2019-10-19 ENCOUNTER — CARE COORDINATION (OUTPATIENT)
Dept: CASE MANAGEMENT | Age: 24
End: 2019-10-19

## 2019-10-20 ENCOUNTER — CARE COORDINATION (OUTPATIENT)
Dept: CASE MANAGEMENT | Age: 24
End: 2019-10-20

## 2019-10-20 ENCOUNTER — HOSPITAL ENCOUNTER (INPATIENT)
Age: 24
LOS: 2 days | Discharge: HOME | DRG: 571 | End: 2019-10-22
Attending: INTERNAL MEDICINE | Admitting: INTERNAL MEDICINE
Payer: MEDICARE

## 2019-10-20 DIAGNOSIS — L02.211 ABDOMINAL WALL ABSCESS: Primary | ICD-10-CM

## 2019-10-20 LAB
ANION GAP SERPL CALCULATED.3IONS-SCNC: 18 MEQ/L (ref 9–15)
BASOPHILS ABSOLUTE: 0 K/UL (ref 0–0.2)
BASOPHILS RELATIVE PERCENT: 0.6 %
BUN BLDV-MCNC: 18 MG/DL (ref 6–20)
CALCIUM SERPL-MCNC: 9.5 MG/DL (ref 8.5–9.9)
CHLORIDE BLD-SCNC: 91 MEQ/L (ref 95–107)
CO2: 23 MEQ/L (ref 20–31)
CREAT SERPL-MCNC: 1.06 MG/DL (ref 0.7–1.2)
EOSINOPHILS ABSOLUTE: 0.1 K/UL (ref 0–0.7)
EOSINOPHILS RELATIVE PERCENT: 2 %
GFR AFRICAN AMERICAN: >60
GFR NON-AFRICAN AMERICAN: >60
GLUCOSE BLD-MCNC: 199 MG/DL (ref 60–115)
GLUCOSE BLD-MCNC: 209 MG/DL (ref 60–115)
GLUCOSE BLD-MCNC: 247 MG/DL (ref 60–115)
GLUCOSE BLD-MCNC: 292 MG/DL (ref 60–115)
GLUCOSE BLD-MCNC: 296 MG/DL (ref 60–115)
GLUCOSE BLD-MCNC: 351 MG/DL (ref 60–115)
GLUCOSE BLD-MCNC: 38 MG/DL (ref 60–115)
GLUCOSE BLD-MCNC: 573 MG/DL (ref 70–99)
HCT VFR BLD CALC: 43.6 % (ref 42–52)
HEMOGLOBIN: 14.5 G/DL (ref 14–18)
LACTIC ACID: 5.6 MMOL/L (ref 0.5–2.2)
LYMPHOCYTES ABSOLUTE: 1.3 K/UL (ref 1–4.8)
LYMPHOCYTES RELATIVE PERCENT: 18.1 %
MCH RBC QN AUTO: 29.6 PG (ref 27–31.3)
MCHC RBC AUTO-ENTMCNC: 33.1 % (ref 33–37)
MCV RBC AUTO: 89.3 FL (ref 80–100)
MONOCYTES ABSOLUTE: 0.4 K/UL (ref 0.2–0.8)
MONOCYTES RELATIVE PERCENT: 5.4 %
NEUTROPHILS ABSOLUTE: 5.2 K/UL (ref 1.4–6.5)
NEUTROPHILS RELATIVE PERCENT: 73.9 %
PDW BLD-RTO: 14.1 % (ref 11.5–14.5)
PERFORMED ON: ABNORMAL
PLATELET # BLD: 331 K/UL (ref 130–400)
POTASSIUM SERPL-SCNC: 4.1 MEQ/L (ref 3.4–4.9)
RBC # BLD: 4.89 M/UL (ref 4.7–6.1)
SODIUM BLD-SCNC: 132 MEQ/L (ref 135–144)
WBC # BLD: 7.1 K/UL (ref 4.8–10.8)

## 2019-10-20 PROCEDURE — 99284 EMERGENCY DEPT VISIT MOD MDM: CPT

## 2019-10-20 PROCEDURE — 83605 ASSAY OF LACTIC ACID: CPT

## 2019-10-20 PROCEDURE — 6370000000 HC RX 637 (ALT 250 FOR IP): Performed by: NURSE PRACTITIONER

## 2019-10-20 PROCEDURE — 6370000000 HC RX 637 (ALT 250 FOR IP): Performed by: INTERNAL MEDICINE

## 2019-10-20 PROCEDURE — 2580000003 HC RX 258: Performed by: NURSE PRACTITIONER

## 2019-10-20 PROCEDURE — 80048 BASIC METABOLIC PNL TOTAL CA: CPT

## 2019-10-20 PROCEDURE — 6360000002 HC RX W HCPCS: Performed by: NURSE PRACTITIONER

## 2019-10-20 PROCEDURE — 87040 BLOOD CULTURE FOR BACTERIA: CPT

## 2019-10-20 PROCEDURE — 36415 COLL VENOUS BLD VENIPUNCTURE: CPT

## 2019-10-20 PROCEDURE — 85025 COMPLETE CBC W/AUTO DIFF WBC: CPT

## 2019-10-20 PROCEDURE — 1210000000 HC MED SURG R&B

## 2019-10-20 RX ORDER — TRAMADOL HYDROCHLORIDE 50 MG/1
100 TABLET ORAL EVERY 6 HOURS
Status: COMPLETED | OUTPATIENT
Start: 2019-10-20 | End: 2019-10-21

## 2019-10-20 RX ORDER — DEXTROSE MONOHYDRATE 25 G/50ML
12.5 INJECTION, SOLUTION INTRAVENOUS PRN
Status: DISCONTINUED | OUTPATIENT
Start: 2019-10-20 | End: 2019-10-22 | Stop reason: HOSPADM

## 2019-10-20 RX ORDER — ONDANSETRON 2 MG/ML
4 INJECTION INTRAMUSCULAR; INTRAVENOUS EVERY 10 MIN PRN
Status: DISCONTINUED | OUTPATIENT
Start: 2019-10-20 | End: 2019-10-22 | Stop reason: HOSPADM

## 2019-10-20 RX ORDER — CALCIUM CARBONATE 200(500)MG
500 TABLET,CHEWABLE ORAL 3 TIMES DAILY PRN
Status: DISCONTINUED | OUTPATIENT
Start: 2019-10-20 | End: 2019-10-22 | Stop reason: HOSPADM

## 2019-10-20 RX ORDER — TRAMADOL HYDROCHLORIDE 50 MG/1
50 TABLET ORAL EVERY 6 HOURS
Status: DISCONTINUED | OUTPATIENT
Start: 2019-10-21 | End: 2019-10-21

## 2019-10-20 RX ORDER — ACETAMINOPHEN 325 MG/1
650 TABLET ORAL EVERY 4 HOURS PRN
Status: DISCONTINUED | OUTPATIENT
Start: 2019-10-20 | End: 2019-10-21

## 2019-10-20 RX ORDER — KETOROLAC TROMETHAMINE 30 MG/ML
30 INJECTION, SOLUTION INTRAMUSCULAR; INTRAVENOUS EVERY 6 HOURS PRN
Status: DISCONTINUED | OUTPATIENT
Start: 2019-10-20 | End: 2019-10-22 | Stop reason: HOSPADM

## 2019-10-20 RX ORDER — SODIUM CHLORIDE 9 MG/ML
100 INJECTION, SOLUTION INTRAVENOUS CONTINUOUS
Status: DISCONTINUED | OUTPATIENT
Start: 2019-10-20 | End: 2019-10-22 | Stop reason: HOSPADM

## 2019-10-20 RX ORDER — ONDANSETRON 2 MG/ML
4 INJECTION INTRAMUSCULAR; INTRAVENOUS EVERY 6 HOURS PRN
Status: DISCONTINUED | OUTPATIENT
Start: 2019-10-20 | End: 2019-10-22 | Stop reason: HOSPADM

## 2019-10-20 RX ORDER — KETOROLAC TROMETHAMINE 30 MG/ML
30 INJECTION, SOLUTION INTRAMUSCULAR; INTRAVENOUS ONCE
Status: COMPLETED | OUTPATIENT
Start: 2019-10-20 | End: 2019-10-20

## 2019-10-20 RX ORDER — INSULIN GLARGINE 100 [IU]/ML
50 INJECTION, SOLUTION SUBCUTANEOUS NIGHTLY
Status: DISCONTINUED | OUTPATIENT
Start: 2019-10-20 | End: 2019-10-22 | Stop reason: HOSPADM

## 2019-10-20 RX ORDER — SODIUM CHLORIDE 0.9 % (FLUSH) 0.9 %
10 SYRINGE (ML) INJECTION EVERY 12 HOURS SCHEDULED
Status: DISCONTINUED | OUTPATIENT
Start: 2019-10-20 | End: 2019-10-22 | Stop reason: HOSPADM

## 2019-10-20 RX ORDER — SODIUM CHLORIDE 0.9 % (FLUSH) 0.9 %
10 SYRINGE (ML) INJECTION PRN
Status: DISCONTINUED | OUTPATIENT
Start: 2019-10-20 | End: 2019-10-22 | Stop reason: HOSPADM

## 2019-10-20 RX ORDER — MAGNESIUM HYDROXIDE/ALUMINUM HYDROXICE/SIMETHICONE 120; 1200; 1200 MG/30ML; MG/30ML; MG/30ML
30 SUSPENSION ORAL 2 TIMES DAILY PRN
Status: DISCONTINUED | OUTPATIENT
Start: 2019-10-20 | End: 2019-10-22 | Stop reason: HOSPADM

## 2019-10-20 RX ORDER — SODIUM CHLORIDE 9 MG/ML
INJECTION, SOLUTION INTRAVENOUS CONTINUOUS
Status: DISCONTINUED | OUTPATIENT
Start: 2019-10-20 | End: 2019-10-21

## 2019-10-20 RX ORDER — ACETAMINOPHEN 325 MG/1
650 TABLET ORAL EVERY 4 HOURS PRN
Status: DISCONTINUED | OUTPATIENT
Start: 2019-10-20 | End: 2019-10-20

## 2019-10-20 RX ORDER — NICOTINE POLACRILEX 4 MG
15 LOZENGE BUCCAL PRN
Status: DISCONTINUED | OUTPATIENT
Start: 2019-10-20 | End: 2019-10-22 | Stop reason: HOSPADM

## 2019-10-20 RX ORDER — M-VIT,TX,IRON,MINS/CALC/FOLIC 27MG-0.4MG
1 TABLET ORAL DAILY
Status: DISCONTINUED | OUTPATIENT
Start: 2019-10-20 | End: 2019-10-22 | Stop reason: HOSPADM

## 2019-10-20 RX ORDER — NICOTINE 21 MG/24HR
1 PATCH, TRANSDERMAL 24 HOURS TRANSDERMAL DAILY
Status: DISCONTINUED | OUTPATIENT
Start: 2019-10-20 | End: 2019-10-22 | Stop reason: HOSPADM

## 2019-10-20 RX ORDER — DEXTROSE MONOHYDRATE 50 MG/ML
100 INJECTION, SOLUTION INTRAVENOUS PRN
Status: DISCONTINUED | OUTPATIENT
Start: 2019-10-20 | End: 2019-10-22 | Stop reason: HOSPADM

## 2019-10-20 RX ORDER — 0.9 % SODIUM CHLORIDE 0.9 %
30 INTRAVENOUS SOLUTION INTRAVENOUS ONCE
Status: DISCONTINUED | OUTPATIENT
Start: 2019-10-20 | End: 2019-10-22 | Stop reason: HOSPADM

## 2019-10-20 RX ORDER — MORPHINE SULFATE 2 MG/ML
4 INJECTION, SOLUTION INTRAMUSCULAR; INTRAVENOUS EVERY 30 MIN PRN
Status: DISCONTINUED | OUTPATIENT
Start: 2019-10-20 | End: 2019-10-21

## 2019-10-20 RX ADMIN — INSULIN GLARGINE 50 UNITS: 100 INJECTION, SOLUTION SUBCUTANEOUS at 22:00

## 2019-10-20 RX ADMIN — SODIUM CHLORIDE 100 ML/HR: 9 INJECTION, SOLUTION INTRAVENOUS at 14:25

## 2019-10-20 RX ADMIN — INSULIN LISPRO 6 UNITS: 100 INJECTION, SOLUTION INTRAVENOUS; SUBCUTANEOUS at 18:52

## 2019-10-20 RX ADMIN — TRAMADOL HYDROCHLORIDE 100 MG: 50 TABLET ORAL at 18:53

## 2019-10-20 RX ADMIN — DEXTROSE 50 % IN WATER (D50W) INTRAVENOUS SYRINGE 12.5 G: at 23:31

## 2019-10-20 RX ADMIN — ANTACID TABLETS 500 MG: 500 TABLET, CHEWABLE ORAL at 16:46

## 2019-10-20 RX ADMIN — MORPHINE SULFATE 4 MG: 2 INJECTION, SOLUTION INTRAMUSCULAR; INTRAVENOUS at 14:26

## 2019-10-20 RX ADMIN — KETOROLAC TROMETHAMINE 30 MG: 30 INJECTION, SOLUTION INTRAMUSCULAR; INTRAVENOUS at 14:25

## 2019-10-20 RX ADMIN — SODIUM CHLORIDE: 9 INJECTION, SOLUTION INTRAVENOUS at 16:46

## 2019-10-20 RX ADMIN — ONDANSETRON 4 MG: 2 INJECTION INTRAMUSCULAR; INTRAVENOUS at 14:25

## 2019-10-20 RX ADMIN — Medication 15 G: at 23:31

## 2019-10-20 RX ADMIN — PIPERACILLIN SODIUM AND TAZOBACTAM SODIUM 3.38 G: 3; .375 INJECTION, POWDER, LYOPHILIZED, FOR SOLUTION INTRAVENOUS at 22:07

## 2019-10-20 RX ADMIN — PIPERACILLIN AND TAZOBACTAM 3.38 G: 3; .375 INJECTION, POWDER, LYOPHILIZED, FOR SOLUTION INTRAVENOUS at 14:25

## 2019-10-20 RX ADMIN — ENOXAPARIN SODIUM 40 MG: 40 INJECTION SUBCUTANEOUS at 16:47

## 2019-10-20 ASSESSMENT — ENCOUNTER SYMPTOMS
VOMITING: 0
EYE PAIN: 0
PHOTOPHOBIA: 0
RHINORRHEA: 0
ABDOMINAL PAIN: 0
NAUSEA: 0
BACK PAIN: 0
COUGH: 0
SHORTNESS OF BREATH: 0
COLOR CHANGE: 1
SORE THROAT: 0
DIARRHEA: 0

## 2019-10-20 ASSESSMENT — PAIN DESCRIPTION - LOCATION: LOCATION: ABDOMEN

## 2019-10-20 ASSESSMENT — PAIN SCALES - GENERAL
PAINLEVEL_OUTOF10: 10
PAINLEVEL_OUTOF10: 10
PAINLEVEL_OUTOF10: 0
PAINLEVEL_OUTOF10: 0

## 2019-10-20 ASSESSMENT — PAIN DESCRIPTION - DESCRIPTORS: DESCRIPTORS: ACHING;BURNING

## 2019-10-20 ASSESSMENT — PAIN DESCRIPTION - PAIN TYPE: TYPE: ACUTE PAIN

## 2019-10-20 ASSESSMENT — PAIN DESCRIPTION - FREQUENCY: FREQUENCY: CONTINUOUS

## 2019-10-21 ENCOUNTER — ANESTHESIA (OUTPATIENT)
Dept: OPERATING ROOM | Age: 24
DRG: 571 | End: 2019-10-21
Payer: MEDICARE

## 2019-10-21 ENCOUNTER — ANESTHESIA EVENT (OUTPATIENT)
Dept: OPERATING ROOM | Age: 24
DRG: 571 | End: 2019-10-21
Payer: MEDICARE

## 2019-10-21 VITALS
RESPIRATION RATE: 11 BRPM | OXYGEN SATURATION: 100 % | DIASTOLIC BLOOD PRESSURE: 48 MMHG | SYSTOLIC BLOOD PRESSURE: 85 MMHG

## 2019-10-21 LAB
ANION GAP SERPL CALCULATED.3IONS-SCNC: 9 MEQ/L (ref 9–15)
BUN BLDV-MCNC: 13 MG/DL (ref 6–20)
CALCIUM SERPL-MCNC: 8.9 MG/DL (ref 8.5–9.9)
CHLORIDE BLD-SCNC: 105 MEQ/L (ref 95–107)
CO2: 29 MEQ/L (ref 20–31)
CREAT SERPL-MCNC: 0.83 MG/DL (ref 0.7–1.2)
GFR AFRICAN AMERICAN: >60
GFR NON-AFRICAN AMERICAN: >60
GLUCOSE BLD-MCNC: 103 MG/DL (ref 70–99)
GLUCOSE BLD-MCNC: 108 MG/DL (ref 60–115)
GLUCOSE BLD-MCNC: 113 MG/DL (ref 60–115)
GLUCOSE BLD-MCNC: 118 MG/DL (ref 60–115)
GLUCOSE BLD-MCNC: 174 MG/DL (ref 60–115)
GLUCOSE BLD-MCNC: 176 MG/DL (ref 60–115)
GLUCOSE BLD-MCNC: 188 MG/DL (ref 60–115)
GLUCOSE BLD-MCNC: 208 MG/DL (ref 60–115)
GLUCOSE BLD-MCNC: 219 MG/DL (ref 60–115)
GLUCOSE BLD-MCNC: 68 MG/DL (ref 60–115)
GLUCOSE BLD-MCNC: 71 MG/DL (ref 60–115)
GLUCOSE BLD-MCNC: 74 MG/DL (ref 60–115)
GLUCOSE BLD-MCNC: 76 MG/DL (ref 60–115)
GLUCOSE BLD-MCNC: 87 MG/DL (ref 60–115)
HCT VFR BLD CALC: 41.9 % (ref 42–52)
HEMOGLOBIN: 13.5 G/DL (ref 14–18)
LACTIC ACID: 1.7 MMOL/L (ref 0.5–2.2)
MCH RBC QN AUTO: 29 PG (ref 27–31.3)
MCHC RBC AUTO-ENTMCNC: 32.3 % (ref 33–37)
MCV RBC AUTO: 89.9 FL (ref 80–100)
PDW BLD-RTO: 14.1 % (ref 11.5–14.5)
PERFORMED ON: ABNORMAL
PERFORMED ON: NORMAL
PLATELET # BLD: 319 K/UL (ref 130–400)
POTASSIUM REFLEX MAGNESIUM: 4.6 MEQ/L (ref 3.4–4.9)
RBC # BLD: 4.67 M/UL (ref 4.7–6.1)
SODIUM BLD-SCNC: 143 MEQ/L (ref 135–144)
WBC # BLD: 6.4 K/UL (ref 4.8–10.8)

## 2019-10-21 PROCEDURE — 6360000002 HC RX W HCPCS: Performed by: NURSE PRACTITIONER

## 2019-10-21 PROCEDURE — 94150 VITAL CAPACITY TEST: CPT

## 2019-10-21 PROCEDURE — 36415 COLL VENOUS BLD VENIPUNCTURE: CPT

## 2019-10-21 PROCEDURE — 3600000012 HC SURGERY LEVEL 2 ADDTL 15MIN: Performed by: SURGERY

## 2019-10-21 PROCEDURE — 85027 COMPLETE CBC AUTOMATED: CPT

## 2019-10-21 PROCEDURE — 2580000003 HC RX 258: Performed by: NURSE PRACTITIONER

## 2019-10-21 PROCEDURE — 2580000003 HC RX 258: Performed by: INTERNAL MEDICINE

## 2019-10-21 PROCEDURE — 3700000000 HC ANESTHESIA ATTENDED CARE: Performed by: SURGERY

## 2019-10-21 PROCEDURE — 7100000001 HC PACU RECOVERY - ADDTL 15 MIN: Performed by: SURGERY

## 2019-10-21 PROCEDURE — 2580000003 HC RX 258: Performed by: NURSE ANESTHETIST, CERTIFIED REGISTERED

## 2019-10-21 PROCEDURE — 0JB80ZZ EXCISION OF ABDOMEN SUBCUTANEOUS TISSUE AND FASCIA, OPEN APPROACH: ICD-10-PCS | Performed by: SURGERY

## 2019-10-21 PROCEDURE — 2709999900 HC NON-CHARGEABLE SUPPLY: Performed by: SURGERY

## 2019-10-21 PROCEDURE — 3700000001 HC ADD 15 MINUTES (ANESTHESIA): Performed by: SURGERY

## 2019-10-21 PROCEDURE — 6360000002 HC RX W HCPCS: Performed by: NURSE ANESTHETIST, CERTIFIED REGISTERED

## 2019-10-21 PROCEDURE — 6370000000 HC RX 637 (ALT 250 FOR IP): Performed by: INTERNAL MEDICINE

## 2019-10-21 PROCEDURE — 3600000002 HC SURGERY LEVEL 2 BASE: Performed by: SURGERY

## 2019-10-21 PROCEDURE — 83605 ASSAY OF LACTIC ACID: CPT

## 2019-10-21 PROCEDURE — 2580000003 HC RX 258: Performed by: SURGERY

## 2019-10-21 PROCEDURE — 6370000000 HC RX 637 (ALT 250 FOR IP): Performed by: ANESTHESIOLOGY

## 2019-10-21 PROCEDURE — 2580000003 HC RX 258: Performed by: ANESTHESIOLOGY

## 2019-10-21 PROCEDURE — 6370000000 HC RX 637 (ALT 250 FOR IP): Performed by: NURSE PRACTITIONER

## 2019-10-21 PROCEDURE — 1210000000 HC MED SURG R&B

## 2019-10-21 PROCEDURE — 80048 BASIC METABOLIC PNL TOTAL CA: CPT

## 2019-10-21 PROCEDURE — 99222 1ST HOSP IP/OBS MODERATE 55: CPT | Performed by: INTERNAL MEDICINE

## 2019-10-21 PROCEDURE — 7100000000 HC PACU RECOVERY - FIRST 15 MIN: Performed by: SURGERY

## 2019-10-21 RX ORDER — DEXTROSE AND SODIUM CHLORIDE 5; .9 G/100ML; G/100ML
INJECTION, SOLUTION INTRAVENOUS CONTINUOUS
Status: DISCONTINUED | OUTPATIENT
Start: 2019-10-21 | End: 2019-10-22 | Stop reason: HOSPADM

## 2019-10-21 RX ORDER — METOCLOPRAMIDE HYDROCHLORIDE 5 MG/ML
10 INJECTION INTRAMUSCULAR; INTRAVENOUS
Status: DISCONTINUED | OUTPATIENT
Start: 2019-10-21 | End: 2019-10-21 | Stop reason: HOSPADM

## 2019-10-21 RX ORDER — MEPERIDINE HYDROCHLORIDE 25 MG/ML
12.5 INJECTION INTRAMUSCULAR; INTRAVENOUS; SUBCUTANEOUS EVERY 5 MIN PRN
Status: DISCONTINUED | OUTPATIENT
Start: 2019-10-21 | End: 2019-10-21 | Stop reason: HOSPADM

## 2019-10-21 RX ORDER — ONDANSETRON 2 MG/ML
INJECTION INTRAMUSCULAR; INTRAVENOUS PRN
Status: DISCONTINUED | OUTPATIENT
Start: 2019-10-21 | End: 2019-10-21 | Stop reason: SDUPTHER

## 2019-10-21 RX ORDER — DEXTROSE MONOHYDRATE 25 G/50ML
12.5 INJECTION, SOLUTION INTRAVENOUS ONCE
Status: COMPLETED | OUTPATIENT
Start: 2019-10-21 | End: 2019-10-21

## 2019-10-21 RX ORDER — DEXTROSE, SODIUM CHLORIDE, SODIUM LACTATE, POTASSIUM CHLORIDE, AND CALCIUM CHLORIDE 5; .6; .31; .03; .02 G/100ML; G/100ML; G/100ML; G/100ML; G/100ML
INJECTION, SOLUTION INTRAVENOUS CONTINUOUS
Status: DISCONTINUED | OUTPATIENT
Start: 2019-10-21 | End: 2019-10-21

## 2019-10-21 RX ORDER — PROPOFOL 10 MG/ML
INJECTION, EMULSION INTRAVENOUS PRN
Status: DISCONTINUED | OUTPATIENT
Start: 2019-10-21 | End: 2019-10-21 | Stop reason: SDUPTHER

## 2019-10-21 RX ORDER — DEXTROSE, SODIUM CHLORIDE, SODIUM LACTATE, POTASSIUM CHLORIDE, AND CALCIUM CHLORIDE 5; .6; .31; .03; .02 G/100ML; G/100ML; G/100ML; G/100ML; G/100ML
INJECTION, SOLUTION INTRAVENOUS CONTINUOUS
Status: DISPENSED | OUTPATIENT
Start: 2019-10-21 | End: 2019-10-21

## 2019-10-21 RX ORDER — SODIUM CHLORIDE 9 MG/ML
INJECTION, SOLUTION INTRAVENOUS CONTINUOUS PRN
Status: DISCONTINUED | OUTPATIENT
Start: 2019-10-21 | End: 2019-10-21 | Stop reason: SDUPTHER

## 2019-10-21 RX ORDER — LIDOCAINE HYDROCHLORIDE 20 MG/ML
INJECTION, SOLUTION INTRAVENOUS PRN
Status: DISCONTINUED | OUTPATIENT
Start: 2019-10-21 | End: 2019-10-21 | Stop reason: SDUPTHER

## 2019-10-21 RX ORDER — OXYCODONE HYDROCHLORIDE AND ACETAMINOPHEN 5; 325 MG/1; MG/1
1 TABLET ORAL EVERY 6 HOURS PRN
Status: DISCONTINUED | OUTPATIENT
Start: 2019-10-21 | End: 2019-10-21

## 2019-10-21 RX ORDER — DIPHENHYDRAMINE HYDROCHLORIDE 50 MG/ML
12.5 INJECTION INTRAMUSCULAR; INTRAVENOUS
Status: DISCONTINUED | OUTPATIENT
Start: 2019-10-21 | End: 2019-10-21 | Stop reason: HOSPADM

## 2019-10-21 RX ORDER — ACETAMINOPHEN 325 MG/1
650 TABLET ORAL EVERY 6 HOURS SCHEDULED
Status: DISCONTINUED | OUTPATIENT
Start: 2019-10-21 | End: 2019-10-22 | Stop reason: HOSPADM

## 2019-10-21 RX ORDER — OXYCODONE HYDROCHLORIDE AND ACETAMINOPHEN 5; 325 MG/1; MG/1
1 TABLET ORAL EVERY 4 HOURS PRN
Status: DISCONTINUED | OUTPATIENT
Start: 2019-10-21 | End: 2019-10-22 | Stop reason: HOSPADM

## 2019-10-21 RX ORDER — LIDOCAINE 4 G/G
3 PATCH TOPICAL DAILY
Status: DISCONTINUED | OUTPATIENT
Start: 2019-10-21 | End: 2019-10-22 | Stop reason: HOSPADM

## 2019-10-21 RX ORDER — FENTANYL CITRATE 50 UG/ML
INJECTION, SOLUTION INTRAMUSCULAR; INTRAVENOUS PRN
Status: DISCONTINUED | OUTPATIENT
Start: 2019-10-21 | End: 2019-10-21 | Stop reason: SDUPTHER

## 2019-10-21 RX ORDER — MAGNESIUM HYDROXIDE 1200 MG/15ML
LIQUID ORAL CONTINUOUS PRN
Status: COMPLETED | OUTPATIENT
Start: 2019-10-21 | End: 2019-10-21

## 2019-10-21 RX ORDER — FENTANYL CITRATE 50 UG/ML
50 INJECTION, SOLUTION INTRAMUSCULAR; INTRAVENOUS EVERY 10 MIN PRN
Status: DISCONTINUED | OUTPATIENT
Start: 2019-10-21 | End: 2019-10-21 | Stop reason: HOSPADM

## 2019-10-21 RX ORDER — HYDROCODONE BITARTRATE AND ACETAMINOPHEN 5; 325 MG/1; MG/1
1 TABLET ORAL PRN
Status: DISCONTINUED | OUTPATIENT
Start: 2019-10-21 | End: 2019-10-21 | Stop reason: HOSPADM

## 2019-10-21 RX ORDER — ONDANSETRON 2 MG/ML
4 INJECTION INTRAMUSCULAR; INTRAVENOUS
Status: DISCONTINUED | OUTPATIENT
Start: 2019-10-21 | End: 2019-10-21 | Stop reason: HOSPADM

## 2019-10-21 RX ORDER — HYDROCODONE BITARTRATE AND ACETAMINOPHEN 5; 325 MG/1; MG/1
2 TABLET ORAL PRN
Status: DISCONTINUED | OUTPATIENT
Start: 2019-10-21 | End: 2019-10-21 | Stop reason: HOSPADM

## 2019-10-21 RX ORDER — BACLOFEN 10 MG/1
10 TABLET ORAL 2 TIMES DAILY
Status: DISCONTINUED | OUTPATIENT
Start: 2019-10-21 | End: 2019-10-22 | Stop reason: HOSPADM

## 2019-10-21 RX ADMIN — KETOROLAC TROMETHAMINE 30 MG: 30 INJECTION, SOLUTION INTRAMUSCULAR; INTRAVENOUS at 12:18

## 2019-10-21 RX ADMIN — QUETIAPINE FUMARATE 150 MG: 100 TABLET ORAL at 21:32

## 2019-10-21 RX ADMIN — PROPOFOL 200 MG: 10 INJECTION, EMULSION INTRAVENOUS at 15:23

## 2019-10-21 RX ADMIN — PIPERACILLIN SODIUM AND TAZOBACTAM SODIUM 3.38 G: 3; .375 INJECTION, POWDER, LYOPHILIZED, FOR SOLUTION INTRAVENOUS at 13:28

## 2019-10-21 RX ADMIN — DEXTROSE 50 % IN WATER (D50W) INTRAVENOUS SYRINGE 12.5 G: at 14:33

## 2019-10-21 RX ADMIN — SODIUM CHLORIDE: 9 INJECTION, SOLUTION INTRAVENOUS at 12:21

## 2019-10-21 RX ADMIN — OXYCODONE HYDROCHLORIDE AND ACETAMINOPHEN 1 TABLET: 5; 325 TABLET ORAL at 21:33

## 2019-10-21 RX ADMIN — FENTANYL CITRATE 50 MCG: 50 INJECTION, SOLUTION INTRAMUSCULAR; INTRAVENOUS at 15:45

## 2019-10-21 RX ADMIN — OXYCODONE HYDROCHLORIDE AND ACETAMINOPHEN 1 TABLET: 5; 325 TABLET ORAL at 17:39

## 2019-10-21 RX ADMIN — ENOXAPARIN SODIUM 40 MG: 40 INJECTION SUBCUTANEOUS at 08:37

## 2019-10-21 RX ADMIN — TRAMADOL HYDROCHLORIDE 50 MG: 50 TABLET, FILM COATED ORAL at 18:04

## 2019-10-21 RX ADMIN — SODIUM CHLORIDE: 9 INJECTION, SOLUTION INTRAVENOUS at 15:17

## 2019-10-21 RX ADMIN — PROPOFOL 50 MG: 10 INJECTION, EMULSION INTRAVENOUS at 15:37

## 2019-10-21 RX ADMIN — LIDOCAINE HYDROCHLORIDE 100 MG: 20 INJECTION, SOLUTION INTRAVENOUS at 15:23

## 2019-10-21 RX ADMIN — SODIUM CHLORIDE: 9 INJECTION, SOLUTION INTRAVENOUS at 04:09

## 2019-10-21 RX ADMIN — TRAMADOL HYDROCHLORIDE 100 MG: 50 TABLET ORAL at 06:44

## 2019-10-21 RX ADMIN — TRAMADOL HYDROCHLORIDE 100 MG: 50 TABLET ORAL at 12:18

## 2019-10-21 RX ADMIN — PIPERACILLIN SODIUM AND TAZOBACTAM SODIUM 3.38 G: 3; .375 INJECTION, POWDER, LYOPHILIZED, FOR SOLUTION INTRAVENOUS at 15:21

## 2019-10-21 RX ADMIN — SODIUM CHLORIDE 1000 ML: 9 INJECTION, SOLUTION INTRAVENOUS at 14:32

## 2019-10-21 RX ADMIN — DEXTROSE AND SODIUM CHLORIDE: 5; 900 INJECTION, SOLUTION INTRAVENOUS at 16:15

## 2019-10-21 RX ADMIN — ONDANSETRON 4 MG: 2 INJECTION INTRAMUSCULAR; INTRAVENOUS at 15:30

## 2019-10-21 RX ADMIN — PIPERACILLIN SODIUM AND TAZOBACTAM SODIUM 3.38 G: 3; .375 INJECTION, POWDER, LYOPHILIZED, FOR SOLUTION INTRAVENOUS at 06:50

## 2019-10-21 RX ADMIN — TRAMADOL HYDROCHLORIDE 100 MG: 50 TABLET ORAL at 01:36

## 2019-10-21 RX ADMIN — Medication 10 ML: at 08:46

## 2019-10-21 RX ADMIN — FENTANYL CITRATE 50 MCG: 50 INJECTION, SOLUTION INTRAMUSCULAR; INTRAVENOUS at 15:39

## 2019-10-21 RX ADMIN — BACLOFEN 10 MG: 10 TABLET ORAL at 21:32

## 2019-10-21 ASSESSMENT — PULMONARY FUNCTION TESTS
PIF_VALUE: 10
PIF_VALUE: 11
PIF_VALUE: 13
PIF_VALUE: 10
PIF_VALUE: 2
PIF_VALUE: 11
PIF_VALUE: 3
PIF_VALUE: 12
PIF_VALUE: 11
PIF_VALUE: 12
PIF_VALUE: 11
PIF_VALUE: 11
PIF_VALUE: 10
PIF_VALUE: 2
PIF_VALUE: 11
PIF_VALUE: 11
PIF_VALUE: 8
PIF_VALUE: 2
PIF_VALUE: 11
PIF_VALUE: 27
PIF_VALUE: 11
PIF_VALUE: 2
PIF_VALUE: 11
PIF_VALUE: 2
PIF_VALUE: 2
PIF_VALUE: 11
PIF_VALUE: 3

## 2019-10-21 ASSESSMENT — ENCOUNTER SYMPTOMS
CONSTIPATION: 0
RESPIRATORY NEGATIVE: 1
RECTAL PAIN: 0
BLOOD IN STOOL: 0
ABDOMINAL DISTENTION: 0
ALLERGIC/IMMUNOLOGIC NEGATIVE: 1
VOMITING: 0
ABDOMINAL PAIN: 1
ANAL BLEEDING: 0
DIARRHEA: 0
COLOR CHANGE: 1
EYES NEGATIVE: 1
NAUSEA: 0

## 2019-10-21 ASSESSMENT — PAIN DESCRIPTION - DESCRIPTORS: DESCRIPTORS: ACHING;BURNING;CONSTANT

## 2019-10-21 ASSESSMENT — PAIN DESCRIPTION - FREQUENCY: FREQUENCY: CONTINUOUS

## 2019-10-21 ASSESSMENT — PAIN SCALES - GENERAL
PAINLEVEL_OUTOF10: 10
PAINLEVEL_OUTOF10: 10
PAINLEVEL_OUTOF10: 7
PAINLEVEL_OUTOF10: 9
PAINLEVEL_OUTOF10: 7
PAINLEVEL_OUTOF10: 10

## 2019-10-21 ASSESSMENT — PAIN DESCRIPTION - ORIENTATION: ORIENTATION: LEFT;LOWER

## 2019-10-21 ASSESSMENT — PAIN DESCRIPTION - LOCATION: LOCATION: ABDOMEN

## 2019-10-21 ASSESSMENT — PAIN DESCRIPTION - PAIN TYPE: TYPE: ACUTE PAIN

## 2019-10-22 ENCOUNTER — CARE COORDINATION (OUTPATIENT)
Dept: CARE COORDINATION | Age: 24
End: 2019-10-22

## 2019-10-22 VITALS
TEMPERATURE: 98.6 F | HEART RATE: 65 BPM | DIASTOLIC BLOOD PRESSURE: 74 MMHG | HEIGHT: 66 IN | WEIGHT: 150 LBS | RESPIRATION RATE: 10 BRPM | SYSTOLIC BLOOD PRESSURE: 120 MMHG | BODY MASS INDEX: 24.11 KG/M2 | OXYGEN SATURATION: 96 %

## 2019-10-22 LAB
GLUCOSE BLD-MCNC: 222 MG/DL (ref 60–115)
GLUCOSE BLD-MCNC: 99 MG/DL (ref 60–115)
PERFORMED ON: ABNORMAL
PERFORMED ON: NORMAL

## 2019-10-22 PROCEDURE — 6370000000 HC RX 637 (ALT 250 FOR IP): Performed by: INTERNAL MEDICINE

## 2019-10-22 PROCEDURE — 2580000003 HC RX 258: Performed by: NURSE PRACTITIONER

## 2019-10-22 PROCEDURE — 6370000000 HC RX 637 (ALT 250 FOR IP): Performed by: ANESTHESIOLOGY

## 2019-10-22 PROCEDURE — 6360000002 HC RX W HCPCS: Performed by: NURSE PRACTITIONER

## 2019-10-22 PROCEDURE — 6370000000 HC RX 637 (ALT 250 FOR IP): Performed by: NURSE PRACTITIONER

## 2019-10-22 PROCEDURE — 99232 SBSQ HOSP IP/OBS MODERATE 35: CPT | Performed by: INTERNAL MEDICINE

## 2019-10-22 RX ORDER — CEPHALEXIN 500 MG/1
500 CAPSULE ORAL 3 TIMES DAILY
Qty: 42 CAPSULE | Refills: 0 | Status: SHIPPED | OUTPATIENT
Start: 2019-10-22 | End: 2019-11-05

## 2019-10-22 RX ORDER — CIPROFLOXACIN 500 MG/1
500 TABLET, FILM COATED ORAL 2 TIMES DAILY
Qty: 28 TABLET | Refills: 0 | Status: SHIPPED | OUTPATIENT
Start: 2019-10-22 | End: 2019-11-05

## 2019-10-22 RX ADMIN — ACETAMINOPHEN 650 MG: 325 TABLET ORAL at 12:03

## 2019-10-22 RX ADMIN — OXYCODONE HYDROCHLORIDE AND ACETAMINOPHEN 1 TABLET: 5; 325 TABLET ORAL at 10:14

## 2019-10-22 RX ADMIN — INSULIN LISPRO 6 UNITS: 100 INJECTION, SOLUTION INTRAVENOUS; SUBCUTANEOUS at 08:12

## 2019-10-22 RX ADMIN — OXYCODONE HYDROCHLORIDE AND ACETAMINOPHEN 1 TABLET: 5; 325 TABLET ORAL at 02:23

## 2019-10-22 RX ADMIN — ACETAMINOPHEN 650 MG: 325 TABLET ORAL at 06:28

## 2019-10-22 RX ADMIN — ENOXAPARIN SODIUM 40 MG: 40 INJECTION SUBCUTANEOUS at 08:14

## 2019-10-22 RX ADMIN — OXYCODONE HYDROCHLORIDE AND ACETAMINOPHEN 1 TABLET: 5; 325 TABLET ORAL at 06:28

## 2019-10-22 RX ADMIN — PIPERACILLIN SODIUM AND TAZOBACTAM SODIUM 3.38 G: 3; .375 INJECTION, POWDER, LYOPHILIZED, FOR SOLUTION INTRAVENOUS at 02:18

## 2019-10-22 RX ADMIN — MULTIPLE VITAMINS W/ MINERALS TAB 1 TABLET: TAB at 08:14

## 2019-10-22 RX ADMIN — PIPERACILLIN SODIUM AND TAZOBACTAM SODIUM 3.38 G: 3; .375 INJECTION, POWDER, LYOPHILIZED, FOR SOLUTION INTRAVENOUS at 10:15

## 2019-10-22 RX ADMIN — BACLOFEN 10 MG: 10 TABLET ORAL at 08:10

## 2019-10-22 ASSESSMENT — PAIN SCALES - GENERAL
PAINLEVEL_OUTOF10: 10
PAINLEVEL_OUTOF10: 10
PAINLEVEL_OUTOF10: 5
PAINLEVEL_OUTOF10: 10

## 2019-10-22 ASSESSMENT — ENCOUNTER SYMPTOMS
DIARRHEA: 0
NAUSEA: 0
ABDOMINAL PAIN: 1

## 2019-10-23 LAB
BLOOD CULTURE, ROUTINE: NORMAL
CULTURE, BLOOD 2: NORMAL

## 2019-10-24 ENCOUNTER — TELEPHONE (OUTPATIENT)
Dept: FAMILY MEDICINE CLINIC | Age: 24
End: 2019-10-24

## 2019-10-24 ENCOUNTER — CARE COORDINATION (OUTPATIENT)
Dept: CASE MANAGEMENT | Age: 24
End: 2019-10-24

## 2019-10-25 LAB
BLOOD CULTURE, ROUTINE: NORMAL
CULTURE, BLOOD 2: NORMAL

## 2019-10-26 ENCOUNTER — CARE COORDINATION (OUTPATIENT)
Dept: CASE MANAGEMENT | Age: 24
End: 2019-10-26

## 2019-10-27 ENCOUNTER — CARE COORDINATION (OUTPATIENT)
Dept: CASE MANAGEMENT | Age: 24
End: 2019-10-27

## 2019-10-29 ENCOUNTER — CARE COORDINATION (OUTPATIENT)
Dept: CARE COORDINATION | Age: 24
End: 2019-10-29

## 2019-10-30 ENCOUNTER — OFFICE VISIT (OUTPATIENT)
Dept: FAMILY MEDICINE CLINIC | Age: 24
End: 2019-10-30
Payer: MEDICARE

## 2019-10-30 ENCOUNTER — CARE COORDINATION (OUTPATIENT)
Dept: CARE COORDINATION | Age: 24
End: 2019-10-30

## 2019-10-30 VITALS
HEIGHT: 66 IN | TEMPERATURE: 99.4 F | OXYGEN SATURATION: 97 % | WEIGHT: 144 LBS | BODY MASS INDEX: 23.14 KG/M2 | SYSTOLIC BLOOD PRESSURE: 110 MMHG | HEART RATE: 110 BPM | DIASTOLIC BLOOD PRESSURE: 80 MMHG | RESPIRATION RATE: 16 BRPM

## 2019-10-30 DIAGNOSIS — E10.622 TYPE 1 DIABETES MELLITUS WITH OTHER SKIN ULCER (HCC): ICD-10-CM

## 2019-10-30 DIAGNOSIS — E10.10 DKA, TYPE 1, NOT AT GOAL (HCC): Primary | ICD-10-CM

## 2019-10-30 DIAGNOSIS — L98.499 TYPE 1 DIABETES MELLITUS WITH OTHER SKIN ULCER (HCC): ICD-10-CM

## 2019-10-30 DIAGNOSIS — S31.109S OPEN WOUND OF ABDOMINAL WALL, SEQUELA: ICD-10-CM

## 2019-10-30 PROCEDURE — 99495 TRANSJ CARE MGMT MOD F2F 14D: CPT | Performed by: NURSE PRACTITIONER

## 2019-10-30 PROCEDURE — 96372 THER/PROPH/DIAG INJ SC/IM: CPT | Performed by: NURSE PRACTITIONER

## 2019-10-30 RX ORDER — GLUCOSAMINE HCL/CHONDROITIN SU 500-400 MG
CAPSULE ORAL
Qty: 300 STRIP | Refills: 5 | Status: ON HOLD | OUTPATIENT
Start: 2019-10-30 | End: 2020-08-04 | Stop reason: HOSPADM

## 2019-10-30 RX ORDER — IBUPROFEN AND FAMOTIDINE 26.6; 8 MG/1; MG/1
800 TABLET, FILM COATED ORAL EVERY 8 HOURS PRN
Qty: 36 TABLET | Refills: 0 | COMMUNITY
Start: 2019-10-30 | End: 2020-03-24

## 2019-10-30 RX ORDER — KETOROLAC TROMETHAMINE 30 MG/ML
60 INJECTION, SOLUTION INTRAMUSCULAR; INTRAVENOUS ONCE
Status: COMPLETED | OUTPATIENT
Start: 2019-10-30 | End: 2019-10-30

## 2019-10-30 RX ADMIN — KETOROLAC TROMETHAMINE 60 MG: 30 INJECTION, SOLUTION INTRAMUSCULAR; INTRAVENOUS at 17:17

## 2019-10-30 ASSESSMENT — ENCOUNTER SYMPTOMS
ABDOMINAL PAIN: 1
COUGH: 0
CONSTIPATION: 0
ABDOMINAL DISTENTION: 1
SHORTNESS OF BREATH: 0
DIARRHEA: 0

## 2019-10-31 ENCOUNTER — CARE COORDINATION (OUTPATIENT)
Dept: CARE COORDINATION | Age: 24
End: 2019-10-31

## 2019-11-04 ENCOUNTER — CARE COORDINATION (OUTPATIENT)
Dept: CARE COORDINATION | Age: 24
End: 2019-11-04

## 2019-11-04 SDOH — SOCIAL STABILITY: SOCIAL INSECURITY: WITHIN THE LAST YEAR, HAVE YOU BEEN AFRAID OF YOUR PARTNER OR EX-PARTNER?: PATIENT DECLINED

## 2019-11-04 SDOH — ECONOMIC STABILITY: FOOD INSECURITY: WITHIN THE PAST 12 MONTHS, THE FOOD YOU BOUGHT JUST DIDN'T LAST AND YOU DIDN'T HAVE MONEY TO GET MORE.: OFTEN TRUE

## 2019-11-04 SDOH — SOCIAL STABILITY: SOCIAL NETWORK
IN A TYPICAL WEEK, HOW MANY TIMES DO YOU TALK ON THE PHONE WITH FAMILY, FRIENDS, OR NEIGHBORS?: MORE THAN THREE TIMES A WEEK

## 2019-11-04 SDOH — ECONOMIC STABILITY: INCOME INSECURITY: HOW HARD IS IT FOR YOU TO PAY FOR THE VERY BASICS LIKE FOOD, HOUSING, MEDICAL CARE, AND HEATING?: HARD

## 2019-11-04 SDOH — HEALTH STABILITY: MENTAL HEALTH
STRESS IS WHEN SOMEONE FEELS TENSE, NERVOUS, ANXIOUS, OR CAN'T SLEEP AT NIGHT BECAUSE THEIR MIND IS TROUBLED. HOW STRESSED ARE YOU?: VERY MUCH

## 2019-11-04 SDOH — ECONOMIC STABILITY: TRANSPORTATION INSECURITY
IN THE PAST 12 MONTHS, HAS THE LACK OF TRANSPORTATION KEPT YOU FROM MEDICAL APPOINTMENTS OR FROM GETTING MEDICATIONS?: YES

## 2019-11-04 SDOH — SOCIAL STABILITY: SOCIAL NETWORK: HOW OFTEN DO YOU GET TOGETHER WITH FRIENDS OR RELATIVES?: MORE THAN THREE TIMES A WEEK

## 2019-11-04 SDOH — SOCIAL STABILITY: SOCIAL INSECURITY
WITHIN THE LAST YEAR, HAVE YOU BEEN KICKED, HIT, SLAPPED, OR OTHERWISE PHYSICALLY HURT BY YOUR PARTNER OR EX-PARTNER?: PATIENT DECLINED

## 2019-11-04 SDOH — SOCIAL STABILITY: SOCIAL INSECURITY
WITHIN THE LAST YEAR, HAVE YOU BEEN HUMILIATED OR EMOTIONALLY ABUSED IN OTHER WAYS BY YOUR PARTNER OR EX-PARTNER?: PATIENT DECLINED

## 2019-11-04 SDOH — HEALTH STABILITY: MENTAL HEALTH: HOW OFTEN DO YOU HAVE A DRINK CONTAINING ALCOHOL?: NEVER

## 2019-11-04 SDOH — HEALTH STABILITY: PHYSICAL HEALTH: ON AVERAGE, HOW MANY DAYS PER WEEK DO YOU ENGAGE IN MODERATE TO STRENUOUS EXERCISE (LIKE A BRISK WALK)?: 0 DAYS

## 2019-11-04 SDOH — SOCIAL STABILITY: SOCIAL NETWORK
DO YOU BELONG TO ANY CLUBS OR ORGANIZATIONS SUCH AS CHURCH GROUPS UNIONS, FRATERNAL OR ATHLETIC GROUPS, OR SCHOOL GROUPS?: NO

## 2019-11-04 SDOH — ECONOMIC STABILITY: FOOD INSECURITY: WITHIN THE PAST 12 MONTHS, YOU WORRIED THAT YOUR FOOD WOULD RUN OUT BEFORE YOU GOT MONEY TO BUY MORE.: OFTEN TRUE

## 2019-11-04 SDOH — SOCIAL STABILITY: SOCIAL INSECURITY
WITHIN THE LAST YEAR, HAVE TO BEEN RAPED OR FORCED TO HAVE ANY KIND OF SEXUAL ACTIVITY BY YOUR PARTNER OR EX-PARTNER?: PATIENT DECLINED

## 2019-11-04 SDOH — SOCIAL STABILITY: SOCIAL NETWORK: HOW OFTEN DO YOU ATTENT MEETINGS OF THE CLUB OR ORGANIZATION YOU BELONG TO?: NEVER

## 2019-11-04 SDOH — SOCIAL STABILITY: SOCIAL NETWORK: HOW OFTEN DO YOU ATTEND CHURCH OR RELIGIOUS SERVICES?: NEVER

## 2019-11-04 SDOH — HEALTH STABILITY: PHYSICAL HEALTH: ON AVERAGE, HOW MANY MINUTES DO YOU ENGAGE IN EXERCISE AT THIS LEVEL?: 0 MIN

## 2019-11-04 SDOH — ECONOMIC STABILITY: TRANSPORTATION INSECURITY
IN THE PAST 12 MONTHS, HAS LACK OF TRANSPORTATION KEPT YOU FROM MEETINGS, WORK, OR FROM GETTING THINGS NEEDED FOR DAILY LIVING?: YES

## 2019-11-04 ASSESSMENT — PATIENT HEALTH QUESTIONNAIRE - PHQ9: SUM OF ALL RESPONSES TO PHQ QUESTIONS 1-9: 14

## 2019-11-06 ENCOUNTER — CARE COORDINATION (OUTPATIENT)
Dept: CARE COORDINATION | Age: 24
End: 2019-11-06

## 2019-11-06 ENCOUNTER — TELEPHONE (OUTPATIENT)
Dept: FAMILY MEDICINE CLINIC | Age: 24
End: 2019-11-06

## 2019-11-11 ENCOUNTER — OFFICE VISIT (OUTPATIENT)
Dept: FAMILY MEDICINE CLINIC | Age: 24
End: 2019-11-11
Payer: MEDICARE

## 2019-11-11 ENCOUNTER — CARE COORDINATION (OUTPATIENT)
Dept: CARE COORDINATION | Age: 24
End: 2019-11-11

## 2019-11-11 VITALS
HEIGHT: 66 IN | WEIGHT: 141.6 LBS | HEART RATE: 97 BPM | BODY MASS INDEX: 22.76 KG/M2 | SYSTOLIC BLOOD PRESSURE: 118 MMHG | DIASTOLIC BLOOD PRESSURE: 68 MMHG | OXYGEN SATURATION: 98 %

## 2019-11-11 DIAGNOSIS — Z48.00 ENCOUNTER FOR CHANGE OR REMOVAL OF WOUND DRESSING: Primary | ICD-10-CM

## 2019-11-11 PROCEDURE — G8484 FLU IMMUNIZE NO ADMIN: HCPCS | Performed by: NURSE PRACTITIONER

## 2019-11-11 PROCEDURE — 99212 OFFICE O/P EST SF 10 MIN: CPT | Performed by: NURSE PRACTITIONER

## 2019-11-11 PROCEDURE — 1111F DSCHRG MED/CURRENT MED MERGE: CPT | Performed by: NURSE PRACTITIONER

## 2019-11-11 PROCEDURE — 4004F PT TOBACCO SCREEN RCVD TLK: CPT | Performed by: NURSE PRACTITIONER

## 2019-11-11 PROCEDURE — G8427 DOCREV CUR MEDS BY ELIG CLIN: HCPCS | Performed by: NURSE PRACTITIONER

## 2019-11-11 PROCEDURE — G8420 CALC BMI NORM PARAMETERS: HCPCS | Performed by: NURSE PRACTITIONER

## 2019-11-12 ENCOUNTER — CARE COORDINATION (OUTPATIENT)
Dept: CARE COORDINATION | Age: 24
End: 2019-11-12

## 2019-11-13 ENCOUNTER — HOSPITAL ENCOUNTER (EMERGENCY)
Age: 24
Discharge: HOME | End: 2019-11-13
Attending: EMERGENCY MEDICINE
Payer: MEDICARE

## 2019-11-13 ENCOUNTER — APPOINTMENT (OUTPATIENT)
Dept: CT IMAGING | Age: 24
End: 2019-11-13
Payer: MEDICARE

## 2019-11-13 ENCOUNTER — APPOINTMENT (OUTPATIENT)
Dept: GENERAL RADIOLOGY | Age: 24
End: 2019-11-13
Payer: MEDICARE

## 2019-11-13 VITALS
SYSTOLIC BLOOD PRESSURE: 120 MMHG | TEMPERATURE: 99.6 F | WEIGHT: 140 LBS | RESPIRATION RATE: 20 BRPM | HEIGHT: 66 IN | OXYGEN SATURATION: 99 % | HEART RATE: 87 BPM | DIASTOLIC BLOOD PRESSURE: 77 MMHG | BODY MASS INDEX: 22.5 KG/M2

## 2019-11-13 DIAGNOSIS — F14.10 COCAINE ABUSE (HCC): ICD-10-CM

## 2019-11-13 DIAGNOSIS — E10.65 HYPERGLYCEMIA DUE TO TYPE 1 DIABETES MELLITUS (HCC): Primary | ICD-10-CM

## 2019-11-13 DIAGNOSIS — J06.9 UPPER RESPIRATORY TRACT INFECTION, UNSPECIFIED TYPE: ICD-10-CM

## 2019-11-13 LAB
ALBUMIN SERPL-MCNC: 4.6 G/DL (ref 3.5–4.6)
ALP BLD-CCNC: 150 U/L (ref 35–104)
ALT SERPL-CCNC: 18 U/L (ref 0–41)
AMPHETAMINE SCREEN, URINE: ABNORMAL
ANION GAP SERPL CALCULATED.3IONS-SCNC: 15 MEQ/L (ref 9–15)
APTT: 26.1 SEC (ref 24.4–36.8)
AST SERPL-CCNC: 16 U/L (ref 0–40)
BARBITURATE SCREEN URINE: ABNORMAL
BASOPHILS ABSOLUTE: 0 K/UL (ref 0–0.2)
BASOPHILS RELATIVE PERCENT: 0.6 %
BENZODIAZEPINE SCREEN, URINE: ABNORMAL
BETA-HYDROXYBUTYRATE: 7.2 MG/DL (ref 0.2–2.8)
BILIRUB SERPL-MCNC: 0.4 MG/DL (ref 0.2–0.7)
BILIRUBIN URINE: NEGATIVE
BLOOD, URINE: NEGATIVE
BUN BLDV-MCNC: 21 MG/DL (ref 6–20)
CALCIUM SERPL-MCNC: 9.2 MG/DL (ref 8.5–9.9)
CANNABINOID SCREEN URINE: ABNORMAL
CHLORIDE BLD-SCNC: 85 MEQ/L (ref 95–107)
CHP ED QC CHECK: YES
CHP ED QC CHECK: YES
CLARITY: CLEAR
CO2: 26 MEQ/L (ref 20–31)
COCAINE METABOLITE SCREEN URINE: POSITIVE
COLOR: YELLOW
CREAT SERPL-MCNC: 1.16 MG/DL (ref 0.7–1.2)
EKG ATRIAL RATE: 79 BPM
EKG P AXIS: 28 DEGREES
EKG P-R INTERVAL: 126 MS
EKG Q-T INTERVAL: 352 MS
EKG QRS DURATION: 90 MS
EKG QTC CALCULATION (BAZETT): 403 MS
EKG R AXIS: 42 DEGREES
EKG T AXIS: 17 DEGREES
EKG VENTRICULAR RATE: 79 BPM
EOSINOPHILS ABSOLUTE: 0.1 K/UL (ref 0–0.7)
EOSINOPHILS RELATIVE PERCENT: 1 %
GFR AFRICAN AMERICAN: >60
GFR AFRICAN AMERICAN: >60
GFR NON-AFRICAN AMERICAN: >60
GFR NON-AFRICAN AMERICAN: >60
GLOBULIN: 2.9 G/DL (ref 2.3–3.5)
GLUCOSE BLD-MCNC: 249 MG/DL (ref 60–115)
GLUCOSE BLD-MCNC: 360 MG/DL (ref 60–115)
GLUCOSE BLD-MCNC: 369 MG/DL
GLUCOSE BLD-MCNC: 945 MG/DL (ref 70–99)
GLUCOSE BLD-MCNC: >600 MG/DL (ref 60–115)
GLUCOSE BLD-MCNC: NORMAL MG/DL
GLUCOSE URINE: >=1000 MG/DL
HCT VFR BLD CALC: 46.4 % (ref 42–52)
HEMOGLOBIN: 15 G/DL (ref 14–18)
INFLUENZA A BY PCR: NEGATIVE
INFLUENZA B BY PCR: NEGATIVE
INR BLD: 0.9
KETONES, URINE: ABNORMAL MG/DL
LACTIC ACID, SEPSIS: 1.8 MMOL/L (ref 0.5–1.9)
LEUKOCYTE ESTERASE, URINE: NEGATIVE
LIPASE: 28 U/L (ref 12–95)
LYMPHOCYTES ABSOLUTE: 0.7 K/UL (ref 1–4.8)
LYMPHOCYTES RELATIVE PERCENT: 11.7 %
Lab: ABNORMAL
MAGNESIUM: 1.9 MG/DL (ref 1.7–2.4)
MCH RBC QN AUTO: 29.9 PG (ref 27–31.3)
MCHC RBC AUTO-ENTMCNC: 32.3 % (ref 33–37)
MCV RBC AUTO: 92.5 FL (ref 80–100)
METHADONE SCREEN, URINE: ABNORMAL
MONOCYTES ABSOLUTE: 0.4 K/UL (ref 0.2–0.8)
MONOCYTES RELATIVE PERCENT: 6.7 %
NEUTROPHILS ABSOLUTE: 4.9 K/UL (ref 1.4–6.5)
NEUTROPHILS RELATIVE PERCENT: 80 %
NITRITE, URINE: NEGATIVE
OPIATE SCREEN URINE: ABNORMAL
OXYCODONE URINE: ABNORMAL
PDW BLD-RTO: 14.1 % (ref 11.5–14.5)
PERFORMED ON: ABNORMAL
PERFORMED ON: NORMAL
PERFORMED ON: NORMAL
PH UA: 7.5 (ref 5–9)
PH VENOUS: 7.35 (ref 7.35–7.45)
PHENCYCLIDINE SCREEN URINE: ABNORMAL
PLATELET # BLD: 236 K/UL (ref 130–400)
POC CREATININE WHOLE BLOOD: 1.1
POC CREATININE: 1.1 MG/DL (ref 0.9–1.3)
POC SAMPLE TYPE: NORMAL
POC SAMPLE TYPE: NORMAL
POTASSIUM SERPL-SCNC: 5 MEQ/L (ref 3.4–4.9)
PRO-BNP: 31 PG/ML
PROCALCITONIN: 0.06 NG/ML (ref 0–0.15)
PROPOXYPHENE SCREEN: ABNORMAL
PROTEIN UA: NEGATIVE MG/DL
PROTHROMBIN TIME: 12.6 SEC (ref 12.3–14.9)
RBC # BLD: 5.01 M/UL (ref 4.7–6.1)
SODIUM BLD-SCNC: 126 MEQ/L (ref 135–144)
SPECIFIC GRAVITY UA: 1.02 (ref 1–1.03)
STREP GRP A PCR: NEGATIVE
TOTAL CK: 70 U/L (ref 0–190)
TOTAL PROTEIN: 7.5 G/DL (ref 6.3–8)
TROPONIN: <0.01 NG/ML (ref 0–0.01)
TSH SERPL DL<=0.05 MIU/L-ACNC: 0.95 UIU/ML (ref 0.44–3.86)
URINE REFLEX TO CULTURE: ABNORMAL
UROBILINOGEN, URINE: 0.2 E.U./DL
WBC # BLD: 6.1 K/UL (ref 4.8–10.8)

## 2019-11-13 PROCEDURE — 74177 CT ABD & PELVIS W/CONTRAST: CPT

## 2019-11-13 PROCEDURE — 36415 COLL VENOUS BLD VENIPUNCTURE: CPT

## 2019-11-13 PROCEDURE — 80307 DRUG TEST PRSMV CHEM ANLYZR: CPT

## 2019-11-13 PROCEDURE — 82550 ASSAY OF CK (CPK): CPT

## 2019-11-13 PROCEDURE — 83735 ASSAY OF MAGNESIUM: CPT

## 2019-11-13 PROCEDURE — 6360000004 HC RX CONTRAST MEDICATION: Performed by: PERSONAL EMERGENCY RESPONSE ATTENDANT

## 2019-11-13 PROCEDURE — 36600 WITHDRAWAL OF ARTERIAL BLOOD: CPT

## 2019-11-13 PROCEDURE — 82948 REAGENT STRIP/BLOOD GLUCOSE: CPT

## 2019-11-13 PROCEDURE — 82010 KETONE BODYS QUAN: CPT

## 2019-11-13 PROCEDURE — 81003 URINALYSIS AUTO W/O SCOPE: CPT

## 2019-11-13 PROCEDURE — 96376 TX/PRO/DX INJ SAME DRUG ADON: CPT

## 2019-11-13 PROCEDURE — 85730 THROMBOPLASTIN TIME PARTIAL: CPT

## 2019-11-13 PROCEDURE — 85025 COMPLETE CBC W/AUTO DIFF WBC: CPT

## 2019-11-13 PROCEDURE — 84443 ASSAY THYROID STIM HORMONE: CPT

## 2019-11-13 PROCEDURE — 83605 ASSAY OF LACTIC ACID: CPT

## 2019-11-13 PROCEDURE — 84145 PROCALCITONIN (PCT): CPT

## 2019-11-13 PROCEDURE — 99285 EMERGENCY DEPT VISIT HI MDM: CPT

## 2019-11-13 PROCEDURE — 87651 STREP A DNA AMP PROBE: CPT

## 2019-11-13 PROCEDURE — 83880 ASSAY OF NATRIURETIC PEPTIDE: CPT

## 2019-11-13 PROCEDURE — 83690 ASSAY OF LIPASE: CPT

## 2019-11-13 PROCEDURE — 84484 ASSAY OF TROPONIN QUANT: CPT

## 2019-11-13 PROCEDURE — 87040 BLOOD CULTURE FOR BACTERIA: CPT

## 2019-11-13 PROCEDURE — 80053 COMPREHEN METABOLIC PANEL: CPT

## 2019-11-13 PROCEDURE — 85610 PROTHROMBIN TIME: CPT

## 2019-11-13 PROCEDURE — 82800 BLOOD PH: CPT

## 2019-11-13 PROCEDURE — 87502 INFLUENZA DNA AMP PROBE: CPT

## 2019-11-13 PROCEDURE — 96374 THER/PROPH/DIAG INJ IV PUSH: CPT

## 2019-11-13 PROCEDURE — 2580000003 HC RX 258: Performed by: PERSONAL EMERGENCY RESPONSE ATTENDANT

## 2019-11-13 PROCEDURE — 6370000000 HC RX 637 (ALT 250 FOR IP): Performed by: PERSONAL EMERGENCY RESPONSE ATTENDANT

## 2019-11-13 PROCEDURE — 93005 ELECTROCARDIOGRAM TRACING: CPT | Performed by: PERSONAL EMERGENCY RESPONSE ATTENDANT

## 2019-11-13 PROCEDURE — 71045 X-RAY EXAM CHEST 1 VIEW: CPT

## 2019-11-13 RX ORDER — QUETIAPINE FUMARATE 300 MG/1
300 TABLET, FILM COATED ORAL NIGHTLY PRN
Qty: 30 TABLET | Refills: 0 | Status: ON HOLD | OUTPATIENT
Start: 2019-11-13 | End: 2020-08-04 | Stop reason: HOSPADM

## 2019-11-13 RX ORDER — ALBUTEROL SULFATE 90 UG/1
AEROSOL, METERED RESPIRATORY (INHALATION)
Qty: 1 INHALER | Refills: 0 | Status: SHIPPED | OUTPATIENT
Start: 2019-11-13 | End: 2021-09-27 | Stop reason: SDUPTHER

## 2019-11-13 RX ORDER — 0.9 % SODIUM CHLORIDE 0.9 %
2000 INTRAVENOUS SOLUTION INTRAVENOUS ONCE
Status: COMPLETED | OUTPATIENT
Start: 2019-11-13 | End: 2019-11-13

## 2019-11-13 RX ORDER — NAPROXEN SODIUM 220 MG
TABLET ORAL
Qty: 100 EACH | Refills: 1 | Status: SHIPPED | OUTPATIENT
Start: 2019-11-13 | End: 2019-12-06 | Stop reason: SDUPTHER

## 2019-11-13 RX ADMIN — SODIUM CHLORIDE 2000 ML: 9 INJECTION, SOLUTION INTRAVENOUS at 02:17

## 2019-11-13 RX ADMIN — INSULIN HUMAN 15 UNITS: 100 INJECTION, SOLUTION PARENTERAL at 03:10

## 2019-11-13 RX ADMIN — IOPAMIDOL 100 ML: 612 INJECTION, SOLUTION INTRAVENOUS at 02:33

## 2019-11-13 RX ADMIN — INSULIN HUMAN 15 UNITS: 100 INJECTION, SOLUTION PARENTERAL at 04:36

## 2019-11-13 ASSESSMENT — ENCOUNTER SYMPTOMS
SORE THROAT: 1
COUGH: 1
RHINORRHEA: 1
VOMITING: 1
NAUSEA: 1
COLOR CHANGE: 0
SHORTNESS OF BREATH: 1
ABDOMINAL PAIN: 1
BLOOD IN STOOL: 0
DIARRHEA: 0

## 2019-11-13 ASSESSMENT — PAIN DESCRIPTION - PAIN TYPE: TYPE: ACUTE PAIN

## 2019-11-13 ASSESSMENT — PAIN SCALES - GENERAL: PAINLEVEL_OUTOF10: 10

## 2019-11-14 ENCOUNTER — CARE COORDINATION (OUTPATIENT)
Dept: CARE COORDINATION | Age: 24
End: 2019-11-14

## 2019-11-14 PROCEDURE — 93010 ELECTROCARDIOGRAM REPORT: CPT | Performed by: INTERNAL MEDICINE

## 2019-11-18 LAB
BLOOD CULTURE, ROUTINE: NORMAL
CULTURE, BLOOD 2: NORMAL

## 2019-11-20 ENCOUNTER — CARE COORDINATION (OUTPATIENT)
Dept: CARE COORDINATION | Age: 24
End: 2019-11-20

## 2019-11-24 ASSESSMENT — ENCOUNTER SYMPTOMS
VOMITING: 0
ABDOMINAL PAIN: 0
BLOOD IN STOOL: 0
NAUSEA: 0
SHORTNESS OF BREATH: 0

## 2019-11-27 ENCOUNTER — OFFICE VISIT (OUTPATIENT)
Dept: FAMILY MEDICINE CLINIC | Age: 24
End: 2019-11-27
Payer: MEDICARE

## 2019-11-27 VITALS
HEART RATE: 110 BPM | HEIGHT: 66 IN | TEMPERATURE: 98.2 F | WEIGHT: 150.6 LBS | DIASTOLIC BLOOD PRESSURE: 74 MMHG | BODY MASS INDEX: 24.2 KG/M2 | OXYGEN SATURATION: 98 % | SYSTOLIC BLOOD PRESSURE: 120 MMHG

## 2019-11-27 DIAGNOSIS — E10.622 TYPE 1 DIABETES MELLITUS WITH OTHER SKIN ULCER (HCC): Primary | ICD-10-CM

## 2019-11-27 DIAGNOSIS — L98.499 TYPE 1 DIABETES MELLITUS WITH OTHER SKIN ULCER (HCC): Primary | ICD-10-CM

## 2019-11-27 DIAGNOSIS — Z48.00 ENCOUNTER FOR CHANGE OR REMOVAL OF WOUND DRESSING: Primary | ICD-10-CM

## 2019-11-27 PROCEDURE — 99213 OFFICE O/P EST LOW 20 MIN: CPT | Performed by: NURSE PRACTITIONER

## 2019-11-27 PROCEDURE — G8427 DOCREV CUR MEDS BY ELIG CLIN: HCPCS | Performed by: NURSE PRACTITIONER

## 2019-11-27 PROCEDURE — G8484 FLU IMMUNIZE NO ADMIN: HCPCS | Performed by: NURSE PRACTITIONER

## 2019-11-27 PROCEDURE — 4004F PT TOBACCO SCREEN RCVD TLK: CPT | Performed by: NURSE PRACTITIONER

## 2019-11-27 PROCEDURE — G8420 CALC BMI NORM PARAMETERS: HCPCS | Performed by: NURSE PRACTITIONER

## 2019-11-27 RX ORDER — INHALER, ASSIST DEVICES
SPACER (EA) MISCELLANEOUS
Refills: 0 | Status: ON HOLD | COMMUNITY
Start: 2019-11-13 | End: 2020-08-04 | Stop reason: ALTCHOICE

## 2019-11-27 ASSESSMENT — ENCOUNTER SYMPTOMS: ABDOMINAL PAIN: 0

## 2019-12-02 ENCOUNTER — TELEPHONE (OUTPATIENT)
Dept: ENDOCRINOLOGY | Age: 24
End: 2019-12-02

## 2019-12-04 ENCOUNTER — APPOINTMENT (OUTPATIENT)
Dept: GENERAL RADIOLOGY | Age: 24
End: 2019-12-04
Payer: MEDICARE

## 2019-12-04 ENCOUNTER — HOSPITAL ENCOUNTER (EMERGENCY)
Age: 24
Discharge: LEFT BEFORE DISCHARGE | End: 2019-12-04
Attending: STUDENT IN AN ORGANIZED HEALTH CARE EDUCATION/TRAINING PROGRAM
Payer: MEDICARE

## 2019-12-04 VITALS
DIASTOLIC BLOOD PRESSURE: 73 MMHG | HEIGHT: 66 IN | TEMPERATURE: 97.8 F | HEART RATE: 94 BPM | SYSTOLIC BLOOD PRESSURE: 115 MMHG | OXYGEN SATURATION: 97 % | RESPIRATION RATE: 19 BRPM | BODY MASS INDEX: 24.11 KG/M2 | WEIGHT: 150 LBS

## 2019-12-04 DIAGNOSIS — Z91.199 NON COMPLIANCE WITH MEDICAL TREATMENT: ICD-10-CM

## 2019-12-04 DIAGNOSIS — F17.200 TOBACCO DEPENDENCE: ICD-10-CM

## 2019-12-04 DIAGNOSIS — E10.65 HYPERGLYCEMIA DUE TO TYPE 1 DIABETES MELLITUS (HCC): Primary | ICD-10-CM

## 2019-12-04 DIAGNOSIS — E86.0 DEHYDRATION: ICD-10-CM

## 2019-12-04 LAB
ALBUMIN SERPL-MCNC: 4.4 G/DL (ref 3.5–4.6)
ALP BLD-CCNC: 134 U/L (ref 35–104)
ALT SERPL-CCNC: 16 U/L (ref 0–41)
AMPHETAMINE SCREEN, URINE: NORMAL
ANION GAP SERPL CALCULATED.3IONS-SCNC: 13 MEQ/L (ref 9–15)
AST SERPL-CCNC: 15 U/L (ref 0–40)
BARBITURATE SCREEN URINE: NORMAL
BASE EXCESS VENOUS: -1 (ref -3–3)
BASOPHILS ABSOLUTE: 0 K/UL (ref 0–0.2)
BASOPHILS RELATIVE PERCENT: 0.6 %
BENZODIAZEPINE SCREEN, URINE: NORMAL
BETA-HYDROXYBUTYRATE: 1.4 MG/DL (ref 0.2–2.8)
BILIRUB SERPL-MCNC: 0.4 MG/DL (ref 0.2–0.7)
BILIRUBIN URINE: NEGATIVE
BLOOD, URINE: NEGATIVE
BUN BLDV-MCNC: 18 MG/DL (ref 6–20)
CALCIUM IONIZED: 1.16 MMOL/L (ref 1.12–1.32)
CALCIUM SERPL-MCNC: 10 MG/DL (ref 8.5–9.9)
CANNABINOID SCREEN URINE: NORMAL
CHLORIDE BLD-SCNC: 90 MEQ/L (ref 95–107)
CHP ED QC CHECK: YES
CLARITY: CLEAR
CO2: 23 MEQ/L (ref 20–31)
COCAINE METABOLITE SCREEN URINE: NORMAL
COLOR: YELLOW
CREAT SERPL-MCNC: 1.06 MG/DL (ref 0.7–1.2)
EOSINOPHILS ABSOLUTE: 0.1 K/UL (ref 0–0.7)
EOSINOPHILS RELATIVE PERCENT: 1.2 %
GFR AFRICAN AMERICAN: >60
GFR AFRICAN AMERICAN: >60
GFR NON-AFRICAN AMERICAN: >60
GFR NON-AFRICAN AMERICAN: >60
GLOBULIN: 3.2 G/DL (ref 2.3–3.5)
GLUCOSE BLD-MCNC: 267 MG/DL (ref 60–115)
GLUCOSE BLD-MCNC: 319 MG/DL
GLUCOSE BLD-MCNC: 319 MG/DL (ref 60–115)
GLUCOSE BLD-MCNC: 677 MG/DL (ref 70–99)
GLUCOSE BLD-MCNC: 686 MG/DL (ref 60–115)
GLUCOSE BLD-MCNC: >600 MG/DL (ref 60–115)
GLUCOSE URINE: >=1000 MG/DL
HCO3 VENOUS: 22.9 MMOL/L (ref 23–29)
HCT VFR BLD CALC: 44.6 % (ref 42–52)
HEMOGLOBIN: 14.9 G/DL (ref 14–18)
HEMOGLOBIN: 15.2 GM/DL (ref 13.5–17.5)
KETONES, URINE: NEGATIVE MG/DL
LACTATE: 2.97 MMOL/L (ref 0.4–2)
LACTIC ACID: 1.8 MMOL/L (ref 0.5–2.2)
LEUKOCYTE ESTERASE, URINE: NEGATIVE
LYMPHOCYTES ABSOLUTE: 1.1 K/UL (ref 1–4.8)
LYMPHOCYTES RELATIVE PERCENT: 17.9 %
Lab: NORMAL
MCH RBC QN AUTO: 29.4 PG (ref 27–31.3)
MCHC RBC AUTO-ENTMCNC: 33.3 % (ref 33–37)
MCV RBC AUTO: 88.3 FL (ref 80–100)
METHADONE SCREEN, URINE: NORMAL
MONOCYTES ABSOLUTE: 0.4 K/UL (ref 0.2–0.8)
MONOCYTES RELATIVE PERCENT: 6.9 %
NEUTROPHILS ABSOLUTE: 4.7 K/UL (ref 1.4–6.5)
NEUTROPHILS RELATIVE PERCENT: 73.4 %
NITRITE, URINE: NEGATIVE
O2 SAT, VEN: 90 %
OPIATE SCREEN URINE: NORMAL
OXYCODONE URINE: NORMAL
PCO2, VEN: 34.7 MM HG (ref 40–50)
PDW BLD-RTO: 13.6 % (ref 11.5–14.5)
PERFORMED ON: ABNORMAL
PH UA: 7 (ref 5–9)
PH VENOUS: 7.43 (ref 7.35–7.45)
PHENCYCLIDINE SCREEN URINE: NORMAL
PLATELET # BLD: 316 K/UL (ref 130–400)
PO2, VEN: 57 MM HG
POC CHLORIDE: 95 MEQ/L (ref 99–110)
POC CREATININE: 0.9 MG/DL (ref 0.9–1.3)
POC HEMATOCRIT: 45 % (ref 41–53)
POC POTASSIUM: 4.1 MEQ/L (ref 3.5–5.1)
POC SAMPLE TYPE: ABNORMAL
POC SODIUM: 128 MEQ/L (ref 136–145)
POTASSIUM SERPL-SCNC: 4.2 MEQ/L (ref 3.4–4.9)
PROPOXYPHENE SCREEN: NORMAL
PROTEIN UA: NEGATIVE MG/DL
RBC # BLD: 5.05 M/UL (ref 4.7–6.1)
SODIUM BLD-SCNC: 126 MEQ/L (ref 135–144)
SPECIFIC GRAVITY UA: 1.03 (ref 1–1.03)
TCO2 CALC VENOUS: 24 MMOL/L
TOTAL CK: 39 U/L (ref 0–190)
TOTAL PROTEIN: 7.6 G/DL (ref 6.3–8)
URINE REFLEX TO CULTURE: ABNORMAL
UROBILINOGEN, URINE: 0.2 E.U./DL
WBC # BLD: 6.4 K/UL (ref 4.8–10.8)

## 2019-12-04 PROCEDURE — 81003 URINALYSIS AUTO W/O SCOPE: CPT

## 2019-12-04 PROCEDURE — 82330 ASSAY OF CALCIUM: CPT

## 2019-12-04 PROCEDURE — 85014 HEMATOCRIT: CPT

## 2019-12-04 PROCEDURE — 99285 EMERGENCY DEPT VISIT HI MDM: CPT

## 2019-12-04 PROCEDURE — 96372 THER/PROPH/DIAG INJ SC/IM: CPT

## 2019-12-04 PROCEDURE — 82550 ASSAY OF CK (CPK): CPT

## 2019-12-04 PROCEDURE — 80053 COMPREHEN METABOLIC PANEL: CPT

## 2019-12-04 PROCEDURE — 83605 ASSAY OF LACTIC ACID: CPT

## 2019-12-04 PROCEDURE — 85025 COMPLETE CBC W/AUTO DIFF WBC: CPT

## 2019-12-04 PROCEDURE — 82010 KETONE BODYS QUAN: CPT

## 2019-12-04 PROCEDURE — 82565 ASSAY OF CREATININE: CPT

## 2019-12-04 PROCEDURE — 6370000000 HC RX 637 (ALT 250 FOR IP): Performed by: STUDENT IN AN ORGANIZED HEALTH CARE EDUCATION/TRAINING PROGRAM

## 2019-12-04 PROCEDURE — 36415 COLL VENOUS BLD VENIPUNCTURE: CPT

## 2019-12-04 PROCEDURE — 84132 ASSAY OF SERUM POTASSIUM: CPT

## 2019-12-04 PROCEDURE — 96360 HYDRATION IV INFUSION INIT: CPT

## 2019-12-04 PROCEDURE — 80307 DRUG TEST PRSMV CHEM ANLYZR: CPT

## 2019-12-04 PROCEDURE — 82435 ASSAY OF BLOOD CHLORIDE: CPT

## 2019-12-04 PROCEDURE — 82803 BLOOD GASES ANY COMBINATION: CPT

## 2019-12-04 PROCEDURE — 84295 ASSAY OF SERUM SODIUM: CPT

## 2019-12-04 PROCEDURE — 71046 X-RAY EXAM CHEST 2 VIEWS: CPT

## 2019-12-04 PROCEDURE — 2580000003 HC RX 258: Performed by: STUDENT IN AN ORGANIZED HEALTH CARE EDUCATION/TRAINING PROGRAM

## 2019-12-04 PROCEDURE — 36600 WITHDRAWAL OF ARTERIAL BLOOD: CPT

## 2019-12-04 PROCEDURE — 82948 REAGENT STRIP/BLOOD GLUCOSE: CPT

## 2019-12-04 RX ORDER — 0.9 % SODIUM CHLORIDE 0.9 %
1000 INTRAVENOUS SOLUTION INTRAVENOUS ONCE
Status: COMPLETED | OUTPATIENT
Start: 2019-12-04 | End: 2019-12-04

## 2019-12-04 RX ORDER — ACETAMINOPHEN 500 MG
1000 TABLET ORAL ONCE
Status: COMPLETED | OUTPATIENT
Start: 2019-12-04 | End: 2019-12-04

## 2019-12-04 RX ADMIN — SODIUM CHLORIDE 1000 ML: 9 INJECTION, SOLUTION INTRAVENOUS at 18:38

## 2019-12-04 RX ADMIN — SODIUM CHLORIDE 1000 ML: 9 INJECTION, SOLUTION INTRAVENOUS at 18:44

## 2019-12-04 RX ADMIN — ACETAMINOPHEN 1000 MG: 500 TABLET ORAL at 18:39

## 2019-12-04 ASSESSMENT — PAIN DESCRIPTION - ORIENTATION: ORIENTATION: RIGHT

## 2019-12-04 ASSESSMENT — ENCOUNTER SYMPTOMS
DIARRHEA: 0
CHEST TIGHTNESS: 0
BACK PAIN: 0
ABDOMINAL PAIN: 0
VOMITING: 0
SHORTNESS OF BREATH: 0
COUGH: 0
TROUBLE SWALLOWING: 0
SINUS PRESSURE: 0

## 2019-12-04 ASSESSMENT — PAIN DESCRIPTION - LOCATION: LOCATION: SHOULDER

## 2019-12-04 ASSESSMENT — PAIN SCALES - GENERAL
PAINLEVEL_OUTOF10: 10
PAINLEVEL_OUTOF10: 10

## 2019-12-06 ENCOUNTER — OFFICE VISIT (OUTPATIENT)
Dept: ENDOCRINOLOGY | Age: 24
End: 2019-12-06
Payer: MEDICARE

## 2019-12-06 VITALS
HEART RATE: 85 BPM | SYSTOLIC BLOOD PRESSURE: 132 MMHG | HEIGHT: 66 IN | DIASTOLIC BLOOD PRESSURE: 87 MMHG | BODY MASS INDEX: 24.27 KG/M2 | WEIGHT: 151 LBS

## 2019-12-06 DIAGNOSIS — T14.8XXA WOUND INFECTION: ICD-10-CM

## 2019-12-06 DIAGNOSIS — F41.9 ANXIETY: ICD-10-CM

## 2019-12-06 DIAGNOSIS — L08.9 WOUND INFECTION: ICD-10-CM

## 2019-12-06 DIAGNOSIS — E10.622 TYPE 1 DIABETES MELLITUS WITH OTHER SKIN ULCER (HCC): ICD-10-CM

## 2019-12-06 DIAGNOSIS — L98.499 TYPE 1 DIABETES MELLITUS WITH OTHER SKIN ULCER (HCC): ICD-10-CM

## 2019-12-06 LAB
CHP ED QC CHECK: NORMAL
GLUCOSE BLD-MCNC: 137 MG/DL

## 2019-12-06 PROCEDURE — G8427 DOCREV CUR MEDS BY ELIG CLIN: HCPCS | Performed by: INTERNAL MEDICINE

## 2019-12-06 PROCEDURE — 2022F DILAT RTA XM EVC RTNOPTHY: CPT | Performed by: INTERNAL MEDICINE

## 2019-12-06 PROCEDURE — 82962 GLUCOSE BLOOD TEST: CPT | Performed by: INTERNAL MEDICINE

## 2019-12-06 PROCEDURE — G8420 CALC BMI NORM PARAMETERS: HCPCS | Performed by: INTERNAL MEDICINE

## 2019-12-06 PROCEDURE — 99214 OFFICE O/P EST MOD 30 MIN: CPT | Performed by: INTERNAL MEDICINE

## 2019-12-06 PROCEDURE — 4004F PT TOBACCO SCREEN RCVD TLK: CPT | Performed by: INTERNAL MEDICINE

## 2019-12-06 PROCEDURE — 3046F HEMOGLOBIN A1C LEVEL >9.0%: CPT | Performed by: INTERNAL MEDICINE

## 2019-12-06 PROCEDURE — G8484 FLU IMMUNIZE NO ADMIN: HCPCS | Performed by: INTERNAL MEDICINE

## 2019-12-06 RX ORDER — INSULIN GLARGINE 100 [IU]/ML
50 INJECTION, SOLUTION SUBCUTANEOUS NIGHTLY
Qty: 1 VIAL | Refills: 0 | Status: SHIPPED | OUTPATIENT
Start: 2019-12-06 | End: 2019-12-26 | Stop reason: SDUPTHER

## 2019-12-06 RX ORDER — NAPROXEN SODIUM 220 MG
TABLET ORAL
Qty: 100 EACH | Refills: 1 | Status: SHIPPED | OUTPATIENT
Start: 2019-12-06 | End: 2019-12-26 | Stop reason: SDUPTHER

## 2019-12-09 ENCOUNTER — HOSPITAL ENCOUNTER (EMERGENCY)
Age: 24
Discharge: HOME | End: 2019-12-10
Payer: MEDICARE

## 2019-12-09 ENCOUNTER — APPOINTMENT (OUTPATIENT)
Dept: GENERAL RADIOLOGY | Age: 24
End: 2019-12-09
Payer: MEDICARE

## 2019-12-09 DIAGNOSIS — R42 DIZZINESS: ICD-10-CM

## 2019-12-09 DIAGNOSIS — R11.0 NAUSEA: Primary | ICD-10-CM

## 2019-12-09 LAB
ALBUMIN SERPL-MCNC: 4.7 G/DL (ref 3.5–4.6)
ALP BLD-CCNC: 130 U/L (ref 35–104)
ALT SERPL-CCNC: 13 U/L (ref 0–41)
ANION GAP SERPL CALCULATED.3IONS-SCNC: 17 MEQ/L (ref 9–15)
AST SERPL-CCNC: 15 U/L (ref 0–40)
BASOPHILS ABSOLUTE: 0.1 K/UL (ref 0–0.2)
BASOPHILS RELATIVE PERCENT: 0.6 %
BILIRUB SERPL-MCNC: <0.2 MG/DL (ref 0.2–0.7)
BUN BLDV-MCNC: 16 MG/DL (ref 6–20)
CALCIUM SERPL-MCNC: 10.7 MG/DL (ref 8.5–9.9)
CHLORIDE BLD-SCNC: 100 MEQ/L (ref 95–107)
CO2: 24 MEQ/L (ref 20–31)
CREAT SERPL-MCNC: 1.26 MG/DL (ref 0.7–1.2)
EKG ATRIAL RATE: 89 BPM
EKG P AXIS: 46 DEGREES
EKG P-R INTERVAL: 122 MS
EKG Q-T INTERVAL: 354 MS
EKG QRS DURATION: 104 MS
EKG QTC CALCULATION (BAZETT): 430 MS
EKG R AXIS: 53 DEGREES
EKG T AXIS: 23 DEGREES
EKG VENTRICULAR RATE: 89 BPM
EOSINOPHILS ABSOLUTE: 0.2 K/UL (ref 0–0.7)
EOSINOPHILS RELATIVE PERCENT: 2 %
ETHANOL PERCENT: NORMAL G/DL
ETHANOL: <10 MG/DL (ref 0–0.08)
GFR AFRICAN AMERICAN: >60
GFR NON-AFRICAN AMERICAN: >60
GLOBULIN: 3.3 G/DL (ref 2.3–3.5)
GLUCOSE BLD-MCNC: 121 MG/DL (ref 70–99)
HCT VFR BLD CALC: 46.1 % (ref 42–52)
HEMOGLOBIN: 15.9 G/DL (ref 14–18)
LYMPHOCYTES ABSOLUTE: 3.2 K/UL (ref 1–4.8)
LYMPHOCYTES RELATIVE PERCENT: 37.4 %
MAGNESIUM: 2.1 MG/DL (ref 1.7–2.4)
MCH RBC QN AUTO: 30 PG (ref 27–31.3)
MCHC RBC AUTO-ENTMCNC: 34.4 % (ref 33–37)
MCV RBC AUTO: 87.1 FL (ref 80–100)
MONOCYTES ABSOLUTE: 0.5 K/UL (ref 0.2–0.8)
MONOCYTES RELATIVE PERCENT: 6.1 %
NEUTROPHILS ABSOLUTE: 4.6 K/UL (ref 1.4–6.5)
NEUTROPHILS RELATIVE PERCENT: 53.9 %
PDW BLD-RTO: 13.5 % (ref 11.5–14.5)
PLATELET # BLD: 319 K/UL (ref 130–400)
POTASSIUM SERPL-SCNC: 3.5 MEQ/L (ref 3.4–4.9)
RBC # BLD: 5.29 M/UL (ref 4.7–6.1)
SODIUM BLD-SCNC: 141 MEQ/L (ref 135–144)
TOTAL CK: 43 U/L (ref 0–190)
TOTAL PROTEIN: 8 G/DL (ref 6.3–8)
TROPONIN: <0.01 NG/ML (ref 0–0.01)
WBC # BLD: 8.6 K/UL (ref 4.8–10.8)

## 2019-12-09 PROCEDURE — 83735 ASSAY OF MAGNESIUM: CPT

## 2019-12-09 PROCEDURE — 99284 EMERGENCY DEPT VISIT MOD MDM: CPT

## 2019-12-09 PROCEDURE — 2580000003 HC RX 258: Performed by: NURSE PRACTITIONER

## 2019-12-09 PROCEDURE — 71045 X-RAY EXAM CHEST 1 VIEW: CPT

## 2019-12-09 PROCEDURE — 82550 ASSAY OF CK (CPK): CPT

## 2019-12-09 PROCEDURE — 36415 COLL VENOUS BLD VENIPUNCTURE: CPT

## 2019-12-09 PROCEDURE — G0480 DRUG TEST DEF 1-7 CLASSES: HCPCS

## 2019-12-09 PROCEDURE — 85025 COMPLETE CBC W/AUTO DIFF WBC: CPT

## 2019-12-09 PROCEDURE — 84484 ASSAY OF TROPONIN QUANT: CPT

## 2019-12-09 PROCEDURE — 80053 COMPREHEN METABOLIC PANEL: CPT

## 2019-12-09 PROCEDURE — 93005 ELECTROCARDIOGRAM TRACING: CPT | Performed by: NURSE PRACTITIONER

## 2019-12-09 RX ORDER — 0.9 % SODIUM CHLORIDE 0.9 %
2000 INTRAVENOUS SOLUTION INTRAVENOUS ONCE
Status: COMPLETED | OUTPATIENT
Start: 2019-12-09 | End: 2019-12-10

## 2019-12-09 RX ADMIN — SODIUM CHLORIDE 2000 ML: 9 INJECTION, SOLUTION INTRAVENOUS at 23:04

## 2019-12-10 VITALS
DIASTOLIC BLOOD PRESSURE: 59 MMHG | HEIGHT: 66 IN | RESPIRATION RATE: 17 BRPM | WEIGHT: 155 LBS | SYSTOLIC BLOOD PRESSURE: 98 MMHG | TEMPERATURE: 97.5 F | HEART RATE: 76 BPM | BODY MASS INDEX: 24.91 KG/M2 | OXYGEN SATURATION: 96 %

## 2019-12-10 PROCEDURE — 93010 ELECTROCARDIOGRAM REPORT: CPT | Performed by: INTERNAL MEDICINE

## 2019-12-10 ASSESSMENT — ENCOUNTER SYMPTOMS
NAUSEA: 1
EYE PAIN: 0
SORE THROAT: 0
ABDOMINAL PAIN: 0
COUGH: 0
RHINORRHEA: 0
DIARRHEA: 0
BACK PAIN: 0
VOMITING: 0
PHOTOPHOBIA: 0
SHORTNESS OF BREATH: 0

## 2019-12-20 ENCOUNTER — HOSPITAL ENCOUNTER (OUTPATIENT)
Dept: WOUND CARE | Age: 24
Discharge: HOME | End: 2019-12-20
Payer: MEDICARE

## 2019-12-20 VITALS
BODY MASS INDEX: 24.91 KG/M2 | DIASTOLIC BLOOD PRESSURE: 74 MMHG | TEMPERATURE: 98.3 F | SYSTOLIC BLOOD PRESSURE: 139 MMHG | WEIGHT: 155 LBS | RESPIRATION RATE: 16 BRPM | HEART RATE: 97 BPM | HEIGHT: 66 IN

## 2019-12-20 DIAGNOSIS — L92.9 HYPERGRANULATION: ICD-10-CM

## 2019-12-20 LAB
GLUCOSE BLD-MCNC: 93 MG/DL (ref 60–115)
PERFORMED ON: NORMAL

## 2019-12-20 PROCEDURE — 17250 CHEM CAUT OF GRANLTJ TISSUE: CPT | Performed by: SURGERY

## 2019-12-20 PROCEDURE — 99203 OFFICE O/P NEW LOW 30 MIN: CPT | Performed by: SURGERY

## 2019-12-20 PROCEDURE — 99213 OFFICE O/P EST LOW 20 MIN: CPT

## 2019-12-20 PROCEDURE — 17250 CHEM CAUT OF GRANLTJ TISSUE: CPT

## 2019-12-26 ENCOUNTER — OFFICE VISIT (OUTPATIENT)
Dept: ENDOCRINOLOGY | Age: 24
End: 2019-12-26
Payer: MEDICARE

## 2019-12-26 VITALS
SYSTOLIC BLOOD PRESSURE: 132 MMHG | OXYGEN SATURATION: 96 % | RESPIRATION RATE: 18 BRPM | DIASTOLIC BLOOD PRESSURE: 77 MMHG | WEIGHT: 153 LBS | HEIGHT: 66 IN | HEART RATE: 109 BPM | BODY MASS INDEX: 24.59 KG/M2

## 2019-12-26 DIAGNOSIS — E10.65 HYPERGLYCEMIA DUE TO TYPE 1 DIABETES MELLITUS (HCC): ICD-10-CM

## 2019-12-26 LAB
CHP ED QC CHECK: NORMAL
GLUCOSE BLD-MCNC: 475 MG/DL

## 2019-12-26 PROCEDURE — 4004F PT TOBACCO SCREEN RCVD TLK: CPT | Performed by: INTERNAL MEDICINE

## 2019-12-26 PROCEDURE — 2022F DILAT RTA XM EVC RTNOPTHY: CPT | Performed by: INTERNAL MEDICINE

## 2019-12-26 PROCEDURE — G8484 FLU IMMUNIZE NO ADMIN: HCPCS | Performed by: INTERNAL MEDICINE

## 2019-12-26 PROCEDURE — G8420 CALC BMI NORM PARAMETERS: HCPCS | Performed by: INTERNAL MEDICINE

## 2019-12-26 PROCEDURE — 3046F HEMOGLOBIN A1C LEVEL >9.0%: CPT | Performed by: INTERNAL MEDICINE

## 2019-12-26 PROCEDURE — 82962 GLUCOSE BLOOD TEST: CPT | Performed by: INTERNAL MEDICINE

## 2019-12-26 PROCEDURE — G8427 DOCREV CUR MEDS BY ELIG CLIN: HCPCS | Performed by: INTERNAL MEDICINE

## 2019-12-26 PROCEDURE — 99213 OFFICE O/P EST LOW 20 MIN: CPT | Performed by: INTERNAL MEDICINE

## 2019-12-26 RX ORDER — INSULIN GLARGINE 100 [IU]/ML
50 INJECTION, SOLUTION SUBCUTANEOUS NIGHTLY
Qty: 1 VIAL | Refills: 0 | Status: SHIPPED | OUTPATIENT
Start: 2019-12-26 | End: 2020-02-03

## 2019-12-26 RX ORDER — NAPROXEN SODIUM 220 MG
TABLET ORAL
Qty: 100 EACH | Refills: 1 | Status: ON HOLD | OUTPATIENT
Start: 2019-12-26 | End: 2020-08-04 | Stop reason: HOSPADM

## 2019-12-30 ENCOUNTER — CARE COORDINATION (OUTPATIENT)
Dept: CARE COORDINATION | Age: 24
End: 2019-12-30

## 2020-01-03 ENCOUNTER — HOSPITAL ENCOUNTER (OUTPATIENT)
Dept: WOUND CARE | Age: 25
Discharge: HOME | End: 2020-01-03
Payer: MEDICARE

## 2020-01-03 VITALS
DIASTOLIC BLOOD PRESSURE: 82 MMHG | SYSTOLIC BLOOD PRESSURE: 130 MMHG | RESPIRATION RATE: 16 BRPM | HEIGHT: 66 IN | WEIGHT: 153 LBS | TEMPERATURE: 97.9 F | HEART RATE: 91 BPM | BODY MASS INDEX: 24.59 KG/M2

## 2020-01-03 PROCEDURE — 99211 OFF/OP EST MAY X REQ PHY/QHP: CPT

## 2020-01-03 PROCEDURE — 99212 OFFICE O/P EST SF 10 MIN: CPT | Performed by: SURGERY

## 2020-01-03 ASSESSMENT — PAIN SCALES - GENERAL: PAINLEVEL_OUTOF10: 0

## 2020-01-03 NOTE — PROGRESS NOTES
[address]                                                   Progress Note and Procedure Note      [address] RECORD NUMBER:  [id number]  AGE: 27 y.o. GENDER: male  : 1995  EPISODE DATE:  1/3/2020    Subjective:     Chief Complaint   Patient presents with    Wound Check     abdomen         HISTORY of PRESENT ILLNESS HPI     Toni Pinedo is a 27 y.o. male who presents today for wound/ulcer evaluation. History of Wound Context: Patient is doing well after abscess drainage with Dr. Fab Rowe in October. No fevers or chills. Patient withy type I DM with an insulin pump. Patient states he was reusing he needles and that's how he got an infection. Patient was seen 19 and had silver nitrate applied to hypergranulation tissue.     Wound/Ulcer Pain Timing/Severity: none  Quality of pain: N/A  Severity:  0 / 10   Modifying Factors: None  Associated Signs/Symptoms: none    Ulcer Identification:  Ulcer Type: abscess  Contributing Factors: diabetes    Wound: N/A        PAST MEDICAL HISTORY        Diagnosis Date    ADHD (attention deficit hyperactivity disorder)     Asperger syndrome     Asthma     Asthma 3/31/2015    Bipolar 1 disorder, mixed, severe ([address] 2018    Chemical dependency ([address]     marijuana    Diabetes mellitus ([address]     Diabetes mellitus type 1 ([address]     Hepatitis C     Hepatitis C     Hyperkalemia, diminished renal excretion 2017    Mental disorder     ODD (oppositional defiant disorder)     Seasonal allergies 3/31/2015    Seizures ([address]        PAST SURGICAL HISTORY    Past Surgical History:   Procedure Laterality Date    ABDOMEN SURGERY Left 10/7/2019    INCISION  & DRAINAGE OF ABSCESS LEFT ABDOMINAL WALL performed by Oanh Goodrich MD at [address] N/A 10/21/2019    INCISION AND DRAINAGE OF RECURRENT ABDOMINAL WALL ABSCESS ROOM 475 performed by Oanh Goodrich MD at [address]    Family History   Problem Relation Age of Onset    Cancer Maternal Aunt         breast    Diabetes Maternal Uncle     Diabetes Paternal Uncle     Cancer Maternal Grandmother     Diabetes Maternal Grandmother     Cancer Maternal Grandfather     Diabetes Maternal Grandfather        SOCIAL HISTORY    Social History     Tobacco Use    Smoking status: Current Every Day Smoker     Packs/day: 1.00     Years: 5.00     Pack years: 5.00     Types: Cigarettes    Smokeless tobacco: Never Used    Tobacco comment: pt aware of risk   Substance Use Topics    Alcohol use: Not Currently     Alcohol/week: 0.0 standard drinks     Frequency: Never     Comment: Had been drinking whiskey and vodka on a regular basis about 6 months ago. Nothing since.  Drug use: Yes     Types: Methamphetamines, Marijuana, IV, Cocaine     Comment: pt states he has been clean for \"4 or 5 years\"       ALLERGIES    Allergies   Allergen Reactions    Bactrim [Sulfamethoxazole-Trimethoprim] Rash    Dust Mite Extract      nasal & sinus congestion, itchy watery eyes, sneezing    Mirtazapine      blisters    Other      dander causes him to sneeze, have nasal/sinus congestion, itchy, watery eyes.     Oxcarbazepine Rash    Shrimp Flavor Nausea And Vomiting       MEDICATIONS    Current Outpatient Medications on File Prior to Encounter   Medication Sig Dispense Refill    Insulin Syringe-Needle U-100 (INSULIN SYRINGE .5CC/31GX5/16\") 31G X 5/16\" 0.5 ML MISC Use 4 daily 100 each 1    insulin glargine (LANTUS) 100 UNIT/ML injection vial Inject 50 Units into the skin nightly 1 vial 0    Insulin Pen Needle (NOVOFINE) 32G X 6 MM MISC qid 300 each 1    glucose 4 g chewable tablet Take 4 tablets by mouth as needed for Low blood sugar 60 tablet 3    insulin aspart (NOVOLOG) 100 UNIT/ML injection vial 15-25 units with each meals 2 vial 3    Spacer/Aero-Holding Chambers (EASIVENT) MISC USE AS INSTRUCTED WITH VENTOLIN INHALER.  0    QUEtiapine (SEROQUEL) 300 MG tablet Take 1 tablet wound assessment. The wound is hypergranulated. Psychiatric:  Judgement and insight intact. Short and long term memory intact. No evidence of depression, anxiety, or agitation. Patient is calm, cooperative, and communicative. Appropriate interactions and affect. Assessment:      Abdominal wall hypergranulation tissue after abscess drainage, which is now healed. Wound Care Documentation:  Wound Abdomen Left; Lower Open wound s/p surgery that was \"weeks ago\" (Active)   Number of days:        Wound 12/20/19 Abdomen Left; Lower #1 (Active)   Wound Image   1/3/2020  1:21 PM   Wound Non-Healing Surgical 1/3/2020  1:21 PM   Wound Cleansed Rinsed/Irrigated with saline 1/3/2020  1:21 PM   Wound Length (cm) 0 cm 1/3/2020  1:21 PM   Wound Width (cm) 0 cm 1/3/2020  1:21 PM   Wound Depth (cm) 0 cm 1/3/2020  1:21 PM   Wound Surface Area (cm^2) 0 cm^2 1/3/2020  1:21 PM   Change in Wound Size % (l*w) 100 1/3/2020  1:21 PM   Wound Volume (cm^3) 0 cm^3 1/3/2020  1:21 PM   Wound Healing % 100 1/3/2020  1:21 PM   Drainage Amount None 1/3/2020  1:21 PM   Drainage Description Yellow 12/20/2019  2:08 PM   Odor None 12/20/2019  2:08 PM   Margins Defined edges 12/20/2019  2:08 PM   Love-wound Assessment Dry; Intact 12/20/2019  2:08 PM   Non-staged Wound Description Full thickness 12/20/2019  2:08 PM   [place]%Wound Bed 60 12/20/2019  2:08 PM   Yellow%Wound Bed 40 12/20/2019  2:08 PM   Number of days: 13         Plan:   Continue to pad and protect  F/u prn      Treatment Note please see attached Discharge Instructions    Written patient dismissal instructions given to patient and signed by patient or POA. Discharge Instructions         Wound Clinic Physician Orders and Discharge [address] [facility] [address]   [place], [address] [facility]  Telephone: [phone]    NAME:  Yolande Reason OF BIRTH:  1995  MEDICAL RECORD NUMBER:  [id number]  DATE:  1/3/2020    Congratulations!!  You have completed your treatment. 1. Return to your Primary Care Physician for all your health issues. 2. Resume your ordinary activities as tolerated. 3. Take your medications as prescribed by your primary care physician. 4. Check your skin daily for cracks, bruises, sores, or dryness. Use a moisturizer as needed. 5. Clean and dry your skin, using mild soap and warm water (not hot). 6. Avoid alcohol and caffeine and do not smoke. 7. Maintain a nutritious diet. 8. Avoid pressure on your wound site. Keep your legs elevated above the level of the heart whenever possible. 9. Continue to use wraps/stockings/compression as prescribed. 10. Replace compression stockings every four to six months as needed to ensure proper fit. 11. Wear well-fitting shoes and leg garments. THANK YOU FOR ALLOWING US TO SERVE YOU.  PLEASE CALL IF YOU DEVELOP ANOTHER WOUND. [phone]                 Electronically signed by Chanel Siddiqui MD on 1/3/2020 at 1:34 PM               Electronically signed by Chanel Siddiqui MD on 1/3/2020 at 1:35 PM

## 2020-01-03 NOTE — PLAN OF CARE
Problem: Wound:  Goal: Will show signs of wound healing; wound closure and no evidence of infection  Description  Will show signs of wound healing; wound closure and no evidence of infection  Outcome: Ongoing     Problem: Blood Glucose:  Goal: Ability to maintain appropriate glucose levels will improve  Description  Ability to maintain appropriate glucose levels will improve  Outcome: Ongoing

## 2020-01-09 ENCOUNTER — HOSPITAL ENCOUNTER (EMERGENCY)
Age: 25
Discharge: HOME | End: 2020-01-09
Payer: MEDICARE

## 2020-01-09 VITALS
HEART RATE: 116 BPM | DIASTOLIC BLOOD PRESSURE: 87 MMHG | RESPIRATION RATE: 16 BRPM | SYSTOLIC BLOOD PRESSURE: 128 MMHG | TEMPERATURE: 98.4 F | BODY MASS INDEX: 24.11 KG/M2 | HEIGHT: 66 IN | WEIGHT: 150 LBS | OXYGEN SATURATION: 96 %

## 2020-01-09 LAB
ALBUMIN SERPL-MCNC: 4.9 G/DL (ref 3.5–4.6)
ALP BLD-CCNC: 126 U/L (ref 35–104)
ALT SERPL-CCNC: 15 U/L (ref 0–41)
ANION GAP SERPL CALCULATED.3IONS-SCNC: 23 MEQ/L (ref 9–15)
AST SERPL-CCNC: 19 U/L (ref 0–40)
BASE EXCESS VENOUS: 0 (ref -3–3)
BASOPHILS ABSOLUTE: 0 K/UL (ref 0–0.2)
BASOPHILS RELATIVE PERCENT: 0.5 %
BILIRUB SERPL-MCNC: 0.5 MG/DL (ref 0.2–0.7)
BUN BLDV-MCNC: 21 MG/DL (ref 6–20)
CALCIUM IONIZED: 1.19 MMOL/L (ref 1.12–1.32)
CALCIUM SERPL-MCNC: 10.4 MG/DL (ref 8.5–9.9)
CHLORIDE BLD-SCNC: 90 MEQ/L (ref 95–107)
CO2: 21 MEQ/L (ref 20–31)
CREAT SERPL-MCNC: 1.28 MG/DL (ref 0.7–1.2)
EOSINOPHILS ABSOLUTE: 0.1 K/UL (ref 0–0.7)
EOSINOPHILS RELATIVE PERCENT: 0.7 %
GFR AFRICAN AMERICAN: >60
GFR AFRICAN AMERICAN: >60
GFR NON-AFRICAN AMERICAN: >60
GFR NON-AFRICAN AMERICAN: >60
GLOBULIN: 3.1 G/DL (ref 2.3–3.5)
GLUCOSE BLD-MCNC: 351 MG/DL (ref 60–115)
GLUCOSE BLD-MCNC: 538 MG/DL (ref 70–99)
GLUCOSE BLD-MCNC: 608 MG/DL (ref 60–115)
HCO3 VENOUS: 25.7 MMOL/L (ref 23–29)
HCT VFR BLD CALC: 47.5 % (ref 42–52)
HEMOGLOBIN: 16.2 G/DL (ref 14–18)
HEMOGLOBIN: 17.2 GM/DL (ref 13.5–17.5)
LACTATE: 5.72 MMOL/L (ref 0.4–2)
LYMPHOCYTES ABSOLUTE: 1.2 K/UL (ref 1–4.8)
LYMPHOCYTES RELATIVE PERCENT: 15 %
MCH RBC QN AUTO: 29.9 PG (ref 27–31.3)
MCHC RBC AUTO-ENTMCNC: 34 % (ref 33–37)
MCV RBC AUTO: 87.8 FL (ref 80–100)
MONOCYTES ABSOLUTE: 0.3 K/UL (ref 0.2–0.8)
MONOCYTES RELATIVE PERCENT: 4.4 %
NEUTROPHILS ABSOLUTE: 6.2 K/UL (ref 1.4–6.5)
NEUTROPHILS RELATIVE PERCENT: 79.4 %
O2 SAT, VEN: 82 %
PCO2, VEN: 44.7 MM HG (ref 40–50)
PDW BLD-RTO: 13.2 % (ref 11.5–14.5)
PERFORMED ON: ABNORMAL
PERFORMED ON: ABNORMAL
PH VENOUS: 7.37 (ref 7.35–7.45)
PLATELET # BLD: 300 K/UL (ref 130–400)
PO2, VEN: 48 MM HG
POC CHLORIDE: 94 MEQ/L (ref 99–110)
POC CREATININE: 1.3 MG/DL (ref 0.9–1.3)
POC HEMATOCRIT: 51 % (ref 41–53)
POC POTASSIUM: 3.6 MEQ/L (ref 3.5–5.1)
POC SAMPLE TYPE: ABNORMAL
POC SODIUM: 133 MEQ/L (ref 136–145)
POTASSIUM SERPL-SCNC: 3.8 MEQ/L (ref 3.4–4.9)
RBC # BLD: 5.41 M/UL (ref 4.7–6.1)
SODIUM BLD-SCNC: 134 MEQ/L (ref 135–144)
TCO2 CALC VENOUS: 27 MMOL/L
TOTAL PROTEIN: 8 G/DL (ref 6.3–8)
WBC # BLD: 7.8 K/UL (ref 4.8–10.8)

## 2020-01-09 PROCEDURE — 80053 COMPREHEN METABOLIC PANEL: CPT

## 2020-01-09 PROCEDURE — 36600 WITHDRAWAL OF ARTERIAL BLOOD: CPT

## 2020-01-09 PROCEDURE — 84132 ASSAY OF SERUM POTASSIUM: CPT

## 2020-01-09 PROCEDURE — 84295 ASSAY OF SERUM SODIUM: CPT

## 2020-01-09 PROCEDURE — 82435 ASSAY OF BLOOD CHLORIDE: CPT

## 2020-01-09 PROCEDURE — 82948 REAGENT STRIP/BLOOD GLUCOSE: CPT

## 2020-01-09 PROCEDURE — 99283 EMERGENCY DEPT VISIT LOW MDM: CPT

## 2020-01-09 PROCEDURE — 82330 ASSAY OF CALCIUM: CPT

## 2020-01-09 PROCEDURE — 82803 BLOOD GASES ANY COMBINATION: CPT

## 2020-01-09 PROCEDURE — 36415 COLL VENOUS BLD VENIPUNCTURE: CPT

## 2020-01-09 PROCEDURE — 85014 HEMATOCRIT: CPT

## 2020-01-09 PROCEDURE — 83605 ASSAY OF LACTIC ACID: CPT

## 2020-01-09 PROCEDURE — 85025 COMPLETE CBC W/AUTO DIFF WBC: CPT

## 2020-01-09 PROCEDURE — 82565 ASSAY OF CREATININE: CPT

## 2020-01-09 RX ORDER — 0.9 % SODIUM CHLORIDE 0.9 %
3000 INTRAVENOUS SOLUTION INTRAVENOUS ONCE
Status: DISCONTINUED | OUTPATIENT
Start: 2020-01-09 | End: 2020-01-09 | Stop reason: HOSPADM

## 2020-01-09 ASSESSMENT — ENCOUNTER SYMPTOMS
RHINORRHEA: 0
SHORTNESS OF BREATH: 0
ABDOMINAL PAIN: 0
EYE PAIN: 0
PHOTOPHOBIA: 0
NAUSEA: 0
DIARRHEA: 0
VOMITING: 0
COUGH: 0
SORE THROAT: 0
BACK PAIN: 0

## 2020-01-09 NOTE — ED PROVIDER NOTES
hyperactivity disorder)     Asperger syndrome     Asthma     Asthma 3/31/2015    Bipolar 1 disorder, mixed, severe ([facility] [address] 2/5/2018    Chemical dependency ([facility] [address]     marijuana    Diabetes mellitus ([facility] [address]     Diabetes mellitus type 1 ([facility] [address]     Hepatitis C     Hepatitis C     Hyperkalemia, diminished renal excretion 11/9/2017    Mental disorder     ODD (oppositional defiant disorder)     Seasonal allergies 3/31/2015    Seizures ([facility] [address]      Past Surgical History:   Procedure Laterality Date    ABDOMEN SURGERY Left 10/7/2019    INCISION  & DRAINAGE OF ABSCESS LEFT ABDOMINAL WALL performed by Oralia Aguilera MD at [address] N/A 10/21/2019    INCISION AND DRAINAGE OF RECURRENT ABDOMINAL WALL ABSCESS ROOM 475 [facility] History     Socioeconomic History    Marital status: Single     Spouse name: Not on file    Number of children: 0    Years of education: 6    Highest education level: Not on file   Occupational History    Not on file   Social Needs    Financial resource strain: Hard    Food insecurity:     Worry: Often true     Inability: Often true    Transportation needs:     Medical: Yes     Non-medical: Yes   Tobacco Use    Smoking status: Current Every Day Smoker     Packs/day: 1.00     Years: 5.00     Pack years: 5.00     Types: Cigarettes    Smokeless tobacco: Never Used    Tobacco comment: pt aware of risk   Substance and Sexual Activity    Alcohol use: Not Currently     Alcohol/week: 0.0 standard drinks     Frequency: Never     Comment: Had been drinking whiskey and vodka on a regular basis about 6 months ago. Nothing since.     Drug use: Yes     Types: Methamphetamines, Marijuana, IV, Cocaine     Comment: pt states he has been clean for \"4 or 5 years\"    Sexual activity: Yes     Partners: Female     Comment: No protection or birth-xontroll plan   Lifestyle    Physical activity:     Days per week: 0 days     Minutes per session: 0 refill takes less than 2 seconds. Findings: No rash. Neurological:      Mental Status: He is alert and oriented to person, place, and time. Psychiatric:         Thought Content: Thought content normal.         Judgment: Judgment normal.         RESULTS     EKG: All EKG's are interpreted by the Emergency Department Physician who either signs or Co-signsthis chart in the absence of a cardiologist.        RADIOLOGY:   Lavone Lolling such as CT, Ultrasound and MRI are read by the radiologist. Plain radiographic images are visualized and preliminarily interpreted by the emergency physician with the below findings:        Interpretation per the Radiologist below, if available at the time ofthis note:    No orders to display         ED BEDSIDE ULTRASOUND:   Performed by ED Physician - none    LABS:  Labs Reviewed   COMPREHENSIVE METABOLIC PANEL - Abnormal; Notable for the following components:       Result Value    Sodium 134 (*)     Chloride 90 (*)     Anion Gap 23 (*)     Glucose 538 (*)     BUN 21 (*)     CREATININE 1.28 (*)     Calcium 10.4 (*)     Alb 4.9 (*)     Alkaline Phosphatase 126 (*)     All other components within normal limits    Narrative:     Rachael Vickers  [facility] tel. [id number],  Glucose results called to and read back by Demetrio Regan, 01/09/2020 18:28,  by Amita Miller   POCT VENOUS - Abnormal; Notable for the following components:    POC Sodium 133 (*)     POC Chloride 94 (*)     POC Glucose 608 (*)     Lactate 5.72 (*)     All other components within normal limits   POCT GLUCOSE - Abnormal; Notable for the following components:    POC Glucose 351 (*)     All other components within normal limits   CBC WITH AUTO DIFFERENTIAL   URINE RT REFLEX TO CULTURE   URINE DRUG SCREEN       All other labs were within normal range or not returned as of this dictation.     EMERGENCY DEPARTMENT COURSE and DIFFERENTIAL DIAGNOSIS/MDM:   Vitals:    Vitals:    01/09/20 1744   BP: 128/87   Pulse: 116   Resp: 16   Temp: 98.4 °F (36.9 °C)   TempSrc: Oral   SpO2: 96%   Weight: 150 lb (68 kg)   Height: 5' 6\" (1.676 m)            MDM patient admitted states he used additional insulin prior to evaluation. On the recheck of his glucose it is down to 300. Upon final estimation the patient states that he no longer wants to be seen in the ER. He is refused further work-up and evaluation. Concern is for the patient but he is already attempting to leave the room stating he does not want to see anyone else and if his evaluation does not require admission he like to leave. Patient has refused to speak to this provider any further refuses further work-up and evaluation I have explained that he should have further evaluation due to his medical history he has declined and demanded to be discharged [address] He states he does understand that he is at risk for further possible medical decline. Patient has signed out [address] and has refused to speak any further to the provider or nursing staff. CRITICAL CARE TIME       CONSULTS:  None    PROCEDURES:  Unless otherwise noted below, none     Procedures    FINAL IMPRESSION      1. Hyperglycemia due to type 1 diabetes mellitus ([facility] [address]          DISPOSITION/PLAN   DISPOSITION        PATIENT REFERRED TO:  No follow-up provider specified.     DISCHARGE MEDICATIONS:  Discharge Medication List as of 1/9/2020  6:17 PM             (Please notethat portions of this note were completed with a voice recognition program.  Efforts were made to edit the dictations but occasionally words are mis-transcribed.)    YAMIL Santos CNP (electronically signed)  Attending Emergency Physician          YAMIL Santos CNP  01/10/20 9721

## 2020-01-09 NOTE — ED NOTES
Bed: 15  Expected date: 1/9/20  Expected time: 5:18 PM  Means of arrival: [facility]  Comments:  27 M - hyperglycemia - reading high at home. Took insulin.  now. 130/80,80,99%.  18G RAC/fluid     Isai Ceja RN  01/09/20 1331

## 2020-01-09 NOTE — ED NOTES
Blood labeled and sent to lab. . Pt unable to urinate at this time; aware of need for urine sample.      Yoandy Lau RN  01/09/20 5713

## 2020-01-25 ENCOUNTER — APPOINTMENT (OUTPATIENT)
Dept: GENERAL RADIOLOGY | Age: 25
End: 2020-01-25
Payer: MEDICARE

## 2020-01-25 ENCOUNTER — HOSPITAL ENCOUNTER (EMERGENCY)
Age: 25
Discharge: HOME | End: 2020-01-25
Attending: EMERGENCY MEDICINE
Payer: MEDICARE

## 2020-01-25 VITALS
HEART RATE: 78 BPM | TEMPERATURE: 98.8 F | SYSTOLIC BLOOD PRESSURE: 120 MMHG | HEIGHT: 66 IN | DIASTOLIC BLOOD PRESSURE: 70 MMHG | WEIGHT: 145 LBS | RESPIRATION RATE: 15 BRPM | OXYGEN SATURATION: 98 % | BODY MASS INDEX: 23.3 KG/M2

## 2020-01-25 LAB
ALBUMIN SERPL-MCNC: 3.9 G/DL (ref 3.5–4.6)
ALP BLD-CCNC: 81 U/L (ref 35–104)
ALT SERPL-CCNC: 14 U/L (ref 0–41)
ANION GAP SERPL CALCULATED.3IONS-SCNC: 15 MEQ/L (ref 9–15)
AST SERPL-CCNC: 21 U/L (ref 0–40)
BASOPHILS ABSOLUTE: 0 K/UL (ref 0–0.2)
BASOPHILS RELATIVE PERCENT: 0.4 %
BILIRUB SERPL-MCNC: <0.2 MG/DL (ref 0.2–0.7)
BILIRUBIN URINE: NEGATIVE
BLOOD, URINE: NEGATIVE
BUN BLDV-MCNC: 12 MG/DL (ref 6–20)
CALCIUM SERPL-MCNC: 8.8 MG/DL (ref 8.5–9.9)
CHLORIDE BLD-SCNC: 94 MEQ/L (ref 95–107)
CHP ED QC CHECK: NORMAL
CLARITY: CLEAR
CO2: 22 MEQ/L (ref 20–31)
COLOR: YELLOW
CREAT SERPL-MCNC: 0.79 MG/DL (ref 0.7–1.2)
EOSINOPHILS ABSOLUTE: 0 K/UL (ref 0–0.7)
EOSINOPHILS RELATIVE PERCENT: 0.8 %
GFR AFRICAN AMERICAN: >60
GFR NON-AFRICAN AMERICAN: >60
GLOBULIN: 2.6 G/DL (ref 2.3–3.5)
GLUCOSE BLD-MCNC: 278 MG/DL
GLUCOSE BLD-MCNC: 278 MG/DL (ref 60–115)
GLUCOSE BLD-MCNC: 310 MG/DL (ref 70–99)
GLUCOSE URINE: >=1000 MG/DL
HCT VFR BLD CALC: 43.9 % (ref 42–52)
HEMOGLOBIN: 15.3 G/DL (ref 14–18)
INFLUENZA A BY PCR: NEGATIVE
INFLUENZA B BY PCR: POSITIVE
KETONES, URINE: ABNORMAL MG/DL
LACTIC ACID: 2 MMOL/L (ref 0.5–2.2)
LEUKOCYTE ESTERASE, URINE: NEGATIVE
LYMPHOCYTES ABSOLUTE: 0.3 K/UL (ref 1–4.8)
LYMPHOCYTES RELATIVE PERCENT: 5.7 %
MCH RBC QN AUTO: 30.1 PG (ref 27–31.3)
MCHC RBC AUTO-ENTMCNC: 34.8 % (ref 33–37)
MCV RBC AUTO: 86.4 FL (ref 80–100)
MONOCYTES ABSOLUTE: 0.4 K/UL (ref 0.2–0.8)
MONOCYTES RELATIVE PERCENT: 6.2 %
NEUTROPHILS ABSOLUTE: 4.9 K/UL (ref 1.4–6.5)
NEUTROPHILS RELATIVE PERCENT: 86.9 %
NITRITE, URINE: NEGATIVE
PDW BLD-RTO: 13.1 % (ref 11.5–14.5)
PERFORMED ON: ABNORMAL
PH UA: 7.5 (ref 5–9)
PLATELET # BLD: 196 K/UL (ref 130–400)
POTASSIUM SERPL-SCNC: 4.4 MEQ/L (ref 3.4–4.9)
PROTEIN UA: NEGATIVE MG/DL
RBC # BLD: 5.08 M/UL (ref 4.7–6.1)
SODIUM BLD-SCNC: 131 MEQ/L (ref 135–144)
SPECIFIC GRAVITY UA: 1.04 (ref 1–1.03)
TOTAL PROTEIN: 6.5 G/DL (ref 6.3–8)
URINE REFLEX TO CULTURE: ABNORMAL
UROBILINOGEN, URINE: 0.2 E.U./DL
WBC # BLD: 5.7 K/UL (ref 4.8–10.8)

## 2020-01-25 PROCEDURE — 99283 EMERGENCY DEPT VISIT LOW MDM: CPT

## 2020-01-25 PROCEDURE — 80053 COMPREHEN METABOLIC PANEL: CPT

## 2020-01-25 PROCEDURE — 85025 COMPLETE CBC W/AUTO DIFF WBC: CPT

## 2020-01-25 PROCEDURE — 81003 URINALYSIS AUTO W/O SCOPE: CPT

## 2020-01-25 PROCEDURE — 87491 CHLMYD TRACH DNA AMP PROBE: CPT

## 2020-01-25 PROCEDURE — 87502 INFLUENZA DNA AMP PROBE: CPT

## 2020-01-25 PROCEDURE — 2580000003 HC RX 258: Performed by: NURSE PRACTITIONER

## 2020-01-25 PROCEDURE — 83605 ASSAY OF LACTIC ACID: CPT

## 2020-01-25 PROCEDURE — 71045 X-RAY EXAM CHEST 1 VIEW: CPT

## 2020-01-25 PROCEDURE — 6370000000 HC RX 637 (ALT 250 FOR IP): Performed by: NURSE PRACTITIONER

## 2020-01-25 PROCEDURE — 36415 COLL VENOUS BLD VENIPUNCTURE: CPT

## 2020-01-25 PROCEDURE — 87591 N.GONORRHOEAE DNA AMP PROB: CPT

## 2020-01-25 RX ORDER — IBUPROFEN 600 MG/1
600 TABLET ORAL EVERY 6 HOURS PRN
Qty: 20 TABLET | Refills: 0 | Status: SHIPPED | OUTPATIENT
Start: 2020-01-25 | End: 2020-03-24

## 2020-01-25 RX ORDER — ACETAMINOPHEN 500 MG
1000 TABLET ORAL EVERY 6 HOURS PRN
Qty: 30 TABLET | Refills: 0 | Status: SHIPPED | OUTPATIENT
Start: 2020-01-25 | End: 2020-03-24

## 2020-01-25 RX ORDER — 0.9 % SODIUM CHLORIDE 0.9 %
2000 INTRAVENOUS SOLUTION INTRAVENOUS ONCE
Status: COMPLETED | OUTPATIENT
Start: 2020-01-25 | End: 2020-01-25

## 2020-01-25 RX ORDER — CLINDAMYCIN HYDROCHLORIDE 300 MG/1
300 CAPSULE ORAL 4 TIMES DAILY
Qty: 28 CAPSULE | Refills: 0 | Status: SHIPPED | OUTPATIENT
Start: 2020-01-25 | End: 2020-02-01

## 2020-01-25 RX ORDER — OSELTAMIVIR PHOSPHATE 75 MG/1
75 CAPSULE ORAL 2 TIMES DAILY
Qty: 10 CAPSULE | Refills: 0 | Status: SHIPPED | OUTPATIENT
Start: 2020-01-25 | End: 2020-01-30

## 2020-01-25 RX ORDER — ACETAMINOPHEN 500 MG
1000 TABLET ORAL ONCE
Status: COMPLETED | OUTPATIENT
Start: 2020-01-25 | End: 2020-01-25

## 2020-01-25 RX ORDER — OSELTAMIVIR PHOSPHATE 75 MG/1
75 CAPSULE ORAL ONCE
Status: COMPLETED | OUTPATIENT
Start: 2020-01-25 | End: 2020-01-25

## 2020-01-25 RX ADMIN — OSELTAMIVIR PHOSPHATE 75 MG: 75 CAPSULE ORAL at 13:53

## 2020-01-25 RX ADMIN — ACETAMINOPHEN 1000 MG: 500 TABLET ORAL at 12:59

## 2020-01-25 RX ADMIN — SODIUM CHLORIDE 2000 ML: 9 INJECTION, SOLUTION INTRAVENOUS at 12:59

## 2020-01-25 ASSESSMENT — ENCOUNTER SYMPTOMS
SHORTNESS OF BREATH: 0
DIARRHEA: 0
NAUSEA: 0
ABDOMINAL PAIN: 0
VOMITING: 0
RHINORRHEA: 1
BACK PAIN: 0
COUGH: 1
SORE THROAT: 0
PHOTOPHOBIA: 0
EYE PAIN: 0

## 2020-01-25 ASSESSMENT — PAIN SCALES - GENERAL
PAINLEVEL_OUTOF10: 10
PAINLEVEL_OUTOF10: 10

## 2020-01-25 ASSESSMENT — PAIN DESCRIPTION - PAIN TYPE: TYPE: ACUTE PAIN

## 2020-01-25 ASSESSMENT — PAIN DESCRIPTION - LOCATION: LOCATION: ABDOMEN;GENERALIZED

## 2020-01-25 ASSESSMENT — PAIN DESCRIPTION - FREQUENCY: FREQUENCY: CONTINUOUS

## 2020-01-25 ASSESSMENT — PAIN DESCRIPTION - ONSET: ONSET: SUDDEN

## 2020-01-25 NOTE — ED NOTES
IV started. Patient tolerated well. All blood work obtained and sent to lab. Flu swab obtained and sent. Patient is aware of the need for urine. Urinal at bedside.       Micky Spicer RN  01/25/20 1949

## 2020-01-25 NOTE — ED NOTES
Pt up to restroom to give urine sample. Ambulatory with steady gait. Did not want to use the urinal in the room. Advised that sample was needed and to bring it back to the room.       Rikki Cruz RN  01/25/20 [address] Monica Stauffer  01/25/20 1167

## 2020-01-25 NOTE — ED PROVIDER NOTES
[address] ED  EMERGENCY DEPARTMENT ENCOUNTER      Pt Name: Noe Fraga  MRN: [id number]  [place] 1995  Date of evaluation: 1/25/2020  Provider: Sena Petit       Chief Complaint   Patient presents with    Fever         HISTORY OF PRESENT ILLNESS   (Location/Symptom, Timing/Onset,Context/Setting, Quality, Duration, Modifying Factors, Severity)  Note limiting factors. Noe Fraga is a 27 y.o. male who presents to the emergency department with complaints of cough, congestion, runny nose, fever that began yesterday. He admits to runny nose \"like a leaky faucet\" and concern for std due to rash. No headache, dizziness, lightheadedness, sweats, cp, sob, palpitations, nvdc or abdominal pain. Location/Symptom - cough  Onset - yesterday  Context/Setting - as above  Quality - harsh, np cough  Duration - 1 day  Modifying Factors - none  Severity - moderate        Nursing Notes were reviewed. REVIEW OF SYSTEMS    (2-9 systems for level 4, 10 or more for level 5)     Review of Systems   Constitutional: Positive for fever. Negative for chills, diaphoresis and fatigue. HENT: Positive for congestion and rhinorrhea. Negative for sore throat. Eyes: Negative for photophobia and pain. Respiratory: Positive for cough. Negative for shortness of breath. Cardiovascular: Negative for chest pain and palpitations. Gastrointestinal: Negative for abdominal pain, diarrhea, nausea and vomiting. Genitourinary: Negative for dysuria and flank pain. Musculoskeletal: Negative for back pain. Skin: Positive for rash. Neurological: Negative for dizziness, light-headedness and headaches. Psychiatric/Behavioral: Negative. All other systems reviewed and are negative. Except as noted above the remainder of the review of systems was reviewed and negative.        PAST MEDICAL HISTORY     Past Medical History:   Diagnosis Date    ADHD (attention deficit hyperactivity disorder)     Asperger syndrome     Asthma     Asthma 3/31/2015    Bipolar 1 disorder, mixed, severe ([facility] [address] 2/5/2018    Chemical dependency ([facility] [address]     marijuana    Diabetes mellitus ([facility] [address]     Diabetes mellitus type 1 ([facility] [address]     Hepatitis C     Hepatitis C     Hyperkalemia, diminished renal excretion 11/9/2017    Mental disorder     ODD (oppositional defiant disorder)     Seasonal allergies 3/31/2015    Seizures ([facility] [address]      Past Surgical History:   Procedure Laterality Date    ABDOMEN SURGERY Left 10/7/2019    INCISION  & DRAINAGE OF ABSCESS LEFT ABDOMINAL WALL performed by Cody Woo MD at [facility] N/A 10/21/2019    INCISION AND DRAINAGE OF RECURRENT ABDOMINAL WALL ABSCESS ROOM 475 performed by Cody Woo MD at [address] [place] [address] History     Socioeconomic History    Marital status: Single     Spouse name: None    Number of children: 0    Years of education: 6    Highest education level: None   Occupational History    None   Social Needs    Financial resource strain: Hard    Food insecurity:     Worry: Often true     Inability: Often true    Transportation needs:     Medical: Yes     Non-medical: Yes   Tobacco Use    Smoking status: Current Every Day Smoker     Packs/day: 1.00     Years: 5.00     Pack years: 5.00     Types: Cigarettes    Smokeless tobacco: Never Used    Tobacco comment: pt aware of risk   Substance and Sexual Activity    Alcohol use: Not Currently     Alcohol/week: 0.0 standard drinks     Frequency: Never     Comment: Had been drinking whiskey and vodka on a regular basis about 6 months ago. Nothing since.     Drug use: Yes     Types: Methamphetamines, Marijuana, IV, Cocaine     Comment: pt states he has been clean for \"4 or 5 years\"    Sexual activity: Yes     Partners: Female     Comment: No protection or birth-xontroll plan   Lifestyle    Physical activity:     Days per week: 0 days     Minutes per session: 0 min    Stress: Very open lesions. Neurological:      Mental Status: He is alert and oriented to person, place, and time. Psychiatric:         Thought Content: Thought content normal.         Judgment: Judgment normal.         RESULTS     EKG: All EKG's are interpreted by the Emergency Department Physician who either signs or Co-signsthis chart in the absence of a cardiologist.        RADIOLOGY:   [place] such as CT, Ultrasound and MRI are read by the radiologist. Plain radiographic images are visualized and preliminarily interpreted by the emergency physician with the below findings:    No inf, eff or ptx    Interpretation per the Radiologist below, if available at the time ofthis note:    XR CHEST PORTABLE   Final Result      Subtle hazy opacity of the left midlung may relate to overlapping structures with infiltrate not excluded. Correlation is recommended.                   ED BEDSIDE ULTRASOUND:   Performed by ED Physician - none    LABS:  Labs Reviewed   RAPID INFLUENZA A/B ANTIGENS - Abnormal; Notable for the following components:       Result Value    Influenza B by PCR POSITIVE (*)     All other components within normal limits   CBC WITH AUTO DIFFERENTIAL - Abnormal; Notable for the following components:    Lymphocytes Absolute 0.3 (*)     All other components within normal limits   COMPREHENSIVE METABOLIC PANEL - Abnormal; Notable for the following components:    Sodium 131 (*)     Chloride 94 (*)     Glucose 310 (*)     All other components within normal limits   URINE RT REFLEX TO CULTURE - Abnormal; Notable for the following components:    Glucose, Ur >=1000 (*)     Ketones, Urine TRACE (*)     All other components within normal limits   POCT GLUCOSE - Abnormal; Notable for the following components:    POC Glucose 278 (*)     All other components within normal limits   POCT GLUCOSE - Normal   C.TRACHOMATIS N.GONORRHOEAE DNA, URINE   LACTIC ACID, PLASMA   LACTIC ACID, PLASMA       All other labs were within normal range or not returned as of this dictation. EMERGENCY DEPARTMENT COURSE and DIFFERENTIAL DIAGNOSIS/MDM:   Vitals:    Vitals:    01/25/20 1231 01/25/20 1408   BP: (!) 115/48 120/70   Pulse: 104 78   Resp: 17 15   Temp: 101.5 °F (38.6 °C) 98.8 °F (37.1 °C)   TempSrc: Oral Oral   SpO2: 96% 98%   Weight: 145 lb (65.8 kg)    Height: 5' 6\" (1.676 m)             MDM on exam pt nontoxic and in no distress. He does appear ill. Exam is consistent with influenza will obtain labs/rads for evaluation of acute pathology. Rash is evaluated and found to be most representative of a folliculitis. Labs/rads reviewed. He reports to feel significantly better. PO intake tolerated well by pt. Extended discharge instructions provided to patient including signs, symptoms and red flags that they should return to the emergency department. They express good understanding. Standard anticipatory guidance given to patient upon discharge. Have given them a specific time frame in which to follow-up and who to follow-up with. I have also advised them that they should return to the emergency department if they get worse, or not getting better or develop any new or concerning symptoms. Patient demonstrates understanding and all questions were answered. CRITICAL CARE TIME       CONSULTS:  None    PROCEDURES:  Unless otherwise noted below, none     Procedures    FINAL IMPRESSION      1. Influenza B    2.  Acute folliculitis          DISPOSITION/PLAN   DISPOSITION Decision To Discharge 01/25/2020 01:54:49 PM      PATIENT REFERRED TO:  YAMIL Barker CNP  [address], [address]  [place] [address]  [phone]    Call in 1 day  For continued evaluation and management      DISCHARGE MEDICATIONS:  Discharge Medication List as of 1/25/2020  1:57 PM      START taking these medications    Details   oseltamivir (TAMIFLU) 75 MG capsule Take 1 capsule by mouth 2 times daily for 5 days, Disp-10 capsule, R-0Print acetaminophen (APAP EXTRA STRENGTH) 500 MG tablet Take 2 tablets by mouth every 6 hours as needed for Pain, Disp-30 tablet, R-0Print      clindamycin (CLEOCIN) 300 MG capsule Take 1 capsule by mouth 4 times daily for 7 days May sub Generic and 150 mg capsules and 2x the amount, Disp-28 capsule, R-0Print                (Please notethat portions of this note were completed with a voice recognition program.  Efforts were made to edit the dictations but occasionally words are mis-transcribed.)    YAMIL Rodriguez CNP (electronically signed)  Attending Emergency Physician          YAMIL Rodriguez CNP  01/25/20 7455

## 2020-01-25 NOTE — ED NOTES
Pt states feeling much better. Given another sandwich and diet pepsi prior to discharge. Skin is pink warm and dry. Resps even and unlabored. Pt given discharge instructions and prescriptions. Verbalized understanding. Ambulatory out of er with steady gait.      Shiela Gonzales RN  01/25/20 9994

## 2020-01-30 LAB
C. TRACHOMATIS DNA ,URINE: NEGATIVE
N. GONORRHOEAE DNA, URINE: NEGATIVE

## 2020-02-03 RX ORDER — INSULIN GLARGINE 100 [IU]/ML
INJECTION, SOLUTION SUBCUTANEOUS
Qty: 10 ML | Refills: 3 | Status: ON HOLD | OUTPATIENT
Start: 2020-02-03 | End: 2020-08-04 | Stop reason: HOSPADM

## 2020-02-16 ENCOUNTER — APPOINTMENT (OUTPATIENT)
Dept: CT IMAGING | Age: 25
End: 2020-02-16
Payer: MEDICARE

## 2020-02-16 ENCOUNTER — HOSPITAL ENCOUNTER (EMERGENCY)
Age: 25
Discharge: HOME HEALTH | End: 2020-02-16
Payer: MEDICARE

## 2020-02-16 VITALS
BODY MASS INDEX: 23.4 KG/M2 | WEIGHT: 145 LBS | TEMPERATURE: 98.7 F | SYSTOLIC BLOOD PRESSURE: 118 MMHG | HEART RATE: 105 BPM | DIASTOLIC BLOOD PRESSURE: 80 MMHG | OXYGEN SATURATION: 96 % | RESPIRATION RATE: 16 BRPM

## 2020-02-16 LAB
ALBUMIN SERPL-MCNC: 4.6 G/DL (ref 3.5–4.6)
ALP BLD-CCNC: 124 U/L (ref 35–104)
ALT SERPL-CCNC: 15 U/L (ref 0–41)
ANION GAP SERPL CALCULATED.3IONS-SCNC: 16 MEQ/L (ref 9–15)
AST SERPL-CCNC: 12 U/L (ref 0–40)
BASE EXCESS VENOUS: 2 (ref -3–3)
BASOPHILS ABSOLUTE: 0.1 K/UL (ref 0–0.2)
BASOPHILS RELATIVE PERCENT: 1 %
BILIRUB SERPL-MCNC: <0.2 MG/DL (ref 0.2–0.7)
BUN BLDV-MCNC: 22 MG/DL (ref 6–20)
CALCIUM IONIZED: 1.17 MMOL/L (ref 1.12–1.32)
CALCIUM SERPL-MCNC: 10.3 MG/DL (ref 8.5–9.9)
CHLORIDE BLD-SCNC: 93 MEQ/L (ref 95–107)
CO2: 24 MEQ/L (ref 20–31)
CREAT SERPL-MCNC: 1.04 MG/DL (ref 0.7–1.2)
EOSINOPHILS ABSOLUTE: 0.1 K/UL (ref 0–0.7)
EOSINOPHILS RELATIVE PERCENT: 1 %
GFR AFRICAN AMERICAN: >60
GFR AFRICAN AMERICAN: >60
GFR NON-AFRICAN AMERICAN: >60
GFR NON-AFRICAN AMERICAN: >60
GLOBULIN: 3 G/DL (ref 2.3–3.5)
GLUCOSE BLD-MCNC: 237 MG/DL (ref 60–115)
GLUCOSE BLD-MCNC: 391 MG/DL (ref 70–99)
GLUCOSE BLD-MCNC: 408 MG/DL (ref 60–115)
GLUCOSE BLD-MCNC: 429 MG/DL (ref 60–115)
HCO3 VENOUS: 25.9 MMOL/L (ref 23–29)
HCT VFR BLD CALC: 46.4 % (ref 42–52)
HEMOGLOBIN: 15.8 G/DL (ref 14–18)
HEMOGLOBIN: 16.6 GM/DL (ref 13.5–17.5)
LACTATE: 2.35 MMOL/L (ref 0.4–2)
LACTIC ACID: 2.2 MMOL/L (ref 0.5–2.2)
LIPASE: 15 U/L (ref 12–95)
LYMPHOCYTES ABSOLUTE: 1.4 K/UL (ref 1–4.8)
LYMPHOCYTES RELATIVE PERCENT: 26.5 %
MCH RBC QN AUTO: 28.5 PG (ref 27–31.3)
MCHC RBC AUTO-ENTMCNC: 34 % (ref 33–37)
MCV RBC AUTO: 83.8 FL (ref 80–100)
MONOCYTES ABSOLUTE: 0.5 K/UL (ref 0.2–0.8)
MONOCYTES RELATIVE PERCENT: 8.8 %
NEUTROPHILS ABSOLUTE: 3.4 K/UL (ref 1.4–6.5)
NEUTROPHILS RELATIVE PERCENT: 62.7 %
O2 SAT, VEN: 99 %
PCO2, VEN: 36.6 MM HG (ref 40–50)
PDW BLD-RTO: 12.7 % (ref 11.5–14.5)
PERFORMED ON: ABNORMAL
PH VENOUS: 7.46 (ref 7.35–7.45)
PLATELET # BLD: 258 K/UL (ref 130–400)
PO2, VEN: 116 MM HG
POC CHLORIDE: 99 MEQ/L (ref 99–110)
POC CREATININE: 1.1 MG/DL (ref 0.9–1.3)
POC HEMATOCRIT: 49 % (ref 41–53)
POC POTASSIUM: 3.9 MEQ/L (ref 3.5–5.1)
POC SAMPLE TYPE: ABNORMAL
POC SODIUM: 134 MEQ/L (ref 136–145)
POTASSIUM SERPL-SCNC: 4.1 MEQ/L (ref 3.4–4.9)
RBC # BLD: 5.54 M/UL (ref 4.7–6.1)
SODIUM BLD-SCNC: 133 MEQ/L (ref 135–144)
TCO2 CALC VENOUS: 27 MMOL/L
TOTAL PROTEIN: 7.6 G/DL (ref 6.3–8)
WBC # BLD: 5.4 K/UL (ref 4.8–10.8)

## 2020-02-16 PROCEDURE — 36600 WITHDRAWAL OF ARTERIAL BLOOD: CPT

## 2020-02-16 PROCEDURE — 83690 ASSAY OF LIPASE: CPT

## 2020-02-16 PROCEDURE — 85025 COMPLETE CBC W/AUTO DIFF WBC: CPT

## 2020-02-16 PROCEDURE — 36415 COLL VENOUS BLD VENIPUNCTURE: CPT

## 2020-02-16 PROCEDURE — 82330 ASSAY OF CALCIUM: CPT

## 2020-02-16 PROCEDURE — 80053 COMPREHEN METABOLIC PANEL: CPT

## 2020-02-16 PROCEDURE — 84132 ASSAY OF SERUM POTASSIUM: CPT

## 2020-02-16 PROCEDURE — 85014 HEMATOCRIT: CPT

## 2020-02-16 PROCEDURE — 83605 ASSAY OF LACTIC ACID: CPT

## 2020-02-16 PROCEDURE — 82803 BLOOD GASES ANY COMBINATION: CPT

## 2020-02-16 PROCEDURE — 82435 ASSAY OF BLOOD CHLORIDE: CPT

## 2020-02-16 PROCEDURE — 82565 ASSAY OF CREATININE: CPT

## 2020-02-16 PROCEDURE — 2580000003 HC RX 258: Performed by: PHYSICIAN ASSISTANT

## 2020-02-16 PROCEDURE — 84295 ASSAY OF SERUM SODIUM: CPT

## 2020-02-16 PROCEDURE — 99284 EMERGENCY DEPT VISIT MOD MDM: CPT

## 2020-02-16 PROCEDURE — 82948 REAGENT STRIP/BLOOD GLUCOSE: CPT

## 2020-02-16 RX ORDER — 0.9 % SODIUM CHLORIDE 0.9 %
2000 INTRAVENOUS SOLUTION INTRAVENOUS ONCE
Status: COMPLETED | OUTPATIENT
Start: 2020-02-16 | End: 2020-02-16

## 2020-02-16 RX ORDER — DOXYCYCLINE 100 MG/1
100 TABLET ORAL 2 TIMES DAILY
Qty: 20 TABLET | Refills: 0 | Status: SHIPPED | OUTPATIENT
Start: 2020-02-16 | End: 2020-02-26

## 2020-02-16 RX ADMIN — SODIUM CHLORIDE 2000 ML: 9 INJECTION, SOLUTION INTRAVENOUS at 16:34

## 2020-02-16 ASSESSMENT — ENCOUNTER SYMPTOMS
TROUBLE SWALLOWING: 0
ALLERGIC/IMMUNOLOGIC NEGATIVE: 1
SHORTNESS OF BREATH: 0
APNEA: 0
ABDOMINAL PAIN: 1
EYE PAIN: 0
COLOR CHANGE: 0

## 2020-02-16 ASSESSMENT — PAIN DESCRIPTION - LOCATION: LOCATION: ABDOMEN

## 2020-02-16 ASSESSMENT — PAIN DESCRIPTION - DESCRIPTORS: DESCRIPTORS: TINGLING

## 2020-02-16 ASSESSMENT — PAIN SCALES - GENERAL: PAINLEVEL_OUTOF10: 0

## 2020-02-16 ASSESSMENT — PAIN DESCRIPTION - ORIENTATION: ORIENTATION: RIGHT

## 2020-02-16 NOTE — ED TRIAGE NOTES
Patient states blood sugar read high at home, took 45 units Novalog & 35 units Lantus at 1500 today, blood sugar was 414 at approximately 1545

## 2020-02-16 NOTE — ED PROVIDER NOTES
[address] [facility] ED  eMERGENCYdEPARTMENT eNCOUnter      Pt Name: Federico Hill  MRN: [id number]  [place] 1995  Date of evaluation: 2/16/2020  Provider:Romulo Queen PA-C    CHIEF COMPLAINT       Chief Complaint   Patient presents with    Hyperglycemia    Abdominal Pain         HISTORY OF PRESENT ILLNESS  (Location/Symptom, Timing/Onset, Context/Setting, Quality, Duration, Modifying Factors, Severity.)   Federico Hill is a 27 y.o. male who presents to the emergency department with concerns of possible recurrent cellulitis or abscess to the right abdominal wall second to reusing his insulin syringes and not cleaning prior to injection. Patient has a history of an abscess that required surgical intervention in October for the same complaints on the left and he states that he is feeling the same as when he had that. Patient states that his sugars been elevated but he has been increasing coverage with his fast acting insulin. Patient denies any known fevers. Patient complains of some nausea but no vomiting or diarrhea. Patient denies any hematuria or dysuria. HPI    Nursing Notes were reviewed and I agree. REVIEW OF SYSTEMS    (2-9 systems for level 4, 10 or more for level 5)     Review of Systems   Constitutional: Negative for diaphoresis and fever. HENT: Negative for hearing loss and trouble swallowing. Eyes: Negative for pain. Respiratory: Negative for apnea and shortness of breath. Cardiovascular: Negative for chest pain. Gastrointestinal: Positive for abdominal pain (abd wall). Endocrine: Negative. Genitourinary: Negative for hematuria. Musculoskeletal: Negative for neck pain and neck stiffness. Skin: Negative for color change. Allergic/Immunologic: Negative. Neurological: Negative for dizziness and numbness. Hematological: Negative. Psychiatric/Behavioral: Negative. All other systems reviewed and are negative.        Except as noted above the remainder of the review of systems was reviewed and negative. PAST MEDICAL HISTORY     Past Medical History:   Diagnosis Date    ADHD (attention deficit hyperactivity disorder)     Asperger syndrome     Asthma     Asthma 3/31/2015    Bipolar 1 disorder, mixed, severe ([facility] [address] 2/5/2018    Chemical dependency ([facility] [address]     marijuana    Diabetes mellitus ([facility] [address]     Diabetes mellitus type 1 ([facility] [address]     Hepatitis C     Hepatitis C     Hyperkalemia, diminished renal excretion 11/9/2017    Mental disorder     ODD (oppositional defiant disorder)     Seasonal allergies 3/31/2015    Seizures ([facility] [address]          SURGICAL HISTORY       Past Surgical History:   Procedure Laterality Date    ABDOMEN SURGERY Left 10/7/2019    INCISION  & DRAINAGE OF ABSCESS LEFT ABDOMINAL WALL performed by Haley Wynn MD at [address] N/A 10/21/2019    INCISION AND DRAINAGE OF RECURRENT ABDOMINAL WALL ABSCESS ROOM 475 performed by Haley Wynn MD at [address] [facility]       Previous Medications    ACETAMINOPHEN (APAP EXTRA STRENGTH) 500 MG TABLET    Take 2 tablets by mouth every 6 hours as needed for Pain    ALBUTEROL SULFATE  (90 BASE) MCG/ACT INHALER    Use 2 puffs 4 times daily for 7 days then as needed for wheezing. Dispense with Spacer and instruct in use. At patient's preference may use 60 dose MDI. May Sub Pro-Air or Proventil as needed per insurance. BLOOD GLUCOSE MONITOR KIT AND SUPPLIES    Give 1 meter Dx E11.65    BLOOD GLUCOSE MONITOR STRIPS    Test 4 times a day & as needed for symptoms of irregular blood glucose.     GLUCOSE 4 G CHEWABLE TABLET    Take 4 tablets by mouth as needed for Low blood sugar    IBUPROFEN (ADVIL;MOTRIN) 600 MG TABLET    Take 1 tablet by mouth every 6 hours as needed for Pain    IBUPROFEN-FAMOTIDINE (DUEXIS) 800-26.6 MG TABS    Take 800 mg by mouth every 8 hours as needed (pain)    INSULIN ASPART (NOVOLOG FLEXPEN) 100 UNIT/ML INJECTION PEN    Inject into the skin 3 times daily (before meals) Patient takes only when he feels it is necessary    INSULIN ASPART (NOVOLOG) 100 UNIT/ML INJECTION VIAL    15-25 units with each meals    INSULIN PEN NEEDLE (NOVOFINE) 32G X 6 MM MISC    qid    INSULIN SYRINGE-NEEDLE U-100 (INSULIN SYRINGE .5CC/31GX5/16\") 31G X 5/16\" 0.5 ML MISC    Use 4 daily    LANTUS 100 UNIT/ML INJECTION VIAL    INJECT 50 UNITS INTO THE SKIN NIGHTLY. QUETIAPINE (SEROQUEL) 300 MG TABLET    Take 1 tablet by mouth nightly as needed (sleep)    SPACER/AERO-HOLDING CHAMBERS (EASIVENT) MISC    USE AS INSTRUCTED WITH VENTOLIN INHALER. ALLERGIES     Bactrim [sulfamethoxazole-trimethoprim]; Dust mite extract; Mirtazapine; Other; Oxcarbazepine; and Shrimp flavor    FAMILY HISTORY       Family History   Problem Relation Age of Onset    Cancer Maternal Aunt         breast    Diabetes Maternal Uncle     Diabetes Paternal Uncle     Cancer Maternal Grandmother     Diabetes Maternal Grandmother     Cancer Maternal Grandfather     Diabetes Maternal Grandfather           SOCIAL HISTORY       Social History     Socioeconomic History    Marital status: Single     Spouse name: None    Number of children: 0    Years of education: 6    Highest education level: None   Occupational History    None   Social Needs    Financial resource strain: Hard    Food insecurity:     Worry: Often true     Inability: Often true    Transportation needs:     Medical: Yes     Non-medical: Yes   Tobacco Use    Smoking status: Current Every Day Smoker     Packs/day: 1.00     Years: 5.00     Pack years: 5.00     Types: Cigarettes    Smokeless tobacco: Never Used    Tobacco comment: pt aware of risk   Substance and Sexual Activity    Alcohol use: Not Currently     Alcohol/week: 0.0 standard drinks     Frequency: Never     Comment: Had been drinking whiskey and vodka on a regular basis about 6 months ago. Nothing since.     Drug use: Yes     Types: Methamphetamines, Marijuana, IV, Cocaine Comment: occassionally, last use 2 weeks ago    Sexual activity: Yes     Partners: Female     Comment: No protection or birth-control plan   Lifestyle    Physical activity:     Days per week: 0 days     Minutes per session: 0 min    Stress: Very much   Relationships    Social connections:     Talks on phone: More than three times a week     Gets together: More than three times a week     Attends [employer] service: Never     Active member of club or organization: No     Attends meetings of clubs or organizations: Never     Relationship status: None    Intimate partner violence:     Fear of current or ex partner: Patient refused     Emotionally abused: Patient refused     Physically abused: Patient refused     Forced sexual activity: Patient refused   Other Topics Concern    None   Social History Narrative    ** Merged History Encounter **            SCREENINGS           PHYSICAL EXAM    (up to 7 forlevel 4, 8 or more for level 5)     ED Triage Vitals [02/16/20 1604]   BP Temp Temp Source Pulse Resp SpO2 Height Weight   125/81 98.7 °F (37.1 °C) Oral 131 20 96 % -- 145 lb (65.8 kg)       Physical Exam  Vitals signs and nursing note reviewed. Constitutional:       General: He is not in acute distress. Appearance: He is well-developed. He is not diaphoretic. HENT:      Head: Normocephalic and atraumatic. Mouth/Throat:      Pharynx: No oropharyngeal exudate. Eyes:      General: No scleral icterus. Conjunctiva/sclera: Conjunctivae normal.      Pupils: Pupils are equal, round, and reactive to light. Neck:      Musculoskeletal: Normal range of motion and neck supple. Trachea: No tracheal deviation. Cardiovascular:      Rate and Rhythm: Normal rate. Heart sounds: Normal heart sounds. Pulmonary:      Effort: Pulmonary effort is normal. No respiratory distress. Breath sounds: Normal breath sounds. Abdominal:      General: Bowel sounds are normal. There is no distension. Palpations: Abdomen is soft. Tenderness: There is abdominal tenderness in the right lower quadrant. Musculoskeletal: Normal range of motion. Skin:     General: Skin is warm and dry. Findings: No erythema or rash. Neurological:      Mental Status: He is alert and oriented to person, place, and time. Cranial Nerves: No cranial nerve deficit. Motor: No abnormal muscle tone. Psychiatric:         Behavior: Behavior normal.         Thought Content: Thought content normal.         Judgment: Judgment normal.           DIAGNOSTIC RESULTS     RADIOLOGY:   Non-plain film images such as CT, Ultrasound and MRI are read by the radiologist. Moris Horne radiographic images are visualized and preliminarilyinterpreted by Raj Mcfarland PA-C with the below findings:      Interpretation per the Radiologist below, if available at the time of this note:    CT ABDOMEN PELVIS W IV CONTRAST Additional Contrast? None    (Results Pending)       LABS:  Labs Reviewed   POCT GLUCOSE - Abnormal; Notable for the following components:       Result Value    POC Glucose 408 (*)     All other components within normal limits   POCT VENOUS - Abnormal; Notable for the following components:    POC Sodium 134 (*)     POC Glucose 429 (*)     pH, Darryl 7.458 (*)     pCO2, Darryl 36.6 (*)     Lactate 2.35 (*)     All other components within normal limits   CBC WITH AUTO DIFFERENTIAL   LACTIC ACID, PLASMA   COMPREHENSIVE METABOLIC PANEL   LIPASE   URINE RT REFLEX TO CULTURE   POCT EPOC BLOOD GAS, LACTIC ACID, ICA       All other labs were within normal range or not returnedas of this dictation.     EMERGENCYDEPARTMENT COURSE and DIFFERENTIAL DIAGNOSIS/MDM:   Vitals:    Vitals:    02/16/20 1604 02/16/20 1630   BP: 125/81 118/80   Pulse: 131 105   Resp: 20 16   Temp: 98.7 °F (37.1 °C)    TempSrc: Oral    SpO2: 96% 96%   Weight: 145 lb (65.8 kg)        REASSESSMENT        Patient had a family emergency developed and had to sign out AMA prior to

## 2020-02-17 LAB
GFR AFRICAN AMERICAN: >60
GFR NON-AFRICAN AMERICAN: >60
PERFORMED ON: NORMAL
POC CREATININE: 1.1 MG/DL (ref 0.9–1.3)
POC SAMPLE TYPE: NORMAL

## 2020-03-12 ENCOUNTER — HOSPITAL ENCOUNTER (EMERGENCY)
Age: 25
Discharge: HOME | End: 2020-03-12
Payer: MEDICARE

## 2020-03-12 VITALS
RESPIRATION RATE: 16 BRPM | WEIGHT: 150 LBS | HEART RATE: 76 BPM | SYSTOLIC BLOOD PRESSURE: 120 MMHG | BODY MASS INDEX: 24.11 KG/M2 | OXYGEN SATURATION: 99 % | HEIGHT: 66 IN | DIASTOLIC BLOOD PRESSURE: 78 MMHG | TEMPERATURE: 97.9 F

## 2020-03-12 LAB
ALBUMIN SERPL-MCNC: 4.1 G/DL (ref 3.5–4.6)
ALP BLD-CCNC: 120 U/L (ref 35–104)
ALT SERPL-CCNC: 18 U/L (ref 0–41)
ANION GAP SERPL CALCULATED.3IONS-SCNC: 14 MEQ/L (ref 9–15)
AST SERPL-CCNC: 13 U/L (ref 0–40)
BASOPHILS ABSOLUTE: 0 K/UL (ref 0–0.2)
BASOPHILS RELATIVE PERCENT: 0.8 %
BILIRUB SERPL-MCNC: <0.2 MG/DL (ref 0.2–0.7)
BILIRUBIN URINE: NEGATIVE
BLOOD, URINE: NEGATIVE
BUN BLDV-MCNC: 20 MG/DL (ref 6–20)
CALCIUM SERPL-MCNC: 9.2 MG/DL (ref 8.5–9.9)
CHLORIDE BLD-SCNC: 97 MEQ/L (ref 95–107)
CHP ED QC CHECK: YES
CLARITY: CLEAR
CO2: 25 MEQ/L (ref 20–31)
COLOR: YELLOW
CREAT SERPL-MCNC: 0.88 MG/DL (ref 0.7–1.2)
EOSINOPHILS ABSOLUTE: 0.2 K/UL (ref 0–0.7)
EOSINOPHILS RELATIVE PERCENT: 3 %
GFR AFRICAN AMERICAN: >60
GFR NON-AFRICAN AMERICAN: >60
GLOBULIN: 2.9 G/DL (ref 2.3–3.5)
GLUCOSE BLD-MCNC: 181 MG/DL
GLUCOSE BLD-MCNC: 181 MG/DL (ref 60–115)
GLUCOSE BLD-MCNC: 323 MG/DL (ref 60–115)
GLUCOSE BLD-MCNC: 370 MG/DL (ref 60–115)
GLUCOSE BLD-MCNC: 418 MG/DL (ref 70–99)
GLUCOSE URINE: >=1000 MG/DL
HCT VFR BLD CALC: 43.7 % (ref 42–52)
HEMOGLOBIN: 14.9 G/DL (ref 14–18)
KETONES, URINE: NEGATIVE MG/DL
LACTIC ACID: 1.3 MMOL/L (ref 0.5–2.2)
LEUKOCYTE ESTERASE, URINE: NEGATIVE
LYMPHOCYTES ABSOLUTE: 1.9 K/UL (ref 1–4.8)
LYMPHOCYTES RELATIVE PERCENT: 34.8 %
MCH RBC QN AUTO: 29.3 PG (ref 27–31.3)
MCHC RBC AUTO-ENTMCNC: 34.2 % (ref 33–37)
MCV RBC AUTO: 85.6 FL (ref 80–100)
MONOCYTES ABSOLUTE: 0.4 K/UL (ref 0.2–0.8)
MONOCYTES RELATIVE PERCENT: 8.5 %
NEUTROPHILS ABSOLUTE: 2.8 K/UL (ref 1.4–6.5)
NEUTROPHILS RELATIVE PERCENT: 52.9 %
NITRITE, URINE: NEGATIVE
PDW BLD-RTO: 13.2 % (ref 11.5–14.5)
PERFORMED ON: ABNORMAL
PH UA: 6.5 (ref 5–9)
PLATELET # BLD: 278 K/UL (ref 130–400)
POTASSIUM SERPL-SCNC: 4.1 MEQ/L (ref 3.4–4.9)
PROTEIN UA: NEGATIVE MG/DL
RBC # BLD: 5.1 M/UL (ref 4.7–6.1)
SODIUM BLD-SCNC: 136 MEQ/L (ref 135–144)
SPECIFIC GRAVITY UA: 1.04 (ref 1–1.03)
STREP GRP A PCR: NEGATIVE
TOTAL PROTEIN: 7 G/DL (ref 6.3–8)
URINE REFLEX TO CULTURE: ABNORMAL
UROBILINOGEN, URINE: 0.2 E.U./DL
WBC # BLD: 5.3 K/UL (ref 4.8–10.8)

## 2020-03-12 PROCEDURE — 36415 COLL VENOUS BLD VENIPUNCTURE: CPT

## 2020-03-12 PROCEDURE — 99284 EMERGENCY DEPT VISIT MOD MDM: CPT

## 2020-03-12 PROCEDURE — 85025 COMPLETE CBC W/AUTO DIFF WBC: CPT

## 2020-03-12 PROCEDURE — 81003 URINALYSIS AUTO W/O SCOPE: CPT

## 2020-03-12 PROCEDURE — 2580000003 HC RX 258: Performed by: NURSE PRACTITIONER

## 2020-03-12 PROCEDURE — 96374 THER/PROPH/DIAG INJ IV PUSH: CPT

## 2020-03-12 PROCEDURE — 87651 STREP A DNA AMP PROBE: CPT

## 2020-03-12 PROCEDURE — 83605 ASSAY OF LACTIC ACID: CPT

## 2020-03-12 PROCEDURE — 6370000000 HC RX 637 (ALT 250 FOR IP): Performed by: NURSE PRACTITIONER

## 2020-03-12 PROCEDURE — 80053 COMPREHEN METABOLIC PANEL: CPT

## 2020-03-12 RX ORDER — NAPROXEN 250 MG/1
250 TABLET ORAL 2 TIMES DAILY WITH MEALS
Qty: 60 TABLET | Refills: 0 | Status: SHIPPED | OUTPATIENT
Start: 2020-03-12 | End: 2020-03-24

## 2020-03-12 RX ORDER — BENZONATATE 100 MG/1
100 CAPSULE ORAL 3 TIMES DAILY PRN
Qty: 20 CAPSULE | Refills: 0 | Status: SHIPPED | OUTPATIENT
Start: 2020-03-12 | End: 2020-03-24

## 2020-03-12 RX ORDER — 0.9 % SODIUM CHLORIDE 0.9 %
2000 INTRAVENOUS SOLUTION INTRAVENOUS ONCE
Status: COMPLETED | OUTPATIENT
Start: 2020-03-12 | End: 2020-03-12

## 2020-03-12 RX ADMIN — INSULIN HUMAN 10 UNITS: 100 INJECTION, SOLUTION PARENTERAL at 10:00

## 2020-03-12 RX ADMIN — SODIUM CHLORIDE 2000 ML: 9 INJECTION, SOLUTION INTRAVENOUS at 20:20

## 2020-03-12 ASSESSMENT — PAIN DESCRIPTION - LOCATION: LOCATION: HEAD;THROAT

## 2020-03-12 ASSESSMENT — PAIN SCALES - GENERAL: PAINLEVEL_OUTOF10: 6

## 2020-03-13 ASSESSMENT — ENCOUNTER SYMPTOMS
COUGH: 0
VOMITING: 0
SORE THROAT: 1
TROUBLE SWALLOWING: 0
BLOOD IN STOOL: 0
BACK PAIN: 0
SHORTNESS OF BREATH: 0
EYE PAIN: 0
NAUSEA: 0
ABDOMINAL PAIN: 0
DIARRHEA: 0
EYE REDNESS: 0
CONSTIPATION: 0
RHINORRHEA: 0
EYE DISCHARGE: 0
COLOR CHANGE: 0
WHEEZING: 0

## 2020-03-13 NOTE — ED PROVIDER NOTES
[address] [facility] ED  EMERGENCY DEPARTMENT ENCOUNTER      Pt Name: Adriana Lamb  MRN: [id number]  [place] 1995  Date of evaluation: 3/12/2020  Provider: YAMIL Ozuna CNP    CHIEF COMPLAINT       Chief Complaint   Patient presents with    Hyperglycemia    Pharyngitis         HISTORY OF PRESENT ILLNESS   (Location/Symptom, Timing/Onset,Context/Setting, Quality, Duration, Modifying Factors, Severity)  Note limiting factors. Adriana Lamb is a 27 y.o. male who presents to the emergency department with a chief complaint of sore throat started 3 days ago rating 7 out of 10 described as a burning sensation with no alleviating or modifying factors. He also reports that his sugar has not been controlled and he ran out of his the strips and has not been able to be taking his insulin as appropriate. He denies having any shortness of breath, diaphoresis, chest pain, nausea or vomiting. Nursing Notes were reviewed. REVIEW OF SYSTEMS    (2-9 systems for level 4, 10 or more for level 5)     Review of Systems   Constitutional: Negative for activity change, appetite change, fatigue and fever. HENT: Positive for sore throat. Negative for congestion, ear pain, rhinorrhea and trouble swallowing. Eyes: Negative for pain, discharge and redness. Respiratory: Negative for cough, shortness of breath and wheezing. Cardiovascular: Negative for chest pain and palpitations. Gastrointestinal: Negative for abdominal pain, blood in stool, constipation, diarrhea, nausea and vomiting. Endocrine: Negative for polydipsia and polyuria. Genitourinary: Negative for decreased urine volume, dysuria, flank pain and hematuria. Musculoskeletal: Negative for arthralgias, back pain and myalgias. Skin: Negative for color change, rash and wound. Neurological: Negative for dizziness, syncope, weakness, light-headedness and headaches. Psychiatric/Behavioral: Negative for behavioral problems.    All other systems reviewed and are negative. Except as noted above the remainder of the review of systems was reviewed and negative. PAST MEDICAL HISTORY     Past Medical History:   Diagnosis Date    ADHD (attention deficit hyperactivity disorder)     Asperger syndrome     Asthma     Asthma 3/31/2015    Bipolar 1 disorder, mixed, severe ([facility] [address] 2/5/2018    Chemical dependency ([facility] [address]     marijuana    Diabetes mellitus ([facility] [address]     Diabetes mellitus type 1 ([facility] [address]     Hepatitis C     Hepatitis C     Hyperkalemia, diminished renal excretion 11/9/2017    Mental disorder     ODD (oppositional defiant disorder)     Seasonal allergies 3/31/2015    Seizures ([facility] [address]      Past Surgical History:   Procedure Laterality Date    ABDOMEN SURGERY Left 10/7/2019    INCISION  & DRAINAGE OF ABSCESS LEFT ABDOMINAL WALL performed by [facility] at [facility] N/A 10/21/2019    INCISION AND DRAINAGE OF RECURRENT ABDOMINAL WALL ABSCESS ROOM 475 performed by Aakash Shukla MD at [address] [place] [address] History     Socioeconomic History    Marital status: Single     Spouse name: None    Number of children: 0    Years of education: 6    Highest education level: None   Occupational History    None   Social Needs    Financial resource strain: Hard    Food insecurity     Worry: Often true     Inability: Often true    Transportation needs     Medical: Yes     Non-medical: Yes   Tobacco Use    Smoking status: Current Every Day Smoker     Packs/day: 1.00     Years: 5.00     Pack years: 5.00     Types: Cigarettes    Smokeless tobacco: Never Used    Tobacco comment: pt aware of risk   Substance and Sexual Activity    Alcohol use: Not Currently     Alcohol/week: 0.0 standard drinks     Frequency: Never     Comment: Had been drinking whiskey and vodka on a regular basis about 6 months ago. Nothing since.     Drug use: Yes     Types: Methamphetamines, Marijuana, IV, Cocaine     Comment: occassionally, last use 2 weeks ago    Sexual activity: Yes     Partners: Female     Comment: No protection or birth-control plan   Lifestyle    Physical activity     Days per week: 0 days     Minutes per session: 0 min    Stress: Very much   Relationships    Social connections     Talks on phone: More than three times a week     Gets together: More than three times a week     Attends Catholic service: Never     Active member of club or organization: No     Attends meetings of clubs or organizations: Never     Relationship status: None    Intimate partner violence     Fear of current or ex partner: Patient refused     Emotionally abused: Patient refused     Physically abused: Patient refused     Forced sexual activity: Patient refused   Other Topics Concern    None   Social History Narrative    ** Merged History Encounter **            SCREENINGS             PHYSICAL EXAM    (up to 7 for level 4, 8 or more for level 5)     ED Triage Vitals [03/12/20 1940]   BP Temp Temp Source Pulse Resp SpO2 Height Weight   122/87 97.9 °F (36.6 °C) Oral 88 19 97 % 5' 6\" (1.676 m) 150 lb (68 kg)       Physical Exam  Vitals signs and nursing note reviewed. Constitutional:       General: He is not in acute distress. Appearance: He is well-developed. He is not diaphoretic. HENT:      Head: Normocephalic and atraumatic. Nose: Nose normal.   Eyes:      Conjunctiva/sclera: Conjunctivae normal.      Pupils: Pupils are equal, round, and reactive to light. Neck:      Musculoskeletal: Normal range of motion and neck supple. Cardiovascular:      Rate and Rhythm: Normal rate and regular rhythm. Heart sounds: Normal heart sounds. Pulmonary:      Effort: Pulmonary effort is normal. No respiratory distress. Breath sounds: Normal breath sounds. No wheezing. Abdominal:      General: Bowel sounds are normal.      Palpations: Abdomen is soft. Tenderness: There is no abdominal tenderness. Skin:     General: Skin is warm and dry. Capillary Refill: Capillary refill takes less than 2 seconds. Findings: No rash. Neurological:      Mental Status: He is alert and oriented to person, place, and time. Cranial Nerves: No cranial nerve deficit. Psychiatric:         Behavior: Behavior normal.         RESULTS     EKG: All EKG's are interpreted by the Emergency Department Physician who either signs or Co-signsthis chart in the absence of a cardiologist.        RADIOLOGY:   [place] such as CT, Ultrasound and MRI are read by the radiologist. Plain radiographic images are visualized and preliminarily interpreted by the emergency physician with the below findings:        Interpretation per the Radiologist below, if available at the time ofthis note:    No orders to display         ED BEDSIDE ULTRASOUND:   Performed by ED Physician - none    LABS:  Labs Reviewed   COMPREHENSIVE METABOLIC PANEL - Abnormal; Notable for the following components:       Result Value    Glucose 418 (*)     Alkaline Phosphatase 120 (*)     All other components within normal limits    Narrative:     Gilda Caban tel. [phone],  Chemistry results called to and read back by dr Sheyla Soler, 03/12/2020 20:53, by  Lisa Mcdonnell   URINE RT REFLEX TO CULTURE - Abnormal; Notable for the following components:    Glucose, Ur >=1000 (*)     All other components within normal limits   POCT GLUCOSE - Abnormal; Notable for the following components:    POC Glucose 370 (*)     All other components within normal limits   POCT GLUCOSE - Abnormal; Notable for the following components:    POC Glucose 323 (*)     All other components within normal limits   POCT GLUCOSE - Abnormal; Notable for the following components:    POC Glucose 181 (*)     All other components within normal limits   POCT GLUCOSE - Normal   RAPID STREP SCREEN   CBC WITH AUTO DIFFERENTIAL   LACTIC ACID, PLASMA       All other labs were within normal range or not returned as of this dictation.     EMERGENCY

## 2020-03-24 ENCOUNTER — HOSPITAL ENCOUNTER (EMERGENCY)
Age: 25
Discharge: HOME | End: 2020-03-24
Payer: MEDICARE

## 2020-03-24 VITALS
RESPIRATION RATE: 18 BRPM | DIASTOLIC BLOOD PRESSURE: 84 MMHG | HEART RATE: 105 BPM | TEMPERATURE: 98.7 F | OXYGEN SATURATION: 97 % | SYSTOLIC BLOOD PRESSURE: 143 MMHG

## 2020-03-24 PROCEDURE — 99284 EMERGENCY DEPT VISIT MOD MDM: CPT

## 2020-03-24 ASSESSMENT — ENCOUNTER SYMPTOMS
EYE DISCHARGE: 0
COUGH: 1
PHOTOPHOBIA: 0
VOICE CHANGE: 0
ABDOMINAL DISTENTION: 0
BACK PAIN: 0
ANAL BLEEDING: 0
NAUSEA: 0
VOMITING: 0
APNEA: 0

## 2020-03-24 NOTE — ED NOTES
Patient refusing to let this nurse check his blood sugar, or to change. Patient is asking to leave. Denies SI/HI, A/V hallucinations.      Carmine Cedillo RN  03/24/20 1946

## 2020-03-25 ENCOUNTER — CARE COORDINATION (OUTPATIENT)
Dept: CARE COORDINATION | Age: 25
End: 2020-03-25

## 2020-03-25 NOTE — ED PROVIDER NOTES
[address] [facility] ED  eMERGENCY dEPARTMENT eNCOUnter      Pt Name: Jeanne Sauceda  MRN: [id number]  [place] 1995  Date of evaluation: 3/24/2020  Provider: Brenna Grande PA-C    CHIEF COMPLAINT       Chief Complaint   Patient presents with   [address] Problem     pt sent by [facility]pd   for eval  was arguing with hios mom and her boyfriend          HISTORY OF PRESENT ILLNESS   (Location/Symptom, Timing/Onset,Context/Setting, Quality, Duration, Modifying Factors, Severity)  Note limiting factors. Jeanne Sauceda is a 23 y.o. male who presents to the emergency department patient presents by [facility] for evaluation he had gotten into an argument with his mother. Patient denies suicidal ideation homicidal ideation he denies hallucinations he denies intent to harm himself or anyone else. Patient denies fever chills nausea vomiting denies chest pain shortness of breath. He does smoke we discussed smoking cessation at length including benefits for greater than 5 minutes. He is to follow-up with primary care and his endocrinologist for this. Symptoms mild severity nothing improves or worsens his symptoms. HPI    NursingNotes were reviewed. REVIEW OF SYSTEMS    (2-9 systems for level 4, 10 or more for level 5)     Review of Systems   Constitutional: Negative for activity change, appetite change, chills, fever and unexpected weight change. HENT: Negative for ear discharge, nosebleeds and voice change. Eyes: Negative for photophobia and discharge. Respiratory: Positive for cough. Negative for apnea. Cardiovascular: Negative for chest pain. Gastrointestinal: Negative for abdominal distention, anal bleeding, nausea and vomiting. Endocrine: Negative for cold intolerance, heat intolerance and polyphagia. Genitourinary: Negative for genital sores. Musculoskeletal: Negative for back pain, joint swelling, neck pain and neck stiffness. Skin: Negative for pallor and rash. Allergic/Immunologic: Negative for immunocompromised state. Neurological: Negative for seizures and facial asymmetry. Hematological: Does not bruise/bleed easily. Psychiatric/Behavioral: Negative for behavioral problems, confusion, sleep disturbance and suicidal ideas. All other systems reviewed and are negative. Except as noted above the remainder of the review of systems was reviewed and negative. PAST MEDICAL HISTORY     Past Medical History:   Diagnosis Date    ADHD (attention deficit hyperactivity disorder)     Asperger syndrome     Asthma     Asthma 3/31/2015    Bipolar 1 disorder, mixed, severe ([facility] [address] 2/5/2018    Chemical dependency ([facility] [address]     marijuana    Diabetes mellitus ([facility] [address]     Diabetes mellitus type 1 ([facility] [address]     Hepatitis C     Hepatitis C     Hyperkalemia, diminished renal excretion 11/9/2017    Mental disorder     ODD (oppositional defiant disorder)     Seasonal allergies 3/31/2015    Seizures ([facility] [address]          SURGICALHISTORY       Past Surgical History:   Procedure Laterality Date    ABDOMEN SURGERY Left 10/7/2019    INCISION  & DRAINAGE OF ABSCESS LEFT ABDOMINAL WALL performed by Aurelio Cam MD at [address] N/A 10/21/2019    INCISION AND DRAINAGE OF RECURRENT ABDOMINAL WALL ABSCESS ROOM 475 performed by Aurelio Cam MD at [facility]       Previous Medications    ALBUTEROL SULFATE  (90 BASE) MCG/ACT INHALER    Use 2 puffs 4 times daily for 7 days then as needed for wheezing. Dispense with Spacer and instruct in use. At patient's preference may use 60 dose MDI. May Sub Pro-Air or Proventil as needed per insurance. BLOOD GLUCOSE MONITOR KIT AND SUPPLIES    Give 1 meter Dx E11.65    BLOOD GLUCOSE MONITOR STRIPS    Test 4 times a day & as needed for symptoms of irregular blood glucose.     GLUCOSE 4 G CHEWABLE TABLET    Take 4 tablets by mouth as needed for Low blood sugar    INSULIN ASPART (NOVOLOG FLEXPEN) 100 No stridor. No wheezing or rhonchi. Abdominal:      General: Bowel sounds are normal. There is no distension. Palpations: Abdomen is soft. Musculoskeletal: Normal range of motion. Skin:     General: Skin is warm. Findings: No erythema. Comments: Patient has left and right forearm old sanguinous crusted lacerations no active bleeding no recent erythematous lesions noted. Neurological:      General: No focal deficit present. Mental Status: He is alert and oriented to person, place, and time. Psychiatric:         Mood and Affect: Mood normal.         DIAGNOSTIC RESULTS     EKG: All EKG's are interpreted by the Emergency Department Physician who either signs or Co-signsthis chart in the absence of a cardiologist.         RADIOLOGY:   Braga Hashimoto such as CT, Ultrasound and MRI are read by the radiologist. Plain radiographic images are visualized and preliminarily interpreted by the emergency physician with the below findings:         Interpretation per the Radiologist below, if available at the time ofthis note:    No orders to display         ED BEDSIDE ULTRASOUND:   Performed by ED Physician - none    LABS:  Labs Reviewed - No data to display    All other labs were within normal range or not returned as of this dictation. EMERGENCY DEPARTMENT COURSE and DIFFERENTIAL DIAGNOSIS/MDM:   Vitals:    Vitals:    03/24/20 1934   BP: (!) 143/84   Pulse: 105   Resp: 18   Temp: 98.7 °F (37.1 °C)   TempSrc: Oral   SpO2: 97%            MDM  Number of Diagnoses or Management Options  Adjustment disorder, unspecified type:   Encounter for smoking cessation counseling:   Diagnosis management comments: Patient has consistently stated is not suicidal homicidal to multiple staff members upon questioning.   He is calm cooperative alert and oriented x4 we discussed smoking cessation at length greater than 5 minutes patient is a type I diabetic notes that he does have a history of infections including

## 2020-04-14 RX ORDER — PERPHENAZINE 16 MG/1
TABLET, FILM COATED ORAL
Qty: 200 EACH | Refills: 3 | Status: SHIPPED | OUTPATIENT
Start: 2020-04-14 | End: 2020-04-17 | Stop reason: SDUPTHER

## 2020-04-17 RX ORDER — PERPHENAZINE 16 MG/1
TABLET, FILM COATED ORAL
Qty: 200 EACH | Refills: 3 | Status: SHIPPED | OUTPATIENT
Start: 2020-04-17

## 2020-04-21 RX ORDER — INSULIN GLARGINE 100 [IU]/ML
INJECTION, SOLUTION SUBCUTANEOUS
Qty: 10 ML | Refills: 3 | OUTPATIENT
Start: 2020-04-21

## 2020-05-04 RX ORDER — INSULIN GLARGINE 100 [IU]/ML
INJECTION, SOLUTION SUBCUTANEOUS
Qty: 10 ML | Refills: 3 | OUTPATIENT
Start: 2020-05-04

## 2020-05-30 ENCOUNTER — TELEPHONE (OUTPATIENT)
Dept: FAMILY MEDICINE CLINIC | Age: 25
End: 2020-05-30

## 2020-06-18 ENCOUNTER — VIRTUAL VISIT (OUTPATIENT)
Dept: ENDOCRINOLOGY | Age: 25
End: 2020-06-18
Payer: MEDICARE

## 2020-06-18 PROCEDURE — 3046F HEMOGLOBIN A1C LEVEL >9.0%: CPT | Performed by: INTERNAL MEDICINE

## 2020-06-18 PROCEDURE — 2022F DILAT RTA XM EVC RTNOPTHY: CPT | Performed by: INTERNAL MEDICINE

## 2020-06-18 PROCEDURE — 99213 OFFICE O/P EST LOW 20 MIN: CPT | Performed by: INTERNAL MEDICINE

## 2020-06-18 PROCEDURE — G8428 CUR MEDS NOT DOCUMENT: HCPCS | Performed by: INTERNAL MEDICINE

## 2020-06-18 NOTE — PROGRESS NOTES
181 (H)   Calcium Latest Ref Range: 8.5 - 9.9 mg/dL  9.2      Total Protein Latest Ref Range: 6.3 - 8.0 g/dL  7.0      QC OK?  Unknown     Yes   Albumin Latest Ref Range: 3.5 - 4.6 g/dL  4.1      Globulin Latest Ref Range: 2.3 - 3.5 g/dL  2.9      Alk Phos Latest Ref Range: 35 - 104 U/L  120 (H)      ALT Latest Ref Range: 0 - 41 U/L  18      AST Latest Ref Range: 0 - 40 U/L  13      Bilirubin Latest Ref Range: 0.2 - 0.7 mg/dL  <0.2            Patient Active Problem List   Diagnosis    DKA, type 1, not at goal [facility])    ADHD (attention deficit hyperactivity disorder)    Oppositional defiant disorder    Seasonal allergies    Asthma    Atopic rhinitis    Closed fracture of neck of metacarpal bone    Congenital anomaly of cerebrovascular system    Generalized convulsive epilepsy ([address]    Contusion of hand    Closed fracture of base of other metacarpal bone(s)    Atopic dermatitis    Adult behavior problems    Congenital vascular nevus    Epilepsy (HCC)    Congenital vascular nevus    Generalized tonic clonic epilepsy (HCC)    Asperger syndrome    Chemical dependency (HCC)    History of oppositional defiant disorder    Hyperglycemia due to type 1 diabetes mellitus (HCC)    Metabolic acidosis with increased anion gap and accumulation of organic acids    Lactic acid acidosis    Hyperkalemia, diminished renal excretion    Hyperkalemia, transcellular shifts    TWIN (acute kidney injury) ([address]    Heroin abuse (HCC)    Hyperglycemia    Substance induced mood disorder ([address]    H/O bipolar disorder    Polysubstance abuse ([address]    Suicide attempt ([address]    Bipolar 1 disorder, mixed, severe ([address]    Multiple drug overdose    Respiratory insufficiency    DM w/ coma type I, uncontrolled ([address]    Seizures ([address]    Hepatitis C    Diabetes mellitus type 1 ([address]    Cellulitis    Abdominal wall abscess    Hypergranulation         Review of Systems    Prior to Visit Medications    Medication Sig whiskey and vodka on a regular basis about 6 months ago. Nothing since.  Drug use: Yes     Types: Methamphetamines, Marijuana, IV, Cocaine     Comment: occassionally, last use 2 weeks ago            PHYSICAL EXAMINATION:  [ INSTRUCTIONS:  \"[x]\" Indicates a positive item  \"[]\" Indicates a negative item  -- DELETE ALL ITEMS NOT EXAMINED]  [] Alert  [] Oriented to person/place/time    [] No apparent distress  [] Toxic appearing    [] Face flushed appearing [] Sclera clear  [] Lips are cyanotic      [] Breathing appears normal  [] Appears tachypneic      [] Rash on visible skin    [] Cranial Nerves II-XII grossly intact    [] Motor grossly intact in visible upper extremities    [] Motor grossly intact in visible lower extremities    [] Normal Mood  [] Anxious appearing    [] Depressed appearing  [] Confused appearing      [] Poor short term memory  [] Poor long term memory    [] OTHER:      Due to this being a TeleHealth encounter, evaluation of the following organ systems is limited: Vitals/Constitutional/EENT/Resp/CV/GI//MS/Neuro/Skin/Heme-Lymph-Imm. ASSESSMENT/PLAN:     Diagnosis Orders   1. Type 1 diabetes mellitus with other specified complication [facility])         Patient advised to come to the office for follow-up and also get his pump downloaded    Continue NovoLog via pump as per current setting    To test blood sugars 4-5 times daily    Patient to follow-up to get his labs done also when he is here    Total time spent with patient was 16 minutes    An  electronic signature was used to authenticate this note.     --Salina Loja MD on 6/18/2020 at 2:34 PM        Pursuant to the emergency declaration under the [facility] waiver authority and the [employer] Act, this Virtual  Visit was conducted, with patient's consent, to reduce the patient's risk of exposure to COVID-19 and provide continuity of care for an established patient. Services were provided through a video synchronous discussion virtually to substitute for in-person clinic visit.

## 2020-07-28 ENCOUNTER — HOSPITAL ENCOUNTER (INPATIENT)
Age: 25
LOS: 1 days | Discharge: PSYCHIATRIC OR SUBSTANCE USE FACILITY | DRG: 917 | End: 2020-07-30
Attending: INTERNAL MEDICINE | Admitting: INTERNAL MEDICINE
Payer: MEDICARE

## 2020-07-28 PROBLEM — T50.901A DRUG OVERDOSE, ACCIDENTAL OR UNINTENTIONAL, INITIAL ENCOUNTER: Status: ACTIVE | Noted: 2020-07-28

## 2020-07-28 LAB
ACETAMINOPHEN LEVEL: <5 UG/ML (ref 10–30)
ALBUMIN SERPL-MCNC: 3.8 G/DL (ref 3.5–4.6)
ALP BLD-CCNC: 63 U/L (ref 35–104)
ALT SERPL-CCNC: 37 U/L (ref 0–41)
ANION GAP SERPL CALCULATED.3IONS-SCNC: 18 MEQ/L (ref 9–15)
AST SERPL-CCNC: 58 U/L (ref 0–40)
BASOPHILS ABSOLUTE: 0 K/UL (ref 0–0.2)
BASOPHILS RELATIVE PERCENT: 0.9 %
BILIRUB SERPL-MCNC: 0.3 MG/DL (ref 0.2–0.7)
BUN BLDV-MCNC: 9 MG/DL (ref 6–20)
CALCIUM SERPL-MCNC: 8.8 MG/DL (ref 8.5–9.9)
CHLORIDE BLD-SCNC: 95 MEQ/L (ref 95–107)
CHP ED QC CHECK: NORMAL
CK MB: 1.3 NG/ML (ref 0–6.7)
CK MB: 1.4 NG/ML (ref 0–6.7)
CK MB: 1.5 NG/ML (ref 0–6.7)
CO2: 20 MEQ/L (ref 20–31)
CREAT SERPL-MCNC: 1.02 MG/DL (ref 0.7–1.2)
CREATINE KINASE-MB INDEX: 0.1 % (ref 0–3.5)
CREATINE KINASE-MB INDEX: 0.1 % (ref 0–3.5)
CREATINE KINASE-MB INDEX: 0.2 % (ref 0–3.5)
EKG ATRIAL RATE: 106 BPM
EKG ATRIAL RATE: 150 BPM
EKG ATRIAL RATE: 96 BPM
EKG P AXIS: 64 DEGREES
EKG P AXIS: 66 DEGREES
EKG P AXIS: 71 DEGREES
EKG P-R INTERVAL: 122 MS
EKG P-R INTERVAL: 132 MS
EKG P-R INTERVAL: 136 MS
EKG Q-T INTERVAL: 282 MS
EKG Q-T INTERVAL: 364 MS
EKG Q-T INTERVAL: 376 MS
EKG QRS DURATION: 112 MS
EKG QRS DURATION: 112 MS
EKG QRS DURATION: 94 MS
EKG QTC CALCULATION (BAZETT): 445 MS
EKG QTC CALCULATION (BAZETT): 475 MS
EKG QTC CALCULATION (BAZETT): 483 MS
EKG R AXIS: 71 DEGREES
EKG R AXIS: 71 DEGREES
EKG R AXIS: 81 DEGREES
EKG T AXIS: 12 DEGREES
EKG T AXIS: 25 DEGREES
EKG T AXIS: 45 DEGREES
EKG VENTRICULAR RATE: 106 BPM
EKG VENTRICULAR RATE: 150 BPM
EKG VENTRICULAR RATE: 96 BPM
EOSINOPHILS ABSOLUTE: 0.1 K/UL (ref 0–0.7)
EOSINOPHILS RELATIVE PERCENT: 1 %
ETHANOL PERCENT: NORMAL G/DL
ETHANOL: <10 MG/DL (ref 0–0.08)
GFR AFRICAN AMERICAN: >60
GFR NON-AFRICAN AMERICAN: >60
GLOBULIN: 2.8 G/DL (ref 2.3–3.5)
GLUCOSE BLD-MCNC: 119 MG/DL (ref 60–115)
GLUCOSE BLD-MCNC: 149 MG/DL (ref 60–115)
GLUCOSE BLD-MCNC: 214 MG/DL (ref 60–115)
GLUCOSE BLD-MCNC: 337 MG/DL
GLUCOSE BLD-MCNC: 337 MG/DL (ref 60–115)
GLUCOSE BLD-MCNC: 353 MG/DL (ref 60–115)
GLUCOSE BLD-MCNC: 358 MG/DL (ref 60–115)
GLUCOSE BLD-MCNC: 368 MG/DL (ref 70–99)
GLUCOSE BLD-MCNC: 390 MG/DL (ref 60–115)
HCT VFR BLD CALC: 43.6 % (ref 42–52)
HEMOGLOBIN: 14.9 G/DL (ref 14–18)
LYMPHOCYTES ABSOLUTE: 1.4 K/UL (ref 1–4.8)
LYMPHOCYTES RELATIVE PERCENT: 25.8 %
MAGNESIUM: 1.8 MG/DL (ref 1.7–2.4)
MCH RBC QN AUTO: 29 PG (ref 27–31.3)
MCHC RBC AUTO-ENTMCNC: 34.2 % (ref 33–37)
MCV RBC AUTO: 84.6 FL (ref 80–100)
MONOCYTES ABSOLUTE: 0.4 K/UL (ref 0.2–0.8)
MONOCYTES RELATIVE PERCENT: 6.7 %
NEUTROPHILS ABSOLUTE: 3.7 K/UL (ref 1.4–6.5)
NEUTROPHILS RELATIVE PERCENT: 65.6 %
PDW BLD-RTO: 13.4 % (ref 11.5–14.5)
PERFORMED ON: ABNORMAL
PLATELET # BLD: 261 K/UL (ref 130–400)
POTASSIUM SERPL-SCNC: 3.5 MEQ/L (ref 3.4–4.9)
RBC # BLD: 5.16 M/UL (ref 4.7–6.1)
SALICYLATE, SERUM: <0.3 MG/DL (ref 15–30)
SARS-COV-2, NAAT: NOT DETECTED
SODIUM BLD-SCNC: 133 MEQ/L (ref 135–144)
TOTAL CK: 1253 U/L (ref 0–190)
TOTAL CK: 619 U/L (ref 0–190)
TOTAL CK: 996 U/L (ref 0–190)
TOTAL PROTEIN: 6.6 G/DL (ref 6.3–8)
TSH SERPL DL<=0.05 MIU/L-ACNC: 1.24 UIU/ML (ref 0.44–3.86)
WBC # BLD: 5.6 K/UL (ref 4.8–10.8)

## 2020-07-28 PROCEDURE — G0480 DRUG TEST DEF 1-7 CLASSES: HCPCS

## 2020-07-28 PROCEDURE — 83735 ASSAY OF MAGNESIUM: CPT

## 2020-07-28 PROCEDURE — 96361 HYDRATE IV INFUSION ADD-ON: CPT

## 2020-07-28 PROCEDURE — 6370000000 HC RX 637 (ALT 250 FOR IP): Performed by: NURSE PRACTITIONER

## 2020-07-28 PROCEDURE — 99285 EMERGENCY DEPT VISIT HI MDM: CPT

## 2020-07-28 PROCEDURE — 6370000000 HC RX 637 (ALT 250 FOR IP): Performed by: INTERNAL MEDICINE

## 2020-07-28 PROCEDURE — 82550 ASSAY OF CK (CPK): CPT

## 2020-07-28 PROCEDURE — 80053 COMPREHEN METABOLIC PANEL: CPT

## 2020-07-28 PROCEDURE — 6360000002 HC RX W HCPCS: Performed by: NURSE PRACTITIONER

## 2020-07-28 PROCEDURE — 85025 COMPLETE CBC W/AUTO DIFF WBC: CPT

## 2020-07-28 PROCEDURE — 93010 ELECTROCARDIOGRAM REPORT: CPT | Performed by: INTERNAL MEDICINE

## 2020-07-28 PROCEDURE — 96374 THER/PROPH/DIAG INJ IV PUSH: CPT

## 2020-07-28 PROCEDURE — 82553 CREATINE MB FRACTION: CPT

## 2020-07-28 PROCEDURE — 96375 TX/PRO/DX INJ NEW DRUG ADDON: CPT

## 2020-07-28 PROCEDURE — G0378 HOSPITAL OBSERVATION PER HR: HCPCS

## 2020-07-28 PROCEDURE — 6360000002 HC RX W HCPCS: Performed by: INTERNAL MEDICINE

## 2020-07-28 PROCEDURE — 84443 ASSAY THYROID STIM HORMONE: CPT

## 2020-07-28 PROCEDURE — 96372 THER/PROPH/DIAG INJ SC/IM: CPT

## 2020-07-28 PROCEDURE — 2580000003 HC RX 258: Performed by: NURSE PRACTITIONER

## 2020-07-28 PROCEDURE — 36415 COLL VENOUS BLD VENIPUNCTURE: CPT

## 2020-07-28 PROCEDURE — 93005 ELECTROCARDIOGRAM TRACING: CPT | Performed by: NURSE PRACTITIONER

## 2020-07-28 PROCEDURE — 2580000003 HC RX 258: Performed by: PHYSICIAN ASSISTANT

## 2020-07-28 PROCEDURE — U0002 COVID-19 LAB TEST NON-CDC: HCPCS

## 2020-07-28 PROCEDURE — 96376 TX/PRO/DX INJ SAME DRUG ADON: CPT

## 2020-07-28 RX ORDER — SODIUM CHLORIDE 0.9 % (FLUSH) 0.9 %
10 SYRINGE (ML) INJECTION PRN
Status: DISCONTINUED | OUTPATIENT
Start: 2020-07-28 | End: 2020-07-30 | Stop reason: HOSPADM

## 2020-07-28 RX ORDER — NALOXONE HYDROCHLORIDE 1 MG/ML
2 INJECTION INTRAMUSCULAR; INTRAVENOUS; SUBCUTANEOUS ONCE
Status: COMPLETED | OUTPATIENT
Start: 2020-07-28 | End: 2020-07-28

## 2020-07-28 RX ORDER — LORAZEPAM 2 MG/ML
2 INJECTION INTRAMUSCULAR ONCE
Status: COMPLETED | OUTPATIENT
Start: 2020-07-28 | End: 2020-07-28

## 2020-07-28 RX ORDER — ACETAMINOPHEN 650 MG/1
650 SUPPOSITORY RECTAL EVERY 6 HOURS PRN
Status: DISCONTINUED | OUTPATIENT
Start: 2020-07-28 | End: 2020-07-30 | Stop reason: HOSPADM

## 2020-07-28 RX ORDER — KETOROLAC TROMETHAMINE 30 MG/ML
30 INJECTION, SOLUTION INTRAMUSCULAR; INTRAVENOUS EVERY 6 HOURS PRN
Status: DISCONTINUED | OUTPATIENT
Start: 2020-07-28 | End: 2020-07-30

## 2020-07-28 RX ORDER — DEXTROSE MONOHYDRATE 25 G/50ML
12.5 INJECTION, SOLUTION INTRAVENOUS PRN
Status: DISCONTINUED | OUTPATIENT
Start: 2020-07-28 | End: 2020-07-30 | Stop reason: HOSPADM

## 2020-07-28 RX ORDER — CLONIDINE HYDROCHLORIDE 0.1 MG/1
0.1 TABLET ORAL 2 TIMES DAILY
Status: DISCONTINUED | OUTPATIENT
Start: 2020-07-28 | End: 2020-07-28

## 2020-07-28 RX ORDER — POLYETHYLENE GLYCOL 3350 17 G/17G
17 POWDER, FOR SOLUTION ORAL DAILY PRN
Status: DISCONTINUED | OUTPATIENT
Start: 2020-07-28 | End: 2020-07-30

## 2020-07-28 RX ORDER — INSULIN GLARGINE 100 [IU]/ML
25 INJECTION, SOLUTION SUBCUTANEOUS NIGHTLY
Status: DISCONTINUED | OUTPATIENT
Start: 2020-07-28 | End: 2020-07-30

## 2020-07-28 RX ORDER — DEXTROSE MONOHYDRATE 50 MG/ML
100 INJECTION, SOLUTION INTRAVENOUS PRN
Status: DISCONTINUED | OUTPATIENT
Start: 2020-07-28 | End: 2020-07-30 | Stop reason: HOSPADM

## 2020-07-28 RX ORDER — 0.9 % SODIUM CHLORIDE 0.9 %
1000 INTRAVENOUS SOLUTION INTRAVENOUS ONCE
Status: COMPLETED | OUTPATIENT
Start: 2020-07-28 | End: 2020-07-28

## 2020-07-28 RX ORDER — LORAZEPAM 2 MG/ML
0.5 INJECTION INTRAMUSCULAR EVERY 6 HOURS PRN
Status: DISCONTINUED | OUTPATIENT
Start: 2020-07-28 | End: 2020-07-28

## 2020-07-28 RX ORDER — KETOROLAC TROMETHAMINE 30 MG/ML
30 INJECTION, SOLUTION INTRAMUSCULAR; INTRAVENOUS ONCE
Status: COMPLETED | OUTPATIENT
Start: 2020-07-28 | End: 2020-07-28

## 2020-07-28 RX ORDER — SODIUM CHLORIDE 9 MG/ML
INJECTION, SOLUTION INTRAVENOUS CONTINUOUS
Status: DISCONTINUED | OUTPATIENT
Start: 2020-07-28 | End: 2020-07-29

## 2020-07-28 RX ORDER — ACETAMINOPHEN 325 MG/1
650 TABLET ORAL EVERY 6 HOURS PRN
Status: DISCONTINUED | OUTPATIENT
Start: 2020-07-28 | End: 2020-07-30 | Stop reason: HOSPADM

## 2020-07-28 RX ORDER — CLONIDINE 0.2 MG/24H
1 PATCH, EXTENDED RELEASE TRANSDERMAL WEEKLY
Status: DISCONTINUED | OUTPATIENT
Start: 2020-07-28 | End: 2020-07-30

## 2020-07-28 RX ORDER — ONDANSETRON 2 MG/ML
4 INJECTION INTRAMUSCULAR; INTRAVENOUS EVERY 6 HOURS PRN
Status: DISCONTINUED | OUTPATIENT
Start: 2020-07-28 | End: 2020-07-30 | Stop reason: HOSPADM

## 2020-07-28 RX ORDER — NICOTINE POLACRILEX 4 MG
15 LOZENGE BUCCAL PRN
Status: DISCONTINUED | OUTPATIENT
Start: 2020-07-28 | End: 2020-07-30 | Stop reason: HOSPADM

## 2020-07-28 RX ORDER — DIPHENHYDRAMINE HYDROCHLORIDE 50 MG/ML
50 INJECTION INTRAMUSCULAR; INTRAVENOUS ONCE
Status: COMPLETED | OUTPATIENT
Start: 2020-07-28 | End: 2020-07-28

## 2020-07-28 RX ORDER — LORAZEPAM 2 MG/ML
1 INJECTION INTRAMUSCULAR EVERY 6 HOURS PRN
Status: DISCONTINUED | OUTPATIENT
Start: 2020-07-28 | End: 2020-07-29

## 2020-07-28 RX ORDER — SODIUM CHLORIDE 0.9 % (FLUSH) 0.9 %
10 SYRINGE (ML) INJECTION EVERY 12 HOURS SCHEDULED
Status: DISCONTINUED | OUTPATIENT
Start: 2020-07-28 | End: 2020-07-30

## 2020-07-28 RX ORDER — PROMETHAZINE HYDROCHLORIDE 12.5 MG/1
12.5 TABLET ORAL EVERY 6 HOURS PRN
Status: DISCONTINUED | OUTPATIENT
Start: 2020-07-28 | End: 2020-07-30 | Stop reason: HOSPADM

## 2020-07-28 RX ADMIN — INSULIN LISPRO 12 UNITS: 100 INJECTION, SOLUTION INTRAVENOUS; SUBCUTANEOUS at 19:24

## 2020-07-28 RX ADMIN — LORAZEPAM 2 MG: 2 INJECTION INTRAMUSCULAR; INTRAVENOUS at 00:22

## 2020-07-28 RX ADMIN — INSULIN HUMAN 10 UNITS: 100 INJECTION, SOLUTION PARENTERAL at 00:44

## 2020-07-28 RX ADMIN — SODIUM CHLORIDE: 9 INJECTION, SOLUTION INTRAVENOUS at 16:40

## 2020-07-28 RX ADMIN — KETOROLAC TROMETHAMINE 30 MG: 30 INJECTION, SOLUTION INTRAMUSCULAR at 00:23

## 2020-07-28 RX ADMIN — DIPHENHYDRAMINE HYDROCHLORIDE 50 MG: 50 INJECTION, SOLUTION INTRAMUSCULAR; INTRAVENOUS at 00:23

## 2020-07-28 RX ADMIN — SODIUM CHLORIDE 1000 ML: 9 INJECTION, SOLUTION INTRAVENOUS at 01:36

## 2020-07-28 RX ADMIN — LORAZEPAM 1 MG: 2 INJECTION INTRAMUSCULAR at 17:49

## 2020-07-28 RX ADMIN — NALOXONE HYDROCHLORIDE 2 MG: 1 INJECTION PARENTERAL at 00:23

## 2020-07-28 RX ADMIN — SODIUM CHLORIDE 1000 ML: 9 INJECTION, SOLUTION INTRAVENOUS at 00:22

## 2020-07-28 ASSESSMENT — ENCOUNTER SYMPTOMS
COUGH: 0
SHORTNESS OF BREATH: 0
DIARRHEA: 0
WHEEZING: 0
SORE THROAT: 0
RHINORRHEA: 0
VOMITING: 0
CHEST TIGHTNESS: 0
ABDOMINAL PAIN: 0
ABDOMINAL DISTENTION: 0
BACK PAIN: 0
NAUSEA: 0
COLOR CHANGE: 0
TROUBLE SWALLOWING: 0

## 2020-07-28 ASSESSMENT — PAIN SCALES - GENERAL
PAINLEVEL_OUTOF10: 10
PAINLEVEL_OUTOF10: 0

## 2020-07-28 ASSESSMENT — PAIN SCALES - WONG BAKER: WONGBAKER_NUMERICALRESPONSE: 0

## 2020-07-28 NOTE — ED NOTES
Pt medicated per orders, wilmar. Well. Pt sleeping, arousable on voice command, skin w/d/pink, pluses palp, st on monitor, 0 problems, 0 c/o, will monitor, closely.      Yenni Ortiz RN  07/28/20 9508

## 2020-07-28 NOTE — ED NOTES
Pt medically cleared for [facility]. Nurse notified [facility] RN sending staffing now to sit with pt.       Shea Munoz RN  07/28/20 2720

## 2020-07-28 NOTE — ED NOTES
Pt lying on cart. Continues to scream out randomly. Pt attempting to bite cords.       Bina Sherman, GLENN  07/28/20 1821

## 2020-07-28 NOTE — ED NOTES
Pt remains in room 10 with [facility] RN at his bedside pt asleep at this time      Estefany García RN  07/28/20 4534

## 2020-07-28 NOTE — ED NOTES
Bed: 10  Expected date: 7/28/20  Expected time: 12:05 AM  Means of arrival: [facility]  Comments:  24yo M, seroquel overdose, SI     Al Bowen, [facility]  07/28/20 0883

## 2020-07-28 NOTE — ED NOTES
Refusing to stay on cart. Yelling out loudly. Refuses to remain on cart. Eyes closed. Difficult to redirect. Swinging arms @ Staff & [facility]. Chauncey Olszewski APRN notified of increased agitation.      Corrina Franklin RN  07/28/20 2787

## 2020-07-28 NOTE — PROGRESS NOTES
Patient came out of his L arm restraint. [employer] called. Security came up, and was able to put patient back in restraints. Bilateral pedal and radial pulse +2, pink and warm, capillary refill <3. Patient continues to be restful for moments, then thrashing around in bed violently. Screaming out randomly. Is unable to give name or birthday, but will say \"stop saying my name when trying to redirect him\" by saying \"Mendel\". Patient is a 1:1. Patient has home insulin pump locked in pharmacy. Dr. Aleida Marte stated patient allow to advance diet as tolerated. Tolerating sips of water x1.      Electronically signed by David Aguilar RN on 7/28/2020 at 8:05 PM

## 2020-07-28 NOTE — ED NOTES
IV placed in pt right foot Abran NP notified and ok with placement      Bobo Martin RN  07/28/20 8193

## 2020-07-28 NOTE — ED NOTES
Awoke briefly while repositioning, swinging arms @ Staff. [facility] police @ bedside. Eyes remain closed & mumbling.       Rose Marie Sy RN  07/28/20 9814

## 2020-07-28 NOTE — H&P
Hospital Medicine History & Physical      PCP: YAMIL Singh CNP    Date of Admission: 7/28/2020    Date of Service: 7/28/20      Chief Complaint:  Drug overdose      History Of Present Illness:  23 y.o. male who presented to [facility] ED after being pink slipped by [employer] for self mutilation and an intentional overdose. The patient was brought in by EMS with a decreased level of consciousness. He has a known history of heroin use, DM type 1, and Hep-C. The patient was medicated with iv narcan by EMS which improved his level of consciousness. He was alert and oriented x's 3 agitated and aggressive to ED staff. The patient admitted to cutting his arm intentionally. And, to taking an unknown quantity of seroquel and heroin in an attempt to hurt himself. The patient was restrained in the ED for being agitated and aggressive to staff    Past Medical History:          Diagnosis Date    ADHD (attention deficit hyperactivity disorder)     Asperger syndrome     Asthma     Asthma 3/31/2015    Bipolar 1 disorder, mixed, severe ([address] 2/5/2018    Chemical dependency ([address]     marijuana    Diabetes mellitus ([address]     Diabetes mellitus type 1 ([address]     Hepatitis C     Hepatitis C     Hyperkalemia, diminished renal excretion 11/9/2017    Mental disorder     ODD (oppositional defiant disorder)     Seasonal allergies 3/31/2015    Seizures ([address]        Past Surgical History:          Procedure Laterality Date    ABDOMEN SURGERY Left 10/7/2019    INCISION  & DRAINAGE OF ABSCESS LEFT ABDOMINAL WALL performed by Chaitanya Lepe MD at [address] [place] [address] N/A 10/21/2019    INCISION AND DRAINAGE OF RECURRENT ABDOMINAL WALL ABSCESS ROOM [facility] performed by Chaitanya Lepe MD at [facility]       Medications Prior to Admission:      Prior to Admission medications    Medication Sig Start Date End Date Taking?  Authorizing Provider   insulin aspart (NOVOLOG) 100 UNIT/ML injection vial 15-25 units with each meals 12/26/19  Yes Raqcuel Stein MD   CONTOUR NEXT TEST strip Test 6x daily Dx E10.65 4/17/20   Racquel Stein MD   NOVOLOG 100 UNIT/ML injection vial Via insulin pump max daily dose 100 units daily 4/14/20   Racquel Stein MD   LANTUS 100 UNIT/ML injection vial INJECT 50 UNITS INTO THE SKIN NIGHTLY. 2/3/20   Racquel Stein MD   Insulin Syringe-Needle U-100 (INSULIN SYRINGE .5CC/31GX5/16\") 31G X 5/16\" 0.5 ML MISC Use 4 daily 12/26/19   Racquel Stein MD   Insulin Pen Needle (NOVOFINE) 32G X 6 MM MISC qid 12/26/19   Racquel Stein MD   glucose 4 g chewable tablet Take 4 tablets by mouth as needed for Low blood sugar 12/26/19   Racquel Stein MD   Spacer/Aero-Holding Chambers (EASIVENT) MISC USE AS INSTRUCTED WITH VENTOLIN INHALER. 11/13/19   Historical Provider, MD   QUEtiapine (SEROQUEL) 300 MG tablet Take 1 tablet by mouth nightly as needed (sleep) 11/13/19   RADHA Stratton   albuterol sulfate  (90 Base) MCG/ACT inhaler Use 2 puffs 4 times daily for 7 days then as needed for wheezing. Dispense with Spacer and instruct in use. At patient's preference may use 60 dose MDI. May Sub Pro-Air or Proventil as needed per insurance. 11/13/19   RADHA Stratton   blood glucose monitor strips Test 4 times a day & as needed for symptoms of irregular blood glucose. 10/30/19   YAMIL Fishman CNP   blood glucose monitor kit and supplies Give 1 meter Dx E11.65 8/10/19   Racquel Stein MD       Allergies:  Bactrim [sulfamethoxazole-trimethoprim]; Dust mite extract; Mirtazapine; Other; Oxcarbazepine; and Shrimp flavor    Social History:      The patient currently lives home    TOBACCO:   reports that he has been smoking cigarettes. He has a 5.00 pack-year smoking history. He has never used smokeless tobacco.  ETOH:   reports previous alcohol use.       Family History:       Positive as follows:        Problem Relation Age of Onset    Cancer Maternal Aunt         breast    Diabetes Maternal Uncle     Diabetes Paternal Uncle     Cancer Maternal Grandmother     Diabetes Maternal Grandmother     Cancer Maternal Grandfather     Diabetes Maternal Grandfather        REVIEW OF SYSTEMS:   Pertinent positives as noted in the HPI. All other systems reviewed and negative. PHYSICAL EXAM:    /89   Pulse 120   Temp 98.3 °F (36.8 °C) (Temporal)   Resp 18   Ht 5' 7\" (1.702 m)   Wt 130 lb (59 kg)   SpO2 98%   BMI 20.36 kg/m²     General appearance:  No apparent distress, appears stated age and cooperative. HEENT:  Normal cephalic, atraumatic without obvious deformity. Pupils equal, round, and reactive to light. Extra ocular muscles intact. Conjunctivae/corneas clear. Neck: Supple, with full range of motion. No jugular venous distention. Trachea midline. Respiratory:  Normal respiratory effort. Clear to auscultation, bilaterally without Rales/Wheezes/Rhonchi. Cardiovascular:  Regular rate and rhythm with normal S1/S2 without murmurs, rubs or gallops. Abdomen: Soft, non-tender, non-distended with normal bowel sounds. Musculoskeletal:  No clubbing, cyanosis or edema bilaterally. Full range of motion without deformity. Skin: Skin color, texture, turgor normal.  No rashes or lesions. Neurologic:  Neurovascularly intact without any focal sensory/motor deficits. Cranial nerves: II-XII intact, grossly non-focal.  Psychiatric:  Alert and oriented, agitated and aggressive  Capillary Refill: Brisk,< 3 seconds   Peripheral Pulses: +2 palpable, equal bilaterally       Labs:     Recent Labs     07/28/20  0015   WBC 5.6   HGB 14.9   HCT 43.6        Recent Labs     07/28/20  0015   *   K 3.5   CL 95   CO2 20   BUN 9   CREATININE 1.02   CALCIUM 8.8     Recent Labs     07/28/20  0015   AST 58*   ALT 37   BILITOT 0.3   ALKPHOS 63     No results for input(s): INR in the last 72 hours.   Recent Labs     07/28/20  0015 07/28/20  0400   CKTOTAL 1,253* 996*       Urinalysis:      Lab Results   Component Value Date    NITRU Negative 03/12/2020 WBCUA 3-5 02/14/2019    BACTERIA Few 02/14/2019    RBCUA 0-2 02/14/2019    BLOODU Negative 03/12/2020    SPECGRAV 1.036 03/12/2020    GLUCOSEU >=1000 03/12/2020    GLUCOSEU GT 1 04/25/2012       Radiology:     CXR: I have reviewed the CXR with the following interpretation: pending  EKG:  I have reviewed the EKG with the following interpretation: pending    No orders to display       ASSESSMENT:    Active Hospital Problems    Diagnosis Date Noted    Drug overdose, accidental or unintentional, initial encounter Mack Healy 07/28/2020       PLAN:        DVT Prophylaxis: lovenox  Diet: No diet orders on file  Code Status: Prior    PT/OT Eval Status: eval and tx    1. Intentional drug overdose-the patient is pink slipped by [employer]. Will admit and consult psych. Will have sitter in the room  2. Self mutilation  3. DM2 with hyperglycemia-accuchecks with ss coverage  4. Hx of Hep-C       Toma [place], [place]    Thank you YAMIL Ryan - MARIEL for the opportunity to be involved in this patient's care. If you have any questions or concerns please feel free to contact me.

## 2020-07-28 NOTE — ED NOTES
Pt sitting up jerking him self and yelling conitnues at various times and then pt lays back with closed eyes and deep breathes.       Larissa Armstrong RN  07/28/20 5061

## 2020-07-28 NOTE — ED NOTES
Pt medicated per orders, wilmar. Well. Pt resting in bed, drowsy, arousable, skin w/d/pink, pulses palp, st on monitor, Nelida Ruiz np aware of it, will monitor.      Cameron Nobles RN  07/28/20 0744

## 2020-07-28 NOTE — ED NOTES
Blood glucose 119. Brent Catherine np aware of it, 0 new orders, pt stable, 0 distress, sleeping, arousable, skin w/d/pink, st on monitor, will monitor.       Phan Cantrell RN  07/28/20 9514

## 2020-07-28 NOTE — ED PROVIDER NOTES
[address] [facility] ED  EMERGENCY DEPARTMENT ENCOUNTER      Pt Name: Nel Fernández  MRN: [id number]  [place] 1995  Date of evaluation: 7/28/2020  Provider: YAMIL Ashley CNP    CHIEF COMPLAINT       Chief Complaint   Patient presents with    Drug Overdose     Patient is pink slipped by [facility] for suicidal ideations self-harm cutting and intentional overdose of home medication    HISTORY OF PRESENT ILLNESS   (Location/Symptom, Timing/Onset,Context/Setting, Quality, Duration, Modifying Factors, Severity)  Note limiting factors. Nel Fernández is a 23 y.o. male who presents to the emergency department brought in by [facility] ambulance as well as [facility] for intentional overdose and self-harm. EMS states that the patient had a decreased level responsiveness on arrival and were informed by the officers that the patient is a known heroin user. IV was established patient labs obtained and patient was given 2 mg of IV Narcan which rapidly increases level responsiveness. Patient alert and oriented x3 agitated and aggressive. He does admit to self-harm states he was cutting his arm yesterday and admits today to taking an unknown quantity of Seroquel as well as using heroin. Patient denies alcohol use or other drug use. Patient states he is in pain but does not rate this pain states this is a chronic pain he has all over however he denies any recent injuries denies any recent illnesses. Nursing Notes were reviewed. REVIEW OF SYSTEMS    (2-9 systems for level 4, 10 or more for level 5)     Review of Systems   Constitutional: Negative for activity change, appetite change, chills, diaphoresis, fatigue and fever. HENT: Negative for congestion, rhinorrhea, sore throat and trouble swallowing. Eyes: Negative for visual disturbance. Respiratory: Negative for cough, chest tightness, shortness of breath and wheezing.     Cardiovascular: Negative for chest pain and palpitations. Gastrointestinal: Negative for abdominal distention, abdominal pain, diarrhea, nausea and vomiting. Genitourinary: Negative for difficulty urinating, flank pain and urgency. Musculoskeletal: Positive for arthralgias and myalgias. Negative for back pain, neck pain and neck stiffness. Skin: Negative for color change and rash. Neurological: Negative for dizziness, tremors, seizures, syncope, speech difficulty, weakness, light-headedness, numbness and headaches. Psychiatric/Behavioral: Positive for agitation, dysphoric mood, self-injury and suicidal ideas. Negative for hallucinations. The patient is not hyperactive. Except as noted above the remainder of the review of systems was reviewed and negative.        PAST MEDICAL HISTORY     Past Medical History:   Diagnosis Date    ADHD (attention deficit hyperactivity disorder)     Asperger syndrome     Asthma     Asthma 3/31/2015    Bipolar 1 disorder, mixed, severe ([address] 2/5/2018    Chemical dependency ([facility] [address]     marijuana    Diabetes mellitus ([address]     Diabetes mellitus type 1 ([address]     Hepatitis C     Hepatitis C     Hyperkalemia, diminished renal excretion 11/9/2017    Mental disorder     ODD (oppositional defiant disorder)     Seasonal allergies 3/31/2015    Seizures ([address]      Past Surgical History:   Procedure Laterality Date    ABDOMEN SURGERY Left 10/7/2019    INCISION  & DRAINAGE OF ABSCESS LEFT ABDOMINAL WALL performed by Deandre Park MD at [address] N/A 10/21/2019    INCISION AND DRAINAGE OF RECURRENT ABDOMINAL WALL ABSCESS ROOM 475 performed by Deandre Park MD at [address] [place] [address] History     Socioeconomic History    Marital status: Single     Spouse name: None    Number of children: 0    Years of education: 6    Highest education level: None   Occupational History    None   Social Needs    Financial resource strain: Hard    Food insecurity     Worry: Often true     Inability: Often true    Transportation needs     Medical: Yes     Non-medical: Yes   Tobacco Use    Smoking status: Current Every Day Smoker     Packs/day: 1.00     Years: 5.00     Pack years: 5.00     Types: Cigarettes    Smokeless tobacco: Never Used    Tobacco comment: pt aware of risk   Substance and Sexual Activity    Alcohol use: Not Currently     Alcohol/week: 0.0 standard drinks     Frequency: Never     Comment: Had been drinking whiskey and vodka on a regular basis about 6 months ago. Nothing since.  Drug use: Yes     Types: Methamphetamines, Marijuana, IV, Cocaine, Opiates      Comment: occassionally, last use 2 weeks ago    Sexual activity: Yes     Partners: Female     Comment: No protection or birth-control plan   Lifestyle    Physical activity     Days per week: 0 days     Minutes per session: 0 min    Stress: Very much   Relationships    Social connections     Talks on phone: More than three times a week     Gets together: More than three times a week     Attends Yarsani service: Never     Active member of club or organization: No     Attends meetings of clubs or organizations: Never     Relationship status: None    Intimate partner violence     Fear of current or ex partner: Patient refused     Emotionally abused: Patient refused     Physically abused: Patient refused     Forced sexual activity: Patient refused   Other Topics Concern    None   Social History Narrative    ** Merged History Encounter **            SCREENINGS             PHYSICAL EXAM    (up to 7 for level 4, 8 or more for level 5)     ED Triage Vitals   BP Temp Temp src Pulse Resp SpO2 Height Weight   -- -- -- -- -- -- -- --       Physical Exam  Constitutional:       General: He is not in acute distress. Appearance: Normal appearance. He is normal weight. He is not ill-appearing, toxic-appearing or diaphoretic. HENT:      Head: Normocephalic and atraumatic.       Right Ear: External ear normal.      Left Ear: External ear normal.      Nose: Nose normal.      Mouth/Throat:      Mouth: Mucous membranes are moist.   Eyes:      General:         Right eye: No discharge. Left eye: No discharge. Conjunctiva/sclera: Conjunctivae normal.      Pupils: Pupils are equal, round, and reactive to light. Comments: Pupils 2-3mm responsive   Neck:      Musculoskeletal: Normal range of motion and neck supple. No neck rigidity or muscular tenderness. Cardiovascular:      Rate and Rhythm: Regular rhythm. Tachycardia present. Pulses: Normal pulses. Pulmonary:      Effort: Pulmonary effort is normal.   Abdominal:      General: There is no distension. Palpations: Abdomen is soft. Tenderness: There is no abdominal tenderness. Musculoskeletal: Normal range of motion. General: No signs of injury. Skin:     General: Skin is warm and dry. Capillary Refill: Capillary refill takes less than 2 seconds. Findings: Laceration present. Neurological:      General: No focal deficit present. Mental Status: He is alert and oriented to person, place, and time. Mental status is at baseline. Cranial Nerves: No cranial nerve deficit. Sensory: No sensory deficit. Motor: No weakness.       Coordination: Coordination normal.         RESULTS     EKG: All EKG's are interpreted by the Emergency Department Physician who either signs or Co-signsthis chart in the absence of a cardiologist.    Sinus tachycardia 150 bpm no acute ST elevation or deviation no other ectopy good R wave progression  ms    Sinus tachycardia 106 beats minute no apparent acute ST elevation deviation no ectopy good R wave progression  ms    Sinus rhythm 96 bpm nonspecific T wave abnormalities no ST elevation or deviation no ectopy good R wave progression  ms    RADIOLOGY:   Non-plain filmimages such as CT, Ultrasound and MRI are read by the radiologist. Plain radiographic images are visualized and preliminarily interpreted by the emergency physician with the below findings:        Interpretation per the Radiologist below, if available at the time ofthis note:    No orders to display         ED BEDSIDE ULTRASOUND:   Performed by ED Physician - none    LABS:  Labs Reviewed   COMPREHENSIVE METABOLIC PANEL - Abnormal; Notable for the following components:       Result Value    Sodium 133 (*)     Anion Gap 18 (*)     Glucose 368 (*)     AST 58 (*)     All other components within normal limits   CK - Abnormal; Notable for the following components: Total CK 1,253 (*)     All other components within normal limits   ACETAMINOPHEN LEVEL - Abnormal; Notable for the following components:    Acetaminophen Level <5 (*)     All other components within normal limits   SALICYLATE LEVEL - Abnormal; Notable for the following components:    Salicylate, Serum <8.5 (*)     All other components within normal limits   CK - Abnormal; Notable for the following components:     Total  (*)     All other components within normal limits   POCT GLUCOSE - Abnormal; Notable for the following components:    POC Glucose 337 (*)     All other components within normal limits   POCT GLUCOSE - Abnormal; Notable for the following components:    POC Glucose 149 (*)     All other components within normal limits   POCT GLUCOSE - Abnormal; Notable for the following components:    POC Glucose 119 (*)     All other components within normal limits   POCT GLUCOSE - Abnormal; Notable for the following components:    POC Glucose 214 (*)     All other components within normal limits   POCT GLUCOSE - Abnormal; Notable for the following components:    POC Glucose 390 (*)     All other components within normal limits   POCT GLUCOSE - Normal   CBC WITH AUTO DIFFERENTIAL   MAGNESIUM   ETHANOL   TSH WITHOUT REFLEX   CKMB & RELATIVE PERCENT   CKMB & RELATIVE PERCENT   COVID-19   URINE RT REFLEX TO CULTURE   URINE DRUG SCREEN       All other labs were within normal range or not returned as of this dictation. EMERGENCY DEPARTMENT COURSE and DIFFERENTIAL DIAGNOSIS/MDM:   Vitals:    Vitals:    07/28/20 0900 07/28/20 1000 07/28/20 1100 07/28/20 1200   BP: 108/76 95/60 (!) 91/58 107/71   Pulse: 106 106 111 108   Resp: 16 16 16 16   Temp:       TempSrc:       SpO2: 96% 97% 97% 98%   Weight:       Height:         Patient medically for behavioral health evaluation and placement       MDM patient arrives agitated aggressive admits to intentional overdose as well as drug use and self cutting. He is pink slipped by [employer] at recalling himself to reported suicidal ideations and overdose. Lab work ordered for behavioral health clearance in addition to multiple EKGs and magnesium levels monitor for change in the QTC. Patient is had multiple EKGs without significant QTC prolongation or other abnormal changes. Lab work returned with elevated glucose as well as elevated CK level. Patient was given multiple liters of IV fluids as well as IV insulin with a significant reduction of his glucose level as well as a significant drop in his CK from over 1200 to just over 900 suggestive of a decreasing CK level. At this time patient medically for behavioral health evaluation after 4 hours monitored in the ER. Patient continues to be drowsy and BH unable to assess patient due to his intake of Seroquel of unknown amount. His blood sugar remains normal and his EKG repeated 3 times is also normal.  Admitted to the service of the hospitalist in a stable condition with stable vital signs. I ordered four-point restraints because the patient was being aggressive towards staff member and towards himself and he is risking hurting himself and he is smacking his on chest against the side of the rail of the bed and he is yelling at the nurses and yelling at me and yelling at the police officers.   CRITICAL CARE TIME       CONSULTS:  None    PROCEDURES:  Unless otherwise noted below, none

## 2020-07-28 NOTE — ED NOTES
Pt resting in bed, skin w/d/pink, pulses palp. 0 problems, will monitor.      Garett Lira RN  07/28/20 9605

## 2020-07-28 NOTE — ED NOTES
Pt medicated per orders, es Church np at bedside. Pt drowsy, skin w/d/pink, pulses palp, st on monitor, pt into  gown, pt wilmar. well. Will monitor.      Silviano Gaytan RN  07/28/20 0039

## 2020-07-28 NOTE — ED NOTES
Resting with eyes closed. Nursing report received from [facility].      Elo Rodriguez RN  07/28/20 9464

## 2020-07-28 NOTE — ED NOTES
Pt placed 1:1 with ed tech. Pt is resting in bed calm no distress noted. Nurse updated chart and vitals.       Eddie De La Garza RN  07/28/20 3197

## 2020-07-28 NOTE — ED NOTES
Pt sound asleep, skin w/d/pink, pulses palp, 0 c/o, 0 distress, pt arousable, drowsy, st on monitor, lopez ackerman updated, 0 new orders, will monitor.      Ranulfo Brown RN  07/28/20 8795

## 2020-07-29 LAB
AMPHETAMINE SCREEN, URINE: ABNORMAL
ANION GAP SERPL CALCULATED.3IONS-SCNC: 23 MEQ/L (ref 9–15)
ANION GAP SERPL CALCULATED.3IONS-SCNC: 25 MEQ/L (ref 9–15)
BACTERIA: NEGATIVE /HPF
BARBITURATE SCREEN URINE: ABNORMAL
BASOPHILS ABSOLUTE: 0 K/UL (ref 0–0.2)
BASOPHILS RELATIVE PERCENT: 0.3 %
BENZODIAZEPINE SCREEN, URINE: ABNORMAL
BILIRUBIN URINE: NEGATIVE
BLOOD, URINE: ABNORMAL
BUN BLDV-MCNC: 18 MG/DL (ref 6–20)
BUN BLDV-MCNC: 18 MG/DL (ref 6–20)
CALCIUM SERPL-MCNC: 10.1 MG/DL (ref 8.5–9.9)
CALCIUM SERPL-MCNC: 10.2 MG/DL (ref 8.5–9.9)
CANNABINOID SCREEN URINE: ABNORMAL
CHLORIDE BLD-SCNC: 109 MEQ/L (ref 95–107)
CHLORIDE BLD-SCNC: 113 MEQ/L (ref 95–107)
CK MB: 3.5 NG/ML (ref 0–6.7)
CLARITY: CLEAR
CO2: 17 MEQ/L (ref 20–31)
CO2: 18 MEQ/L (ref 20–31)
COCAINE METABOLITE SCREEN URINE: POSITIVE
COLOR: YELLOW
CREAT SERPL-MCNC: 1.61 MG/DL (ref 0.7–1.2)
CREAT SERPL-MCNC: 1.62 MG/DL (ref 0.7–1.2)
CREATINE KINASE-MB INDEX: 0.3 % (ref 0–3.5)
EOSINOPHILS ABSOLUTE: 0 K/UL (ref 0–0.7)
EOSINOPHILS RELATIVE PERCENT: 0.4 %
EPITHELIAL CELLS, UA: ABNORMAL /HPF (ref 0–5)
GFR AFRICAN AMERICAN: >60
GFR AFRICAN AMERICAN: >60
GFR NON-AFRICAN AMERICAN: 52.9
GFR NON-AFRICAN AMERICAN: 53.3
GLUCOSE BLD-MCNC: 128 MG/DL (ref 70–99)
GLUCOSE BLD-MCNC: 140 MG/DL (ref 60–115)
GLUCOSE BLD-MCNC: 186 MG/DL (ref 70–99)
GLUCOSE BLD-MCNC: 236 MG/DL (ref 60–115)
GLUCOSE BLD-MCNC: 242 MG/DL (ref 60–115)
GLUCOSE BLD-MCNC: 314 MG/DL (ref 60–115)
GLUCOSE BLD-MCNC: 359 MG/DL (ref 60–115)
GLUCOSE BLD-MCNC: 400 MG/DL (ref 60–115)
GLUCOSE URINE: 500 MG/DL
HCT VFR BLD CALC: 43.5 % (ref 42–52)
HEMOGLOBIN: 14.7 G/DL (ref 14–18)
HYALINE CASTS: ABNORMAL /HPF (ref 0–5)
KETONES, URINE: 40 MG/DL
LEUKOCYTE ESTERASE, URINE: NEGATIVE
LYMPHOCYTES ABSOLUTE: 1.2 K/UL (ref 1–4.8)
LYMPHOCYTES RELATIVE PERCENT: 9.9 %
Lab: ABNORMAL
MCH RBC QN AUTO: 29 PG (ref 27–31.3)
MCHC RBC AUTO-ENTMCNC: 33.8 % (ref 33–37)
MCV RBC AUTO: 85.9 FL (ref 80–100)
METHADONE SCREEN, URINE: ABNORMAL
MONOCYTES ABSOLUTE: 0.8 K/UL (ref 0.2–0.8)
MONOCYTES RELATIVE PERCENT: 6.6 %
NEUTROPHILS ABSOLUTE: 10.4 K/UL (ref 1.4–6.5)
NEUTROPHILS RELATIVE PERCENT: 82.8 %
NITRITE, URINE: NEGATIVE
OPIATE SCREEN URINE: ABNORMAL
OXYCODONE URINE: ABNORMAL
PDW BLD-RTO: 13.9 % (ref 11.5–14.5)
PERFORMED ON: ABNORMAL
PH UA: 6.5 (ref 5–9)
PHENCYCLIDINE SCREEN URINE: ABNORMAL
PHOSPHORUS: 4.4 MG/DL (ref 2.3–4.8)
PLATELET # BLD: 313 K/UL (ref 130–400)
POTASSIUM REFLEX MAGNESIUM: 4.4 MEQ/L (ref 3.4–4.9)
POTASSIUM REFLEX MAGNESIUM: 4.9 MEQ/L (ref 3.4–4.9)
PROPOXYPHENE SCREEN: ABNORMAL
PROTEIN UA: 100 MG/DL
RBC # BLD: 5.06 M/UL (ref 4.7–6.1)
RBC UA: ABNORMAL /HPF (ref 0–5)
SARS-COV-2, NAAT: NOT DETECTED
SODIUM BLD-SCNC: 151 MEQ/L (ref 135–144)
SODIUM BLD-SCNC: 154 MEQ/L (ref 135–144)
SPECIFIC GRAVITY UA: 1.01 (ref 1–1.03)
TOTAL CK: 1102 U/L (ref 0–190)
URINE REFLEX TO CULTURE: ABNORMAL
UROBILINOGEN, URINE: 0.2 E.U./DL
WBC # BLD: 12.6 K/UL (ref 4.8–10.8)
WBC UA: ABNORMAL /HPF (ref 0–5)

## 2020-07-29 PROCEDURE — 1210000000 HC MED SURG R&B

## 2020-07-29 PROCEDURE — 2580000003 HC RX 258: Performed by: INTERNAL MEDICINE

## 2020-07-29 PROCEDURE — 6370000000 HC RX 637 (ALT 250 FOR IP): Performed by: INTERNAL MEDICINE

## 2020-07-29 PROCEDURE — 6360000002 HC RX W HCPCS: Performed by: INTERNAL MEDICINE

## 2020-07-29 PROCEDURE — U0002 COVID-19 LAB TEST NON-CDC: HCPCS

## 2020-07-29 PROCEDURE — 96376 TX/PRO/DX INJ SAME DRUG ADON: CPT

## 2020-07-29 PROCEDURE — 36415 COLL VENOUS BLD VENIPUNCTURE: CPT

## 2020-07-29 PROCEDURE — 81001 URINALYSIS AUTO W/SCOPE: CPT

## 2020-07-29 PROCEDURE — 82550 ASSAY OF CK (CPK): CPT

## 2020-07-29 PROCEDURE — 84100 ASSAY OF PHOSPHORUS: CPT

## 2020-07-29 PROCEDURE — 80048 BASIC METABOLIC PNL TOTAL CA: CPT

## 2020-07-29 PROCEDURE — 85025 COMPLETE CBC W/AUTO DIFF WBC: CPT

## 2020-07-29 PROCEDURE — 80307 DRUG TEST PRSMV CHEM ANLYZR: CPT

## 2020-07-29 PROCEDURE — 82553 CREATINE MB FRACTION: CPT

## 2020-07-29 PROCEDURE — 90792 PSYCH DIAG EVAL W/MED SRVCS: CPT | Performed by: PSYCHIATRY & NEUROLOGY

## 2020-07-29 RX ORDER — LORAZEPAM 2 MG/ML
1 INJECTION INTRAMUSCULAR EVERY 6 HOURS PRN
Status: DISCONTINUED | OUTPATIENT
Start: 2020-07-29 | End: 2020-07-29

## 2020-07-29 RX ORDER — SODIUM CHLORIDE, SODIUM LACTATE, POTASSIUM CHLORIDE, AND CALCIUM CHLORIDE .6; .31; .03; .02 G/100ML; G/100ML; G/100ML; G/100ML
1000 INJECTION, SOLUTION INTRAVENOUS ONCE
Status: DISCONTINUED | OUTPATIENT
Start: 2020-07-29 | End: 2020-07-30

## 2020-07-29 RX ORDER — SODIUM CHLORIDE, SODIUM LACTATE, POTASSIUM CHLORIDE, CALCIUM CHLORIDE 600; 310; 30; 20 MG/100ML; MG/100ML; MG/100ML; MG/100ML
INJECTION, SOLUTION INTRAVENOUS CONTINUOUS
Status: DISCONTINUED | OUTPATIENT
Start: 2020-07-29 | End: 2020-07-30

## 2020-07-29 RX ORDER — SODIUM CHLORIDE, SODIUM LACTATE, POTASSIUM CHLORIDE, AND CALCIUM CHLORIDE .6; .31; .03; .02 G/100ML; G/100ML; G/100ML; G/100ML
1000 INJECTION, SOLUTION INTRAVENOUS ONCE
Status: COMPLETED | OUTPATIENT
Start: 2020-07-29 | End: 2020-07-29

## 2020-07-29 RX ORDER — LORAZEPAM 2 MG/ML
1 INJECTION INTRAMUSCULAR EVERY 4 HOURS PRN
Status: DISCONTINUED | OUTPATIENT
Start: 2020-07-29 | End: 2020-07-30

## 2020-07-29 RX ORDER — SODIUM CHLORIDE, SODIUM LACTATE, POTASSIUM CHLORIDE, CALCIUM CHLORIDE 600; 310; 30; 20 MG/100ML; MG/100ML; MG/100ML; MG/100ML
INJECTION, SOLUTION INTRAVENOUS ONCE
Status: COMPLETED | OUTPATIENT
Start: 2020-07-29 | End: 2020-07-29

## 2020-07-29 RX ADMIN — INSULIN LISPRO 10 UNITS: 100 INJECTION, SOLUTION INTRAVENOUS; SUBCUTANEOUS at 00:10

## 2020-07-29 RX ADMIN — LORAZEPAM 1 MG: 2 INJECTION INTRAMUSCULAR at 15:02

## 2020-07-29 RX ADMIN — SODIUM CHLORIDE, POTASSIUM CHLORIDE, SODIUM LACTATE AND CALCIUM CHLORIDE: 600; 310; 30; 20 INJECTION, SOLUTION INTRAVENOUS at 16:45

## 2020-07-29 RX ADMIN — INSULIN GLARGINE 25 UNITS: 100 INJECTION, SOLUTION SUBCUTANEOUS at 00:10

## 2020-07-29 RX ADMIN — SODIUM CHLORIDE, POTASSIUM CHLORIDE, SODIUM LACTATE AND CALCIUM CHLORIDE 1000 ML: 600; 310; 30; 20 INJECTION, SOLUTION INTRAVENOUS at 15:40

## 2020-07-29 RX ADMIN — INSULIN GLARGINE 25 UNITS: 100 INJECTION, SOLUTION SUBCUTANEOUS at 21:18

## 2020-07-29 RX ADMIN — LORAZEPAM 1 MG: 2 INJECTION INTRAMUSCULAR at 00:09

## 2020-07-29 RX ADMIN — SODIUM CHLORIDE, POTASSIUM CHLORIDE, SODIUM LACTATE AND CALCIUM CHLORIDE: 600; 310; 30; 20 INJECTION, SOLUTION INTRAVENOUS at 19:21

## 2020-07-29 ASSESSMENT — PAIN DESCRIPTION - PAIN TYPE: TYPE: REFERRED PAIN

## 2020-07-29 ASSESSMENT — PAIN DESCRIPTION - LOCATION: LOCATION: ARM

## 2020-07-29 ASSESSMENT — PAIN SCALES - WONG BAKER: WONGBAKER_NUMERICALRESPONSE: 0

## 2020-07-29 ASSESSMENT — PAIN SCALES - GENERAL: PAINLEVEL_OUTOF10: 3

## 2020-07-29 NOTE — PROGRESS NOTES
Physician Progress Note    7/29/2020   2:48 PM    Name:  Hollie Rodríguez  MRN:    [id number]      Day: 0     Admit Date: 7/28/2020 12:10 AM  PCP: YAMIL Valentine CNP    Code Status:  Full Code    Subjective:     Patient will violently thrash around in bed, yell profanities, and threaten staff. He will not provide any subjective history.     Current Facility-Administered Medications   Medication Dose Route Frequency Provider Last Rate Last Dose    LORazepam (ATIVAN) injection 1 mg  1 mg Intramuscular Q6H PRN Samira Coronado MD        lactated ringers bolus  1,000 mL Intravenous Once [place] Fails, DO        lactated ringers infusion   Intravenous Continuous [place] Fails, DO        sodium chloride flush 0.9 % injection 10 mL  10 mL Intravenous 2 times per day TwanRADHA Najera        sodium chloride flush 0.9 % injection 10 mL  10 mL Intravenous PRN Twan Fitting RADHA Chow        acetaminophen (TYLENOL) tablet 650 mg  650 mg Oral Q6H PRN RADHA Licona        Or    acetaminophen (TYLENOL) suppository 650 mg  650 mg Rectal Q6H PRN Twan RADHA Soliz        polyethylene glycol (GLYCOLAX) packet 17 g  17 g Oral Daily PRN TwanRADHA Najera        promethazine (PHENERGAN) tablet 12.5 mg  12.5 mg Oral Q6H PRN Twan RADHA Soliz        Or    ondansetron [facility] injection 4 mg  4 mg Intravenous Q6H PRN Twan Fitting RADHA Chow        enoxaparin (LOVENOX) injection 40 mg  40 mg Subcutaneous Daily Twan Fitting Chalo Chowma        glucose (GLUTOSE) 40 % oral gel 15 g  15 g Oral PRN [place] Fails, DO        dextrose 50 % IV solution  12.5 g Intravenous PRN [place] Fails, DO        glucagon (rDNA) injection 1 mg  1 mg Intramuscular PRN [place] Fails, DO        dextrose 5 % solution  100 mL/hr Intravenous PRN [place] Fails, DO        insulin glargine (LANTUS) injection vial 25 Units  25 Units Subcutaneous Nightly [place] Fails, DO   25 Units at 20 0010    insulin lispro (HUMALOG) injection vial 0-12 Units  0-12 Units Subcutaneous Q6H Merdis Brown, DO   10 Units at 20 0010    ketorolac (TORADOL) injection 30 mg  30 mg Intravenous Q6H PRN Merdis Brown, DO        cloNIDine (CATAPRES) 0.2 MG/24HR 1 patch  1 patch Transdermal Weekly Merdis Brown, DO   1 patch at 20 4709       Physical Examination:      Vitals:  /81   Pulse 99   Temp 97.9 °F (36.6 °C)   Resp 20   Ht 5' 7\" (1.702 m)   Wt 130 lb (59 kg)   SpO2 100%   BMI 20.36 kg/m²   Temp (24hrs), Av.9 °F (36.6 °C), Min:97.4 °F (36.3 °C), Max:98.2 °F (36.8 °C)      General appearance: Drowsy but then will start thrashing in bed, spit at staff, yell profanities. Lungs: clear to auscultation bilaterally, normal effort  Heart: regular rate and rhythm, no murmur  Abdomen: soft, nontender, nondistended, bowel sounds present, no masses  Extremities: no edema, redness, tenderness in the calves  Skin: no gross lesions, rashes    Data:     Labs:  Recent Labs     20  0015 20  0702   WBC 5.6 12.6*   HGB 14.9 14.7    313     Recent Labs     20  0015 20  0041 20  0702   *  --  154*   K 3.5  --  4.4   CL 95  --  113*   CO2 20  --  18*   BUN 9  --  18   CREATININE 1.02  --  1.62*   GLUCOSE 368* 337 128*     Recent Labs     20  0015   AST 58*   ALT 37   BILITOT 0.3   ALKPHOS 63       Assessment and Plan:        23year-old type I diabetic with history of polysubstance use presents following suspected intentional overdose on Seroquel and suspected heroin/fentanyl (responded to Narcan). 1.  Toxic encephalopathy secondary to suspected intentional overdose on antipsychotic, opioid, cocaine  -Monitor QT  -With prolonged QT at moment, will use Benzodiazepine for agitation  -continue clonidine patch  -If agitation remains poorly controlled, will transfer to ICU for initiation of Precedex drip    2.   Rhabdomyolysis from cocaine use  -IV fluid when IV established    3. TWIN/hypernatremia from dehydration: Recheck BMP. Try to restart IV    4. Type 1 diabetes with hyperglycemia: Currently trying to manage with subcutaneous insulin however patient is also thrashing during glucose checks and refusing insulin administration    I discussed this with RN-we will try to continue to use Ativan and if he continues to remain agitated to the point where we cannot obtain IV access administer IV fluids or subcutaneous insulin, will need to transfer him to ICU for Precedex drip. He will transfer to inpatient psychiatry after he is medically stable.     Electronically signed by Minerva Jackson DO on 7/29/2020 at 2:48 PM

## 2020-07-29 NOTE — PROGRESS NOTES
Unable to obtain vital signs at this time  Due to patient violently thrashing in bed attempting to get out of restraints call placed to security for assistance in completing this task

## 2020-07-29 NOTE — PROGRESS NOTES
The patient was a transfer from [facility] [address] . Patient in 4 point restraints. Sitter at bedside. Patient has a 1:1 at bedside. New restraint order in place @ 1500. Patient is sleeping at this time. New order will need to be obtain in four hours if restraints are continued.

## 2020-07-29 NOTE — PROGRESS NOTES
0945:  Collected 2nd covid swab although pt repeatedly trying to bite me throughout the process. Sent to lab. Will not be able to place IV, pt states he will intentionally stick me with the needle if I do. Adamantly refusing care and using profanity. Completely naked with no bed linens, applied gown and sheets to bed. Remains in 4 point hard restraints, 1:1 sitter at bedside. Perfect serve message sent to Dr. Robert Bishop to be made aware of this. 1245:  Urine specimen obtained and sent to lab. Dr. Robert Bishop notified of 2nd negative covid result. Blood sugar 236 & pt refused insulin. Began thrashing body around and using vulgar language when attempting to give. 1445:  Call received from Dr. Robert Bishop, instructed to give IM ativan and then attempt to start peripheral IV and give IVF or else pt may need to go to ICU for precedex. 1502: IM ativan given, pt somewhat cooperative at this time. Allowed IV start, #22 to right FA. Restraints removed one at a time and ROM performed. Pt did have some abrasions at the site of wrist restraints, probably due to intense thrashing and trying to get out of restraints. Dressed with mepilex. 1600:  Report called to [facility] and transport requested. 1630:  Pt left floor in bed accompanied by transport and PCA in stable condition.

## 2020-07-29 NOTE — CARE COORDINATION
UNABLE TO COMPLETE CMI, PATIENT AGITATED, ON 4 POINT RESTRAINTS. MENTALLY UNSTABLE. WILL NEED ADMIT TO  WHEN MEDICALLY CLEARED PER DR. STEWART.
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
## 2020-07-29 NOTE — PROGRESS NOTES
Able to obtain vital signs  Change bed linen  Pt continues to thrash in bed threaten staff with violence  Spit at staff  Pulling on restraints

## 2020-07-30 ENCOUNTER — HOSPITAL ENCOUNTER (INPATIENT)
Age: 25
LOS: 5 days | Discharge: HOME | DRG: 885 | End: 2020-08-04
Attending: PSYCHIATRY & NEUROLOGY | Admitting: PSYCHIATRY & NEUROLOGY
Payer: MEDICARE

## 2020-07-30 VITALS
BODY MASS INDEX: 20.4 KG/M2 | HEART RATE: 81 BPM | DIASTOLIC BLOOD PRESSURE: 80 MMHG | TEMPERATURE: 97.7 F | OXYGEN SATURATION: 99 % | WEIGHT: 130 LBS | RESPIRATION RATE: 16 BRPM | HEIGHT: 67 IN | SYSTOLIC BLOOD PRESSURE: 118 MMHG

## 2020-07-30 PROBLEM — G92.9 TOXIC ENCEPHALOPATHY: Status: ACTIVE | Noted: 2020-07-30

## 2020-07-30 PROBLEM — M62.82 RHABDOMYOLYSIS: Status: ACTIVE | Noted: 2020-07-30

## 2020-07-30 PROBLEM — F31.9 BIPOLAR 1 DISORDER (HCC): Status: ACTIVE | Noted: 2020-07-30

## 2020-07-30 LAB
ANION GAP SERPL CALCULATED.3IONS-SCNC: 13 MEQ/L (ref 9–15)
BASOPHILS ABSOLUTE: 0 K/UL (ref 0–0.2)
BASOPHILS RELATIVE PERCENT: 0.4 %
BUN BLDV-MCNC: 12 MG/DL (ref 6–20)
CALCIUM SERPL-MCNC: 8.7 MG/DL (ref 8.5–9.9)
CHLORIDE BLD-SCNC: 106 MEQ/L (ref 95–107)
CK MB: 1.5 NG/ML (ref 0–6.7)
CO2: 23 MEQ/L (ref 20–31)
CREAT SERPL-MCNC: 1.09 MG/DL (ref 0.7–1.2)
CREATINE KINASE-MB INDEX: 0.4 % (ref 0–3.5)
EOSINOPHILS ABSOLUTE: 0.3 K/UL (ref 0–0.7)
EOSINOPHILS RELATIVE PERCENT: 3.6 %
GFR AFRICAN AMERICAN: >60
GFR NON-AFRICAN AMERICAN: >60
GLUCOSE BLD-MCNC: 260 MG/DL (ref 70–99)
GLUCOSE BLD-MCNC: 303 MG/DL (ref 60–115)
GLUCOSE BLD-MCNC: 499 MG/DL (ref 60–115)
GLUCOSE BLD-MCNC: 87 MG/DL (ref 60–115)
HCT VFR BLD CALC: 41.1 % (ref 42–52)
HEMOGLOBIN: 13.7 G/DL (ref 14–18)
LYMPHOCYTES ABSOLUTE: 2 K/UL (ref 1–4.8)
LYMPHOCYTES RELATIVE PERCENT: 28.4 %
MCH RBC QN AUTO: 28.8 PG (ref 27–31.3)
MCHC RBC AUTO-ENTMCNC: 33.3 % (ref 33–37)
MCV RBC AUTO: 86.4 FL (ref 80–100)
MONOCYTES ABSOLUTE: 0.5 K/UL (ref 0.2–0.8)
MONOCYTES RELATIVE PERCENT: 7.8 %
NEUTROPHILS ABSOLUTE: 4.2 K/UL (ref 1.4–6.5)
NEUTROPHILS RELATIVE PERCENT: 59.8 %
PDW BLD-RTO: 13.7 % (ref 11.5–14.5)
PERFORMED ON: ABNORMAL
PERFORMED ON: ABNORMAL
PERFORMED ON: NORMAL
PLATELET # BLD: 256 K/UL (ref 130–400)
POTASSIUM REFLEX MAGNESIUM: 3.7 MEQ/L (ref 3.4–4.9)
RBC # BLD: 4.76 M/UL (ref 4.7–6.1)
SODIUM BLD-SCNC: 142 MEQ/L (ref 135–144)
TOTAL CK: 368 U/L (ref 0–190)
WBC # BLD: 7 K/UL (ref 4.8–10.8)

## 2020-07-30 PROCEDURE — 6360000002 HC RX W HCPCS: Performed by: PHYSICIAN ASSISTANT

## 2020-07-30 PROCEDURE — 82553 CREATINE MB FRACTION: CPT

## 2020-07-30 PROCEDURE — 36415 COLL VENOUS BLD VENIPUNCTURE: CPT

## 2020-07-30 PROCEDURE — 82550 ASSAY OF CK (CPK): CPT

## 2020-07-30 PROCEDURE — 85025 COMPLETE CBC W/AUTO DIFF WBC: CPT

## 2020-07-30 PROCEDURE — 6370000000 HC RX 637 (ALT 250 FOR IP): Performed by: PSYCHIATRY & NEUROLOGY

## 2020-07-30 PROCEDURE — 2580000003 HC RX 258: Performed by: INTERNAL MEDICINE

## 2020-07-30 PROCEDURE — 83036 HEMOGLOBIN GLYCOSYLATED A1C: CPT

## 2020-07-30 PROCEDURE — 6370000000 HC RX 637 (ALT 250 FOR IP): Performed by: INTERNAL MEDICINE

## 2020-07-30 PROCEDURE — 80048 BASIC METABOLIC PNL TOTAL CA: CPT

## 2020-07-30 PROCEDURE — 99212 OFFICE O/P EST SF 10 MIN: CPT

## 2020-07-30 PROCEDURE — 1240000000 HC EMOTIONAL WELLNESS R&B

## 2020-07-30 RX ORDER — NICOTINE POLACRILEX 4 MG
15 LOZENGE BUCCAL PRN
Status: CANCELLED | OUTPATIENT
Start: 2020-07-30

## 2020-07-30 RX ORDER — POLYETHYLENE GLYCOL 3350 17 G/17G
17 POWDER, FOR SOLUTION ORAL DAILY
Status: DISCONTINUED | OUTPATIENT
Start: 2020-07-30 | End: 2020-07-30 | Stop reason: HOSPADM

## 2020-07-30 RX ORDER — ACETAMINOPHEN 325 MG/1
650 TABLET ORAL EVERY 6 HOURS PRN
Status: CANCELLED | OUTPATIENT
Start: 2020-07-30

## 2020-07-30 RX ORDER — INSULIN GLARGINE 100 [IU]/ML
35 INJECTION, SOLUTION SUBCUTANEOUS NIGHTLY
Status: DISCONTINUED | OUTPATIENT
Start: 2020-07-30 | End: 2020-07-30 | Stop reason: HOSPADM

## 2020-07-30 RX ORDER — ONDANSETRON 2 MG/ML
4 INJECTION INTRAMUSCULAR; INTRAVENOUS EVERY 6 HOURS PRN
Status: DISCONTINUED | OUTPATIENT
Start: 2020-07-30 | End: 2020-08-04 | Stop reason: HOSPADM

## 2020-07-30 RX ORDER — MAGNESIUM HYDROXIDE/ALUMINUM HYDROXICE/SIMETHICONE 120; 1200; 1200 MG/30ML; MG/30ML; MG/30ML
30 SUSPENSION ORAL PRN
Status: CANCELLED | OUTPATIENT
Start: 2020-07-30

## 2020-07-30 RX ORDER — BENZTROPINE MESYLATE 1 MG/ML
2 INJECTION INTRAMUSCULAR; INTRAVENOUS 2 TIMES DAILY PRN
Status: CANCELLED | OUTPATIENT
Start: 2020-07-30

## 2020-07-30 RX ORDER — HALOPERIDOL 2 MG/1
5 TABLET ORAL EVERY 4 HOURS PRN
Status: CANCELLED | OUTPATIENT
Start: 2020-07-30

## 2020-07-30 RX ORDER — PROMETHAZINE HYDROCHLORIDE 12.5 MG/1
12.5 TABLET ORAL EVERY 6 HOURS PRN
Status: CANCELLED | OUTPATIENT
Start: 2020-07-30

## 2020-07-30 RX ORDER — POLYETHYLENE GLYCOL 3350 17 G/17G
17 POWDER, FOR SOLUTION ORAL DAILY
Status: CANCELLED | OUTPATIENT
Start: 2020-07-31

## 2020-07-30 RX ORDER — ACETAMINOPHEN 650 MG/1
650 SUPPOSITORY RECTAL EVERY 6 HOURS PRN
Status: DISCONTINUED | OUTPATIENT
Start: 2020-07-30 | End: 2020-07-30 | Stop reason: HOSPADM

## 2020-07-30 RX ORDER — ACETAMINOPHEN 325 MG/1
650 TABLET ORAL EVERY 4 HOURS PRN
Status: CANCELLED | OUTPATIENT
Start: 2020-07-30

## 2020-07-30 RX ORDER — HALOPERIDOL 5 MG
5 TABLET ORAL EVERY 4 HOURS PRN
Status: DISCONTINUED | OUTPATIENT
Start: 2020-07-30 | End: 2020-08-04 | Stop reason: HOSPADM

## 2020-07-30 RX ORDER — DEXTROSE MONOHYDRATE 25 G/50ML
12.5 INJECTION, SOLUTION INTRAVENOUS PRN
Status: DISCONTINUED | OUTPATIENT
Start: 2020-07-30 | End: 2020-08-04 | Stop reason: HOSPADM

## 2020-07-30 RX ORDER — INSULIN GLARGINE 100 [IU]/ML
30 INJECTION, SOLUTION SUBCUTANEOUS NIGHTLY
Status: DISCONTINUED | OUTPATIENT
Start: 2020-07-30 | End: 2020-07-30

## 2020-07-30 RX ORDER — INSULIN GLARGINE 100 [IU]/ML
35 INJECTION, SOLUTION SUBCUTANEOUS NIGHTLY
Status: CANCELLED | OUTPATIENT
Start: 2020-07-30

## 2020-07-30 RX ORDER — ACETAMINOPHEN 325 MG/1
650 TABLET ORAL EVERY 4 HOURS PRN
Status: DISCONTINUED | OUTPATIENT
Start: 2020-07-30 | End: 2020-08-04 | Stop reason: HOSPADM

## 2020-07-30 RX ORDER — HALOPERIDOL 5 MG/ML
5 INJECTION INTRAMUSCULAR EVERY 4 HOURS PRN
Status: CANCELLED | OUTPATIENT
Start: 2020-07-30

## 2020-07-30 RX ORDER — DEXTROSE MONOHYDRATE 25 G/50ML
12.5 INJECTION, SOLUTION INTRAVENOUS PRN
Status: CANCELLED | OUTPATIENT
Start: 2020-07-30

## 2020-07-30 RX ORDER — INSULIN GLARGINE 100 [IU]/ML
35 INJECTION, SOLUTION SUBCUTANEOUS NIGHTLY
Status: DISCONTINUED | OUTPATIENT
Start: 2020-07-30 | End: 2020-07-31

## 2020-07-30 RX ORDER — SODIUM CHLORIDE 0.9 % (FLUSH) 0.9 %
10 SYRINGE (ML) INJECTION PRN
Status: DISCONTINUED | OUTPATIENT
Start: 2020-07-30 | End: 2020-08-04 | Stop reason: HOSPADM

## 2020-07-30 RX ORDER — HYDROXYZINE PAMOATE 50 MG/1
50 CAPSULE ORAL EVERY 6 HOURS PRN
Status: DISCONTINUED | OUTPATIENT
Start: 2020-07-30 | End: 2020-08-04 | Stop reason: HOSPADM

## 2020-07-30 RX ORDER — DEXTROSE MONOHYDRATE 50 MG/ML
100 INJECTION, SOLUTION INTRAVENOUS PRN
Status: DISCONTINUED | OUTPATIENT
Start: 2020-07-30 | End: 2020-08-04 | Stop reason: HOSPADM

## 2020-07-30 RX ORDER — HYDROXYZINE HYDROCHLORIDE 50 MG/ML
50 INJECTION, SOLUTION INTRAMUSCULAR EVERY 6 HOURS PRN
Status: DISCONTINUED | OUTPATIENT
Start: 2020-07-30 | End: 2020-08-04 | Stop reason: HOSPADM

## 2020-07-30 RX ORDER — SODIUM CHLORIDE 0.9 % (FLUSH) 0.9 %
10 SYRINGE (ML) INJECTION PRN
Status: CANCELLED | OUTPATIENT
Start: 2020-07-30

## 2020-07-30 RX ORDER — HALOPERIDOL 5 MG/ML
5 INJECTION INTRAMUSCULAR EVERY 4 HOURS PRN
Status: DISCONTINUED | OUTPATIENT
Start: 2020-07-30 | End: 2020-08-04 | Stop reason: HOSPADM

## 2020-07-30 RX ORDER — CLONIDINE HYDROCHLORIDE 0.1 MG/1
0.1 TABLET ORAL EVERY 12 HOURS
Status: CANCELLED | OUTPATIENT
Start: 2020-07-30 | End: 2020-07-31

## 2020-07-30 RX ORDER — LACTULOSE 10 G/15ML
20 SOLUTION ORAL ONCE
Status: COMPLETED | OUTPATIENT
Start: 2020-07-30 | End: 2020-07-30

## 2020-07-30 RX ORDER — DEXTROSE MONOHYDRATE 50 MG/ML
100 INJECTION, SOLUTION INTRAVENOUS PRN
Status: CANCELLED | OUTPATIENT
Start: 2020-07-30

## 2020-07-30 RX ORDER — HYDROXYZINE PAMOATE 25 MG/1
50 CAPSULE ORAL EVERY 6 HOURS PRN
Status: CANCELLED | OUTPATIENT
Start: 2020-07-30

## 2020-07-30 RX ORDER — LORAZEPAM 0.5 MG/1
0.5 TABLET ORAL EVERY 4 HOURS PRN
Status: DISCONTINUED | OUTPATIENT
Start: 2020-07-30 | End: 2020-07-30 | Stop reason: HOSPADM

## 2020-07-30 RX ORDER — ACETAMINOPHEN 650 MG/1
650 SUPPOSITORY RECTAL EVERY 6 HOURS PRN
Status: CANCELLED | OUTPATIENT
Start: 2020-07-30

## 2020-07-30 RX ORDER — CLONIDINE HYDROCHLORIDE 0.1 MG/1
0.1 TABLET ORAL EVERY 12 HOURS
Status: COMPLETED | OUTPATIENT
Start: 2020-07-30 | End: 2020-07-31

## 2020-07-30 RX ORDER — TRAZODONE HYDROCHLORIDE 50 MG/1
50 TABLET ORAL NIGHTLY PRN
Status: CANCELLED | OUTPATIENT
Start: 2020-07-30

## 2020-07-30 RX ORDER — PROMETHAZINE HYDROCHLORIDE 12.5 MG/1
12.5 TABLET ORAL EVERY 6 HOURS PRN
Status: DISCONTINUED | OUTPATIENT
Start: 2020-07-30 | End: 2020-08-04 | Stop reason: HOSPADM

## 2020-07-30 RX ORDER — POLYETHYLENE GLYCOL 3350 17 G/17G
17 POWDER, FOR SOLUTION ORAL DAILY
Status: DISCONTINUED | OUTPATIENT
Start: 2020-07-31 | End: 2020-08-04 | Stop reason: HOSPADM

## 2020-07-30 RX ORDER — MAGNESIUM HYDROXIDE/ALUMINUM HYDROXICE/SIMETHICONE 120; 1200; 1200 MG/30ML; MG/30ML; MG/30ML
30 SUSPENSION ORAL PRN
Status: DISCONTINUED | OUTPATIENT
Start: 2020-07-30 | End: 2020-08-04 | Stop reason: HOSPADM

## 2020-07-30 RX ORDER — TRAZODONE HYDROCHLORIDE 50 MG/1
50 TABLET ORAL NIGHTLY PRN
Status: DISCONTINUED | OUTPATIENT
Start: 2020-07-30 | End: 2020-08-01

## 2020-07-30 RX ORDER — LORAZEPAM 0.5 MG/1
0.5 TABLET ORAL EVERY 4 HOURS PRN
Status: CANCELLED | OUTPATIENT
Start: 2020-07-30

## 2020-07-30 RX ORDER — BENZTROPINE MESYLATE 1 MG/ML
2 INJECTION INTRAMUSCULAR; INTRAVENOUS 2 TIMES DAILY PRN
Status: DISCONTINUED | OUTPATIENT
Start: 2020-07-30 | End: 2020-08-04 | Stop reason: HOSPADM

## 2020-07-30 RX ORDER — CLONIDINE HYDROCHLORIDE 0.1 MG/1
0.1 TABLET ORAL EVERY 12 HOURS
Status: DISCONTINUED | OUTPATIENT
Start: 2020-07-30 | End: 2020-07-30 | Stop reason: HOSPADM

## 2020-07-30 RX ORDER — ACETAMINOPHEN 325 MG/1
650 TABLET ORAL EVERY 6 HOURS PRN
Status: DISCONTINUED | OUTPATIENT
Start: 2020-07-30 | End: 2020-07-30 | Stop reason: SDUPTHER

## 2020-07-30 RX ORDER — LORAZEPAM 0.5 MG/1
0.5 TABLET ORAL EVERY 4 HOURS PRN
Status: DISCONTINUED | OUTPATIENT
Start: 2020-07-30 | End: 2020-08-04 | Stop reason: HOSPADM

## 2020-07-30 RX ORDER — ONDANSETRON 2 MG/ML
4 INJECTION INTRAMUSCULAR; INTRAVENOUS EVERY 6 HOURS PRN
Status: CANCELLED | OUTPATIENT
Start: 2020-07-30

## 2020-07-30 RX ORDER — NICOTINE POLACRILEX 4 MG
15 LOZENGE BUCCAL PRN
Status: DISCONTINUED | OUTPATIENT
Start: 2020-07-30 | End: 2020-08-04 | Stop reason: HOSPADM

## 2020-07-30 RX ORDER — HYDROXYZINE HYDROCHLORIDE 50 MG/ML
50 INJECTION, SOLUTION INTRAMUSCULAR EVERY 6 HOURS PRN
Status: CANCELLED | OUTPATIENT
Start: 2020-07-30

## 2020-07-30 RX ADMIN — LACTULOSE 20 G: 20 SOLUTION ORAL at 09:45

## 2020-07-30 RX ADMIN — INSULIN GLARGINE 35 UNITS: 100 INJECTION, SOLUTION SUBCUTANEOUS at 21:23

## 2020-07-30 RX ADMIN — POLYETHYLENE GLYCOL 3350 17 G: 17 POWDER, FOR SOLUTION ORAL at 10:02

## 2020-07-30 RX ADMIN — INSULIN LISPRO 12 UNITS: 100 INJECTION, SOLUTION INTRAVENOUS; SUBCUTANEOUS at 20:36

## 2020-07-30 RX ADMIN — TRAZODONE HYDROCHLORIDE 50 MG: 50 TABLET ORAL at 21:20

## 2020-07-30 RX ADMIN — LORAZEPAM 0.5 MG: 0.5 TABLET ORAL at 14:32

## 2020-07-30 RX ADMIN — SODIUM CHLORIDE, POTASSIUM CHLORIDE, SODIUM LACTATE AND CALCIUM CHLORIDE: 600; 310; 30; 20 INJECTION, SOLUTION INTRAVENOUS at 09:45

## 2020-07-30 RX ADMIN — SODIUM CHLORIDE, POTASSIUM CHLORIDE, SODIUM LACTATE AND CALCIUM CHLORIDE: 600; 310; 30; 20 INJECTION, SOLUTION INTRAVENOUS at 01:44

## 2020-07-30 RX ADMIN — HYDROXYZINE PAMOATE 50 MG: 50 CAPSULE ORAL at 21:20

## 2020-07-30 RX ADMIN — ENOXAPARIN SODIUM 40 MG: 40 INJECTION SUBCUTANEOUS at 09:45

## 2020-07-30 RX ADMIN — CLONIDINE HYDROCHLORIDE 0.1 MG: 0.1 TABLET ORAL at 21:20

## 2020-07-30 ASSESSMENT — SLEEP AND FATIGUE QUESTIONNAIRES
DO YOU HAVE DIFFICULTY SLEEPING: NO
AVERAGE NUMBER OF SLEEP HOURS: 6
DO YOU USE A SLEEP AID: YES
DIFFICULTY ARISING: NO
DIFFICULTY STAYING ASLEEP: NO
SLEEP PATTERN: DIFFICULTY FALLING ASLEEP;INSOMNIA
DIFFICULTY FALLING ASLEEP: YES
RESTFUL SLEEP: YES

## 2020-07-30 NOTE — PROGRESS NOTES
Wound Ostomy Continence Nurse  Consult Note       NAME:  Chi Carr  MEDICAL RECORD NUMBER:  [id number]  AGE: 23 y.o. GENDER: male  : 1995  TODAY'S DATE:  2020    Subjective   Reason for [id number] Nurse Evaluation and Assessment: Bilateral wrist abrasions       Chi Carr is a 23 y.o. male referred by:   [] Physician  [x] Nursing  [x] Other: per protocol for safety    Wound Identification:  Wound Type: traumatic  Contributing Factors: patient combative, brought to unit with 4 point restaints in place. Wound History: Patient combative upon arrival to [facility], in 4 point restraints. Per nursing staff, patient was thrashing and developed bilateral wrist abrasions from this incident. Bilateral wrists with superficial, pink, abrasions, scant drainage. No areas of pressure injury noted.   Current Wound Care Treatment:  Recommending: border foam dressings to bilateral wrist abrasions    Patient Goal of Care:  [x] Wound Healing  [] Odor Control  [] Palliative Care  [] Pain Control   [] Other:         PAST MEDICAL HISTORY        Diagnosis Date    ADHD (attention deficit hyperactivity disorder)     Asperger syndrome     Asthma     Asthma 3/31/2015    Bipolar 1 disorder, mixed, severe ([address] 2018    Chemical dependency ([address]     marijuana    Diabetes mellitus ([address]     Diabetes mellitus type 1 ([address]     Hepatitis C     Hepatitis C     Hyperkalemia, diminished renal excretion 2017    Mental disorder     ODD (oppositional defiant disorder)     Seasonal allergies 3/31/2015    Seizures ([address]        PAST SURGICAL HISTORY    Past Surgical History:   Procedure Laterality Date    ABDOMEN SURGERY Left 10/7/2019    INCISION  & DRAINAGE OF ABSCESS LEFT ABDOMINAL WALL performed by Ervin Oconnor MD at [address] N/A 10/21/2019    INCISION AND DRAINAGE OF RECURRENT ABDOMINAL WALL ABSCESS ROOM 475 performed by Ervin Oconnor MD at [address]    Family History Problem Relation Age of Onset    Cancer Maternal Aunt         breast    Diabetes Maternal Uncle     Diabetes Paternal Uncle     Cancer Maternal Grandmother     Diabetes Maternal Grandmother     Cancer Maternal Grandfather     Diabetes Maternal Grandfather        SOCIAL HISTORY    Social History     Tobacco Use    Smoking status: Current Every Day Smoker     Packs/day: 1.00     Years: 5.00     Pack years: 5.00     Types: Cigarettes    Smokeless tobacco: Never Used    Tobacco comment: pt aware of risk   Substance Use Topics    Alcohol use: Not Currently     Alcohol/week: 0.0 standard drinks     Frequency: Never     Comment: Had been drinking whiskey and vodka on a regular basis about 6 months ago. Nothing since.  Drug use: Yes     Types: Methamphetamines, Marijuana, IV, Cocaine, Opiates      Comment: occassionally, last use 2 weeks ago       ALLERGIES    Allergies   Allergen Reactions    Bactrim [Sulfamethoxazole-Trimethoprim] Rash    Dust Mite Extract      nasal & sinus congestion, itchy watery eyes, sneezing    Mirtazapine      blisters    Other      dander causes him to sneeze, have nasal/sinus congestion, itchy, watery eyes.  Oxcarbazepine Rash    Shrimp Flavor Nausea And Vomiting       MEDICATIONS    No current facility-administered medications on file prior to encounter. Current Outpatient Medications on File Prior to Encounter   Medication Sig Dispense Refill    insulin aspart (NOVOLOG) 100 UNIT/ML injection vial 15-25 units with each meals 2 vial 3    CONTOUR NEXT TEST strip Test 6x daily Dx E10.65 200 each 3    NOVOLOG 100 UNIT/ML injection vial Via insulin pump max daily dose 100 units daily 3 vial 3    LANTUS 100 UNIT/ML injection vial INJECT 50 UNITS INTO THE SKIN NIGHTLY.  10 mL 3    Insulin Syringe-Needle U-100 (INSULIN SYRINGE .5CC/31GX5/16\") 31G X 5/16\" 0.5 ML MISC Use 4 daily 100 each 1    Insulin Pen Needle (NOVOFINE) 32G X 6 MM MISC qid 300 each 1    glucose 4 g chewable tablet Take 4 tablets by mouth as needed for Low blood sugar 60 tablet 3    Spacer/Aero-Holding Chambers (EASIVENT) MISC USE AS INSTRUCTED WITH VENTOLIN INHALER.  0    QUEtiapine (SEROQUEL) 300 MG tablet Take 1 tablet by mouth nightly as needed (sleep) 30 tablet 0    albuterol sulfate  (90 Base) MCG/ACT inhaler Use 2 puffs 4 times daily for 7 days then as needed for wheezing. Dispense with Spacer and instruct in use. At patient's preference may use 60 dose MDI. May Sub Pro-Air or Proventil as needed per insurance. 1 Inhaler 0    blood glucose monitor strips Test 4 times a day & as needed for symptoms of irregular blood glucose.  300 strip 5    blood glucose monitor kit and supplies Give 1 meter Dx E11.65 1 kit 0       Objective    /80   Pulse 81   Temp 97.7 °F (36.5 °C) (Oral)   Resp 16   Ht 5' 7\" (1.702 m)   Wt 130 lb (59 kg)   SpO2 99%   BMI 20.36 kg/m²     LABS:  WBC:    Lab Results   Component Value Date    WBC 7.0 07/30/2020     H/H:    Lab Results   Component Value Date    HGB 13.7 07/30/2020    HCT 41.1 07/30/2020     PTT:    Lab Results   Component Value Date    APTT 26.1 11/13/2019   [APTT}  PT/INR:    Lab Results   Component Value Date    PROTIME 12.6 11/13/2019    INR 0.9 11/13/2019     HgBA1c:    Lab Results   Component Value Date    LABA1C 12.2 10/01/2019       Assessment   Jorden Risk Score: Jorden Scale Score: 19    Patient Active Problem List   Diagnosis    DKA, type 1, not at goal [facility])    ADHD (attention deficit hyperactivity disorder)    Oppositional defiant disorder    Seasonal allergies    Asthma    Atopic rhinitis    Closed fracture of neck of metacarpal bone    Congenital anomaly of cerebrovascular system    Generalized convulsive epilepsy ([address]    Contusion of hand    Closed fracture of base of other metacarpal bone(s)    Atopic dermatitis    Adult behavior problems    Congenital vascular nevus    Epilepsy ([facility]    Congenital vascular nevus  Generalized tonic clonic epilepsy (HCC)    Asperger syndrome    Chemical dependency ([facility]    History of oppositional defiant disorder    Hyperglycemia due to type 1 diabetes mellitus (HCC)    Metabolic acidosis with increased anion gap and accumulation of organic acids    Lactic acid acidosis    Hyperkalemia, diminished renal excretion    Hyperkalemia, transcellular shifts    TWIN (acute kidney injury) ([facility]    Heroin abuse (HCC)    Hyperglycemia    Substance induced mood disorder ([facility]    H/O bipolar disorder    Polysubstance abuse ([facility]    Suicide attempt ([facility]    Bipolar 1 disorder, mixed, severe ([facility]    Multiple drug overdose    Respiratory insufficiency    DM w/ coma type I, uncontrolled ([facility]    Seizures ([facility]    Hepatitis C    Diabetes mellitus type 1 ([facility]    Cellulitis    Abdominal wall abscess    Hypergranulation    Drug overdose, accidental or unintentional, initial encounter    Toxic encephalopathy    Rhabdomyolysis       Measurements:  Wound 07/29/20 Wrist Anterior;Right (Active)   Wound Traumatic 07/29/20 2100   Dressing Status Clean; Intact;Dry 07/30/20 0936   Dressing Changed Changed/New 07/30/20 0936   Dressing/Treatment Foam 07/30/20 0936   Wound Cleansed Rinsed/Irrigated with saline 07/29/20 1617   Number of days: 0       Wound 07/29/20 Wrist Anterior; Left (Active)   Wound Traumatic 07/29/20 2100   Dressing Status Clean;Dry; Intact 07/30/20 0936   Dressing Changed Changed/New 07/30/20 0936   Dressing/Treatment Foam 07/30/20 0936   Wound Cleansed Rinsed/Irrigated with saline 07/29/20 1617   Number of days: 0       Plan   Plan of Care: Wound 07/29/20 Wrist Anterior;Right-Dressing/Treatment: Foam  Wound 07/29/20 Wrist Anterior; Left-Dressing/Treatment: Foam    Specialty Bed Required : N/A   [] Low Air Loss   [] Pressure Redistribution  [] Fluid Immersion  [] Bariatric  [] Other:     Current Diet: DIET CARB CONTROL; Carb Control: 5 carb choices (75 gms)/meal; Safety Tray; Safety Tray (Disposables No Utensils)  Dietician consult:  N/A    Discharge Plan:  Placement for patient upon discharge: Psych possibly    Patient appropriate for Outpatient [address]: N/A    Referrals:  []   [] [facility]  [] Supplies  [] Other    Patient/Caregiver Teaching:  Level of patient/caregiver understanding able to:   [] Indicates understanding       [] Needs reinforcement  [] Unsuccessful      [] Verbal Understanding  [] Demonstrated understanding       [] No evidence of learning  [] Refused teaching         [x] N/A       Electronically signed by EVA ArzateN, RN, CWOCN on 7/30/2020 at 1:42 PM

## 2020-07-30 NOTE — DISCHARGE SUMMARY
[facility] Medicine Discharge Summary    Tiffany Smart  :  1995  MRN:  [id number]    Admit date:  2020  Discharge date:  2020    Admitting Physician:  Dacia Vaughn DO  Primary Care Physician:  Hemal Osorio APRN - CNP    Discharge Diagnoses:    Principal Problem:    Drug overdose, accidental or unintentional, initial encounter  Active Problems:    TWIN (acute kidney injury) ([facility]    Diabetes mellitus type 1 ([facility]    Toxic encephalopathy    Rhabdomyolysis  Resolved Problems:    * No resolved hospital problems. *    Chief Complaint   Patient presents with    Drug Overdose       Condition: improved  Activity: no restrictions    Diet: diabetic 3 carb  Disposition: in-patient psychiatry  Functional Status: ambulatory    Significant Findings:     Labs Renal Latest Ref Rng & Units 2020 2020 2020 2020 3/12/2020   BUN 6 - 20 mg/dL 12 18 18 9 20   Cr 0.70 - 1.20 mg/dL 1.09 1.61(H) 1.62(H) 1.02 0.88   K 3.4 - 4.9 mEq/L 3.7 4.9 4.4 3.5 4.1   Na 135 - 144 mEq/L 142 151(H) 154(H) 133(L) 136         Hospital Course:   24-year-old male with history of polysubstance abuse, type 1 diabetes presented after intentional overdose on Seroquel along with other substances including cocaine, amphetamine, heroin (UTox was only positive for cocaine, however patient admitted to using these other substances). The patient was monitored on telemetry and his course was complicated by severe agitation/delirium which ultimately resolved. He was agitated the point where he was threatening staff, thrashing in bed, using vulgar language, and requiring four-point Leather restraints. He was noted to have mild rhabdomyolysis with CK peaking in the 1000's. He also had mild TWIN. Both of these issues resolved with IV fluid. He will continue his treatment on the inpatient psychiatry mast.   He may need adjustment of his insulin regimen during this time-he usually wears a continuous insulin pump however he does not have supplies at this time. Exam on Discharge:   /80   Pulse 81   Temp 97.7 °F (36.5 °C) (Oral)   Resp 16   Ht 5' 7\" (1.702 m)   Wt 130 lb (59 kg)   SpO2 99%   BMI 20.36 kg/m²   General appearance: alert, cooperative and no distress  Mental Status: oriented to person, place and time and normal affect  Lungs: clear to auscultation bilaterally, normal effort  Heart: regular rate and rhythm, no murmur  Abdomen: soft, nontender, nondistended, bowel sounds present, no masses  Extremities: no edema, redness, tenderness in the calves  Skin: no gross lesions, rashes    Discharge Medication List:   Lino Moran Medication Instructions SILVIANO:[id number]    Printed on:07/30/20 9302   Medication Information                      albuterol sulfate  (90 Base) MCG/ACT inhaler  Use 2 puffs 4 times daily for 7 days then as needed for wheezing. Dispense with Spacer and instruct in use. At patient's preference may use 60 dose MDI. May Sub Pro-Air or Proventil as needed per insurance. blood glucose monitor kit and supplies  Give 1 meter Dx E11.65             blood glucose monitor strips  Test 4 times a day & as needed for symptoms of irregular blood glucose. CONTOUR NEXT TEST strip  Test 6x daily Dx E10.65             glucose 4 g chewable tablet  Take 4 tablets by mouth as needed for Low blood sugar             insulin aspart (NOVOLOG) 100 UNIT/ML injection vial  15-25 units with each meals             Insulin Pen Needle (NOVOFINE) 32G X 6 MM MISC  qid             Insulin Syringe-Needle U-100 (INSULIN SYRINGE .5CC/31GX5/16\") 31G X 5/16\" 0.5 ML MISC  Use 4 daily             LANTUS 100 UNIT/ML injection vial  INJECT 50 UNITS INTO THE SKIN NIGHTLY.              NOVOLOG 100 UNIT/ML injection vial  Via insulin pump max daily dose 100 units daily             QUEtiapine (SEROQUEL) 300 MG tablet  Take 1 tablet by mouth nightly as needed (sleep) Spacer/Aero-Holding Chambers (EASIVENT) MISC  USE AS INSTRUCTED WITH VENTOLIN INHALER.                  DC time 37 minutes    Signed:  Carlene Gordon  7/30/2020, 2:53 PM

## 2020-07-30 NOTE — PROGRESS NOTES
Pt arrived to the unit via wheelchair from [address] [place] [address]. Pt oriented to unit and room. Skin check completed, pt has bilateral abrasions to forearms and multiple scars on forearms. Pt denies SI, HI, and A/V hallucinations. Pt cooperative with an irritable edge. Pt states he needs to get home to take care of his 2 cats and 1 dog.

## 2020-07-30 NOTE — PROGRESS NOTES
Patient did not attend Activity Group despite staff encouragement.  Electronically signed by Keesha Gilliam on 7/30/2020 at 7:08 PM

## 2020-07-30 NOTE — PROGRESS NOTES
Report taken from [address] [place] [address]. Pt is a 23year old involuntary patient admitted under the care of Dr. Lenka Black with diagnosis of Bipolar 1 D. O. Pt presented to the ED after an intentional OD on Seroquel. Pt reports relapsing on heroin, last use day of admission via IV route. Pt has a HX of self harm and multiple abrasions to wrists bilaterally. Tox positive for Cocaine.

## 2020-07-30 NOTE — CARE COORDINATION
SPOKE WITH [facility], PT.MEDICALLY CLEAR,  THEY WILL CALL BACK WITH BED ASSIGNMENT.  Electronically signed by Gracia Alonzo RN on 7/30/2020 at 11:32 AM

## 2020-07-30 NOTE — FLOWSHEET NOTE
Assessment completed, cooperative with care and medications. No needs voiced. IVF maintained per orders.

## 2020-07-31 LAB
GLUCOSE BLD-MCNC: 222 MG/DL (ref 60–115)
GLUCOSE BLD-MCNC: 242 MG/DL (ref 60–115)
GLUCOSE BLD-MCNC: 254 MG/DL (ref 60–115)
GLUCOSE BLD-MCNC: 297 MG/DL (ref 60–115)
GLUCOSE BLD-MCNC: 315 MG/DL (ref 60–115)
GLUCOSE BLD-MCNC: 389 MG/DL (ref 60–115)
GLUCOSE BLD-MCNC: 428 MG/DL (ref 60–115)
PERFORMED ON: ABNORMAL

## 2020-07-31 PROCEDURE — 6370000000 HC RX 637 (ALT 250 FOR IP): Performed by: NURSE PRACTITIONER

## 2020-07-31 PROCEDURE — 6370000000 HC RX 637 (ALT 250 FOR IP): Performed by: PSYCHIATRY & NEUROLOGY

## 2020-07-31 PROCEDURE — 6370000000 HC RX 637 (ALT 250 FOR IP): Performed by: INTERNAL MEDICINE

## 2020-07-31 PROCEDURE — 99222 1ST HOSP IP/OBS MODERATE 55: CPT | Performed by: PSYCHIATRY & NEUROLOGY

## 2020-07-31 PROCEDURE — 1240000000 HC EMOTIONAL WELLNESS R&B

## 2020-07-31 RX ORDER — QUETIAPINE FUMARATE 50 MG/1
100 TABLET, EXTENDED RELEASE ORAL NIGHTLY
Status: DISCONTINUED | OUTPATIENT
Start: 2020-07-31 | End: 2020-08-01

## 2020-07-31 RX ORDER — INSULIN GLARGINE 100 [IU]/ML
40 INJECTION, SOLUTION SUBCUTANEOUS NIGHTLY
Status: DISCONTINUED | OUTPATIENT
Start: 2020-07-31 | End: 2020-08-01

## 2020-07-31 RX ORDER — BACITRACIN, NEOMYCIN, POLYMYXIN B 400; 3.5; 5 [USP'U]/G; MG/G; [USP'U]/G
OINTMENT TOPICAL 2 TIMES DAILY
Status: DISCONTINUED | OUTPATIENT
Start: 2020-07-31 | End: 2020-08-04 | Stop reason: HOSPADM

## 2020-07-31 RX ADMIN — INSULIN LISPRO 10 UNITS: 100 INJECTION, SOLUTION INTRAVENOUS; SUBCUTANEOUS at 17:20

## 2020-07-31 RX ADMIN — QUETIAPINE FUMARATE 100 MG: 50 TABLET, EXTENDED RELEASE ORAL at 21:13

## 2020-07-31 RX ADMIN — HYDROXYZINE PAMOATE 50 MG: 50 CAPSULE ORAL at 21:13

## 2020-07-31 RX ADMIN — NICOTINE POLACRILEX 4 MG: 4 GUM, CHEWING ORAL at 07:09

## 2020-07-31 RX ADMIN — NICOTINE POLACRILEX 4 MG: 4 GUM, CHEWING ORAL at 13:26

## 2020-07-31 RX ADMIN — BACITRACIN, NEOMYCIN, POLYMYXIN B 1 G: 400; 3.5; 5 OINTMENT TOPICAL at 12:47

## 2020-07-31 RX ADMIN — CLONIDINE HYDROCHLORIDE 0.1 MG: 0.1 TABLET ORAL at 08:29

## 2020-07-31 RX ADMIN — INSULIN LISPRO 6 UNITS: 100 INJECTION, SOLUTION INTRAVENOUS; SUBCUTANEOUS at 12:43

## 2020-07-31 RX ADMIN — PROMETHAZINE HYDROCHLORIDE 12.5 MG: 12.5 TABLET ORAL at 07:40

## 2020-07-31 RX ADMIN — INSULIN GLARGINE 40 UNITS: 100 INJECTION, SOLUTION SUBCUTANEOUS at 21:07

## 2020-07-31 RX ADMIN — INSULIN LISPRO 8 UNITS: 100 INJECTION, SOLUTION INTRAVENOUS; SUBCUTANEOUS at 08:29

## 2020-07-31 RX ADMIN — NICOTINE POLACRILEX 4 MG: 4 GUM, CHEWING ORAL at 17:38

## 2020-07-31 RX ADMIN — BACITRACIN, NEOMYCIN, POLYMYXIN B 1 G: 400; 3.5; 5 OINTMENT TOPICAL at 21:13

## 2020-07-31 RX ADMIN — INSULIN LISPRO 4 UNITS: 100 INJECTION, SOLUTION INTRAVENOUS; SUBCUTANEOUS at 21:09

## 2020-07-31 RX ADMIN — NICOTINE POLACRILEX 4 MG: 4 GUM, CHEWING ORAL at 22:09

## 2020-07-31 RX ADMIN — NICOTINE POLACRILEX 4 MG: 4 GUM, CHEWING ORAL at 19:58

## 2020-07-31 RX ADMIN — HYDROXYZINE PAMOATE 50 MG: 50 CAPSULE ORAL at 07:40

## 2020-07-31 RX ADMIN — TRAZODONE HYDROCHLORIDE 50 MG: 50 TABLET ORAL at 21:13

## 2020-07-31 RX ADMIN — NICOTINE POLACRILEX 4 MG: 4 GUM, CHEWING ORAL at 08:51

## 2020-07-31 ASSESSMENT — LIFESTYLE VARIABLES: HISTORY_ALCOHOL_USE: NO

## 2020-07-31 NOTE — PROGRESS NOTES
Pt. attended the 0900 community meeting.  Electronically signed by Sudhakar Shirley on 7/31/2020 at 2:06 PM
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
## 2020-07-31 NOTE — CARE COORDINATION
Patient declined to sign RIGOBERTO for collateral contact.     Electronically signed by Ruthann Billy on 7/31/2020 at 8:53 AM

## 2020-07-31 NOTE — PROGRESS NOTES
Patient did not attend Healthy Living Group despite staff encouragement.  Electronically signed by Carline Segura on 7/31/2020 at 5:28 PM

## 2020-07-31 NOTE — PROGRESS NOTES
Pt comes to the desk and asks to have his sugar checked. Pts sugar is 297. Pt will be monitored as Dr Hussain Gregg saw pt today and has adjusted his insulin.  Electronically signed by Dario Romeo LPN on 0/32/1140 at 6:86 PM

## 2020-07-31 NOTE — CARE COORDINATION
Brief Intervention and Referral to Treatment Summary    Patient was provided PHQ-9, AUDIT and DAST Screening:      PHQ-9 Score: 0  AUDIT Score:  0  DAST Score:  2    Patients substance use is considered     Low Risk/Healthy  Moderate Risk  Harmful x  Dependent    Patients depression is considered:     Minimal x  Mild   Moderate  Moderately Severe  Severe    Brief Education Was Provided    Patient was not receptive to educational materials offered and refused to make contact with [facility]    Brief Intervention Is Provided (Only for AUDIT or DAST)     Patient denies readiness to decrease and/or stop use and a plan was not discussed    Recommendations/Referrals for Brief and/or Specialized Treatment Provided to Patient     medication management, group/individual therapies, family meetings, psycho -education, treatment team meetings to assist with stabilization.  Re-evaluate need for AOD treatment with patient    Electronically signed by Chico Briceno on 7/31/2020 at 8:53 AM

## 2020-07-31 NOTE — CARE COORDINATION
Patient did not attend group despite staff encouragement.   Electronically signed by Jean Pierre Rangel on 7/31/2020 at 10:35 AM

## 2020-07-31 NOTE — PROGRESS NOTES
Pt. refused to attend the 1100 skills group, despite staff encouragement.  Electronically signed by Allen Keenan on 7/31/2020 at 2:07 PM
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
## 2020-07-31 NOTE — PROGRESS NOTES
Patient did not attend Activity Group despite staff encouragement.  Electronically signed by Keesha Gilliam on 7/31/2020 at 7:18 PM

## 2020-07-31 NOTE — CARE COORDINATION
Patient did not attend group despite staff encouragement.   Electronically signed by Bucky Jaramillo on 7/31/2020 at 2:48 PM

## 2020-07-31 NOTE — PROGRESS NOTES
Patient did not attend Wrap Up and Relaxation Group despite staff encouragement.  Electronically signed by Pierre Chong on 7/30/2020 at 9:28 PM

## 2020-08-01 LAB
GLUCOSE BLD-MCNC: 309 MG/DL (ref 60–115)
GLUCOSE BLD-MCNC: 358 MG/DL (ref 60–115)
GLUCOSE BLD-MCNC: 370 MG/DL (ref 60–115)
GLUCOSE BLD-MCNC: 50 MG/DL (ref 60–115)
GLUCOSE BLD-MCNC: 65 MG/DL (ref 60–115)
GLUCOSE BLD-MCNC: 65 MG/DL (ref 60–115)
GLUCOSE BLD-MCNC: 85 MG/DL (ref 60–115)
GLUCOSE BLD-MCNC: 98 MG/DL (ref 60–115)
PERFORMED ON: ABNORMAL
PERFORMED ON: NORMAL

## 2020-08-01 PROCEDURE — 6370000000 HC RX 637 (ALT 250 FOR IP): Performed by: NURSE PRACTITIONER

## 2020-08-01 PROCEDURE — 6370000000 HC RX 637 (ALT 250 FOR IP): Performed by: PSYCHIATRY & NEUROLOGY

## 2020-08-01 PROCEDURE — 6370000000 HC RX 637 (ALT 250 FOR IP): Performed by: INTERNAL MEDICINE

## 2020-08-01 PROCEDURE — 1240000000 HC EMOTIONAL WELLNESS R&B

## 2020-08-01 PROCEDURE — 99221 1ST HOSP IP/OBS SF/LOW 40: CPT | Performed by: INTERNAL MEDICINE

## 2020-08-01 RX ORDER — QUETIAPINE 200 MG/1
200 TABLET, FILM COATED, EXTENDED RELEASE ORAL NIGHTLY
Status: DISCONTINUED | OUTPATIENT
Start: 2020-08-01 | End: 2020-08-02

## 2020-08-01 RX ORDER — INSULIN GLARGINE 100 [IU]/ML
30 INJECTION, SOLUTION SUBCUTANEOUS NIGHTLY
Status: DISCONTINUED | OUTPATIENT
Start: 2020-08-01 | End: 2020-08-02

## 2020-08-01 RX ADMIN — INSULIN GLARGINE 30 UNITS: 100 INJECTION, SOLUTION SUBCUTANEOUS at 20:41

## 2020-08-01 RX ADMIN — NICOTINE POLACRILEX 4 MG: 4 GUM, CHEWING ORAL at 16:42

## 2020-08-01 RX ADMIN — NICOTINE POLACRILEX 4 MG: 4 GUM, CHEWING ORAL at 23:07

## 2020-08-01 RX ADMIN — BACITRACIN, NEOMYCIN, POLYMYXIN B 1 G: 400; 3.5; 5 OINTMENT TOPICAL at 21:29

## 2020-08-01 RX ADMIN — NICOTINE POLACRILEX 4 MG: 4 GUM, CHEWING ORAL at 09:07

## 2020-08-01 RX ADMIN — NICOTINE POLACRILEX 4 MG: 4 GUM, CHEWING ORAL at 21:53

## 2020-08-01 RX ADMIN — INSULIN LISPRO 8 UNITS: 100 INJECTION, SOLUTION INTRAVENOUS; SUBCUTANEOUS at 08:53

## 2020-08-01 RX ADMIN — INSULIN LISPRO 10 UNITS: 100 INJECTION, SOLUTION INTRAVENOUS; SUBCUTANEOUS at 17:15

## 2020-08-01 RX ADMIN — QUETIAPINE FUMARATE 200 MG: 200 TABLET, EXTENDED RELEASE ORAL at 21:29

## 2020-08-01 NOTE — PROGRESS NOTES
Patient came out to the nurses station stating he wasn't feeling good and requested blood sugar to be taken. Pt Blood sugar was 65. Pt received juice, pepsi and saskia crackers with peanut butter. Will recheck and continue to monitor.

## 2020-08-01 NOTE — GROUP NOTE
Group Therapy Note    Date: 8/1/2020    Group Start Time: 9555  Group End Time: 1049  Group Topic: Healthy Living/Wellness    [facility] ANNE Conway        Group Therapy Note    Attendees: 10/22       Patient's Goal:  To learn how to utilize certain coping skills when in high distress. Notes:  Patient minimally participated in the Healthy Living Group.     Status After Intervention:  Unchanged    Participation Level: Minimal    Participation Quality: Resistant      Speech:  normal      Thought Process/Content: Logical      Affective Functioning: Constricted/Restricted      Mood: euthymic      Level of consciousness:  Alert and Attentive      Response to Learning: Progressing to goal      Endings: None Reported    Modes of Intervention: Education      Discipline Responsible: [address], [facility] [employer]      Signature:  Lore Conway

## 2020-08-01 NOTE — PROGRESS NOTES
Patient refusing his 12 units of insulin lispro scheduled at lunch. His sugar was 65 earlier,  had a sandwich wrap and he did not want to recheck it.   Eating lunch at the present time

## 2020-08-01 NOTE — CARE COORDINATION
Pt. Denies all. Minimizes situation. Restless during assessment and could not sit still. Laughs at inappropriate times. Labile mood.

## 2020-08-01 NOTE — GROUP NOTE
Group Therapy Note    Date: 8/1/2020    Group Start Time: 1000  Group End Time: 1030  Group Topic: Psychotherapy    [facility] Oneil December        Group Therapy Note    Attendees: 5         Patient's Goal:  To participate in group therapy process    Notes:  Patient reported having received a \"pill\" last night that increased anxiety.  Patient was also engaged in conversation regarding tattoos as a means of self expression    Status After Intervention:  Improved    Participation Level: Interactive    Participation Quality: Appropriate      Speech:  normal      Thought Process/Content: Logical      Affective Functioning: Congruent      Mood: euthymic      Level of consciousness:  Alert and Oriented x4      Response to Learning: Able to verbalize current knowledge/experience      Endings: None Reported    Modes of Intervention: Support      Discipline Responsible: /Counselor      Signature:  Oneil December

## 2020-08-01 NOTE — GROUP NOTE
Group Therapy Note    Date: 8/1/2020    Group Start Time: 1100  Group End Time: 1200  Group Topic: Psychoeducation    [facility]    Abdoul Speaker, CTRS        Group Therapy Note    Attendees: 4         Patient's Goal:  *\"to go home\"    Notes:  Pt. attended the 1100 skill group briefly. Did not want to wear mask in group despite encouragement. Left session prematurely.     Status After Intervention:  Unchanged    Participation Level: Interactive    Participation Quality: Inappropriate and Resistant      Speech:  normal      Thought Process/Content: Logical      Affective Functioning: Flat      Mood: irritable      Level of consciousness:  Alert, Oriented x4 and Preoccupied      Response to Learning: Resistant      Endings: None Reported    Modes of Intervention: Education, Support, Socialization and Activity      Discipline Responsible: Psychoeducational Specialist      Signature:  Elicia Busch

## 2020-08-01 NOTE — PROGRESS NOTES
Patient to desk complaining of not feeling well. Tanking. Sugar 65. [place] wrap given ,   Patient says \" you guys are killing me without my insulin pump\" but smiled.

## 2020-08-01 NOTE — PROGRESS NOTES
Pt has been visible out on unit social with peers. When this writer asked if he is attending group pt stated '' I have to wear a mask and I was never good at listening to rules\". Pt denies all and is preoccupied about discharge. Pt reports improved appetite and reports becoming angry after a argument with a friend. Pt denies SI,HI,AVH.

## 2020-08-01 NOTE — PROGRESS NOTES
Pt is A & O X4. Pt states he has no depression or anxiety. Denies SI/HI/AVH. Pts' appetite is good , pts'  AC , insulin per orders,pt states this is because he doesn't have his insulin pump. Pt is showering daily. Trazodone, vistaril, & seroquel given at hs.

## 2020-08-01 NOTE — PROGRESS NOTES
Not on file    Number of children: 0    Years of education: 6    Highest education level: Not on file   Occupational History    Not on file   Social Needs    Financial resource strain: Hard    Food insecurity     Worry: Often true     Inability: Often true    Transportation needs     Medical: Yes     Non-medical: Yes   Tobacco Use    Smoking status: Current Every Day Smoker     Packs/day: 1.00     Years: 5.00     Pack years: 5.00     Types: Cigarettes    Smokeless tobacco: Never Used    Tobacco comment: pt aware of risk   Substance and Sexual Activity    Alcohol use: Not Currently     Alcohol/week: 0.0 standard drinks     Frequency: Never     Comment: Had been drinking whiskey and vodka on a regular basis about 6 months ago. Nothing since.  Drug use: Yes     Types: Methamphetamines, Marijuana, IV, Cocaine, Opiates      Comment: occassionally, last use 2 weeks ago    Sexual activity: Yes     Partners: Female     Comment: No protection or birth-control plan   Lifestyle    Physical activity     Days per week: 0 days     Minutes per session: 0 min    Stress: Very much   Relationships    Social connections     Talks on phone: More than three times a week     Gets together: More than three times a week     Attends [employer] service: Never     Active member of club or organization: No     Attends meetings of clubs or organizations: Never     Relationship status: Not on file    Intimate partner violence     Fear of current or ex partner: Patient refused     Emotionally abused: Patient refused     Physically abused: Patient refused     Forced sexual activity: Patient refused   Other Topics Concern    Not on file   Social History Narrative    ** Merged History Encounter **                ROS:  [x] All negative/unchanged except if checked.  Explain positive(checked items) below:  [] Constitutional  [] Eyes  [] Ear/Nose/Mouth/Throat  [] Respiratory  [] CV  [] GI  []   [] Musculoskeletal  [] Skin/Breast  [] Neurological  [] Endocrine  [] Heme/Lymph  [] Allergic/Immunologic    Explanation:     MEDICATIONS:    Current Facility-Administered Medications:     QUEtiapine (SEROQUEL XR) extended release tablet 200 mg, 200 mg, Oral, Nightly, Hanna Bates MD  [facility]  nicotine polacrilex (NICORETTE) gum 4 mg, 4 mg, Oral, PRN, Hanna Bates MD, 4 mg at 08/01/20 0907    neomycin-bacitracin-polymyxin (NEOSPORIN) ointment, , Topical, BID, Sawyer Killian, APRHIREN - CNP, 1 g at 07/31/20 2113    insulin glargine (LANTUS) injection vial 40 Units, 40 Units, Subcutaneous, Nightly, Dirk Chu DO, 40 Units at 07/31/20 2107    insulin lispro (HUMALOG) injection vial 12 Units, 12 Units, Subcutaneous, TID WC, Dirk Chu DO, 12 Units at 08/01/20 0854    acetaminophen (TYLENOL) tablet 650 mg, 650 mg, Oral, Q4H PRN, Bob Ramirez MD    aluminum & magnesium hydroxide-simethicone (MAALOX) 200-200-20 MG/5ML suspension 30 mL, 30 mL, Oral, PRN, Bob Ramirez MD    benztropine mesylate (COGENTIN) injection 2 mg, 2 mg, Intramuscular, BID PRNBob MD    haloperidol lactate (HALDOL) injection 5 mg, 5 mg, Intramuscular, Q4H PRN **OR** haloperidol (HALDOL) tablet 5 mg, 5 mg, Oral, Q4H PRNBob MD    magnesium hydroxide (MILK OF MAGNESIA) 400 MG/5ML suspension 30 mL, 30 mL, Oral, Daily PRN, Bob Ramirez MD    promethazine (PHENERGAN) tablet 12.5 mg, 12.5 mg, Oral, Q6H PRN, 12.5 mg at 07/31/20 0740 **OR** ondansetron (ZOFRAN) injection 4 mg, 4 mg, Intravenous, Q6H PRN, Dirk Chu DO    sodium chloride flush 0.9 % injection 10 mL, 10 mL, Intravenous, PRN, Dirk Chu DO    dextrose 5 % solution, 100 mL/hr, Intravenous, PRN, Dirk Learn, DO    dextrose 50 % IV solution, 12.5 g, Intravenous, PRN, Dirk Learn, DO    glucagon (rDNA) injection 1 mg, 1 mg, Intramuscular, PRN, Dirk Chu, DO    glucose (GLUTOSE) 40 % oral gel 15 g, 15 g, Oral, PRN, Dirk Chu, DO    insulin lispro (HUMALOG) injection vial 0-12 Units, 0-12 Units, Subcutaneous, 4x Daily AC & HS, Laura Patel DO, 8 Units at 08/01/20 0853    hydrOXYzine (VISTARIL) injection 50 mg, 50 mg, Intramuscular, Q6H PRN **OR** hydrOXYzine (VISTARIL) capsule 50 mg, 50 mg, Oral, Q6H PRN, Richad Spurling, MD, 50 mg at 07/31/20 2113    LORazepam (ATIVAN) tablet 0.5 mg, 0.5 mg, Oral, Q4H PRN, Laura Patel DO    polyethylene glycol (GLYCOLAX) packet 17 g, 17 g, Oral, Daily, Laura Patel DO      Examination:  /78   Pulse 83   Temp 98 °F (36.7 °C) (Oral)   Resp 16   SpO2 94%   Gait - steady  Medication side effects(SE): none reported    Mental Status Examination:    Level of consciousness:  within normal limits   Appearance:  fair grooming and fair hygiene  Behavior/Motor:  psychomotor retardation  Attitude toward examiner:  fair eye contact, evasive and guarded  Speech:  spontaneous, normal rate and normal volume   Mood: decreased range and depressed  Affect:  mood incongruent  Thought processes:  linear and goal directed   Thought content:  Preoccupied with discharge  Homocidal ideation denies  Suicidal Ideation:  denies suicidal ideation but may be minimizing  Delusions:  no evidence of delusions  Perceptual Disturbance:  denies any perceptual disturbance  Cognition:  oriented to person, place, and time   Concentration distractible  Insight poor   Judgement poor     ASSESSMENT:   Patient symptoms are:  [x] Well controlled  [] Improving  [] Worsening  [] No change      Diagnosis:   Bipolar depression  Borderline PD  Opiate use disorder    LABS:    Recent Labs     07/30/20  0555   WBC 7.0   HGB 13.7*        Recent Labs     07/30/20  0555      K 3.7      CO2 23   BUN 12   CREATININE 1.09   GLUCOSE 260*     No results for input(s): BILITOT, ALKPHOS, AST, ALT in the last 72 hours.   Lab Results   Component Value Date    LABAMPH Neg 07/29/2020    BARBSCNU Neg 07/29/2020    LABBENZ Neg 07/29/2020 LABBENZ NEGATIVE 09/21/2012    LABMETH Neg 07/29/2020    OPIATESCREENURINE Neg 07/29/2020    PHENCYCLIDINESCREENURINE Neg 07/29/2020    PPXUR NOT REPORTED 02/11/2015    ETOH <10 07/28/2020     Lab Results   Component Value Date    TSH 1.240 07/28/2020     Lab Results   Component Value Date    LITHIUM <0.1 (L) 05/25/2016     Lab Results   Component Value Date    VALPROATE 44.2 (L) 02/14/2019       RISK ASSESSMENT:   Suicide risk: high  Homicide risk: low  Violence risk: low  Elopement risk: medium to low    Treatment Plan:  Reviewed current Medications with the patient. Will continue current medications  Risks, benefits, side effects, drug-to-drug interactions and alternatives to treatment were discussed. Collateral information: pending  CD evaluation pending  Encourage patient to attend group and other milieu activities.   Discharge planning discussed with the patient and treatment team.    PSYCHOTHERAPY/COUNSELING:  [x] Therapeutic interview  [x] Supportive  [] CBT  [] Ongoing  [] Other    [x] Patient continues to need, on a daily basis, active treatment furnished directly by or requiring the supervision of inpatient psychiatric personnel      Anticipated Length of stay:             Electronically signed by Camden Frye MD on 8/1/2020 at 4:28 PM

## 2020-08-02 LAB
GLUCOSE BLD-MCNC: 135 MG/DL (ref 60–115)
GLUCOSE BLD-MCNC: 190 MG/DL (ref 60–115)
GLUCOSE BLD-MCNC: 318 MG/DL (ref 60–115)
GLUCOSE BLD-MCNC: 341 MG/DL (ref 60–115)
GLUCOSE BLD-MCNC: 504 MG/DL (ref 60–115)
GLUCOSE BLD-MCNC: 531 MG/DL (ref 60–115)
HBA1C MFR BLD: 10.6 % (ref 4.8–5.9)
PERFORMED ON: ABNORMAL

## 2020-08-02 PROCEDURE — 6370000000 HC RX 637 (ALT 250 FOR IP): Performed by: NURSE PRACTITIONER

## 2020-08-02 PROCEDURE — 1240000000 HC EMOTIONAL WELLNESS R&B

## 2020-08-02 PROCEDURE — 6370000000 HC RX 637 (ALT 250 FOR IP): Performed by: INTERNAL MEDICINE

## 2020-08-02 PROCEDURE — 99232 SBSQ HOSP IP/OBS MODERATE 35: CPT | Performed by: INTERNAL MEDICINE

## 2020-08-02 PROCEDURE — 6370000000 HC RX 637 (ALT 250 FOR IP): Performed by: PSYCHIATRY & NEUROLOGY

## 2020-08-02 RX ORDER — INSULIN GLARGINE 100 [IU]/ML
38 INJECTION, SOLUTION SUBCUTANEOUS NIGHTLY
Status: DISCONTINUED | OUTPATIENT
Start: 2020-08-02 | End: 2020-08-03

## 2020-08-02 RX ORDER — QUETIAPINE 300 MG/1
300 TABLET, FILM COATED, EXTENDED RELEASE ORAL NIGHTLY
Status: DISCONTINUED | OUTPATIENT
Start: 2020-08-02 | End: 2020-08-04 | Stop reason: HOSPADM

## 2020-08-02 RX ADMIN — QUETIAPINE FUMARATE 300 MG: 300 TABLET, EXTENDED RELEASE ORAL at 21:21

## 2020-08-02 RX ADMIN — NICOTINE POLACRILEX 4 MG: 4 GUM, CHEWING ORAL at 19:49

## 2020-08-02 RX ADMIN — BACITRACIN, NEOMYCIN, POLYMYXIN B 1 G: 400; 3.5; 5 OINTMENT TOPICAL at 21:21

## 2020-08-02 RX ADMIN — NICOTINE POLACRILEX 4 MG: 4 GUM, CHEWING ORAL at 08:31

## 2020-08-02 RX ADMIN — NICOTINE POLACRILEX 4 MG: 4 GUM, CHEWING ORAL at 14:31

## 2020-08-02 RX ADMIN — NICOTINE POLACRILEX 4 MG: 4 GUM, CHEWING ORAL at 12:42

## 2020-08-02 RX ADMIN — NICOTINE POLACRILEX 4 MG: 4 GUM, CHEWING ORAL at 23:31

## 2020-08-02 RX ADMIN — INSULIN GLARGINE 38 UNITS: 100 INJECTION, SOLUTION SUBCUTANEOUS at 20:39

## 2020-08-02 NOTE — CARE COORDINATION
Pt. Denies all. \"I'm ready to leave. \"  Pt. States he was never depressed or suicidal, he was just acting out. Pt. Laughs at inappropriate times. Pt. Is happy because a 36year old female friend misses him and wants him to move in.  Pt. Anxious for discharge because he wants to get a job. Reports poor sleep, but good appetite. Pt. Restless during assessment.

## 2020-08-02 NOTE — GROUP NOTE
Group Therapy Note    Date: 8/1/2020    Group Start Time: 8385  Group End Time: 1930  Group Topic: Recreational    [facility]    Lincoln Monroy        Group Therapy Note    Attendees: 8/22       Patient's Goal:  To participate in the Activity Group's game of Wii Sports. Notes:  Patient actively participated in the group activity.     Status After Intervention:  Improved    Participation Level: Interactive    Participation Quality: Appropriate and Attentive      Speech:  normal      Thought Process/Content: Logical      Affective Functioning: Congruent      Mood: euthymic      Level of consciousness:  Alert and Attentive      Response to Learning: Able to verbalize current knowledge/experience      Endings: None Reported    Modes of Intervention: Activity      Discipline Responsible: [address], [employer]      Signature:  Lincoln Monroy

## 2020-08-02 NOTE — PROGRESS NOTES
Pineda Jensen is a 23 y.o. male patient.   Chief complaint uncontrolled type 1 diabetes  Current Facility-Administered Medications   Medication Dose Route Frequency Provider Last Rate Last Dose    QUEtiapine (SEROQUEL XR) extended release tablet 300 mg  300 mg Oral Nightly Bruce Mckinney MD        insulin glargine (LANTUS) injection vial 38 Units  38 Units Subcutaneous Nightly Kostas Linn MD        [START ON 8/3/2020] insulin lispro (HUMALOG) injection vial 14 Units  14 Units Subcutaneous TID  Kostas Linn MD        insulin lispro (HUMALOG) injection vial 0-12 Units  0-12 Units Subcutaneous TID  Kostas Linn MD   8 Units at 20 1631    insulin lispro (HUMALOG) injection vial 0-6 Units  0-6 Units Subcutaneous Nightly Kostas Linn MD        nicotine polacrilex (NICORETTE) gum 4 mg  4 mg Oral PRN Bruce Mckinney MD   4 mg at 20 1431    neomycin-bacitracin-polymyxin (NEOSPORIN) ointment   Topical BID Murcynthia Parker, APRHIREN - CNP   1 g at 20 2129    acetaminophen (TYLENOL) tablet 650 mg  650 mg Oral Q4H PRN Nic Marti MD        aluminum & magnesium hydroxide-simethicone (MAALOX) 200-200-20 MG/5ML suspension 30 mL  30 mL Oral PRN Nic Marti MD        benztropine mesylate (COGENTIN) injection 2 mg  2 mg Intramuscular BID PRN Nic Marti MD        haloperidol lactate (HALDOL) injection 5 mg  5 mg Intramuscular Q4H PRN Nic Marti MD        Or    haloperidol (HALDOL) tablet 5 mg  5 mg Oral Q4H PRN Nic Marti MD        magnesium hydroxide (MILK OF MAGNESIA) 400 MG/5ML suspension 30 mL  30 mL Oral Daily PRN Nic Marti MD        promethazine (PHENERGAN) tablet 12.5 mg  12.5 mg Oral Q6H PRN Everlena Mattes, DO   12.5 mg at 20 0740    Or    ondansetron (ZOFRAN) injection 4 mg  4 mg Intravenous Q6H PRN Everperfectoa Mattes, DO        sodium chloride flush 0.9 % injection 10 mL  10 mL Intravenous PRN Carlene Gordon, DO        dextrose 5 % solution  100 mL/hr Intravenous PRN Creig Donis, DO        dextrose 50 % IV solution  12.5 g Intravenous PRN Creig Donis, DO        glucagon (rDNA) injection 1 mg  1 mg Intramuscular PRN Creig Donis, DO        glucose (GLUTOSE) 40 % oral gel 15 g  15 g Oral PRN Creig Donis, DO        hydrOXYzine (VISTARIL) injection 50 mg  50 mg Intramuscular Q6H PRN Brittny Pina MD        Or    hydrOXYzine (VISTARIL) capsule 50 mg  50 mg Oral Q6H PRN Brittny Pina MD   50 mg at 07/31/20 2113    LORazepam (ATIVAN) tablet 0.5 mg  0.5 mg Oral Q4H PRN Creig Donis, DO        polyethylene glycol (GLYCOLAX) packet 17 g  17 g Oral Daily Creig Donis, DO         Allergies   Allergen Reactions    Bactrim [Sulfamethoxazole-Trimethoprim] Rash    Dust Mite Extract      nasal & sinus congestion, itchy watery eyes, sneezing    Mirtazapine      blisters    Other      dander causes him to sneeze, have nasal/sinus congestion, itchy, watery eyes.  Oxcarbazepine Rash    Shrimp Flavor Nausea And Vomiting     Active Problems:    Bipolar 1 disorder (HCC)    Uncontrolled type 1 diabetes mellitus with hyperglycemia (HCC)  Resolved Problems:    * No resolved hospital problems. *    Blood pressure 112/72, pulse 107, temperature 98.6 °F (37 °C), temperature source Oral, resp. rate 18, SpO2 97 %. Subjective:  Symptoms:  Stable. Diet:  Adequate intake. Activity level: Normal.      Objective:  General Appearance:  Comfortable. Vital signs: (most recent): Blood pressure 112/72, pulse 107, temperature 98.6 °F (37 °C), temperature source Oral, resp. rate 18, SpO2 97 %. Vital signs are normal.    HEENT: Normal HEENT exam.    Lungs:  Normal effort and normal respiratory rate. Heart: Normal rate. Abdomen: Abdomen is soft. (Healed scar ). Neurological: Patient is alert. Skin:  There is ecchymosis. Results for Rory Mcclain (MRN [id number]) as of 8/2/2020 18:15   Ref.  Range 8/2/2020 06:06 8/2/2020 06:25 8/2/2020 07:48 8/2/2020 11:59 8/2/2020 16:28   POC Glucose Latest Ref Range: 60 - 115 mg/dl 531 (HH) 504 (HH) 318 (H) 190 (H) 341 (H)     Lab Results   Component Value Date    LABA1C 10.6 (H) 07/30/2020         Assessment:    Condition: In stable condition. Improving. (Type 1 diabetes with labile control   Bipolar 1 disorder  ADHD  ). Plan:   (Increase Lantus to 38 units at bedtime  Increase Humalog to 14 units with each meals plus medium dose sliding scale coverage  Monitor glycemic control closely  Patient to resume his insulin pump at the time of discharge).        Nidhi Colvin MD  8/2/2020

## 2020-08-02 NOTE — GROUP NOTE
Group Therapy Note    Date: 8/1/2020    Group Start Time: 2100  Group End Time: 2125  Group Topic: Wrap-Up    [facility]    Fahad Santa        Group Therapy Note    Attendees: 8/22       Patient's Goal:  Patient's reported goal for today was to [place] home. \"    Notes:  Patient reports not meeting their goal for today. Patient participated in a mindfulness meditation following Wrap Up Group.     Status After Intervention:  Unchanged    Participation Level: Minimal    Participation Quality: Attentive      Speech:  normal      Thought Process/Content: Logical      Affective Functioning: Constricted/Restricted      Mood: euthymic      Level of consciousness:  Alert      Response to Learning: Progressing to goal      Endings: None Reported    Modes of Intervention: Support      Discipline Responsible: [address], [employer]      Signature:  Fahad Santa

## 2020-08-02 NOTE — PROGRESS NOTES
Pt. attended the 0900 community meeting.  Electronically signed by Donovan Teresa on 8/2/2020 at 9:20 AM

## 2020-08-02 NOTE — PROGRESS NOTES
Pt came to Nurses desk and stated ''I feel like my sugar is low\". This writer checked pts blood sugar and was 50 pt was given a turkey wrap and OJ. Will recheck in 15 minutes.

## 2020-08-02 NOTE — PROGRESS NOTES
Socioeconomic History    Marital status: Single     Spouse name: Not on file    Number of children: 0    Years of education: 6    Highest education level: Not on file   Occupational History    Not on file   Social Needs    Financial resource strain: Hard    Food insecurity     Worry: Often true     Inability: Often true    Transportation needs     Medical: Yes     Non-medical: Yes   Tobacco Use    Smoking status: Current Every Day Smoker     Packs/day: 1.00     Years: 5.00     Pack years: 5.00     Types: Cigarettes    Smokeless tobacco: Never Used    Tobacco comment: pt aware of risk   Substance and Sexual Activity    Alcohol use: Not Currently     Alcohol/week: 0.0 standard drinks     Frequency: Never     Comment: Had been drinking whiskey and vodka on a regular basis about 6 months ago. Nothing since.  Drug use: Yes     Types: Methamphetamines, Marijuana, IV, Cocaine, Opiates      Comment: occassionally, last use 2 weeks ago    Sexual activity: Yes     Partners: Female     Comment: No protection or birth-control plan   Lifestyle    Physical activity     Days per week: 0 days     Minutes per session: 0 min    Stress: Very much   Relationships    Social connections     Talks on phone: More than three times a week     Gets together: More than three times a week     Attends [employer] service: Never     Active member of club or organization: No     Attends meetings of clubs or organizations: Never     Relationship status: Not on file    Intimate partner violence     Fear of current or ex partner: Patient refused     Emotionally abused: Patient refused     Physically abused: Patient refused     Forced sexual activity: Patient refused   Other Topics Concern    Not on file   Social History Narrative    ** Merged History Encounter **                ROS:  [x] All negative/unchanged except if checked.  Explain positive(checked items) below:  [] Constitutional  [] Eyes  [] Ear/Nose/Mouth/Throat  [] Respiratory  [] CV  [] GI  []   [] Musculoskeletal  [] Skin/Breast  [] Neurological  [] Endocrine  [] Heme/Lymph  [] Allergic/Immunologic    Explanation:     MEDICATIONS:    Current Facility-Administered Medications:     QUEtiapine (SEROQUEL XR) extended release tablet 300 mg, 300 mg, Oral, Nightly, Hanna Bates MD    insulin glargine (LANTUS) injection vial 30 Units, 30 Units, Subcutaneous, Nightly, Kostas Linn MD, 30 Units at 08/01/20 2041    insulin lispro (HUMALOG) injection vial 0-12 Units, 0-12 Units, Subcutaneous, TID WC, Kostas Linn MD, 2 Units at 08/02/20 1226    insulin lispro (HUMALOG) injection vial 0-6 Units, 0-6 Units, Subcutaneous, Nightly, Sheldon Woodson MD    nicotine polacrilex (NICORETTE) gum 4 mg, 4 mg, Oral, PRN, Hanna Bates MD, 4 mg at 08/02/20 0831    neomycin-bacitracin-polymyxin (NEOSPORIN) ointment, , Topical, BID, YAMIL Isaac - CNP, 1 g at 08/01/20 2129    insulin lispro (HUMALOG) injection vial 12 Units, 12 Units, Subcutaneous, TID WC, Dirk Chu DO, 12 Units at 08/02/20 7088    acetaminophen (TYLENOL) tablet 650 mg, 650 mg, Oral, Q4H PRN, Bob Ramirez MD    aluminum & magnesium hydroxide-simethicone (MAALOX) 200-200-20 MG/5ML suspension 30 mL, 30 mL, Oral, PRN, Bob Ramirez MD    benztropine mesylate (COGENTIN) injection 2 mg, 2 mg, Intramuscular, BID PRN, Bob Ramirez MD    haloperidol lactate (HALDOL) injection 5 mg, 5 mg, Intramuscular, Q4H PRN **OR** haloperidol (HALDOL) tablet 5 mg, 5 mg, Oral, Q4H PRN, Bob Ramirez MD    magnesium hydroxide (MILK OF MAGNESIA) 400 MG/5ML suspension 30 mL, 30 mL, Oral, Daily PRN, Bob Ramirez MD    promethazine (PHENERGAN) tablet 12.5 mg, 12.5 mg, Oral, Q6H PRN, 12.5 mg at 07/31/20 0740 **OR** ondansetron (ZOFRAN) injection 4 mg, 4 mg, Intravenous, Q6H PRN, Dirk Chu, DO    sodium chloride flush 0.9 % injection 10 mL, 10 mL, Intravenous, PRN, Dirk Chu, DO    dextrose 5 % solution, 100 mL/hr, Intravenous, PRN, Dirk Chu DO    dextrose 50 % IV solution, 12.5 g, Intravenous, PRN, Dirk Chu DO    glucagon (rDNA) injection 1 mg, 1 mg, Intramuscular, PRN, Dirk Chu DO    glucose (GLUTOSE) 40 % oral gel 15 g, 15 g, Oral, PRN, Drik Chu DO    hydrOXYzine (VISTARIL) injection 50 mg, 50 mg, Intramuscular, Q6H PRN **OR** hydrOXYzine (VISTARIL) capsule 50 mg, 50 mg, Oral, Q6H PRN, Bob Ramirez MD, 50 mg at 07/31/20 2113    LORazepam (ATIVAN) tablet 0.5 mg, 0.5 mg, Oral, Q4H PRN, Dirk Chu DO    polyethylene glycol (GLYCOLAX) packet 17 g, 17 g, Oral, Daily, Dirk Chu DO      Examination:  /72   Pulse 107   Temp 97 °F (36.1 °C) (Oral)   Resp 18   SpO2 97%   Gait - steady  Medication side effects(SE): none reported    Mental Status Examination:    Level of consciousness:  within normal limits   Appearance:  fair grooming and fair hygiene  Behavior/Motor:  psychomotor retardation improving  Attitude toward examiner:  fair eye contact, evasive and guarded  Speech:  spontaneous, normal rate and normal volume   Mood: decreased range and depressed but improving  Affect:  mood incongruent  Thought processes:  linear and goal directed   Thought content:  Preoccupied with discharge  Homocidal ideation denies  Suicidal Ideation:  denies suicidal ideation but may be minimizing  Delusions:  no evidence of delusions  Perceptual Disturbance:  denies any perceptual disturbance  Cognition:  oriented to person, place, and time   Concentration distractible  Insight poor   Judgement poor     ASSESSMENT:   Patient symptoms are:  [x] Well controlled  [x] Improving  [] Worsening  [] No change      Diagnosis:   Bipolar depression  Borderline PD  Opiate use disorder    LABS:    No results for input(s): WBC, HGB, PLT in the last 72 hours. No results for input(s): NA, K, CL, CO2, BUN, CREATININE, GLUCOSE in the last 72 hours.   No results for input(s): BILITOT, ALKPHOS, AST, ALT in the last 72 hours. Lab Results   Component Value Date    LABAMPH Neg 07/29/2020    BARBSCNU Neg 07/29/2020    LABBENZ Neg 07/29/2020    LABBENZ NEGATIVE 09/21/2012    LABMETH Neg 07/29/2020    OPIATESCREENURINE Neg 07/29/2020    PHENCYCLIDINESCREENURINE Neg 07/29/2020    PPXUR NOT REPORTED 02/11/2015    ETOH <10 07/28/2020     Lab Results   Component Value Date    TSH 1.240 07/28/2020     Lab Results   Component Value Date    LITHIUM <0.1 (L) 05/25/2016     Lab Results   Component Value Date    VALPROATE 44.2 (L) 02/14/2019       RISK ASSESSMENT:   Suicide risk: high  Homicide risk: low  Violence risk: low  Elopement risk: medium to low    Treatment Plan:  Reviewed current Medications with the patient. Will continue current medications  Risks, benefits, side effects, drug-to-drug interactions and alternatives to treatment were discussed. Collateral information: pending  CD evaluation pending  Encourage patient to attend group and other milieu activities.   Discharge planning discussed with the patient and treatment team.    PSYCHOTHERAPY/COUNSELING:  [x] Therapeutic interview  [x] Supportive  [] CBT  [] Ongoing  [] Other    [x] Patient continues to need, on a daily basis, active treatment furnished directly by or requiring the supervision of inpatient psychiatric personnel      Anticipated Length of stay:             Electronically signed by Scooby Reese MD on 8/2/2020 at 12:31 PM

## 2020-08-02 NOTE — PROGRESS NOTES
Patient did not attend the [address] [facility],[address] despite staff encouragement.  Electronically signed by Sarabjit aTn on 8/2/2020 at 5:09 PM

## 2020-08-02 NOTE — PROGRESS NOTES
Pt. refused to attend the 1100 skills group, despite staff encouragement.  Electronically signed by Amanda Boyd on 8/2/2020 at 1:47 PM

## 2020-08-02 NOTE — CONSULTS
[address]                      [address], [address] [facility]                                  CONSULTATION    PATIENT NAME: Nohemy Carter                    :        1995  MED REC NO:   [id number]                            ROOM:       [id number]  ACCOUNT NO:   [de-identified]                           ADMIT DATE: 2020  PROVIDER:     Alfreda Velázquez MD    CONSULT DATE:  2020    ENDOCRINE CONSULTATION    This is a virtual visit. I spoke to the patient on the phone. REASON FOR CONSULTATION:  Management of uncontrolled type 1 diabetes. CHIEF COMPLAINT AND HISTORY OF PRESENT ILLNESS:  The patient is a  80-year-old male with known history of type 1 diabetes with labile  inadequate control, who was admitted to behavioral unit after mood  changes and being angry with a friend and took a bunch of Seroquel. He  has had history of bipolar disorder and addiction. He has also had  attempts to cut himself and has taken heroin in the past.    The patient has been on the insulin pump for management of type 1  diabetes and recently had developed infection in his abdominal wall  after not changing the catheter site. Currently, he is back on the  insulin pump. His insulin pump was turned over or not given to him and  was put on subcu Lantus 40 units at night, Humalog 12 units with each  meals. Blood sugars were lower in the morning at 85. After that he has  been fluctuating between 300 to 400 range. P.o. intake has been  excellent here. His last hemoglobin A1c was done in 10/2019, it was  12.2. PAST MEDICAL HISTORY:  Significant for type 1 diabetes, history of  suicidal tendency, hepatitis C, bipolar disorder, Asperger syndrome,  ADHD. PAST SURGICAL HISTORY:  Abdominal surgery in 10/2019 for abscesses,  wound. FAMILY HISTORY:  Cancer, diabetes. PERSONAL AND SOCIAL HISTORY:  Currently does smoke cigarettes, but  denies any alcohol.   Does use cocaine, marijuana, methamphetamine,  opioids. HOME MEDICATIONS:  Includes Novolog via pump, Lantus 50 units in the  nighttime if he is not using the pump, Seroquel. Reviewed allergies    REVIEW OF SYSTEMS:  Other than mood changes, overdose, depression,  14-point review of systems were negative. PHYSICAL EXAMINATION:  As per the hospitalist.  VITAL SIGNS:  Blood pressure 116/78, pulse rate was 80, respiratory rate  was 18, temperature 98. ASSESSMENT:  Uncontrolled labile type 1 diabetes, [id number]  compliance, and substance abuse. PLAN:  Lower Lantus to 30 units at night, continue Humalog 12 with each  meals plus medium dose sliding scale. We will adjust his insulin dose  to get his blood sugars in 140, 150-200 range. Avoid hypoglycemia. Get  a hemoglobin A1c. Continue other supportive medications. Treatment  plan as per Psychiatry after discharge. The patient to follow up with  us at the office to get hooked up on continuous glucose monitoring on  08/13. Long-term A1c goal of 6.5 to 7 range. Thank you for the consult.         Jen Haque MD    D: 08/01/2020 17:36:35       T: 08/01/2020 21:10:34     [email or web address]  Job#: [id number]     Doc#: [id number]    CC:

## 2020-08-02 NOTE — PROGRESS NOTES
Group Therapy Note    Date: 8/2/2020  Start Time: 1420  End Time:  1500    Number of Participants:10    Type of Group: Cognitive Skills    Patient's Goal:  To participate in mood management group. Notes: Patient declined to attend psychoeducation group at [phone] despite encouragement by staff.      Discipline Responsible: /Counselor    ASHLEY Taveras

## 2020-08-02 NOTE — PROGRESS NOTES
Pt's accu check 531 with recheck of 504. Hospitalist notified. New orders received per Dr. BLUE [facility] [employer]. Pt to receive 0800 dose of Humalog 12 units now and recheck blood glucose in 1 hour. Notify provider of results.

## 2020-08-03 LAB
GLUCOSE BLD-MCNC: 104 MG/DL (ref 60–115)
GLUCOSE BLD-MCNC: 261 MG/DL (ref 60–115)
GLUCOSE BLD-MCNC: 403 MG/DL (ref 60–115)
GLUCOSE BLD-MCNC: 407 MG/DL (ref 60–115)
GLUCOSE BLD-MCNC: 68 MG/DL (ref 60–115)
GLUCOSE BLD-MCNC: >600 MG/DL (ref 60–115)
PERFORMED ON: ABNORMAL
PERFORMED ON: NORMAL
PERFORMED ON: NORMAL

## 2020-08-03 PROCEDURE — 1240000000 HC EMOTIONAL WELLNESS R&B

## 2020-08-03 PROCEDURE — 6370000000 HC RX 637 (ALT 250 FOR IP): Performed by: PSYCHIATRY & NEUROLOGY

## 2020-08-03 PROCEDURE — 99231 SBSQ HOSP IP/OBS SF/LOW 25: CPT | Performed by: PSYCHIATRY & NEUROLOGY

## 2020-08-03 PROCEDURE — 90833 PSYTX W PT W E/M 30 MIN: CPT | Performed by: PSYCHIATRY & NEUROLOGY

## 2020-08-03 PROCEDURE — 6370000000 HC RX 637 (ALT 250 FOR IP): Performed by: INTERNAL MEDICINE

## 2020-08-03 PROCEDURE — 99232 SBSQ HOSP IP/OBS MODERATE 35: CPT | Performed by: INTERNAL MEDICINE

## 2020-08-03 PROCEDURE — 6370000000 HC RX 637 (ALT 250 FOR IP): Performed by: NURSE PRACTITIONER

## 2020-08-03 RX ORDER — INSULIN GLARGINE 100 [IU]/ML
30 INJECTION, SOLUTION SUBCUTANEOUS NIGHTLY
Status: DISCONTINUED | OUTPATIENT
Start: 2020-08-03 | End: 2020-08-04 | Stop reason: HOSPADM

## 2020-08-03 RX ADMIN — HYDROXYZINE PAMOATE 50 MG: 50 CAPSULE ORAL at 21:16

## 2020-08-03 RX ADMIN — BACITRACIN, NEOMYCIN, POLYMYXIN B 1 G: 400; 3.5; 5 OINTMENT TOPICAL at 09:23

## 2020-08-03 RX ADMIN — NICOTINE POLACRILEX 4 MG: 4 GUM, CHEWING ORAL at 13:31

## 2020-08-03 RX ADMIN — QUETIAPINE FUMARATE 300 MG: 300 TABLET, EXTENDED RELEASE ORAL at 21:16

## 2020-08-03 RX ADMIN — INSULIN GLARGINE 30 UNITS: 100 INJECTION, SOLUTION SUBCUTANEOUS at 21:13

## 2020-08-03 RX ADMIN — ACETAMINOPHEN 650 MG: 325 TABLET ORAL at 16:22

## 2020-08-03 RX ADMIN — NICOTINE POLACRILEX 4 MG: 4 GUM, CHEWING ORAL at 12:26

## 2020-08-03 ASSESSMENT — PAIN SCALES - GENERAL
PAINLEVEL_OUTOF10: 0
PAINLEVEL_OUTOF10: 10

## 2020-08-03 NOTE — PROGRESS NOTES
Andree Calero [facility]. FOLLOW-UP NOTE       8/3/2020     Patient was seen and examined in person, Chart reviewed   Patient's case discussed with staff/team    Chief Complaint: depression    Interim History:     Remorseful about the overdose  Want to attend his friend  tomorrow evening  Pt want to stay with his GF  Pt denies feeling depressed  Want to stay clean and sober  Minimize the drug use- sometime I use it if I am bored but not actively seek drugs  Denies hopeless and worthless feeling    Appetite:   [x] Normal/Unchanged  [] Increased  [] Decreased      Sleep:       [x] Normal/Unchanged  [] Fair       [] Poor              Energy:    [x] Normal/Unchanged  [] Increased  [] Decreased        SI [] Present  [x] Absent    HI  []Present  [x] Absent     Aggression:  [] yes  [x] no    Patient is [x] able  [] unable to CONTRACT FOR SAFETY     PAST MEDICAL/PSYCHIATRIC HISTORY:   Past Medical History:   Diagnosis Date    ADHD (attention deficit hyperactivity disorder)     Asperger syndrome     Asthma     Asthma 3/31/2015    Bipolar 1 disorder, mixed, [facility] 2018    Chemical dependency ([facility]     marijuana    Diabetes mellitus ([facility]     Diabetes mellitus type 1 ([facility]     Hepatitis C     Hepatitis C     Hyperkalemia, diminished renal excretion 2017    Mental disorder     ODD (oppositional defiant disorder)     Seasonal allergies 3/31/2015    Seizures ([facility]        FAMILY/SOCIAL HISTORY:  Family History   Problem Relation Age of Onset    Cancer Maternal Aunt         breast    Diabetes Maternal Uncle     Diabetes Paternal Uncle     Cancer Maternal Grandmother     Diabetes Maternal Grandmother     Cancer Maternal Grandfather     Diabetes Maternal Grandfather      Social History     Socioeconomic History    Marital status: Single     Spouse name: Not on file    Number of children: 0    Years of education: 11    Highest education level: Not on file Occupational History    Not on file   Social Needs    Financial resource strain: Hard    Food insecurity     Worry: Often true     Inability: Often true    Transportation needs     Medical: Yes     Non-medical: Yes   Tobacco Use    Smoking status: Current Every Day Smoker     Packs/day: 1.00     Years: 5.00     Pack years: 5.00     Types: Cigarettes    Smokeless tobacco: Never Used    Tobacco comment: pt aware of risk   Substance and Sexual Activity    Alcohol use: Not Currently     Alcohol/week: 0.0 standard drinks     Frequency: Never     Comment: Had been drinking whiskey and vodka on a regular basis about 6 months ago. Nothing since.  Drug use: Yes     Types: Methamphetamines, Marijuana, IV, Cocaine, Opiates      Comment: occassionally, last use 2 weeks ago    Sexual activity: Yes     Partners: Female     Comment: No protection or birth-control plan   Lifestyle    Physical activity     Days per week: 0 days     Minutes per session: 0 min    Stress: Very much   Relationships    Social connections     Talks on phone: More than three times a week     Gets together: More than three times a week     Attends [employer] service: Never     Active member of club or organization: No     Attends meetings of clubs or organizations: Never     Relationship status: Not on file    Intimate partner violence     Fear of current or ex partner: Patient refused     Emotionally abused: Patient refused     Physically abused: Patient refused     Forced sexual activity: Patient refused   Other Topics Concern    Not on file   Social History Narrative    ** Merged History Encounter **                ROS:  [x] All negative/unchanged except if checked.  Explain positive(checked items) below:  [] Constitutional  [] Eyes  [] Ear/Nose/Mouth/Throat  [] Respiratory  [] CV  [] GI  []   [] Musculoskeletal  [] Skin/Breast  [] Neurological  [] Endocrine  [] Heme/Lymph  [] Allergic/Immunologic    Explanation: MEDICATIONS:    Current Facility-Administered Medications:     QUEtiapine (SEROQUEL XR) extended release tablet 300 mg, 300 mg, Oral, Nightly, Isaiah Osei MD, 300 mg at 08/02/20 2121    insulin glargine (LANTUS) injection vial 38 Units, 38 Units, Subcutaneous, Nightly, Kostas Linn MD, 38 Units at 08/02/20 2039    insulin lispro (HUMALOG) injection vial 14 Units, 14 Units, Subcutaneous, TID WC, Kostas Linn MD, 14 Units at 08/03/20 0815    insulin lispro (HUMALOG) injection vial 0-12 Units, 0-12 Units, Subcutaneous, TID WC, Kostas Linn MD, 12 Units at 08/03/20 0816    insulin lispro (HUMALOG) injection vial 0-6 Units, 0-6 Units, Subcutaneous, Nightly, Melanie Mcdonald MD    nicotine polacrilex (NICORETTE) gum 4 mg, 4 mg, Oral, PRN, Isaiah Osei MD, 4 mg at 08/02/20 2331    neomycin-bacitracin-polymyxin (NEOSPORIN) ointment, , Topical, BID, Betty Javier, APRN - CNP, 1 g at 08/03/20 0202    acetaminophen (TYLENOL) tablet 650 mg, 650 mg, Oral, Q4H PRN, Imani Lundberg MD    aluminum & magnesium hydroxide-simethicone (MAALOX) 200-200-20 MG/5ML suspension 30 mL, 30 mL, Oral, PRN, Imani Lundberg MD    benztropine mesylate (COGENTIN) injection 2 mg, 2 mg, Intramuscular, BID PRN, Imani Lundberg MD    haloperidol lactate (HALDOL) injection 5 mg, 5 mg, Intramuscular, Q4H PRN **OR** haloperidol (HALDOL) tablet 5 mg, 5 mg, Oral, Q4H PRN, Imani Lundberg MD    magnesium hydroxide (MILK OF MAGNESIA) 400 MG/5ML suspension 30 mL, 30 mL, Oral, Daily PRN, Imani Lundberg MD    promethazine (PHENERGAN) tablet 12.5 mg, 12.5 mg, Oral, Q6H PRN, 12.5 mg at 07/31/20 0740 **OR** ondansetron (ZOFRAN) injection 4 mg, 4 mg, Intravenous, Q6H PRN, Simmie Nims, DO    sodium chloride flush 0.9 % injection 10 mL, 10 mL, Intravenous, PRN, Simmie Nims, DO    dextrose 5 % solution, 100 mL/hr, Intravenous, PRN, Simmie Nims, DO    dextrose 50 % IV solution, 12.5 g, Intravenous, PRN, Simmie Nims, DO   glucagon (rDNA) injection 1 mg, 1 mg, Intramuscular, PRN, Jessica Delgado,     glucose (GLUTOSE) 40 % oral gel 15 g, 15 g, Oral, PRN, Jessica Delgado,     hydrOXYzine (VISTARIL) injection 50 mg, 50 mg, Intramuscular, Q6H PRN **OR** hydrOXYzine (VISTARIL) capsule 50 mg, 50 mg, Oral, Q6H PRN, Britt Petty MD, 50 mg at 07/31/20 2113    LORazepam (ATIVAN) tablet 0.5 mg, 0.5 mg, Oral, Q4H PRN, Jessica Delgado,     polyethylene glycol (GLYCOLAX) packet 17 g, 17 g, Oral, Daily, Jessica Delgado DO      Examination:  BP (!) 143/68 Comment: rn notified   Pulse 84   Temp 97 °F (36.1 °C) (Oral)   Resp 16   SpO2 94%   Gait - steady  Medication side effects(SE): no    Mental Status Examination:    Level of consciousness:  within normal limits   Appearance:  fair grooming and fair hygiene  Behavior/Motor:  no abnormalities noted  Attitude toward examiner:  cooperative  Speech:  normal volume   Mood: less depressed  Affect:  mood congruent  Thought processes:  linear   Thought content:  Suicidal Ideation:  denies suicidal ideation  Cognition:  oriented to person, place, and time   Concentration poor  Insight fair   Judgement fair     ASSESSMENT:   Patient symptoms are:  [] Well controlled  [] Improving  [] Worsening  [] No change      Diagnosis:   Active Problems:    Bipolar 1 disorder ([facility]    Uncontrolled type 1 diabetes mellitus with hyperglycemia ([facility]  Resolved Problems:    * No resolved hospital problems. *      LABS:    No results for input(s): WBC, HGB, PLT in the last 72 hours. No results for input(s): NA, K, CL, CO2, BUN, CREATININE, GLUCOSE in the last 72 hours. No results for input(s): BILITOT, ALKPHOS, AST, ALT in the last 72 hours.   Lab Results   Component Value Date    LABAMPH Neg 07/29/2020    BARBSCNU Neg 07/29/2020    LABBENZ Neg 07/29/2020    LABBENZ NEGATIVE 09/21/2012    LABMETH Neg 07/29/2020    OPIATESCREENURINE Neg 07/29/2020    PHENCYCLIDINESCREENURINE Neg 07/29/2020    PPXUR NOT REPORTED 02/11/2015    ETOH <10 07/28/2020     Lab Results   Component Value Date    TSH 1.240 07/28/2020     Lab Results   Component Value Date    LITHIUM <0.1 (L) 05/25/2016     Lab Results   Component Value Date    VALPROATE 44.2 (L) 02/14/2019       Treatment Plan:  Reviewed current Medications with the patient. Risks, benefits, side effects, drug-to-drug interactions and alternatives to treatment were discussed. Collateral information reviewed  CD evaluation  Encourage patient to attend group and other milieu activities. Discharge planning discussed with the patient and treatment team.    PSYCHOTHERAPY/COUNSELING:  [x] Therapeutic interview  [x] Supportive  [] CBT  [] Ongoing  [] Other  Patient was seen 1:1 for 20 minutes, other than E&M time spent, focusing on      - coping skills techniques     - Anxiety management techniques discussed including deep breathing exercise and PMR     - discussing patients strength and weakness      - Motivational interviewing to assess the stage of change and assessing patient readiness to quit substance use.      - Focusing on negative cognition and maladaptive thoughts, which is feeding and maintaining the depression symptoms     - DBT techniques explained to the patient    [x] Patient continues to need, on a daily basis, active treatment furnished directly by or requiring the supervision of inpatient psychiatric personnel      Anticipated Length of stay: tomorrow            Electronically signed by Daniel Yañez MD on 8/3/2020 at 11:42 AM

## 2020-08-03 NOTE — PROGRESS NOTES
Daily Note:     Pt denies SI, HI, and A/V hallucinations. Pt visible on the unit and noted to be bright in appearance. Pt reports improved mood with no depression or anxiety. Pt reports improved sleep and good appetite. Pt states he is looking forward to discharge on Tuesday and plan to return home with his female friend and states that they are remaining free from alcohol and drugs. Pt denies any issues and has been compliant with his medication.

## 2020-08-03 NOTE — PROGRESS NOTES
Pt. refused to attend the 1100 skills group, despite staff encouragement. Electronically signed by Rommel Luciano, [address] on 8/3/2020 at 12:36 PM

## 2020-08-03 NOTE — PROGRESS NOTES
Pt requesting blood glucose level to be checked, c/o \"not feeling well\" tired/ hungry, accu check 261. Given snack. Pt requesting a dose of insulin. Hospitalist Farhan Ray NP notified of pt's requests. No new orders at this time.

## 2020-08-03 NOTE — PROGRESS NOTES
Patient did not attend the 1900 Activity Group despite staff encouragement.  Electronically signed by Lincoln Monroy on 8/2/2020 at 10:42 PM

## 2020-08-03 NOTE — PROGRESS NOTES
Pt has been visible out on unit walking and laughing with peers. Pt denies any delusional thoughts and was restless during assessment. Pt attended one morning group and denies [address].

## 2020-08-03 NOTE — PROGRESS NOTES
Pt. declined to attend the 0900 community meeting, despite staff encouragement. Electronically signed by Warden Fink, [address] on 8/3/2020 at 10:31 AM

## 2020-08-04 VITALS
RESPIRATION RATE: 16 BRPM | DIASTOLIC BLOOD PRESSURE: 79 MMHG | OXYGEN SATURATION: 94 % | SYSTOLIC BLOOD PRESSURE: 127 MMHG | TEMPERATURE: 97 F | HEART RATE: 94 BPM

## 2020-08-04 LAB
GLUCOSE BLD-MCNC: 188 MG/DL (ref 60–115)
GLUCOSE BLD-MCNC: 358 MG/DL (ref 60–115)
GLUCOSE BLD-MCNC: 61 MG/DL (ref 60–115)
PERFORMED ON: ABNORMAL
PERFORMED ON: ABNORMAL
PERFORMED ON: NORMAL

## 2020-08-04 PROCEDURE — 99239 HOSP IP/OBS DSCHRG MGMT >30: CPT | Performed by: PSYCHIATRY & NEUROLOGY

## 2020-08-04 RX ORDER — QUETIAPINE 300 MG/1
300 TABLET, FILM COATED, EXTENDED RELEASE ORAL NIGHTLY
Qty: 15 TABLET | Refills: 2 | Status: SHIPPED | OUTPATIENT
Start: 2020-08-04 | End: 2021-09-27 | Stop reason: SDUPTHER

## 2020-08-04 NOTE — PROGRESS NOTES
Pt. refused to attend the 1100 skills group, despite staff encouragement. Electronically signed by Angelic Dias, [facility] on 8/4/2020 at 12:12 PM

## 2020-08-04 NOTE — PROGRESS NOTES
Pineda Jensen is a 23 y.o. male patient.   Chief complaint uncontrolled type 1 diabetes  Current Facility-Administered Medications   Medication Dose Route Frequency Provider Last Rate Last Dose    insulin glargine (LANTUS) injection vial 30 Units  30 Units Subcutaneous Nightly Kostas Linn MD   30 Units at 20 2113    QUEtiapine (SEROQUEL XR) extended release tablet 300 mg  300 mg Oral Nightly Bruce Mckinney MD   300 mg at 20 211    insulin lispro (HUMALOG) injection vial 14 Units  14 Units Subcutaneous TID  Kostas Hou MD   14 Units at 20 1625    insulin lispro (HUMALOG) injection vial 0-12 Units  0-12 Units Subcutaneous TID  Kostas Hou MD   12 Units at 20 1900    insulin lispro (HUMALOG) injection vial 0-6 Units  0-6 Units Subcutaneous Nightly Kostas Linn MD   6 Units at 20 2121    nicotine polacrilex (NICORETTE) gum 4 mg  4 mg Oral PRN Bruce Mckinney MD   4 mg at 20 1331    neomycin-bacitracin-polymyxin (NEOSPORIN) ointment   Topical BID Murl Muff, APRN - CNP   1 g at 20 5412    acetaminophen (TYLENOL) tablet 650 mg  650 mg Oral Q4H PRN Nic Marti MD   650 mg at 20 1622    aluminum & magnesium hydroxide-simethicone (MAALOX) 200-200-20 MG/5ML suspension 30 mL  30 mL Oral PRN Nic Marti MD        benztropine mesylate (COGENTIN) injection 2 mg  2 mg Intramuscular BID PRN Nic Marti MD        haloperidol lactate (HALDOL) injection 5 mg  5 mg Intramuscular Q4H PRN Nic Marti MD        Or    haloperidol (HALDOL) tablet 5 mg  5 mg Oral Q4H PRN Nic Marti MD        magnesium hydroxide (MILK OF MAGNESIA) 400 MG/5ML suspension 30 mL  30 mL Oral Daily PRN Nic Marti MD        promethazine (PHENERGAN) tablet 12.5 mg  12.5 mg Oral Q6H PRN Evergely Matveena DO   12.5 mg at 20 0740    Or    ondansetron (ZOFRAN) injection 4 mg  4 mg Intravenous Q6H PRN Everperfectoa Mattes, DO        sodium chloride flush 0.9 % injection 10 mL  10 mL Intravenous PRN Alric Berliner, DO        dextrose 5 % solution  100 mL/hr Intravenous PRN Alric Berliner, DO        dextrose 50 % IV solution  12.5 g Intravenous PRN Alric Berliner, DO        glucagon (rDNA) injection 1 mg  1 mg Intramuscular PRN Alric Berliner, DO        glucose (GLUTOSE) 40 % oral gel 15 g  15 g Oral PRN Alric Berliner, DO        hydrOXYzine (VISTARIL) injection 50 mg  50 mg Intramuscular Q6H PRN Travis Echavarria MD        Or    hydrOXYzine (VISTARIL) capsule 50 mg  50 mg Oral Q6H PRN Travis Echavarria MD   50 mg at 08/03/20 2116    LORazepam (ATIVAN) tablet 0.5 mg  0.5 mg Oral Q4H PRN Alric Berliner, DO        polyethylene glycol (GLYCOLAX) packet 17 g  17 g Oral Daily Alric Berliner, DO         Allergies   Allergen Reactions    Bactrim [Sulfamethoxazole-Trimethoprim] Rash    Dust Mite Extract      nasal & sinus congestion, itchy watery eyes, sneezing    Mirtazapine      blisters    Other      dander causes him to sneeze, have nasal/sinus congestion, itchy, watery eyes.  Oxcarbazepine Rash    Shrimp Flavor Nausea And Vomiting     Active Problems:    Bipolar 1 disorder (HCC)    Uncontrolled type 1 diabetes mellitus with hyperglycemia (HCC)  Resolved Problems:    * No resolved hospital problems. *    Blood pressure (!) 143/68, pulse 84, temperature 98 °F (36.7 °C), temperature source Oral, resp. rate 16, SpO2 94 %. Subjective:  Symptoms:  Stable. Diet:  Adequate intake. Activity level: Normal.      Objective:  General Appearance:  Comfortable. Vital signs: (most recent): Blood pressure (!) 143/68, pulse 84, temperature 98 °F (36.7 °C), temperature source Oral, resp. rate 16, SpO2 94 %. Vital signs are normal.    HEENT: Normal HEENT exam.    Lungs:  Normal effort. Heart: Normal rate. Extremities: Normal range of motion. Neurological: Patient is alert. Results for Mercy Higginbotham (MRN [id number]) as of 8/3/2020 22:13   Ref.
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
## 2020-08-04 NOTE — DISCHARGE INSTR - DIET

## 2020-08-04 NOTE — PROGRESS NOTES
Patient did not attend the [facility] Wrap Up and Relaxation Group despite staff encouragement.  Electronically signed by Gallito Martinez on 8/3/2020 at 9:49 PM

## 2020-08-05 NOTE — GROUP NOTE
Group Therapy Note    Date: 8/4/2020    Group Start Time: 0471  Group End Time: 1050  Group Topic: Psychotherapy    AMAN [facility] EMORY Jordan, [facility]        Group Therapy Note    Attendees: 7         Patient's Goal:  Follow up with Iona Dewitt    Notes:  Being discharged    Status After Intervention:  Improved    Participation Level: Interactive    Participation Quality: Appropriate      Speech:  normal      Thought Process/Content: Logical      Affective Functioning: Congruent      Mood: anxious      Level of consciousness:  Alert      Response to Learning: Progressing to goal      Endings: None Reported    Modes of Intervention: Support      Discipline Responsible: /Counselor      Signature:  Michelle Jordan, [facility]

## 2020-08-05 NOTE — GROUP NOTE
Group Therapy Note    Date: 8/3/2020    Group Start Time: 1100  Group End Time: 1150  Group Topic: Psychotherapy    MLLESIA 3[facility] EMORY Jordan, [facility]        Group Therapy Note    Attendees: 5         Patient's Goal:  To be discharged today    Notes:  Patient stated he is doing better    Status After Intervention:  Improved    Participation Level: Interactive    Participation Quality: Appropriate      Speech:  normal      Thought Process/Content: Logical      Affective Functioning: Congruent      Mood: anxious      Level of consciousness:  Alert      Response to Learning: Progressing to goal      Endings: None Reported    Modes of Intervention: Support      Discipline Responsible: /Counselor      Signature:  Michelle Jordan, [facility]

## 2020-08-26 RX ORDER — PEN NEEDLE, DIABETIC 29 G X1/2"
NEEDLE, DISPOSABLE MISCELLANEOUS
Qty: 100 EACH | Refills: 3 | Status: SHIPPED | OUTPATIENT
Start: 2020-08-26

## 2020-09-03 ENCOUNTER — TELEPHONE (OUTPATIENT)
Dept: FAMILY MEDICINE CLINIC | Age: 25
End: 2020-09-03

## 2020-09-09 ENCOUNTER — APPOINTMENT (OUTPATIENT)
Dept: GENERAL RADIOLOGY | Age: 25
End: 2020-09-09
Payer: MEDICARE

## 2020-09-09 ENCOUNTER — HOSPITAL ENCOUNTER (EMERGENCY)
Age: 25
Discharge: OTHER INSTITUTION | End: 2020-09-09
Payer: MEDICARE

## 2020-09-09 VITALS
HEIGHT: 66 IN | DIASTOLIC BLOOD PRESSURE: 91 MMHG | OXYGEN SATURATION: 100 % | RESPIRATION RATE: 16 BRPM | HEART RATE: 102 BPM | WEIGHT: 155 LBS | SYSTOLIC BLOOD PRESSURE: 146 MMHG | BODY MASS INDEX: 24.91 KG/M2 | TEMPERATURE: 98.6 F

## 2020-09-09 LAB
ACETAMINOPHEN LEVEL: <5 UG/ML (ref 10–30)
ALBUMIN SERPL-MCNC: 4 G/DL (ref 3.5–4.6)
ALP BLD-CCNC: 72 U/L (ref 35–104)
ALT SERPL-CCNC: 13 U/L (ref 0–41)
ANION GAP SERPL CALCULATED.3IONS-SCNC: 10 MEQ/L (ref 9–15)
AST SERPL-CCNC: 25 U/L (ref 0–40)
BASOPHILS ABSOLUTE: 0 K/UL (ref 0–0.2)
BASOPHILS RELATIVE PERCENT: 0.7 %
BILIRUB SERPL-MCNC: 0.5 MG/DL (ref 0.2–0.7)
BUN BLDV-MCNC: 14 MG/DL (ref 6–20)
CALCIUM SERPL-MCNC: 9.1 MG/DL (ref 8.5–9.9)
CHLORIDE BLD-SCNC: 99 MEQ/L (ref 95–107)
CHP ED QC CHECK: YES
CK MB: 2.4 NG/ML (ref 0–6.7)
CO2: 27 MEQ/L (ref 20–31)
CREAT SERPL-MCNC: 0.99 MG/DL (ref 0.7–1.2)
CREATINE KINASE-MB INDEX: 0.8 % (ref 0–3.5)
EOSINOPHILS ABSOLUTE: 0.1 K/UL (ref 0–0.7)
EOSINOPHILS RELATIVE PERCENT: 1.3 %
ETHANOL PERCENT: NORMAL G/DL
ETHANOL: <10 MG/DL (ref 0–0.08)
GFR AFRICAN AMERICAN: >60
GFR NON-AFRICAN AMERICAN: >60
GLOBULIN: 2.8 G/DL (ref 2.3–3.5)
GLUCOSE BLD-MCNC: 136 MG/DL
GLUCOSE BLD-MCNC: 136 MG/DL (ref 60–115)
GLUCOSE BLD-MCNC: 154 MG/DL (ref 70–99)
GLUCOSE BLD-MCNC: 375 MG/DL
GLUCOSE BLD-MCNC: 375 MG/DL (ref 60–115)
HCT VFR BLD CALC: 43.2 % (ref 42–52)
HEMOGLOBIN: 15.1 G/DL (ref 14–18)
LYMPHOCYTES ABSOLUTE: 1.4 K/UL (ref 1–4.8)
LYMPHOCYTES RELATIVE PERCENT: 20.6 %
MCH RBC QN AUTO: 29.5 PG (ref 27–31.3)
MCHC RBC AUTO-ENTMCNC: 34.9 % (ref 33–37)
MCV RBC AUTO: 84.5 FL (ref 80–100)
MONOCYTES ABSOLUTE: 0.5 K/UL (ref 0.2–0.8)
MONOCYTES RELATIVE PERCENT: 7.5 %
NEUTROPHILS ABSOLUTE: 4.7 K/UL (ref 1.4–6.5)
NEUTROPHILS RELATIVE PERCENT: 69.9 %
PDW BLD-RTO: 13.5 % (ref 11.5–14.5)
PERFORMED ON: ABNORMAL
PERFORMED ON: ABNORMAL
PLATELET # BLD: 225 K/UL (ref 130–400)
POTASSIUM SERPL-SCNC: 3.3 MEQ/L (ref 3.4–4.9)
RBC # BLD: 5.11 M/UL (ref 4.7–6.1)
SALICYLATE, SERUM: <0.3 MG/DL (ref 15–30)
SODIUM BLD-SCNC: 136 MEQ/L (ref 135–144)
TOTAL CK: 290 U/L (ref 0–190)
TOTAL PROTEIN: 6.8 G/DL (ref 6.3–8)
TSH SERPL DL<=0.05 MIU/L-ACNC: 0.4 UIU/ML (ref 0.44–3.86)
WBC # BLD: 6.7 K/UL (ref 4.8–10.8)

## 2020-09-09 PROCEDURE — 82550 ASSAY OF CK (CPK): CPT

## 2020-09-09 PROCEDURE — G0480 DRUG TEST DEF 1-7 CLASSES: HCPCS

## 2020-09-09 PROCEDURE — 80053 COMPREHEN METABOLIC PANEL: CPT

## 2020-09-09 PROCEDURE — 82553 CREATINE MB FRACTION: CPT

## 2020-09-09 PROCEDURE — 85025 COMPLETE CBC W/AUTO DIFF WBC: CPT

## 2020-09-09 PROCEDURE — 73630 X-RAY EXAM OF FOOT: CPT

## 2020-09-09 PROCEDURE — 84443 ASSAY THYROID STIM HORMONE: CPT

## 2020-09-09 PROCEDURE — 99285 EMERGENCY DEPT VISIT HI MDM: CPT

## 2020-09-09 PROCEDURE — 36415 COLL VENOUS BLD VENIPUNCTURE: CPT

## 2020-09-09 ASSESSMENT — ENCOUNTER SYMPTOMS
SORE THROAT: 0
ABDOMINAL DISTENTION: 0
COLOR CHANGE: 0
RHINORRHEA: 0
ABDOMINAL PAIN: 0
SHORTNESS OF BREATH: 0
CONSTIPATION: 0
EYE DISCHARGE: 0

## 2020-09-09 NOTE — ED TRIAGE NOTES
Patient presents with complaints of bilateral foot pain, states he is homeless and spends most of his day walking around. Patient also made comments to EMS that when he overdosed yesterday, he was attempting to harm himself, and also stated to EMS that he had intentions on overdosing by programming his insulin pump to administer more insulin than necessary in an attempt to lower his blood sugar to a dangerous level. Patient denies suicidal ideations on arrival, expresses being upset with EMS, states he did not make suicidal comments.

## 2020-09-09 NOTE — ED NOTES
Blood obtained; pt tolerated well. Labeled and sent to lab. Pt unable to urinate at this time. Pt to XRay via cart; escorted by myself and Police.      Jenn Restrepo RN  09/09/20 5708

## 2020-09-09 NOTE — ED NOTES
; Emmanuel GARCIA aware. Vitals taken and documented. Pt refusing to sign discharge papers. Police at bedside for discharge. Pt left in stable condition with Police at discharge.        Nicholas Hill RN  09/09/20 1100

## 2020-09-09 NOTE — ED NOTES
Bed: 10  Expected date: 9/9/20  Expected time: 6:33 AM  Means of arrival: [facility]  Comments:  25 suicidal, feet hurt, narcan earlier in the day      Tyler Mcleod RN  09/09/20 0878

## 2020-09-09 NOTE — ED NOTES
EMS states patient called EMS due to attempted at OD. EMS states patient admitted to them it was an OD attempt, and then attempted to \"mess with his insulin pump\" to inject himself with to OD.      Mary Bradford RN  09/09/20 8770

## 2020-09-09 NOTE — ED PROVIDER NOTES
Psychiatric/Behavioral: Positive for self-injury and suicidal ideas. Negative for agitation and confusion. Except as noted above the remainder of the review of systems was reviewed and negative. PAST MEDICAL HISTORY     Past Medical History:   Diagnosis Date    ADHD (attention deficit hyperactivity disorder)     Asperger syndrome     Asthma     Asthma 3/31/2015    Bipolar 1 disorder, mixed, severe ([facility] 2/5/2018    Chemical dependency ([facility]     marijuana    Diabetes mellitus ([facility]     Diabetes mellitus type 1 ([facility]     Hepatitis C     Hepatitis C     Hyperkalemia, diminished renal excretion 11/9/2017    Mental disorder     ODD (oppositional defiant disorder)     Seasonal allergies 3/31/2015    Seizures ([facility]          SURGICALHISTORY       Past Surgical History:   Procedure Laterality Date    ABDOMEN SURGERY Left 10/7/2019    INCISION  & DRAINAGE OF ABSCESS LEFT ABDOMINAL WALL performed by Chaitanya Lepe MD at [address] [place] [address] Drive N/A 10/21/2019    INCISION AND DRAINAGE OF RECURRENT ABDOMINAL WALL ABSCESS ROOM [address] performed by Chaitanya Lepe MD at [facility]       Previous Medications    ALBUTEROL SULFATE  (90 BASE) MCG/ACT INHALER    Use 2 puffs 4 times daily for 7 days then as needed for wheezing. Dispense with Spacer and instruct in use. At patient's preference may use 60 dose MDI. May Sub Pro-Air or Proventil as needed per insurance. BD INSULIN SYRINGE U/F 31G X 5/16\" 0.5 ML MISC    use 4 daily    CONTOUR NEXT TEST STRIP    Test 6x daily Dx E10.65    NOVOLOG 100 UNIT/ML INJECTION VIAL    Via insulin pump max daily dose 100 units daily    QUETIAPINE (SEROQUEL XR) 300 MG EXTENDED RELEASE TABLET    Take 1 tablet by mouth nightly       ALLERGIES     Bactrim [sulfamethoxazole-trimethoprim]; Dust mite extract; Mirtazapine; Other;  Oxcarbazepine; and Shrimp flavor    FAMILY HISTORY       Family History   Problem Relation Age of Onset    Cancer Maternal Aunt         breast    Diabetes Maternal Uncle     Diabetes Paternal Uncle     Cancer Maternal Grandmother     Diabetes Maternal Grandmother     Cancer Maternal Grandfather     Diabetes Maternal Grandfather           SOCIAL HISTORY       Social History     Socioeconomic History    Marital status: Single     Spouse name: Not on file    Number of children: 0    Years of education: 6    Highest education level: Not on file   Occupational History    Not on file   Social Needs    Financial resource strain: Hard    Food insecurity     Worry: Often true     Inability: Often true    Transportation needs     Medical: Yes     Non-medical: Yes   Tobacco Use    Smoking status: Current Every Day Smoker     Packs/day: 1.00     Years: 5.00     Pack years: 5.00     Types: Cigarettes    Smokeless tobacco: Never Used    Tobacco comment: pt aware of risk   Substance and Sexual Activity    Alcohol use: Not Currently     Alcohol/week: 0.0 standard drinks     Frequency: Never     Comment: Had been drinking whiskey and vodka on a regular basis about 6 months ago. Nothing since.     Drug use: Yes     Types: Methamphetamines, Marijuana, IV, Cocaine, Opiates      Comment: occassionally, last use 2 weeks ago    Sexual activity: Yes     Partners: Female     Comment: No protection or birth-control plan   Lifestyle    Physical activity     Days per week: 0 days     Minutes per session: 0 min    Stress: Very much   Relationships    Social connections     Talks on phone: More than three times a week     Gets together: More than three times a week     Attends Spiritism service: Never     Active member of club or organization: No     Attends meetings of clubs or organizations: Never     Relationship status: Not on file    Intimate partner violence     Fear of current or ex partner: Patient refused     Emotionally abused: Patient refused     Physically abused: Patient refused     Forced sexual activity: Patient refused   Other Topics Concern    Not on file   Social History Narrative    ** Merged History Encounter **            SCREENINGS      @FLOW([id number])@      PHYSICAL EXAM    (up to 7 for level 4, 8 or more for level 5)     ED Triage Vitals [09/09/20 0645]   BP Temp Temp Source Pulse Resp SpO2 Height Weight   (!) 152/118 98.6 °F (37 °C) Oral 115 20 95 % 5' 6\" (1.676 m) 155 lb (70.3 kg)       Physical Exam  Vitals signs and nursing note reviewed. Constitutional:       General: He is not in acute distress. Appearance: He is well-developed. He is not ill-appearing, toxic-appearing or diaphoretic. HENT:      Head: Normocephalic. Nose: Nose normal. No congestion. Mouth/Throat:      Mouth: Mucous membranes are moist.      Pharynx: No oropharyngeal exudate or posterior oropharyngeal erythema. Eyes:      Extraocular Movements: Extraocular movements intact. Conjunctiva/sclera: Conjunctivae normal.      Pupils: Pupils are equal, round, and reactive to light. Neck:      Musculoskeletal: Normal range of motion and neck supple. No neck rigidity. Vascular: No JVD. Trachea: No tracheal deviation. Cardiovascular:      Rate and Rhythm: Normal rate. Pulses: Normal pulses. Heart sounds: Normal heart sounds. No murmur. No friction rub. No gallop. Pulmonary:      Effort: Pulmonary effort is normal. No tachypnea, accessory muscle usage, respiratory distress or retractions. Breath sounds: No stridor. No wheezing, rhonchi or rales. Chest:      Chest wall: No tenderness. Abdominal:      General: Abdomen is flat. Bowel sounds are normal. There is no distension or abdominal bruit. Palpations: There is no shifting dullness, fluid wave, hepatomegaly, splenomegaly, mass or pulsatile mass. Tenderness: There is no abdominal tenderness. There is no right CVA tenderness, left CVA tenderness, guarding or rebound.  Negative signs include Sanchez's sign, Rovsing's sign and McBurney's EXAMINATION: XR FOOT LEFT (MIN 3 VIEWS), XR FOOT RIGHT (MIN 3 VIEWS)       CLINICAL HISTORY:  pain, with no acute injury       COMPARISON: Left foot September 9, 2020       FINDINGS:   Three views of the left foot are submitted. No acute fractures. No dislocations. No focal bony abnormalities                                                                                    IMPRESSION: NO ACUTE FRACTURES            ED BEDSIDE ULTRASOUND:   Performed by ED Physician - none    LABS:  Labs Reviewed   COMPREHENSIVE METABOLIC PANEL - Abnormal; Notable for the following components:       Result Value    Potassium 3.3 (*)     Glucose 154 (*)     All other components within normal limits   TSH WITHOUT REFLEX - Abnormal; Notable for the following components:    TSH 0.400 (*)     All other components within normal limits   CK - Abnormal; Notable for the following components: Total  (*)     All other components within normal limits   ACETAMINOPHEN LEVEL - Abnormal; Notable for the following components:    Acetaminophen Level <5 (*)     All other components within normal limits   SALICYLATE LEVEL - Abnormal; Notable for the following components:    Salicylate, Serum <1.3 (*)     All other components within normal limits   POCT GLUCOSE - Abnormal; Notable for the following components:    POC Glucose 136 (*)     All other components within normal limits   POCT GLUCOSE - Normal   CBC WITH AUTO DIFFERENTIAL   ETHANOL   CKMB & RELATIVE PERCENT   URINE DRUG SCREEN   URINE RT REFLEX TO CULTURE   POCT GLUCOSE       All other labs were within normal range or not returned as of this dictation.     EMERGENCY DEPARTMENT COURSE and DIFFERENTIAL DIAGNOSIS/MDM:   Vitals:    Vitals:    09/09/20 0645   BP: (!) 152/118   Pulse: 115   Resp: 20   Temp: 98.6 °F (37 °C)   TempSrc: Oral   SpO2: 95%   Weight: 155 lb (70.3 kg)   Height: 5' 6\" (1.676 m)          MDM  Number of Diagnoses or Management Options  Pain in both feet: Suicidal ideation:   Diagnosis management comments: Patient presented to ED with a complaint of bilateral foot pain, but had also expressed to EMS that he is having thoughts of wanting to kill himself, as he overdosed on heroin earlier in the day, which initially was thought to be an accidental overdose, but patient told EMS on the way and that this was intentional to try to harm himself. He also had told EMS that he was going to mess with his insulin pump, but would not clarify what his intent was. He was pink slipped initially as he arrived in the emergency department due to thoughts of self-harm. During his initial evaluation screening, drug paraphernalia was found him, patient was cleared medically, and will be discharged to the [facility] long term for his offenses. Patient was advised if he has any worsening or changes symptoms of foot pain, to return to the emerge department, otherwise he was given follow-up with podiatry. CRITICAL CARE TIME   Total Critical Care time was 0 minutes, excluding separately reportableprocedures. There was a high probability of clinicallysignificant/life threatening deterioration in the patient's condition which required my urgent intervention. CONSULTS:  None    PROCEDURES:  Unless otherwise noted below, none     Procedures    FINAL IMPRESSION      1. Suicidal ideation    2.  Pain in both feet          DISPOSITION/PLAN   DISPOSITION Decision To Discharge 09/09/2020 09:07:18 AM      PATIENT REFERRED TO:  YAMIL Mejia CNP  [address]  [phone]    In 1 week      Zachary Aiken DPM  [address] [place] [address]  [phone]            DISCHARGE MEDICATIONS:  New Prescriptions    No medications on file          (Please note that portions of this note were completed with a voice recognition program.  Efforts were made to edit the dictations but occasionally words are mis-transcribed.)    Randall Ellis PA-C (electronically signed)  Attending Emergency Physician         Simone Burch PA-C  09/09/20 8630

## 2020-10-01 ENCOUNTER — OFFICE VISIT (OUTPATIENT)
Dept: ENDOCRINOLOGY | Age: 25
End: 2020-10-01
Payer: MEDICARE

## 2020-10-01 VITALS
DIASTOLIC BLOOD PRESSURE: 78 MMHG | HEART RATE: 82 BPM | OXYGEN SATURATION: 95 % | SYSTOLIC BLOOD PRESSURE: 114 MMHG | BODY MASS INDEX: 24.53 KG/M2 | WEIGHT: 152 LBS

## 2020-10-01 LAB
CHP ED QC CHECK: NORMAL
GLUCOSE BLD-MCNC: 384 MG/DL
HBA1C MFR BLD: 11.8 %

## 2020-10-01 PROCEDURE — 83036 HEMOGLOBIN GLYCOSYLATED A1C: CPT | Performed by: INTERNAL MEDICINE

## 2020-10-01 PROCEDURE — G8427 DOCREV CUR MEDS BY ELIG CLIN: HCPCS | Performed by: INTERNAL MEDICINE

## 2020-10-01 PROCEDURE — G8420 CALC BMI NORM PARAMETERS: HCPCS | Performed by: INTERNAL MEDICINE

## 2020-10-01 PROCEDURE — 3046F HEMOGLOBIN A1C LEVEL >9.0%: CPT | Performed by: INTERNAL MEDICINE

## 2020-10-01 PROCEDURE — 82962 GLUCOSE BLOOD TEST: CPT | Performed by: INTERNAL MEDICINE

## 2020-10-01 PROCEDURE — G8484 FLU IMMUNIZE NO ADMIN: HCPCS | Performed by: INTERNAL MEDICINE

## 2020-10-01 PROCEDURE — 99213 OFFICE O/P EST LOW 20 MIN: CPT | Performed by: INTERNAL MEDICINE

## 2020-10-01 PROCEDURE — 4004F PT TOBACCO SCREEN RCVD TLK: CPT | Performed by: INTERNAL MEDICINE

## 2020-10-01 PROCEDURE — 2022F DILAT RTA XM EVC RTNOPTHY: CPT | Performed by: INTERNAL MEDICINE

## 2020-10-01 NOTE — PROGRESS NOTES
Subjective:      Patient ID: Joni Marinelli is a 23 y.o. male. Follow-up on type 1 diabetes patient was recently [facility]  Recently got a new pump as he had lost the prior pump before  Diabetes   He presents for his follow-up diabetic visit. He has type 1 diabetes mellitus. Symptoms are worsening. Current diabetic treatment includes insulin pump. Meal planning includes carbohydrate counting. His overall blood glucose range is >200 mg/dl. (Lab Results       Component                Value               Date                       LABA1C                   11.8                10/01/2020            )       Results for Saleem Diaz (MRN [id number]) as of 10/1/2020 11:36   Ref. Range 2/15/2019 17:13 2/20/2019 05:42 10/1/2019 19:43 7/30/2020 05:55 10/1/2020 11:34   Hemoglobin A1C Latest Units: % 10.6 (H) 10.1 (H) 12.2 (H) 10.6 (H) 11.8       Results for Saleem Diaz (MRN [id number]) as of 10/1/2020 11:36   Ref.  Range 10/1/2020 11:21 10/1/2020 11:34   Glucose Latest Units: mg/dL 384    Hemoglobin A1C Latest Units: %  11.8     Lab Results   Component Value Date     09/09/2020    K 3.3 (L) 09/09/2020    CL 99 09/09/2020    CO2 27 09/09/2020    BUN 14 09/09/2020    CREATININE 0.99 09/09/2020    GLUCOSE 384 10/01/2020    CALCIUM 9.1 09/09/2020    PROT 6.8 09/09/2020    LABALBU 4.0 09/09/2020    BILITOT 0.5 09/09/2020    ALKPHOS 72 09/09/2020    AST 25 09/09/2020    ALT 13 09/09/2020    LABGLOM >60.0 09/09/2020    GFRAA >60.0 09/09/2020    GLOB 2.8 09/09/2020       Patient Active Problem List   Diagnosis    DKA, type 1, not at goal [facility])    ADHD (attention deficit hyperactivity disorder)    Oppositional defiant disorder    Seasonal allergies    Asthma    Atopic rhinitis    Closed fracture of neck of metacarpal bone    Congenital anomaly of cerebrovascular system    Generalized convulsive epilepsy ([address]    Contusion of hand    Closed fracture of base of other metacarpal bone(s)    Atopic dermatitis    Adult behavior problems    Congenital vascular nevus    Epilepsy ([facility]    Congenital vascular nevus    Generalized tonic clonic epilepsy (HCC)    Asperger syndrome    Chemical dependency ([facility]    History of oppositional defiant disorder    Hyperglycemia due to type 1 diabetes mellitus (HCC)    Metabolic acidosis with increased anion gap and accumulation of organic acids    Lactic acid acidosis    Hyperkalemia, diminished renal excretion    Hyperkalemia, transcellular shifts    TWIN (acute kidney injury) ([facility]    Heroin abuse (HCC)    Hyperglycemia    Substance induced mood disorder ([facility]    H/O bipolar disorder    Polysubstance abuse ([facility]    Suicide attempt ([facility]    Bipolar 1 disorder, mixed, severe ([facility]    Multiple drug overdose    Respiratory insufficiency    DM w/ coma type I, uncontrolled ([facility]    Seizures ([facility]    Hepatitis C    Diabetes mellitus type 1 ([facility]    Cellulitis    Abdominal wall abscess    Hypergranulation    Drug overdose, accidental or unintentional, initial encounter    Toxic encephalopathy    Rhabdomyolysis    Bipolar 1 disorder ([facility]    Uncontrolled type 1 diabetes mellitus with hyperglycemia (HCC)     Allergies   Allergen Reactions    Bactrim [Sulfamethoxazole-Trimethoprim] Rash    Dust Mite Extract      nasal & sinus congestion, itchy watery eyes, sneezing    Mirtazapine      blisters    Other      dander causes him to sneeze, have nasal/sinus congestion, itchy, watery eyes.  Oxcarbazepine Rash    Shrimp Flavor Nausea And Vomiting         Review of Systems   Psychiatric/Behavioral: Positive for decreased concentration and dysphoric mood. Vitals:    10/01/20 1124   BP: 114/78   Pulse: 82   SpO2: 95%   Weight: 152 lb (68.9 kg)       Objective:   Physical Exam  Constitutional:       Appearance: Normal appearance. He is normal weight. HENT:      Head: Normocephalic and atraumatic.    Neck:      Musculoskeletal: Normal range of motion and neck supple. Cardiovascular:      Rate and Rhythm: Normal rate. Abdominal:       Musculoskeletal: Normal range of motion. Neurological:      General: No focal deficit present. Mental Status: He is alert. Assessment:       Diagnosis Orders   1.  Type 1 diabetes mellitus with other specified complication (HCC)  POCT Glucose    POCT glycosylated hemoglobin (Hb A1C)           Plan:      Orders Placed This Encounter   Procedures    POCT Glucose    POCT glycosylated hemoglobin (Hb A1C)     Continue insulin pump as per current setting patient to talk to [employer] pump rep today to get on CGM and get his basal and bolus adjusted compliance stressed with insulin  Basal rate 1.5 units/h carb ratio was 13 sensitivity is 35        Justo Huber MD

## 2020-10-25 ENCOUNTER — HOSPITAL ENCOUNTER (EMERGENCY)
Age: 25
Discharge: HOME | End: 2020-10-25
Payer: MEDICARE

## 2020-10-25 VITALS
SYSTOLIC BLOOD PRESSURE: 115 MMHG | BODY MASS INDEX: 24.91 KG/M2 | RESPIRATION RATE: 18 BRPM | HEIGHT: 66 IN | DIASTOLIC BLOOD PRESSURE: 94 MMHG | HEART RATE: 98 BPM | WEIGHT: 155 LBS | TEMPERATURE: 98.1 F

## 2020-10-25 PROCEDURE — 99284 EMERGENCY DEPT VISIT MOD MDM: CPT

## 2020-10-25 ASSESSMENT — ENCOUNTER SYMPTOMS
COLOR CHANGE: 0
TROUBLE SWALLOWING: 0
ABDOMINAL PAIN: 0
SHORTNESS OF BREATH: 0
APNEA: 0
EYE PAIN: 0
ALLERGIC/IMMUNOLOGIC NEGATIVE: 1

## 2020-10-25 NOTE — ED NOTES
Bed: 18  Expected date: 10/25/20  Expected time: 2:12 PM  Means of arrival: [facility]  Comments:  23 M - Narcan 2mg nasally by [facility], 2mg IV by [facility]. A&O x4.  BLAS Robles RN  10/25/20 0574

## 2020-10-25 NOTE — ED NOTES
Pt is currently in the process of calling family members to pick him up and continues to refuse any medical treatments at this time. Pt states \"I want to leave when can I sign out\". Pt observed to be rasing his voice over the phone states \"come and get me now! I want leave they will not let me leave until I have a ride\". Call light within reach and no further questions at this time.      Jann Parsons RN  10/25/20 7783

## 2020-10-25 NOTE — ED NOTES
PA speaking with patient. He now states he can't find a ride. Aware he can leave at [phone] if he doesn't find a ride.      Carley Sánchez RN  10/25/20 7540

## 2020-10-25 NOTE — ED TRIAGE NOTES
Pt arrived via EMS with complaints of an heroin drug overdose  , pt reports \"I didn't mean to\" . 2 mg of narcan was given IV and 2 mg of intranasal narcan was also administered by [place] [employer]. EMS reported after the administration of narcan,pt was A & O x 3. Pt observed to be walking to room and appeared to be removing IV heplock from his right [facility] that was inserted by the squad. Pt wants to go home and does not want further treat,ent, pt states \"I don't want any of that help, I want to go home\". Provider at bedside informing the pt regarding the health risk of leaving AMA including dealth , pt continues to request to go home.    Pt does not appear to be in any distress at this time , pt is alert and oriented and able to answer questions appropriately

## 2020-10-25 NOTE — ED PROVIDER NOTES
[facility] ED  eMERGENCYdEPARTMENT eNCOUnter      Pt Name: Erin Shields  MRN: [id number]  [place] 1995  Date of evaluation: 10/25/2020  Provider:Romulo Vivar PA-C    CHIEF COMPLAINT       Chief Complaint   Patient presents with    Drug Overdose         HISTORY OF PRESENT ILLNESS  (Location/Symptom, Timing/Onset, Context/Setting, Quality, Duration, Modifying Factors, Severity.)   Erin Shields is a 23 y.o. male who presents to the emergency department following an accidental heroin overdose just prior to arrival.  Patient was found unresponsive and given 2 mg of nasal Narcan by police followed by 2 mg of IV Narcan by EMS with complete resolution of syncope. Patient states that this was accidental and it was not in an attempt to harm himself or kill himself. Patient denies any recent illness or injuries otherwise. Patient is refusing all testing stating that he would like to be signed out. HPI    Nursing Notes were reviewed and I agree. REVIEW OF SYSTEMS    (2-9 systems for level 4, 10 or more for level 5)     Review of Systems   Constitutional: Negative for diaphoresis and fever. HENT: Negative for hearing loss and trouble swallowing. Eyes: Negative for pain. Respiratory: Negative for apnea and shortness of breath. Cardiovascular: Negative for chest pain. Gastrointestinal: Negative for abdominal pain. Endocrine: Negative. Genitourinary: Negative for hematuria. Musculoskeletal: Negative for neck pain and neck stiffness. Skin: Negative for color change. Allergic/Immunologic: Negative. Neurological: Positive for syncope. Negative for dizziness and numbness. Hematological: Negative. Psychiatric/Behavioral: Negative. All other systems reviewed and are negative. Except as noted above the remainder of the review of systems was reviewed and negative.        PAST MEDICAL HISTORY     Past Medical History:   Diagnosis Date    ADHD (attention deficit hyperactivity disorder)     Asperger syndrome     Asthma     Asthma 3/31/2015    Bipolar 1 disorder, mixed, severe ([facility] 2/5/2018    Chemical dependency ([facility]     marijuana    Diabetes mellitus ([facility]     Diabetes mellitus type 1 ([facility]     Hepatitis C     Hepatitis C     Hyperkalemia, diminished renal excretion 11/9/2017    Mental disorder     ODD (oppositional defiant disorder)     Seasonal allergies 3/31/2015    Seizures ([facility]          SURGICAL HISTORY       Past Surgical History:   Procedure Laterality Date    ABDOMEN SURGERY Left 10/7/2019    INCISION  & DRAINAGE OF ABSCESS LEFT ABDOMINAL WALL performed by Dot Villagran MD at [address] 10/21/2019    INCISION AND DRAINAGE OF RECURRENT ABDOMINAL WALL ABSCESS ROOM [facility] performed by Dot Villagran MD at [facility]       Previous Medications    ALBUTEROL SULFATE  (90 BASE) MCG/ACT INHALER    Use 2 puffs 4 times daily for 7 days then as needed for wheezing. Dispense with Spacer and instruct in use. At patient's preference may use 60 dose MDI. May Sub Pro-Air or Proventil as needed per insurance. BD INSULIN SYRINGE U/F 31G X 5/16\" 0.5 ML MISC    use 4 daily    CONTOUR NEXT TEST STRIP    Test 6x daily Dx E10.65    NOVOLOG 100 UNIT/ML INJECTION VIAL    Via insulin pump max daily dose 100 units daily    QUETIAPINE (SEROQUEL XR) 300 MG EXTENDED RELEASE TABLET    Take 1 tablet by mouth nightly       ALLERGIES     Bactrim [sulfamethoxazole-trimethoprim]; Dust mite extract; Mirtazapine; Other;  Oxcarbazepine; and Shrimp flavor    FAMILY HISTORY       Family History   Problem Relation Age of Onset    Cancer Maternal Aunt         breast    Diabetes Maternal Uncle     Diabetes Paternal Uncle     Cancer Maternal Grandmother     Diabetes Maternal Grandmother     Cancer Maternal Grandfather     Diabetes Maternal Grandfather           SOCIAL HISTORY       Social History     Socioeconomic History    DIAGNOSIS/MDM:   Vitals: There were no vitals filed for this visit. REASSESSMENT      Patient presented the emergency department following an accidental opiate overdose. Patient was offered lets get real to discuss his drug use and he declined, stating that \"I am not looking to do all that\". I did discuss with the patient that the Narcan can wear off before the opiate does causing him to go unresponsive again. Patient is aware of the risks, alert and oriented x3 and allowed to make decisions on his own. Patient will return to the emergency department if at anytime he is willing to have further evaluation or care      MDM    PROCEDURES:    Procedures      FINAL IMPRESSION      1.  Opiate overdose, accidental or unintentional, initial encounter [facility])          [address] [place] [address] 10/25/2020 02:20:11 PM      PATIENT REFERRED TO:  YAMIL Calderón CNP  [address], [address] [facility] [phone]    Call in 1 day      [address] [facility] [id number] [phone]  Call in 1 day        DISCHARGE MEDICATIONS:  New Prescriptions    No medications on file       (Please note that portions of this note were completed with a voice recognition program.  Efforts were made to edit the dictations but occasionally words are mis-transcribed.)    AMANDA Loja PA-C  10/25/20 1422

## 2020-10-26 ENCOUNTER — CARE COORDINATION (OUTPATIENT)
Dept: CARE COORDINATION | Age: 25
End: 2020-10-26

## 2020-10-26 NOTE — CARE COORDINATION
Attempted to contact patient for post ED COVID-19 Monitoring. Unable to reach patient by phone. Message left regarding the purpose of this call. Number provided and call back requested. Luis Fernando Mccracken

## 2020-10-27 ENCOUNTER — CARE COORDINATION (OUTPATIENT)
Dept: CARE COORDINATION | Age: 25
End: 2020-10-27

## 2021-01-06 ENCOUNTER — TELEPHONE (OUTPATIENT)
Dept: FAMILY MEDICINE CLINIC | Age: 26
End: 2021-01-06

## 2021-01-21 ENCOUNTER — TELEPHONE (OUTPATIENT)
Dept: FAMILY MEDICINE CLINIC | Age: 26
End: 2021-01-21

## 2021-01-31 ENCOUNTER — TELEPHONE (OUTPATIENT)
Dept: FAMILY MEDICINE CLINIC | Age: 26
End: 2021-01-31

## 2021-02-22 NOTE — PROGRESS NOTES
NP notified of pt 499BS. Pt refused insulin earlier and is agreeable at this time. No new orders at this time. Number Of Deep Sutures (Optional): 3

## 2021-05-25 ENCOUNTER — TELEPHONE (OUTPATIENT)
Dept: FAMILY MEDICINE CLINIC | Age: 26
End: 2021-05-25

## 2021-08-05 ENCOUNTER — OFFICE VISIT (OUTPATIENT)
Dept: FAMILY MEDICINE CLINIC | Age: 26
End: 2021-08-05
Payer: MEDICARE

## 2021-08-05 VITALS
DIASTOLIC BLOOD PRESSURE: 88 MMHG | HEART RATE: 81 BPM | SYSTOLIC BLOOD PRESSURE: 128 MMHG | TEMPERATURE: 97.5 F | WEIGHT: 151 LBS | OXYGEN SATURATION: 98 % | BODY MASS INDEX: 24.27 KG/M2 | HEIGHT: 66 IN

## 2021-08-05 DIAGNOSIS — F31.9 BIPOLAR AFFECTIVE DISORDER, REMISSION STATUS UNSPECIFIED (HCC): ICD-10-CM

## 2021-08-05 DIAGNOSIS — E10.69 TYPE 1 DIABETES MELLITUS WITH OTHER SPECIFIED COMPLICATION (HCC): Primary | ICD-10-CM

## 2021-08-05 DIAGNOSIS — L98.492 NON-PRESSURE CHRONIC ULCER OF SKIN OF OTHER SITES WITH FAT LAYER EXPOSED (HCC): ICD-10-CM

## 2021-08-05 DIAGNOSIS — Z13.220 LIPID SCREENING: ICD-10-CM

## 2021-08-05 DIAGNOSIS — R10.84 GENERALIZED ABDOMINAL PAIN: ICD-10-CM

## 2021-08-05 DIAGNOSIS — T40.1X4A: ICD-10-CM

## 2021-08-05 DIAGNOSIS — R56.9 SEIZURES (HCC): ICD-10-CM

## 2021-08-05 PROCEDURE — 99214 OFFICE O/P EST MOD 30 MIN: CPT | Performed by: NURSE PRACTITIONER

## 2021-08-05 PROCEDURE — 2022F DILAT RTA XM EVC RTNOPTHY: CPT | Performed by: NURSE PRACTITIONER

## 2021-08-05 PROCEDURE — 4004F PT TOBACCO SCREEN RCVD TLK: CPT | Performed by: NURSE PRACTITIONER

## 2021-08-05 PROCEDURE — 3046F HEMOGLOBIN A1C LEVEL >9.0%: CPT | Performed by: NURSE PRACTITIONER

## 2021-08-05 PROCEDURE — G8427 DOCREV CUR MEDS BY ELIG CLIN: HCPCS | Performed by: NURSE PRACTITIONER

## 2021-08-05 PROCEDURE — G8420 CALC BMI NORM PARAMETERS: HCPCS | Performed by: NURSE PRACTITIONER

## 2021-08-05 RX ORDER — LISINOPRIL 2.5 MG/1
2.5 TABLET ORAL DAILY
COMMUNITY
End: 2021-09-27 | Stop reason: SDUPTHER

## 2021-08-05 RX ORDER — LITHIUM CARBONATE 300 MG/1
300 TABLET, FILM COATED, EXTENDED RELEASE ORAL 2 TIMES DAILY
COMMUNITY
End: 2021-09-27 | Stop reason: SDUPTHER

## 2021-08-05 RX ORDER — INSULIN GLARGINE 100 [IU]/ML
INJECTION, SOLUTION SUBCUTANEOUS
Qty: 10 ML | Refills: 3 | Status: SHIPPED | OUTPATIENT
Start: 2021-08-05 | End: 2021-12-23

## 2021-08-05 RX ORDER — BLOOD-GLUCOSE METER
1 EACH MISCELLANEOUS 4 TIMES DAILY
Qty: 100 EACH | Refills: 3 | Status: SHIPPED | OUTPATIENT
Start: 2021-08-05 | End: 2021-10-22 | Stop reason: ALTCHOICE

## 2021-08-05 RX ORDER — NALTREXONE HYDROCHLORIDE 50 MG/1
50 TABLET, FILM COATED ORAL DAILY
COMMUNITY
End: 2021-09-27 | Stop reason: SDUPTHER

## 2021-08-05 RX ORDER — BUSPIRONE HYDROCHLORIDE 15 MG/1
15 TABLET ORAL 3 TIMES DAILY
COMMUNITY
End: 2021-09-27 | Stop reason: SDUPTHER

## 2021-08-05 RX ORDER — INSULIN ASPART 100 [IU]/ML
10 INJECTION, SOLUTION INTRAVENOUS; SUBCUTANEOUS
Qty: 5 PEN | Refills: 3 | Status: SHIPPED | OUTPATIENT
Start: 2021-08-05 | End: 2021-12-23

## 2021-08-05 RX ORDER — QUETIAPINE FUMARATE 50 MG/1
50 TABLET, EXTENDED RELEASE ORAL NIGHTLY
COMMUNITY
End: 2021-09-27 | Stop reason: SDUPTHER

## 2021-08-05 SDOH — ECONOMIC STABILITY: FOOD INSECURITY: WITHIN THE PAST 12 MONTHS, YOU WORRIED THAT YOUR FOOD WOULD RUN OUT BEFORE YOU GOT MONEY TO BUY MORE.: NEVER TRUE

## 2021-08-05 SDOH — ECONOMIC STABILITY: FOOD INSECURITY: WITHIN THE PAST 12 MONTHS, THE FOOD YOU BOUGHT JUST DIDN'T LAST AND YOU DIDN'T HAVE MONEY TO GET MORE.: NEVER TRUE

## 2021-08-05 ASSESSMENT — SOCIAL DETERMINANTS OF HEALTH (SDOH): HOW HARD IS IT FOR YOU TO PAY FOR THE VERY BASICS LIKE FOOD, HOUSING, MEDICAL CARE, AND HEATING?: NOT VERY HARD

## 2021-08-05 ASSESSMENT — ENCOUNTER SYMPTOMS
COUGH: 0
DIARRHEA: 0
CONSTIPATION: 0
SHORTNESS OF BREATH: 0

## 2021-08-05 NOTE — PROGRESS NOTES
Subjective  Chief Complaint   Patient presents with    Diabetes    Other     pt states gluten allergy fmaily history, would like tested. Diabetes  He presents for his follow-up diabetic visit. He has type 1 diabetes mellitus. His disease course has been worsening. There are no hypoglycemic associated symptoms. Associated symptoms include polydipsia, polyphagia and polyuria. Pertinent negatives for diabetes include no chest pain and no fatigue. There are no hypoglycemic complications. Diabetic complications include peripheral neuropathy. Current diabetic treatment includes insulin injections. He is compliant with treatment some of the time. He is following a generally unhealthy diet. When asked about meal planning, he reported none. His home blood glucose trend is increasing rapidly. His overall blood glucose range is >200 mg/dl. An ACE inhibitor/angiotensin II receptor blocker is being taken. He does not see a podiatrist.Eye exam is not current. Pt recently released from group home. Needs to get back on insulin pump. States that his BS has been extremely elevated. Reports no \"symptoms of dka\"     Has been taking his psychiatry meds minus the naltrexone. States that he wanted to be able to drink alcohol. Currently homeless. Lives in car.     Patient Active Problem List    Diagnosis Date Noted    Non-pressure chronic ulcer of skin of other sites with fat layer exposed ([facility] 08/05/2021    Uncontrolled type 1 diabetes mellitus with hyperglycemia (HCC)     Toxic encephalopathy 07/30/2020    Rhabdomyolysis 07/30/2020    Bipolar 1 disorder ([address] 07/30/2020    Drug overdose, accidental or unintentional, initial encounter 07/28/2020    Hypergranulation 12/20/2019    Abdominal wall abscess 10/18/2019    Cellulitis 10/04/2019    Seizures ([address]     Hepatitis C     Diabetes mellitus type 1 ([address]     DM w/ coma type I, uncontrolled ([address]     Multiple drug overdose 02/14/2019    Respiratory insufficiency     Bipolar 1 disorder, mixed, severe ([address] 02/05/2018    H/O bipolar disorder 02/02/2018    Polysubstance abuse ([address]     Suicide attempt ([address]     Hyperglycemia 02/01/2018    Substance induced mood disorder ([address] 02/01/2018    Asperger syndrome 11/09/2017    Chemical dependency ([address] 11/09/2017    History of oppositional defiant disorder 11/09/2017    Hyperglycemia due to type 1 diabetes mellitus ([address] 04/85/4732    Metabolic acidosis with increased anion gap and accumulation of organic acids 11/09/2017    Lactic acid acidosis 11/09/2017    Hyperkalemia, diminished renal excretion 11/09/2017    Hyperkalemia, transcellular shifts 11/09/2017    TWIN (acute kidney injury) ([address] 11/09/2017    Heroin abuse ([address] 11/09/2017    DKA, type 1, not at goal [facility]) 03/31/2015    ADHD (attention deficit hyperactivity disorder) 03/31/2015    Oppositional defiant disorder 03/31/2015    Seasonal allergies 03/31/2015    Asthma 03/31/2015    Contusion of hand 08/28/2012    Closed fracture of base of other metacarpal bone(s) 06/08/2012    Closed fracture of neck of metacarpal bone 10/01/2008    Epilepsy ([address] 09/10/2007    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    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    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    Generalized convulsive epilepsy ([address] 01/08/2007    Generalized tonic clonic epilepsy ([address] 01/08/2007    Atopic rhinitis 08/07/2004    Atopic dermatitis 01/24/2004    Adult behavior problems 07/02/2003     Past Medical History:   Diagnosis Date    ADHD (attention deficit hyperactivity disorder)     Asperger syndrome     Asthma     Asthma 3/31/2015    Bipolar 1 disorder, mixed, severe ([address] 2/5/2018    Chemical dependency ([address]     marijuana    Diabetes mellitus ([address]     Diabetes mellitus type 1 ([address]     Hepatitis C     Hepatitis C     Hyperkalemia, diminished renal excretion 11/9/2017    Mental disorder     ODD (oppositional defiant disorder)     Seasonal allergies 3/31/2015    Seizures ([address]      Past Surgical History:   Procedure Laterality Date    ABDOMEN SURGERY Left 10/7/2019    INCISION  & DRAINAGE OF ABSCESS LEFT ABDOMINAL WALL performed by Elsy Petty MD at [address] [place] [address] N/A 10/21/2019    INCISION AND DRAINAGE OF RECURRENT ABDOMINAL WALL ABSCESS ROOM 475 performed by Elsy Petty MD at [facility]     Family History   Problem Relation Age of Onset    Cancer Maternal Aunt         breast    Diabetes Maternal Uncle     Diabetes Paternal Uncle     Cancer Maternal Grandmother     Diabetes Maternal Grandmother     Cancer Maternal Grandfather     Diabetes Maternal Grandfather      Social History     Socioeconomic History    Marital status: Single     Spouse name: None    Number of children: 0    Years of education: 6    Highest education level: None   Occupational History    None   Tobacco Use    Smoking status: Current Every Day Smoker     Packs/day: 1.00     Years: 5.00     Pack years: 5.00     Types: Cigarettes    Smokeless tobacco: Never Used    Tobacco comment: pt aware of risk   Vaping Use    Vaping Use: Never used   Substance and Sexual Activity    Alcohol use: Not Currently     Alcohol/week: 0.0 standard drinks     Comment: Had been drinking whiskey and vodka on a regular basis about 6 months ago. Nothing since.     Drug use: Yes     Types: Methamphetamines, Marijuana, IV, Cocaine, Opiates      Comment: occassionally, last use 2 weeks ago    Sexual activity: Yes     Partners: Female     Comment: No protection or birth-control plan   Other Topics Concern    None   Social History Narrative    ** Merged History Encounter **          Social Determinants of Health     Financial Resource Strain: Low Risk     Difficulty of Paying Living Expenses: Not very hard   Food Insecurity: No Food Insecurity    Worried About Running Out of Food in the Last Year: Never true    Tam of [employer] in the Last Year: Never true   Transportation Needs:     Lack of Transportation (Medical):  Lack of Transportation (Non-Medical):    Physical Activity:     Days of Exercise per Week:     Minutes of Exercise per Session:    Stress:     Feeling of Stress :    Social Connections:     Frequency of Communication with Friends and Family:     Frequency of Social Gatherings with Friends and Family:     Attends Rastafari Services:     Active Member of Clubs or Organizations:     Attends Club or Organization Meetings:     Marital Status:    Intimate Partner Violence:     Fear of Current or Ex-Partner:     Emotionally Abused:     Physically Abused:     Sexually Abused:      Current Outpatient Medications on File Prior to Visit   Medication Sig Dispense Refill    lisinopril (PRINIVIL;ZESTRIL) 2.5 MG tablet Take 2.5 mg by mouth daily      lithium (LITHOBID) 300 MG extended release tablet Take 300 mg by mouth 2 times daily      naltrexone (DEPADE) 50 MG tablet Take 50 mg by mouth daily      busPIRone (BUSPAR) 15 MG tablet Take 15 mg by mouth 3 times daily      QUEtiapine (SEROQUEL XR) 50 MG extended release tablet Take 50 mg by mouth nightly      BD INSULIN SYRINGE U/F 31G X 5/16\" 0.5 ML MISC use 4 daily 100 each 3    QUEtiapine (SEROQUEL XR) 300 MG extended release tablet Take 1 tablet by mouth nightly (Patient taking differently: Take 200 mg by mouth nightly ) 15 tablet 2    CONTOUR NEXT TEST strip Test 6x daily Dx E10.65 200 each 3    albuterol sulfate  (90 Base) MCG/ACT inhaler Use 2 puffs 4 times daily for 7 days then as needed for wheezing. Dispense with Spacer and instruct in use. At patient's preference may use 60 dose MDI. May Sub Pro-Air or Proventil as needed per insurance. 1 Inhaler 0     No current facility-administered medications on file prior to visit.      Allergies   Allergen Reactions    Bactrim [Sulfamethoxazole-Trimethoprim] Rash    Dust Mite Extract      nasal & sinus congestion, itchy watery eyes, sneezing    Mirtazapine      blisters    Other      dander causes him to sneeze, have nasal/sinus congestion, itchy, watery eyes.  Oxcarbazepine Rash    Shrimp Flavor Nausea And Vomiting       Review of Systems   Constitutional: Negative for diaphoresis and fatigue. Respiratory: Negative for cough and shortness of breath. Cardiovascular: Negative for chest pain. Gastrointestinal: Negative for constipation and diarrhea. Endocrine: Positive for polydipsia, polyphagia and polyuria. Objective  Vitals:    08/05/21 0909   BP: 128/88   Site: Left Upper Arm   Position: Sitting   Pulse: 81   Temp: 97.5 °F (36.4 °C)   SpO2: 98%   Weight: 151 lb (68.5 kg)   Height: 5' 6\" (1.676 m)     Physical Exam  Vitals and nursing note reviewed. Constitutional:       Appearance: Normal appearance. He is normal weight. Cardiovascular:      Rate and Rhythm: Normal rate and regular rhythm. Pulses: Normal pulses. Heart sounds: Normal heart sounds. Pulmonary:      Effort: Pulmonary effort is normal.      Breath sounds: Normal breath sounds. Skin:     General: Skin is warm. Neurological:      General: No focal deficit present. Mental Status: He is alert and oriented to person, place, and time. Mental status is at baseline. Psychiatric:         Mood and Affect: Mood normal.         Behavior: Behavior normal.         Thought Content: Thought content normal.         Judgment: Judgment normal.         Assessment & Plan     Diagnosis Orders   1. Type 1 diabetes mellitus with other specified complication ([facility]  Hemoglobin A1C    Ana Nieto MD, Endocrinology, [place]    insulin glargine (LANTUS) 100 UNIT/ML injection vial    insulin aspart (NOVOLOG FLEXPEN) 100 UNIT/ML injection pen    Comprehensive Metabolic Panel    Microalbumin / Creatinine Urine Ratio   2. Lipid screening  Lipid Panel   3.  Non-pressure chronic ulcer of skin of other sites with fat layer exposed ([facility]  resolved   4. Poisoning by heroin, undetermined, initial encounter ([facility]  Clean currently. 5. Bipolar affective disorder, remission status unspecified [facility])  Miles Shay MD, [place]   6. Seizures ([facility]     7. Generalized abdominal pain  Chronic Enteric Hypersensitivity Reflex          Orders Placed This Encounter   Procedures    Hemoglobin A1C     Standing Status:   Future     Number of Occurrences:   1     Standing Expiration Date:   8/5/2022    Lipid Panel     Standing Status:   Future     Number of Occurrences:   1     Standing Expiration Date:   8/5/2022     Order Specific Question:   Is Patient Fasting?/# of Hours     Answer:   12    Comprehensive Metabolic Panel     Standing Status:   Future     Number of Occurrences:   1     Standing Expiration Date:   8/5/2022    Microalbumin / Creatinine Urine Ratio     Standing Status:   Future     Number of Occurrences:   1     Standing Expiration Date:   8/5/2022    Chronic Enteric Hypersensitivity Reflex     Standing Status:   Future     Standing Expiration Date:   8/5/2022   Toby Calix MD, Endocrinology, [place]     Referral Priority:   Routine     Referral Type:   Eval and Treat     Referral Reason:   Specialty Services Required     Referred to Provider:   Brittany Pina MD     Requested Specialty:   Endocrinology     Number of Visits Requested:   Scott Mello MD, [place]     Referral Priority:   Routine     Referral Type:   Eval and Treat     Referral Reason:   Specialty Services Required     Referred to Provider:   Yamil Shea MD     Requested Specialty:   Psychiatry     Number of Visits Requested:   1       Orders Placed This Encounter   Medications    insulin glargine (LANTUS) 100 UNIT/ML injection vial     Sig: INJECT 50 UNITS INTO THE SKIN NIGHTLY.      Dispense:  10 mL     Refill:  3    insulin aspart (NOVOLOG FLEXPEN) 100 UNIT/ML injection pen     Sig: Inject 10 Units into the skin 3 times daily (before meals)     Dispense:  5 pen     Refill:  3       Side effects, adverse effects of the medication prescribed today, as well as treatment plan/ rationale and result expectations have been discussed with the patient who expresses understanding and desires to proceed. Close follow up to evaluate treatment results and for coordination of care. I have reviewed the patient's medical history in detail and updated the computerized patient record. As always, patient is advised that if symptoms worsen in any way they will proceed to the nearest emergency room. Return in about 4 weeks (around 9/2/2021).     Kenroy Santos, APRN - CNP

## 2021-08-09 ENCOUNTER — TELEPHONE (OUTPATIENT)
Dept: FAMILY MEDICINE CLINIC | Age: 26
End: 2021-08-09

## 2021-08-09 ENCOUNTER — OFFICE VISIT (OUTPATIENT)
Dept: ENDOCRINOLOGY | Age: 26
End: 2021-08-09
Payer: MEDICARE

## 2021-08-09 VITALS
HEIGHT: 66 IN | DIASTOLIC BLOOD PRESSURE: 70 MMHG | BODY MASS INDEX: 25.23 KG/M2 | SYSTOLIC BLOOD PRESSURE: 114 MMHG | HEART RATE: 89 BPM | OXYGEN SATURATION: 98 % | WEIGHT: 157 LBS

## 2021-08-09 DIAGNOSIS — E10.69 TYPE 1 DIABETES MELLITUS WITH OTHER SPECIFIED COMPLICATION (HCC): Primary | ICD-10-CM

## 2021-08-09 LAB
CHP ED QC CHECK: NORMAL
GLUCOSE BLD-MCNC: 101 MG/DL

## 2021-08-09 PROCEDURE — 2022F DILAT RTA XM EVC RTNOPTHY: CPT | Performed by: INTERNAL MEDICINE

## 2021-08-09 PROCEDURE — 4004F PT TOBACCO SCREEN RCVD TLK: CPT | Performed by: INTERNAL MEDICINE

## 2021-08-09 PROCEDURE — 82962 GLUCOSE BLOOD TEST: CPT | Performed by: INTERNAL MEDICINE

## 2021-08-09 PROCEDURE — 3046F HEMOGLOBIN A1C LEVEL >9.0%: CPT | Performed by: INTERNAL MEDICINE

## 2021-08-09 PROCEDURE — G8419 CALC BMI OUT NRM PARAM NOF/U: HCPCS | Performed by: INTERNAL MEDICINE

## 2021-08-09 PROCEDURE — 99214 OFFICE O/P EST MOD 30 MIN: CPT | Performed by: INTERNAL MEDICINE

## 2021-08-09 PROCEDURE — G8427 DOCREV CUR MEDS BY ELIG CLIN: HCPCS | Performed by: INTERNAL MEDICINE

## 2021-08-09 RX ORDER — BLOOD SUGAR DIAGNOSTIC
1 STRIP MISCELLANEOUS 4 TIMES DAILY
Qty: 100 EACH | Refills: 5 | Status: SHIPPED | OUTPATIENT
Start: 2021-08-09

## 2021-08-09 RX ORDER — PERPHENAZINE 16 MG/1
1 TABLET, FILM COATED ORAL 4 TIMES DAILY
Qty: 200 EACH | Refills: 3 | Status: SHIPPED | OUTPATIENT
Start: 2021-08-09 | End: 2021-10-22 | Stop reason: SDUPTHER

## 2021-08-09 NOTE — TELEPHONE ENCOUNTER
----- Message from Jeremie Parker sent at 8/6/2021  1:11 PM EDT -----  Subject: Refill Request    QUESTIONS  Name of Medication? Other - injection pen needles   Patient-reported dosage and instructions? unknown   How many days do you have left? 0  Preferred Pharmacy? [id number] [address] Pharmacy phone number (if available)? [phone]  Additional Information for Provider? pt was seen 08/05/21 by PCP Perla Bassett she ordered a insulin aspart (NOVOLOG FLEXPEN) 100 UNIT/ML injection   pen but pt did not receive the needles for the pen.   ---------------------------------------------------------------------------  --------------  CALL BACK INFO  What is the best way for the office to contact you? OK to leave message on   voicemail  Preferred Call Back Phone Number?  [phone]

## 2021-08-09 NOTE — PROGRESS NOTES
2021    Assessment:       Diagnosis Orders   1. Type 1 diabetes mellitus with other specified complication (HCC)  POCT Glucose    HM DIABETES FOOT EXAM    Hemoglobin D3Q    Basic Metabolic Panel, Fasting         PLAN:     Orders Placed This Encounter   Procedures    Hemoglobin A1C     Standing Status:   Future     Standing Expiration Date:   2022    Basic Metabolic Panel, Fasting     Standing Status:   Future     Standing Expiration Date:   2022    POCT Glucose    HM DIABETES FOOT EXAM     Orders Placed This Encounter   Medications    insulin lispro (HUMALOG) 100 UNIT/ML injection vial     Sig: Via pump max dose 100 units/day     Dispense:  3 vial     Refill:  3    blood glucose test strips (CONTOUR NEXT TEST) strip     Si each by In Vitro route 4 times daily As needed. Dispense:  200 each     Refill:  3     Restart insulin pump  A1c goal of 6.5  Diabetes education provided today:    Nutrition as a mainstream of diabetes therapy. [place] about label reading. Insulin pumps, how they work and how they affect blood sugar levels. Basal rate 1.5 units/h carb ratio was 15 sensitivity is 35     more than 50 % of 30 min spen din pt education counseling   Orders Placed This Encounter   Procedures    POCT Glucose     No orders of the defined types were placed in this encounter. No follow-ups on file.   Subjective:     Chief Complaint   Patient presents with    Diabetes     Vitals:    21 1350   BP: 114/70   Pulse: 89   SpO2: 98%   Weight: 157 lb (71.2 kg)   Height: 5' 6\" (1.676 m)     Wt Readings from Last 3 Encounters:   21 157 lb (71.2 kg)   21 151 lb (68.5 kg)   10/25/20 155 lb (70.3 kg)     BP Readings from Last 3 Encounters:   21 114/70   21 128/88   10/25/20 (!) 115/94     Follow-up on type 1 diabetes patient recently was incarcerated and  Released  Wants to start on insulin pump overall blood sugars have been labile also requesting strips denies any severe hypo or hyperglycemic episode  Currently on Lantus/NovoLog Humalog insulin      Diabetes  He presents for his follow-up diabetic visit. He has type 1 diabetes mellitus. His disease course has been fluctuating. Associated symptoms include fatigue. Pertinent negatives for diabetes include no polydipsia, no polyuria, no visual change and no weight loss. There are no hypoglycemic complications. There are no diabetic complications. Current diabetic treatment includes insulin injections. He is currently taking insulin pre-breakfast, pre-lunch, pre-dinner and at bedtime.  (Lab Results       Component                Value               Date                       LABA1C                   9.6 (H)             08/05/2021              )     Past Medical History:   Diagnosis Date    ADHD (attention deficit hyperactivity disorder)     Asperger syndrome     Asthma     Asthma 3/31/2015    Bipolar 1 disorder, mixed, severe ([address] 2/5/2018    Chemical dependency ([facility]     marijuana    Diabetes mellitus ([facility]     Diabetes mellitus type 1 (HCC)     Hepatitis C     Hepatitis C     Hyperkalemia, diminished renal excretion 11/9/2017    Mental disorder     ODD (oppositional defiant disorder)     Seasonal allergies 3/31/2015    Seizures ([facility]      Past Surgical History:   Procedure Laterality Date    ABDOMEN SURGERY Left 10/7/2019    INCISION  & DRAINAGE OF ABSCESS LEFT ABDOMINAL WALL performed by Radha De La Cruz MD at [address] N/A 10/21/2019    INCISION AND DRAINAGE OF RECURRENT ABDOMINAL WALL ABSCESS ROOM [facility] performed by Radha De La Cruz MD at [address] [facility] History     Socioeconomic History    Marital status: Single     Spouse name: Not on file    Number of children: 0    Years of education: 6    Highest education level: Not on file   Occupational History    Not on file   Tobacco Use    Smoking status: Current Every Day Smoker     Packs/day: 1.00     Years: 5.00     Pack years: 5.00     Types: Cigarettes    Smokeless tobacco: Never Used    Tobacco comment: pt aware of risk   Vaping Use    Vaping Use: Never used   Substance and Sexual Activity    Alcohol use: Not Currently     Alcohol/week: 0.0 standard drinks     Comment: Had been drinking whiskey and vodka on a regular basis about 6 months ago. Nothing since.  Drug use: Yes     Types: Methamphetamines, Marijuana, IV, Cocaine, Opiates      Comment: occassionally, last use 2 weeks ago    Sexual activity: Yes     Partners: Female     Comment: No protection or birth-control plan   Other Topics Concern    Not on file   Social History Narrative    ** Merged History Encounter **          Social Determinants of Health     Financial Resource Strain: Low Risk     Difficulty of Paying Living Expenses: Not very hard   Food Insecurity: No Food Insecurity    Worried About Running Out of Food in the Last Year: Never true    Tam of Food in the Last Year: Never true   Transportation Needs:     Lack of Transportation (Medical):      Lack of Transportation (Non-Medical):    Physical Activity:     Days of Exercise per Week:     Minutes of Exercise per Session:    Stress:     Feeling of Stress :    Social Connections:     Frequency of Communication with Friends and Family:     Frequency of Social Gatherings with Friends and Family:     Attends [employer] Services:     Active Member of Clubs or Organizations:     Attends Club or Organization Meetings:     Marital Status:    Intimate Partner Violence:     Fear of Current or Ex-Partner:     Emotionally Abused:     Physically Abused:     Sexually Abused:      Family History   Problem Relation Age of Onset    Cancer Maternal Aunt         breast    Diabetes Maternal Uncle     Diabetes Paternal Uncle     Cancer Maternal Grandmother     Diabetes Maternal Grandmother     Cancer Maternal Grandfather     Diabetes Maternal Grandfather      Allergies   Allergen Reactions    Bactrim [Sulfamethoxazole-Trimethoprim] Rash    Dust Mite Extract      nasal & sinus congestion, itchy watery eyes, sneezing    Mirtazapine      blisters    Other      dander causes him to sneeze, have nasal/sinus congestion, itchy, watery eyes.  Oxcarbazepine Rash    Shrimp Flavor Nausea And Vomiting       Current Outpatient Medications:     Insulin Pen Needle (PEN NEEDLES) 32G X 6 MM MISC, 1 Device by Does not apply route 4 times daily, Disp: 100 each, Rfl: 5    lisinopril (PRINIVIL;ZESTRIL) 2.5 MG tablet, Take 2.5 mg by mouth daily, Disp: , Rfl:     lithium (LITHOBID) 300 MG extended release tablet, Take 300 mg by mouth 2 times daily, Disp: , Rfl:     naltrexone (DEPADE) 50 MG tablet, Take 50 mg by mouth daily, Disp: , Rfl:     busPIRone (BUSPAR) 15 MG tablet, Take 15 mg by mouth 3 times daily, Disp: , Rfl:     QUEtiapine (SEROQUEL XR) 50 MG extended release tablet, Take 50 mg by mouth nightly, Disp: , Rfl:     insulin glargine (LANTUS) 100 UNIT/ML injection vial, INJECT 50 UNITS INTO THE SKIN NIGHTLY., Disp: 10 mL, Rfl: 3    insulin aspart (NOVOLOG FLEXPEN) 100 UNIT/ML injection pen, Inject 10 Units into the skin 3 times daily (before meals), Disp: 5 pen, Rfl: 3    blood glucose test strips (ASSURE PRISM MULTI TEST) strip, 1 each by In Vitro route 4 times daily As needed. , Disp: 100 each, Rfl: 3    BD INSULIN SYRINGE U/F 31G X 5/16\" 0.5 ML MISC, use 4 daily, Disp: 100 each, Rfl: 3    QUEtiapine (SEROQUEL XR) 300 MG extended release tablet, Take 1 tablet by mouth nightly (Patient taking differently: Take 200 mg by mouth nightly ), Disp: 15 tablet, Rfl: 2    CONTOUR NEXT TEST strip, Test 6x daily Dx E10.65, Disp: 200 each, Rfl: 3    albuterol sulfate  (90 Base) MCG/ACT inhaler, Use 2 puffs 4 times daily for 7 days then as needed for wheezing. Dispense with Spacer and instruct in use. At patient's preference may use 60 dose MDI.  May Sub Pro-Air or Proventil as needed per insurance., Disp: 1

## 2021-08-16 ASSESSMENT — ENCOUNTER SYMPTOMS
VISUAL CHANGE: 0
EYES NEGATIVE: 1

## 2021-09-02 ENCOUNTER — OFFICE VISIT (OUTPATIENT)
Dept: FAMILY MEDICINE CLINIC | Age: 26
End: 2021-09-02
Payer: MEDICARE

## 2021-09-02 VITALS
SYSTOLIC BLOOD PRESSURE: 100 MMHG | WEIGHT: 166 LBS | BODY MASS INDEX: 26.68 KG/M2 | OXYGEN SATURATION: 97 % | HEIGHT: 66 IN | HEART RATE: 94 BPM | DIASTOLIC BLOOD PRESSURE: 64 MMHG

## 2021-09-02 DIAGNOSIS — E10.69 TYPE 1 DIABETES MELLITUS WITH OTHER SPECIFIED COMPLICATION (HCC): Primary | ICD-10-CM

## 2021-09-02 PROCEDURE — 2022F DILAT RTA XM EVC RTNOPTHY: CPT | Performed by: NURSE PRACTITIONER

## 2021-09-02 PROCEDURE — 99213 OFFICE O/P EST LOW 20 MIN: CPT | Performed by: NURSE PRACTITIONER

## 2021-09-02 PROCEDURE — G8427 DOCREV CUR MEDS BY ELIG CLIN: HCPCS | Performed by: NURSE PRACTITIONER

## 2021-09-02 PROCEDURE — G8419 CALC BMI OUT NRM PARAM NOF/U: HCPCS | Performed by: NURSE PRACTITIONER

## 2021-09-02 PROCEDURE — 3046F HEMOGLOBIN A1C LEVEL >9.0%: CPT | Performed by: NURSE PRACTITIONER

## 2021-09-02 PROCEDURE — 4004F PT TOBACCO SCREEN RCVD TLK: CPT | Performed by: NURSE PRACTITIONER

## 2021-09-02 RX ORDER — BLOOD-GLUCOSE METER
1 EACH MISCELLANEOUS DAILY
Qty: 1 KIT | Refills: 0 | Status: SHIPPED | OUTPATIENT
Start: 2021-09-02

## 2021-09-02 ASSESSMENT — ENCOUNTER SYMPTOMS: WHEEZING: 1

## 2021-09-02 NOTE — PROGRESS NOTES
Subjective  Chief Complaint   Patient presents with    Diabetes     f/u        HPI     Back on insulin pump. Has not been checking BS. Has felt \"low\" while at work a few times. Following up with Dr. John Park in a couple of months. Going to be getting new pump. Has started working at [employer]. Going well. Had been going treatment while incarcerated.    Denies going to professional center for substance abuse  Has been using sister for support     Patient Active Problem List    Diagnosis Date Noted    Non-pressure chronic ulcer of skin of other sites with fat layer exposed ([address] 08/05/2021    Uncontrolled type 1 diabetes mellitus with hyperglycemia ([address]     Toxic encephalopathy 07/30/2020    Rhabdomyolysis 07/30/2020    Bipolar 1 disorder ([address] 07/30/2020    Drug overdose, accidental or unintentional, initial encounter 07/28/2020    Hypergranulation 12/20/2019    Abdominal wall abscess 10/18/2019    Cellulitis 10/04/2019    Seizures ([address]     Hepatitis C     Diabetes mellitus type 1 ([address]     DM w/ coma type I, uncontrolled ([address]     Multiple drug overdose 02/14/2019    Respiratory insufficiency     Bipolar 1 disorder, mixed, severe ([address] 02/05/2018    H/O bipolar disorder 02/02/2018    Polysubstance abuse ([address]     Suicide attempt ([address]     Hyperglycemia 02/01/2018    Substance induced mood disorder ([address] 02/01/2018    Asperger syndrome 11/09/2017    Chemical dependency ([address] 11/09/2017    History of oppositional defiant disorder 11/09/2017    Hyperglycemia due to type 1 diabetes mellitus ([address] 96/06/2398    Metabolic acidosis with increased anion gap and accumulation of organic acids 11/09/2017    Lactic acid acidosis 11/09/2017    Hyperkalemia, diminished renal excretion 11/09/2017    Hyperkalemia, transcellular shifts 11/09/2017    TWIN (acute kidney injury) ([address] 11/09/2017    Heroin abuse ([address] 11/09/2017    DKA, type 1, not at goal [facility]) 03/31/2015    ADHD (attention Years of education: 6    Highest education level: None   Occupational History    None   Tobacco Use    Smoking status: Current Every Day Smoker     Packs/day: 1.00     Years: 5.00     Pack years: 5.00     Types: Cigarettes    Smokeless tobacco: Never Used    Tobacco comment: pt aware of risk   Vaping Use    Vaping Use: Never used   Substance and Sexual Activity    Alcohol use: Not Currently     Alcohol/week: 0.0 standard drinks     Comment: Had been drinking whiskey and vodka on a regular basis about 6 months ago. Nothing since.  Drug use: Yes     Types: Methamphetamines, Marijuana, IV, Cocaine, Opiates      Comment: occassionally, last use 2 weeks ago    Sexual activity: Yes     Partners: Female     Comment: No protection or birth-control plan   Other Topics Concern    None   Social History Narrative    ** Merged History Encounter **          Social Determinants of Health     Financial Resource Strain: Low Risk     Difficulty of Paying Living Expenses: Not very hard   Food Insecurity: No Food Insecurity    Worried About Running Out of Food in the Last Year: Never true    Tam of Food in the Last Year: Never true   Transportation Needs:     Lack of Transportation (Medical):      Lack of Transportation (Non-Medical):    Physical Activity:     Days of Exercise per Week:     Minutes of Exercise per Session:    Stress:     Feeling of Stress :    Social Connections:     Frequency of Communication with Friends and Family:     Frequency of Social Gatherings with Friends and Family:     Attends L[employer] Services:     Active Member of Clubs or Organizations:     Attends Club or Organization Meetings:     Marital Status:    Intimate Partner Violence:     Fear of Current or Ex-Partner:     Emotionally Abused:     Physically Abused:     Sexually Abused:      Current Outpatient Medications on File Prior to Visit   Medication Sig Dispense Refill    blood glucose test strips (CONTOUR NEXT TEST) strip 1 each by In Vitro route 4 times daily As needed. 200 each 3    lisinopril (PRINIVIL;ZESTRIL) 2.5 MG tablet Take 2.5 mg by mouth daily      lithium (LITHOBID) 300 MG extended release tablet Take 300 mg by mouth 2 times daily      naltrexone (DEPADE) 50 MG tablet Take 50 mg by mouth daily      busPIRone (BUSPAR) 15 MG tablet Take 15 mg by mouth 3 times daily      QUEtiapine (SEROQUEL XR) 50 MG extended release tablet Take 50 mg by mouth nightly      insulin aspart (NOVOLOG FLEXPEN) 100 UNIT/ML injection pen Inject 10 Units into the skin 3 times daily (before meals) 5 pen 3    blood glucose test strips (ASSURE PRISM MULTI TEST) strip 1 each by In Vitro route 4 times daily As needed. 100 each 3    QUEtiapine (SEROQUEL XR) 300 MG extended release tablet Take 1 tablet by mouth nightly (Patient taking differently: Take 200 mg by mouth nightly ) 15 tablet 2    CONTOUR NEXT TEST strip Test 6x daily Dx E10.65 200 each 3    albuterol sulfate  (90 Base) MCG/ACT inhaler Use 2 puffs 4 times daily for 7 days then as needed for wheezing. Dispense with Spacer and instruct in use. At patient's preference may use 60 dose MDI. May Sub Pro-Air or Proventil as needed per insurance. 1 Inhaler 0    insulin lispro (HUMALOG) 100 UNIT/ML injection vial Via pump max dose 100 units/day (Patient not taking: Reported on 9/2/2021) 3 vial 3    Insulin Pen Needle (PEN NEEDLES) 32G X 6 MM MISC 1 Device by Does not apply route 4 times daily (Patient not taking: Reported on 9/2/2021) 100 each 5    insulin glargine (LANTUS) 100 UNIT/ML injection vial INJECT 50 UNITS INTO THE SKIN NIGHTLY. (Patient not taking: Reported on 9/2/2021) 10 mL 3    BD INSULIN SYRINGE U/F 31G X 5/16\" 0.5 ML MISC use 4 daily (Patient not taking: Reported on 9/2/2021) 100 each 3     No current facility-administered medications on file prior to visit.      Allergies   Allergen Reactions    Bactrim [Sulfamethoxazole-Trimethoprim] Rash    Dust Mite Extract      nasal & sinus congestion, itchy watery eyes, sneezing    Mirtazapine      blisters    Other      dander causes him to sneeze, have nasal/sinus congestion, itchy, watery eyes.  Oxcarbazepine Rash    Shrimp Flavor Nausea And Vomiting       Review of Systems   Constitutional: Negative. Respiratory: Positive for wheezing. Skin: Negative. Psychiatric/Behavioral: The patient is nervous/anxious and is hyperactive. Objective  Vitals:    21 1114   BP: 100/64   Pulse: 94   SpO2: 97%   Weight: 166 lb (75.3 kg)   Height: 5' 6\" (1.676 m)     Physical Exam  Vitals and nursing note reviewed. Constitutional:       Appearance: Normal appearance. HENT:      Head: Normocephalic. Eyes:      Pupils: Pupils are equal, round, and reactive to light. Cardiovascular:      Rate and Rhythm: Normal rate and regular rhythm. Pulses: Normal pulses. Heart sounds: Normal heart sounds. Pulmonary:      Breath sounds: Wheezing present. Skin:     General: Skin is warm and dry. Neurological:      General: No focal deficit present. Mental Status: He is alert and oriented to person, place, and time. Mental status is at baseline. Psychiatric:         Mood and Affect: Mood normal.         Behavior: Behavior normal.         Thought Content: Thought content normal.         Judgment: Judgment normal.       Assessment & Plan     Diagnosis Orders   1. Type 1 diabetes mellitus with other specified complication (HCC)  Blood Glucose Monitoring Suppl (CONTOUR NEXT ONE) KIT       No orders of the defined types were placed in this encounter. Orders Placed This Encounter   Medications    Blood Glucose Monitoring Suppl (CONTOUR NEXT ONE) KIT     Si Device by Does not apply route daily     Dispense:  1 kit     Refill:  0     Pt encouraged to get professional help for addiction and staying sober.    Pt encouraged to keep track of sugar before appointment with Dr. Zohra Black and in general to see how low sugar is during hypoglycemic events. Pt verbalizes understanding. Side effects, adverse effects of the medication prescribed today, as well as treatment plan/ rationale and result expectations have been discussed with the patient who expresses understanding and desires to proceed. Close follow up to evaluate treatment results and for coordination of care. I have reviewed the patient's medical history in detail and updated the computerized patient record. As always, patient is advised that if symptoms worsen in any way they will proceed to the nearest emergency room. Return in about 3 months (around 12/2/2021). Corine Score, APRN - CNP  Side effects, adverse effects of the medication prescribed today, as well as treatment plan/ rationale and result expectations have been discussed with the patient who expresses understanding and desires to proceed. Close follow up to evaluate treatment results and for coordination of care. I have reviewed the patient's medical history in detail and updated the computerized patient record. As always, patient is advised that if symptoms worsen in any way they will proceed to the nearest emergency room.

## 2021-09-27 ENCOUNTER — OFFICE VISIT (OUTPATIENT)
Dept: FAMILY MEDICINE CLINIC | Age: 26
End: 2021-09-27
Payer: MEDICARE

## 2021-09-27 VITALS
TEMPERATURE: 97 F | HEART RATE: 84 BPM | DIASTOLIC BLOOD PRESSURE: 84 MMHG | WEIGHT: 165.4 LBS | OXYGEN SATURATION: 98 % | BODY MASS INDEX: 26.58 KG/M2 | HEIGHT: 66 IN | SYSTOLIC BLOOD PRESSURE: 118 MMHG

## 2021-09-27 DIAGNOSIS — L03.012 CELLULITIS OF FINGER OF LEFT HAND: Primary | ICD-10-CM

## 2021-09-27 DIAGNOSIS — G56.01 CARPAL TUNNEL SYNDROME, RIGHT: ICD-10-CM

## 2021-09-27 PROCEDURE — G8427 DOCREV CUR MEDS BY ELIG CLIN: HCPCS | Performed by: PHYSICIAN ASSISTANT

## 2021-09-27 PROCEDURE — G8419 CALC BMI OUT NRM PARAM NOF/U: HCPCS | Performed by: PHYSICIAN ASSISTANT

## 2021-09-27 PROCEDURE — 4004F PT TOBACCO SCREEN RCVD TLK: CPT | Performed by: PHYSICIAN ASSISTANT

## 2021-09-27 PROCEDURE — 99214 OFFICE O/P EST MOD 30 MIN: CPT | Performed by: PHYSICIAN ASSISTANT

## 2021-09-27 RX ORDER — DOXYCYCLINE HYCLATE 100 MG
100 TABLET ORAL 2 TIMES DAILY
Qty: 14 TABLET | Refills: 0 | Status: SHIPPED | OUTPATIENT
Start: 2021-09-27 | End: 2021-10-04

## 2021-09-27 NOTE — PROGRESS NOTES
Diagnosis Date    ADHD (attention deficit hyperactivity disorder)     Asperger syndrome     Asthma     Asthma 3/31/2015    Bipolar 1 disorder, mixed, severe ([facility] 2/5/2018    Chemical dependency ([facility]     marijuana    Diabetes mellitus ([facility]     Diabetes mellitus type 1 ([facility]     Hepatitis C     Hepatitis C     Hyperkalemia, diminished renal excretion 11/9/2017    Mental disorder     ODD (oppositional defiant disorder)     Seasonal allergies 3/31/2015    Seizures ([facility]      SURGICAL HISTORY     Patient  has a past surgical history that includes Abdomen surgery (Left, 10/7/2019) and Abdomen surgery (N/A, 10/21/2019). CURRENT MEDICATIONS       Previous Medications    BD INSULIN SYRINGE U/F 31G X 5/16\" 0.5 ML MISC    use 4 daily    BLOOD GLUCOSE MONITORING SUPPL (CONTOUR NEXT ONE) KIT    1 Device by Does not apply route daily    BLOOD GLUCOSE TEST STRIPS (ASSURE PRISM MULTI TEST) STRIP    1 each by In Vitro route 4 times daily As needed. BLOOD GLUCOSE TEST STRIPS (CONTOUR NEXT TEST) STRIP    1 each by In Vitro route 4 times daily As needed. CONTOUR NEXT TEST STRIP    Test 6x daily Dx E10.65    INSULIN ASPART (NOVOLOG FLEXPEN) 100 UNIT/ML INJECTION PEN    Inject 10 Units into the skin 3 times daily (before meals)    INSULIN GLARGINE (LANTUS) 100 UNIT/ML INJECTION VIAL    INJECT 50 UNITS INTO THE SKIN NIGHTLY. INSULIN LISPRO (HUMALOG) 100 UNIT/ML INJECTION VIAL    Via pump max dose 100 units/day    INSULIN PEN NEEDLE (PEN NEEDLES) 32G X 6 MM MISC    1 Device by Does not apply route 4 times daily     ALLERGIES     Patient is is allergic to bactrim [sulfamethoxazole-trimethoprim], dust mite extract, mirtazapine, other, oxcarbazepine, and shrimp flavor. FAMILY HISTORY     Patient'sfamily history includes Cancer in his maternal aunt, maternal grandfather, and maternal grandmother; Diabetes in his maternal grandfather, maternal grandmother, maternal uncle, and paternal uncle.   SOCIAL HISTORY Patient  reports that he has been smoking cigarettes. He has a 5.00 pack-year smoking history. He has never used smokeless tobacco. He reports previous alcohol use. He reports current drug use. Drugs: Methamphetamines, Marijuana, IV, Cocaine, and Opiates . PHYSICAL EXAM     VITALS  BP: 118/84, Temp: 97 °F (36.1 °C), Pulse: 84,  , SpO2: 98 %  Physical Exam  Vitals and nursing note reviewed. Constitutional:       General: He is awake. He is not in acute distress. Appearance: Normal appearance. He is well-developed. He is not ill-appearing, toxic-appearing or diaphoretic. HENT:      Head: Normocephalic and atraumatic. Right Ear: Hearing and external ear normal.      Left Ear: Hearing and external ear normal.      Nose: Nose normal.   Eyes:      General: Lids are normal. Vision grossly intact. Gaze aligned appropriately. Conjunctiva/sclera: Conjunctivae normal.   Cardiovascular:      Rate and Rhythm: Normal rate and regular rhythm. Pulses: Normal pulses. Heart sounds: Normal heart sounds, S1 normal and S2 normal.   Pulmonary:      Effort: Pulmonary effort is normal.      Breath sounds: Normal breath sounds and air entry. Musculoskeletal:        Hands:       Cervical back: Normal range of motion. Comments: Patient has wound on the left index finger with abscess present. Size of a pencil eraser. No drainage present at this time. There is surrounding erythema. Patient has positive Tinel's of the right wrist. Distribution in median nerve. Patient has good  strength. No thenar atrophy. Skin:     General: Skin is warm. Capillary Refill: Capillary refill takes less than 2 seconds. Neurological:      Mental Status: He is alert and oriented to person, place, and time. Gait: Gait is intact.    Psychiatric:         Attention and Perception: Attention normal.         Mood and Affect: Mood normal.         Speech: Speech normal.         Behavior: Behavior normal. Behavior is cooperative. READY CARE COURSE   Labs:  No results found for this visit on 09/27/21. IMAGING:  No orders to display     Scheduled Meds:  Continuous Infusions:  PRN Meds:. PROCEDURES:  FINAL IMPRESSION      1. Cellulitis of finger of left hand    2. Carpal tunnel syndrome, right      DISPOSITION/PLAN   1. Recommend that the patient start abx due to the patient's previous history. Recommend warm compresses and keeping area clean and dry. Went over possible s/e of the medications. Patient would like to proceed with therapy. 2. Recommend that the patient first start with cock-up wrist splint, icing area, and NSAIDs at this time. Recommend gentle stretches for the wrist. Discussed signs and symptoms which require immediate follow-up in ED/call to 911. Patient verbalized understanding. On this date 9/27/2021 I have spent 30 minutes reviewing previous notes, test results and face to face with the patient discussing the diagnosis and importance of compliance with the treatment plan as well as documenting on the day of the visit. PATIENT REFERRED TO:  Return if symptoms worsen or fail to improve. DISCHARGE MEDICATIONS:  New Prescriptions    DOXYCYCLINE HYCLATE (VIBRA-TABS) 100 MG TABLET    Take 1 tablet by mouth 2 times daily for 7 days    MUPIROCIN (BACTROBAN) 2 % OINTMENT    Apply topically 3 times daily for 10 days. Cannot display discharge medications since this is not an admission.        Adriana Whitman

## 2021-09-27 NOTE — PATIENT INSTRUCTIONS
Cock-up wrist splint    Patient Education        Carpal Tunnel Syndrome: Exercises  Introduction  Here are some examples of exercises for you to try. The exercises may be suggested for a condition or for rehabilitation. Start each exercise slowly. Ease off the exercises if you start to have pain. You will be told when to start these exercises and which ones will work best for you. Warm-up stretches  When you no longer have pain or numbness, you can do exercises to help prevent carpal tunnel syndrome from coming back. Do not do any stretch or movement that is uncomfortable or painful. 1. Rotate your wrist up, down, and from side to side. Repeat 4 times. 2. Stretch your fingers far apart. Relax them, and then stretch them again. Repeat 4 times. 3. Stretch your thumb by pulling it back gently, holding it, and then releasing it. Repeat 4 times. How to do the exercises  Prayer stretch    1. Start with your palms together in front of your chest just below your chin. 2. Slowly lower your hands toward your waistline, keeping your hands close to your stomach and your palms together until you feel a mild to moderate stretch under your forearms. 3. Hold for at least 15 to 30 seconds. Repeat 2 to 4 times. Wrist flexor stretch    1. Extend your arm in front of you with your palm up. 2. Bend your wrist, pointing your hand toward the floor. 3. With your other hand, gently bend your wrist farther until you feel a mild to moderate stretch in your forearm. 4. Hold for at least 15 to 30 seconds. Repeat 2 to 4 times. Wrist extensor stretch    1. Repeat steps 1 through 4 of the stretch above, but begin with your extended hand palm down. Follow-up care is a key part of your treatment and safety. Be sure to make and go to all appointments, and call your doctor if you are having problems. It's also a good idea to know your test results and keep a list of the medicines you take. Where can you learn more?   Go to [email or web address] and sign in to your [employer] account. Enter C865 in the [facility] box to learn more about \"Carpal Tunnel Syndrome: Exercises. \"     If you do not have an account, please click on the \"Sign Up Now\" link. Current as of: July 1, 2021               Content Version: 13.0  © 3806-7044 [employer]. Care instructions adapted under license by [employer]. If you have questions about a medical condition or this instruction, always ask your healthcare professional. [address] any warranty or liability for your use of this information.

## 2021-09-28 RX ORDER — LITHIUM CARBONATE 300 MG/1
300 TABLET, FILM COATED, EXTENDED RELEASE ORAL 2 TIMES DAILY
Qty: 60 TABLET | Refills: 5 | Status: SHIPPED | OUTPATIENT
Start: 2021-09-28 | End: 2022-02-24 | Stop reason: SDUPTHER

## 2021-09-28 RX ORDER — BUSPIRONE HYDROCHLORIDE 15 MG/1
15 TABLET ORAL 3 TIMES DAILY
Qty: 45 TABLET | Refills: 5 | Status: SHIPPED | OUTPATIENT
Start: 2021-09-28 | End: 2022-02-24 | Stop reason: SDUPTHER

## 2021-09-28 RX ORDER — QUETIAPINE 300 MG/1
300 TABLET, FILM COATED, EXTENDED RELEASE ORAL NIGHTLY
Qty: 30 TABLET | Refills: 5 | Status: SHIPPED | OUTPATIENT
Start: 2021-09-28 | End: 2022-02-24 | Stop reason: SDUPTHER

## 2021-09-28 RX ORDER — QUETIAPINE FUMARATE 50 MG/1
50 TABLET, EXTENDED RELEASE ORAL NIGHTLY
Qty: 30 TABLET | Refills: 5 | Status: SHIPPED | OUTPATIENT
Start: 2021-09-28 | End: 2022-02-24 | Stop reason: SDUPTHER

## 2021-09-28 RX ORDER — ALBUTEROL SULFATE 90 UG/1
AEROSOL, METERED RESPIRATORY (INHALATION)
Qty: 1 EACH | Refills: 5 | Status: SHIPPED | OUTPATIENT
Start: 2021-09-28

## 2021-09-28 RX ORDER — LISINOPRIL 2.5 MG/1
2.5 TABLET ORAL DAILY
Qty: 30 TABLET | Refills: 5 | Status: SHIPPED | OUTPATIENT
Start: 2021-09-28 | End: 2022-02-24 | Stop reason: SDUPTHER

## 2021-09-28 RX ORDER — NALTREXONE HYDROCHLORIDE 50 MG/1
50 TABLET, FILM COATED ORAL DAILY
Qty: 30 TABLET | Refills: 5 | Status: SHIPPED | OUTPATIENT
Start: 2021-09-28 | End: 2022-02-24 | Stop reason: SDUPTHER

## 2021-09-28 ASSESSMENT — ENCOUNTER SYMPTOMS
SINUS PRESSURE: 0
SINUS PAIN: 0
SHORTNESS OF BREATH: 0
DIARRHEA: 0
COUGH: 0
CHEST TIGHTNESS: 0
ABDOMINAL PAIN: 0
SORE THROAT: 0
VOMITING: 0
BACK PAIN: 0
NAUSEA: 0

## 2021-09-28 ASSESSMENT — VISUAL ACUITY: OU: 1

## 2021-09-28 NOTE — TELEPHONE ENCOUNTER
Spoke to patients grandfather, states he is working and will be giving us a call back when available.

## 2021-09-28 NOTE — TELEPHONE ENCOUNTER
2 seroquel xr scripts 300mg and 50 mg taking both ?     buspar 3 times daily 15mg     Clarify albuterol script ?

## 2021-09-28 NOTE — TELEPHONE ENCOUNTER
Pharmacy calling and needs clarification on multiple medications. Pharmacy did not elaborate on what the questions. Meds:  Buspirone 15  Albuterol   seroquel XR    [facility].    Ph. [phone]

## 2021-10-22 ENCOUNTER — OFFICE VISIT (OUTPATIENT)
Dept: FAMILY MEDICINE CLINIC | Age: 26
End: 2021-10-22
Payer: MEDICARE

## 2021-10-22 ENCOUNTER — TELEPHONE (OUTPATIENT)
Dept: FAMILY MEDICINE CLINIC | Age: 26
End: 2021-10-22

## 2021-10-22 VITALS
DIASTOLIC BLOOD PRESSURE: 72 MMHG | OXYGEN SATURATION: 98 % | SYSTOLIC BLOOD PRESSURE: 106 MMHG | HEIGHT: 66 IN | HEART RATE: 84 BPM | TEMPERATURE: 97.5 F | WEIGHT: 165 LBS | BODY MASS INDEX: 26.52 KG/M2

## 2021-10-22 DIAGNOSIS — J40 BRONCHITIS: Primary | ICD-10-CM

## 2021-10-22 DIAGNOSIS — R11.0 NAUSEA: ICD-10-CM

## 2021-10-22 DIAGNOSIS — Z72.0 TOBACCO ABUSE: ICD-10-CM

## 2021-10-22 DIAGNOSIS — R05.9 COUGH: ICD-10-CM

## 2021-10-22 PROCEDURE — 87804 INFLUENZA ASSAY W/OPTIC: CPT | Performed by: NURSE PRACTITIONER

## 2021-10-22 PROCEDURE — 4004F PT TOBACCO SCREEN RCVD TLK: CPT | Performed by: NURSE PRACTITIONER

## 2021-10-22 PROCEDURE — G8427 DOCREV CUR MEDS BY ELIG CLIN: HCPCS | Performed by: NURSE PRACTITIONER

## 2021-10-22 PROCEDURE — 99213 OFFICE O/P EST LOW 20 MIN: CPT | Performed by: NURSE PRACTITIONER

## 2021-10-22 PROCEDURE — 87426 SARSCOV CORONAVIRUS AG IA: CPT | Performed by: NURSE PRACTITIONER

## 2021-10-22 PROCEDURE — G8419 CALC BMI OUT NRM PARAM NOF/U: HCPCS | Performed by: NURSE PRACTITIONER

## 2021-10-22 PROCEDURE — G8484 FLU IMMUNIZE NO ADMIN: HCPCS | Performed by: NURSE PRACTITIONER

## 2021-10-22 RX ORDER — AMOXICILLIN AND CLAVULANATE POTASSIUM 875; 125 MG/1; MG/1
1 TABLET, FILM COATED ORAL 2 TIMES DAILY
Qty: 14 TABLET | Refills: 0 | Status: SHIPPED | OUTPATIENT
Start: 2021-10-22 | End: 2021-10-29

## 2021-10-22 RX ORDER — ONDANSETRON 4 MG/1
4 TABLET, ORALLY DISINTEGRATING ORAL EVERY 12 HOURS PRN
Qty: 10 TABLET | Refills: 0 | Status: SHIPPED | OUTPATIENT
Start: 2021-10-22 | End: 2022-05-10 | Stop reason: ALTCHOICE

## 2021-10-22 ASSESSMENT — ENCOUNTER SYMPTOMS
WHEEZING: 0
ABDOMINAL PAIN: 0
RHINORRHEA: 0
CHEST TIGHTNESS: 1
DIARRHEA: 0
NAUSEA: 1
SORE THROAT: 0
COUGH: 1
SHORTNESS OF BREATH: 0
VOMITING: 0
SINUS PRESSURE: 0

## 2021-10-22 NOTE — PROGRESS NOTES
Subjective  Albino Links, 27 y.o. male presents today with:  Chief Complaint   Patient presents with    Cough     states that his symptoms started over 2 weeks ago.  Nausea       HPI   Presents to [facility] for cough and chest congestion   Cough ongoing over last 2 weeks   Thick yellow mucous with cough  Lessened appetite  Nausea   Hydrating well   Denies SOB  Denies chest pain   Not taking temp. Denies chills   Denies diarrhea    Work going well at his job  Current smoker 1 ppd. Began smoking at 17 y/o  Insulin pump. On last vial                Past Medical History:   Diagnosis Date    ADHD (attention deficit hyperactivity disorder)     Asperger syndrome     Asthma     Asthma 3/31/2015    Bipolar 1 disorder, mixed, severe ([address] 2/5/2018    Chemical dependency ([address]     marijuana    Diabetes mellitus ([address]     Diabetes mellitus type 1 ([address]     Hepatitis C     Hepatitis C     Hyperkalemia, diminished renal excretion 11/9/2017    Mental disorder     ODD (oppositional defiant disorder)     Seasonal allergies 3/31/2015    Seizures ([address]       Past Surgical History:   Procedure Laterality Date    ABDOMEN SURGERY Left 10/7/2019    INCISION  & DRAINAGE OF ABSCESS LEFT ABDOMINAL WALL performed by Trey Linda MD at [address] [place] [address] Drive N/A 10/21/2019    INCISION AND DRAINAGE OF RECURRENT ABDOMINAL WALL ABSCESS ROOM 475 performed by Trey Linda MD at [facility]     Family History   Problem Relation Age of Onset    Cancer Maternal Aunt         breast    Diabetes Maternal Uncle     Diabetes Paternal Uncle     Cancer Maternal Grandmother     Diabetes Maternal Grandmother     Cancer Maternal Grandfather     Diabetes Maternal Grandfather            Review of Systems   Constitutional: Positive for appetite change and fatigue. Negative for activity change, chills and diaphoresis. Fever: unsure. not taking temp. HENT: Positive for congestion.  Negative for ear pain, rhinorrhea, sinus pressure and sore throat (\"scratchy\"). Respiratory: Positive for cough and chest tightness. Negative for shortness of breath and wheezing. Cardiovascular: Negative for chest pain and palpitations. Gastrointestinal: Positive for nausea. Negative for abdominal pain, diarrhea and vomiting. Musculoskeletal: Negative for myalgias, neck pain and neck stiffness. Skin: Negative for rash. Neurological: Positive for headaches. Negative for dizziness and light-headedness. Psychiatric/Behavioral: Negative for sleep disturbance. PMH, Surgical Hx, Family Hx, and Social Hx reviewed and updated. Objective  Vitals:    10/22/21 1017   BP: 106/72   Pulse: 84   Temp: 97.5 °F (36.4 °C)   TempSrc: Tympanic   SpO2: 98%   Weight: 165 lb (74.8 kg)   Height: 5' 6\" (1.676 m)     BP Readings from Last 3 Encounters:   10/22/21 106/72   09/27/21 118/84   09/02/21 100/64     Wt Readings from Last 3 Encounters:   10/22/21 165 lb (74.8 kg)   09/27/21 165 lb 6.4 oz (75 kg)   09/02/21 166 lb (75.3 kg)           Physical Exam  Vitals reviewed. Constitutional:       General: He is not in acute distress. Appearance: Normal appearance. He is not toxic-appearing. HENT:      Right Ear: Tympanic membrane, ear canal and external ear normal.      Left Ear: Tympanic membrane, ear canal and external ear normal.      Nose: Nose normal.      Right Sinus: No maxillary sinus tenderness or frontal sinus tenderness. Left Sinus: No maxillary sinus tenderness or frontal sinus tenderness. Mouth/Throat:      Lips: Pink. Mouth: Mucous membranes are moist.      Pharynx: Oropharynx is clear. Uvula midline. No oropharyngeal exudate, posterior oropharyngeal erythema or uvula swelling. Tonsils: No tonsillar exudate. 0 on the right. 0 on the left. Eyes:      General: Lids are normal.      Conjunctiva/sclera: Conjunctivae normal.   Cardiovascular:      Rate and Rhythm: Normal rate.    Pulmonary:      Effort: Pulmonary effort is normal.      Breath sounds: Normal breath sounds and air entry. Musculoskeletal:         General: Normal range of motion. Cervical back: Normal range of motion. No rigidity. No pain with movement. Lymphadenopathy:      Head:      Right side of head: No submental, submandibular, tonsillar, preauricular or posterior auricular adenopathy. Left side of head: No submental, submandibular, tonsillar, preauricular or posterior auricular adenopathy. Cervical: No cervical adenopathy. Skin:     General: Skin is warm and dry. Capillary Refill: Capillary refill takes less than 2 seconds. Coloration: Skin is not pale. Neurological:      General: No focal deficit present. Mental Status: He is alert and oriented to person, place, and time. Assessment & Plan    Diagnosis Orders   1. Bronchitis  amoxicillin-clavulanate (AUGMENTIN) 875-125 MG per tablet   2. Nausea  POCT Influenza A/B    POCT COVID-19, Antigen    ondansetron (ZOFRAN-ODT) 4 MG disintegrating tablet   3. Tobacco abuse     4. Cough  POCT Influenza A/B    POCT COVID-19, Antigen     Orders Placed This Encounter   Procedures    POCT Influenza A/B    POCT COVID-19, Antigen     Order Specific Question:   Is this test for diagnosis or screening? Answer:   Diagnosis of ill patient     Order Specific Question:   Symptomatic for COVID-19 as defined by CDC? Answer:   Yes     Order Specific Question:   Date of Symptom Onset     Answer:   10/8/2021     Order Specific Question:   Hospitalized for COVID-19? Answer:   No     Order Specific Question:   Admitted to ICU for COVID-19? Answer:   No     Order Specific Question:   Employed in healthcare setting? Answer:   No     Order Specific Question:   Resident in a congregate (group) care setting? Answer:   No     Order Specific Question:   Pregnant: Answer:   No     Order Specific Question:   Previously tested for COVID-19?      Answer:   Yes     Orders Placed This Encounter   Medications    amoxicillin-clavulanate (AUGMENTIN) 875-125 MG per tablet     Sig: Take 1 tablet by mouth 2 times daily for 7 days     Dispense:  14 tablet     Refill:  0    ondansetron (ZOFRAN-ODT) 4 MG disintegrating tablet     Sig: Take 1 tablet by mouth every 12 hours as needed for Nausea or Vomiting     Dispense:  10 tablet     Refill:  0     Return if symptoms worsen or fail to improve, for follow up with PCP.    >50% of 20 minutes was spent spent on counseling, answering questions, instructions on meds, examining, coordinating the care based on my plan and assessment as noted. Reviewed with the patient: current clinical status & medications. He is aware both rapid COVID-19 antigen & Influenza testing are negative today at the office. Side effects, adverse effects of the medication prescribed today, as well as treatment plan/rationale and result expectations have been discussed with the patient who expressed understanding. How can you care for yourself at home? · Take all medicines exactly as prescribed. Call your doctor if you think you are having a problem with your medicine. · Get some extra rest.  · Take an over-the-counter cough medicine that contains dextromethorphan to help quiet a dry, hacking cough so that you can sleep. Avoid cough medicines that have more than one active ingredient. Read and follow all instructions on the label. · Breathe moist air from a humidifier, hot shower, or sink filled with hot water. The heat and moisture will thin mucus so you can cough it out. · Do not smoke. Smoking can make bronchitis worse. If you need help quitting, talk to your doctor about stop-smoking programs and medicines. These can increase your chances of quitting for good. When should you call for help? Call 911 anytime you think you may need emergency care. For example, call if:    · You have severe trouble breathing.            Close follow up to evaluate treatment results and for coordination of care. I have reviewed the patient's medical history in detail and updated the computerized patient record.         YAMIL Simmons NP

## 2021-10-22 NOTE — LETTER
[address]  Phone: [phone]  Fax: [phone]    YAMIL Aguilar NP        October 22, 2021     Patient: Clari Franco   YOB: 1995   Date of Visit: 10/22/2021         To Whom it May Concern:      Susan Vega was seen in my clinic on 10/22/2021. He may return to work on 10/23/21. Please excuse him from work on 10/22/21. If you have any questions or concerns, please don't hesitate to call.     Sincerely,         YAMIL Aguilar NP

## 2021-12-17 ENCOUNTER — OFFICE VISIT (OUTPATIENT)
Dept: FAMILY MEDICINE CLINIC | Age: 26
End: 2021-12-17
Payer: MEDICARE

## 2021-12-17 VITALS
HEIGHT: 66 IN | OXYGEN SATURATION: 98 % | SYSTOLIC BLOOD PRESSURE: 130 MMHG | BODY MASS INDEX: 26.52 KG/M2 | TEMPERATURE: 97.7 F | HEART RATE: 111 BPM | DIASTOLIC BLOOD PRESSURE: 80 MMHG | WEIGHT: 165 LBS

## 2021-12-17 DIAGNOSIS — R11.0 NAUSEA: ICD-10-CM

## 2021-12-17 DIAGNOSIS — R11.2 NAUSEA AND VOMITING, INTRACTABILITY OF VOMITING NOT SPECIFIED, UNSPECIFIED VOMITING TYPE: Primary | ICD-10-CM

## 2021-12-17 DIAGNOSIS — K21.9 GASTROESOPHAGEAL REFLUX DISEASE, UNSPECIFIED WHETHER ESOPHAGITIS PRESENT: ICD-10-CM

## 2021-12-17 LAB
INFLUENZA A ANTIBODY: NORMAL
INFLUENZA B ANTIBODY: NORMAL
Lab: NORMAL
PERFORMING INSTRUMENT: NORMAL
QC PASS/FAIL: NORMAL
SARS-COV-2, POC: NORMAL

## 2021-12-17 PROCEDURE — G8419 CALC BMI OUT NRM PARAM NOF/U: HCPCS | Performed by: NURSE PRACTITIONER

## 2021-12-17 PROCEDURE — 87426 SARSCOV CORONAVIRUS AG IA: CPT | Performed by: NURSE PRACTITIONER

## 2021-12-17 PROCEDURE — G8484 FLU IMMUNIZE NO ADMIN: HCPCS | Performed by: NURSE PRACTITIONER

## 2021-12-17 PROCEDURE — 4004F PT TOBACCO SCREEN RCVD TLK: CPT | Performed by: NURSE PRACTITIONER

## 2021-12-17 PROCEDURE — G8427 DOCREV CUR MEDS BY ELIG CLIN: HCPCS | Performed by: NURSE PRACTITIONER

## 2021-12-17 PROCEDURE — 87804 INFLUENZA ASSAY W/OPTIC: CPT | Performed by: NURSE PRACTITIONER

## 2021-12-17 PROCEDURE — 99213 OFFICE O/P EST LOW 20 MIN: CPT | Performed by: NURSE PRACTITIONER

## 2021-12-17 RX ORDER — OMEPRAZOLE 40 MG/1
40 CAPSULE, DELAYED RELEASE ORAL
Qty: 14 CAPSULE | Refills: 1 | Status: SHIPPED | OUTPATIENT
Start: 2021-12-17 | End: 2022-06-20

## 2021-12-17 RX ORDER — ONDANSETRON 4 MG/1
4 TABLET, FILM COATED ORAL 3 TIMES DAILY PRN
Qty: 15 TABLET | Refills: 0 | Status: SHIPPED | OUTPATIENT
Start: 2021-12-17 | End: 2022-05-05 | Stop reason: ALTCHOICE

## 2021-12-17 ASSESSMENT — ENCOUNTER SYMPTOMS
SHORTNESS OF BREATH: 0
SORE THROAT: 0
NAUSEA: 0
RHINORRHEA: 0
DIARRHEA: 0
COUGH: 0
ABDOMINAL PAIN: 0
CHEST TIGHTNESS: 0

## 2021-12-17 NOTE — LETTER
[address]  Phone: [phone]  Fax: [phone]    YAMIL Junior NP        December 17, 2021     Patient: Krishna Wells   YOB: 1995   Date of Visit: 12/17/2021       To Whom it May Concern:        Chandler Ivan was seen in my clinic on 12/17/2021. He should remain home from work until COVID-19 test result is available. Testing usually results around  3-4 days from test date. Testing completed 12/17/2021. If you have any questions or concerns, please don't hesitate to call.           Sincerely,             YAMIL Junior NP
Provider Name, If Known (E.G., Dr. CASTELLANOS): Dr. Ferreira
Detail Level: Detailed

## 2021-12-17 NOTE — PROGRESS NOTES
Subjective  Kaia Granados, 27 y.o. male presents today with:  Chief Complaint   Patient presents with    Headache     pt states headache and vomiting happened last night after work.  Emesis    Nausea       HPI  Presents to [facility] for N/V, headache and diarrhea   Did not receive COVID-19 or flu vaccines  Started to feel unwell last night   Fatigued  Dizzy and lightheaded   Not monitoring temp at home   No other sick contacts currently in home   Current smoker 1-1.5ppd    Reports also having increased heartburn lately   Worse at night   States \"throat on fire\" sometimes                   Past Medical History:   Diagnosis Date    ADHD (attention deficit hyperactivity disorder)     Asperger syndrome     Asthma     Asthma 3/31/2015    Bipolar 1 disorder, mixed, severe ([address] 2/5/2018    Chemical dependency ([facility]     marijuana    Diabetes mellitus ([address]     Diabetes mellitus type 1 ([address]     Hepatitis C     Hepatitis C     Hyperkalemia, diminished renal excretion 11/9/2017    Mental disorder     ODD (oppositional defiant disorder)     Seasonal allergies 3/31/2015    Seizures ([address]       Past Surgical History:   Procedure Laterality Date    ABDOMEN SURGERY Left 10/7/2019    INCISION  & DRAINAGE OF ABSCESS LEFT ABDOMINAL WALL performed by Spenser Israel MD at [address] [place] [address] Drive N/A 10/21/2019    INCISION AND DRAINAGE OF RECURRENT ABDOMINAL WALL ABSCESS ROOM 475 performed by Spenser Israel MD at [facility]     Family History   Problem Relation Age of Onset    Cancer Maternal Aunt         breast    Diabetes Maternal Uncle     Diabetes Paternal Uncle     Cancer Maternal Grandmother     Diabetes Maternal Grandmother     Cancer Maternal Grandfather     Diabetes Maternal Grandfather            Review of Systems   Constitutional: Positive for activity change, appetite change and fatigue. Negative for chills and diaphoresis. Fever: unsure. not taking temp.    HENT: Negative for congestion, ear pain, rhinorrhea and sore throat. Respiratory: Negative for cough, chest tightness and shortness of breath. Cardiovascular: Negative for chest pain and palpitations. Gastrointestinal: Negative for abdominal pain, diarrhea and nausea. Musculoskeletal: Negative for myalgias. Skin: Negative for rash. Neurological: Positive for dizziness, light-headedness and headaches. Psychiatric/Behavioral: Negative for sleep disturbance. PMH, Surgical Hx, Family Hx, and Social Hx reviewed and updated. Objective  Vitals:    12/17/21 1253   BP: 130/80   Pulse: 111   Temp: 97.7 °F (36.5 °C)   SpO2: 98%   Weight: 165 lb (74.8 kg)   Height: 5' 6\" (1.676 m)     BP Readings from Last 3 Encounters:   12/17/21 130/80   10/22/21 106/72   09/27/21 118/84     Wt Readings from Last 3 Encounters:   12/17/21 165 lb (74.8 kg)   10/22/21 165 lb (74.8 kg)   09/27/21 165 lb 6.4 oz (75 kg)         Physical Exam  Vitals reviewed. Constitutional:       General: He is not in acute distress. Appearance: He is well-developed. He is ill-appearing. He is not toxic-appearing or diaphoretic. HENT:      Right Ear: Tympanic membrane, ear canal and external ear normal.      Left Ear: Tympanic membrane, ear canal and external ear normal.      Nose: Nose normal.      Mouth/Throat:      Mouth: Mucous membranes are moist.      Pharynx: Oropharynx is clear. No oropharyngeal exudate or posterior oropharyngeal erythema. Eyes:      Conjunctiva/sclera: Conjunctivae normal.   Cardiovascular:      Rate and Rhythm: Normal rate. Pulmonary:      Effort: Pulmonary effort is normal.      Breath sounds: Normal breath sounds. Abdominal:      Palpations: Abdomen is soft. Musculoskeletal:         General: Normal range of motion. Cervical back: Normal range of motion. Skin:     General: Skin is warm and dry. Capillary Refill: Capillary refill takes less than 2 seconds.    Neurological:      Mental Status: He is alert and oriented to person, place, and time. Psychiatric:         Mood and Affect: Mood is anxious. Assessment & Plan    Diagnosis Orders   1. Nausea and vomiting, intractability of vomiting not specified, unspecified vomiting type  POCT COVID-19, Antigen    POCT Influenza A/B    Covid-19 Ambulatory    ondansetron (ZOFRAN) 4 MG tablet   2. Gastroesophageal reflux disease, unspecified whether esophagitis present  omeprazole (PRILOSEC) 40 MG delayed release capsule     Orders Placed This Encounter   Procedures    Covid-19 Ambulatory     Standing Status:   Future     Number of Occurrences:   1     Standing Expiration Date:   12/17/2022     Scheduling Instructions:      Saline media preferred given current shortage of viral transport media but both acceptable     Order Specific Question:   Is this test for diagnosis or screening? Answer:   Diagnosis of ill patient     Order Specific Question:   Symptomatic for COVID-19 as defined by CDC? Answer:   Yes     Order Specific Question:   Date of Symptom Onset     Answer:   12/16/2021     Order Specific Question:   Hospitalized for COVID-19? Answer:   No     Order Specific Question:   Admitted to ICU for COVID-19? Answer:   No     Order Specific Question:   Employed in healthcare setting? Answer:   No     Order Specific Question:   Resident in a congregate (group) care setting? Answer:   No     Order Specific Question:   Pregnant: Answer:   No     Order Specific Question:   Previously tested for COVID-19? Answer: Yes    POCT COVID-19, Antigen     Order Specific Question:   Is this test for diagnosis or screening? Answer:   Diagnosis of ill patient     Order Specific Question:   Symptomatic for COVID-19 as defined by CDC? Answer:   Yes     Order Specific Question:   Date of Symptom Onset     Answer:   12/15/2021     Order Specific Question:   Hospitalized for COVID-19?      Answer:   No     Order Specific Question:   Admitted to ICU for COVID-19? Answer:   No     Order Specific Question:   Employed in healthcare setting? Answer:   No     Order Specific Question:   Resident in a congregate (group) care setting? Answer:   No     Order Specific Question:   Pregnant: Answer:   No     Order Specific Question:   Previously tested for COVID-19? Answer: Yes    POCT Influenza A/B     Orders Placed This Encounter   Medications    ondansetron (ZOFRAN) 4 MG tablet     Sig: Take 1 tablet by mouth 3 times daily as needed for Nausea or Vomiting     Dispense:  15 tablet     Refill:  0    omeprazole (PRILOSEC) 40 MG delayed release capsule     Sig: Take 1 capsule by mouth every morning (before breakfast) for 14 days     Dispense:  14 capsule     Refill:  1     Return if symptoms worsen or fail to improve, for follow up with PCP. Reviewed with the patient: current clinical status & medications. Side effects, adverse effects of the medications prescribed today, as well as treatment plan/rationale and result expectations have been discussed with the patient who expressed understanding. How can you care for yourself at home? · To prevent dehydration, drink plenty of fluids. Choose water and other clear liquids until you feel better. If you have kidney, heart, or liver disease and have to limit fluids, talk with your doctor before you increase the amount of fluids you drink. · Rest in bed until you feel better. · When you are able to eat, try clear soups, mild foods, and liquids until all symptoms are gone for 12 to 48 hours. Other good choices include dry toast, crackers, cooked cereal, and gelatin dessert, such as Jell-O. Close follow up to evaluate treatment results and for coordination of care. I have reviewed the patient's medical history in detail and updated the computerized patient record.           YAMIL Coley NP

## 2021-12-20 LAB
SARS-COV-2: NOT DETECTED
SOURCE: NORMAL

## 2021-12-21 DIAGNOSIS — E10.69 TYPE 1 DIABETES MELLITUS WITH OTHER SPECIFIED COMPLICATION (HCC): ICD-10-CM

## 2021-12-21 LAB
ANION GAP SERPL CALCULATED.3IONS-SCNC: 11 MEQ/L (ref 9–15)
BUN BLDV-MCNC: 13 MG/DL (ref 6–20)
CALCIUM SERPL-MCNC: 9.7 MG/DL (ref 8.5–9.9)
CHLORIDE BLD-SCNC: 103 MEQ/L (ref 95–107)
CO2: 23 MEQ/L (ref 20–31)
CREAT SERPL-MCNC: 0.8 MG/DL (ref 0.7–1.2)
GFR AFRICAN AMERICAN: >60
GFR NON-AFRICAN AMERICAN: >60
GLUCOSE FASTING: 192 MG/DL (ref 70–99)
HBA1C MFR BLD: 10.9 % (ref 4.8–5.9)
POTASSIUM SERPL-SCNC: 3.9 MEQ/L (ref 3.4–4.9)
SODIUM BLD-SCNC: 137 MEQ/L (ref 135–144)

## 2021-12-21 NOTE — PROGRESS NOTES
Group Therapy Note    Date:8/3/2020  Start Time:1425  End Time:  3312    Number of Participants:4    Type of Group: Cognitive Skills    Patient's Goal:  To participate in mood management group. Notes: Patient declined to attend psychoeducation group at 1425 despite encouragement by staff.      Discipline Responsible: /Counselor    ASHLEY Elizabeth 

## 2021-12-23 ENCOUNTER — OFFICE VISIT (OUTPATIENT)
Dept: ENDOCRINOLOGY | Age: 26
End: 2021-12-23
Payer: MEDICARE

## 2021-12-23 VITALS
HEIGHT: 66 IN | WEIGHT: 163 LBS | BODY MASS INDEX: 26.2 KG/M2 | HEART RATE: 85 BPM | SYSTOLIC BLOOD PRESSURE: 119 MMHG | DIASTOLIC BLOOD PRESSURE: 77 MMHG

## 2021-12-23 DIAGNOSIS — E10.69 TYPE 1 DIABETES MELLITUS WITH OTHER SPECIFIED COMPLICATION (HCC): Primary | ICD-10-CM

## 2021-12-23 DIAGNOSIS — J06.9 UPPER RESPIRATORY TRACT INFECTION, UNSPECIFIED TYPE: ICD-10-CM

## 2021-12-23 LAB
CHP ED QC CHECK: NORMAL
GLUCOSE BLD-MCNC: 284 MG/DL

## 2021-12-23 PROCEDURE — G8484 FLU IMMUNIZE NO ADMIN: HCPCS | Performed by: INTERNAL MEDICINE

## 2021-12-23 PROCEDURE — 2022F DILAT RTA XM EVC RTNOPTHY: CPT | Performed by: INTERNAL MEDICINE

## 2021-12-23 PROCEDURE — 4004F PT TOBACCO SCREEN RCVD TLK: CPT | Performed by: INTERNAL MEDICINE

## 2021-12-23 PROCEDURE — G8427 DOCREV CUR MEDS BY ELIG CLIN: HCPCS | Performed by: INTERNAL MEDICINE

## 2021-12-23 PROCEDURE — 82962 GLUCOSE BLOOD TEST: CPT | Performed by: INTERNAL MEDICINE

## 2021-12-23 PROCEDURE — G8419 CALC BMI OUT NRM PARAM NOF/U: HCPCS | Performed by: INTERNAL MEDICINE

## 2021-12-23 PROCEDURE — 99213 OFFICE O/P EST LOW 20 MIN: CPT | Performed by: INTERNAL MEDICINE

## 2021-12-23 RX ORDER — AZITHROMYCIN 250 MG/1
250 TABLET, FILM COATED ORAL SEE ADMIN INSTRUCTIONS
Qty: 6 TABLET | Refills: 0 | Status: SHIPPED | OUTPATIENT
Start: 2021-12-23 | End: 2021-12-28

## 2021-12-23 RX ORDER — LANCETS 30 GAUGE
EACH MISCELLANEOUS
Qty: 100 EACH | Refills: 3 | Status: SHIPPED | OUTPATIENT
Start: 2021-12-23

## 2021-12-23 RX ORDER — GLUCOSAMINE HCL/CHONDROITIN SU 500-400 MG
CAPSULE ORAL
Qty: 100 STRIP | Refills: 3 | Status: SHIPPED | OUTPATIENT
Start: 2021-12-23

## 2021-12-23 ASSESSMENT — ENCOUNTER SYMPTOMS: COUGH: 1

## 2021-12-23 NOTE — PROGRESS NOTES
12/23/2021    Assessment:       Diagnosis Orders   1.  Type 1 diabetes mellitus with other specified complication (HCC)  POCT Glucose         PLAN:     Orders Placed This Encounter   Procedures    Basic Metabolic Panel     Standing Status:   Future     Standing Expiration Date:   12/23/2022    Hemoglobin A1C     Standing Status:   Future     Standing Expiration Date:   12/23/2022    POCT Glucose     Continue NovoLog via pump as per current setting talk to [employer] to get sensor  Prescribe Z-[place]  Follow-up in 3 months A1c goal of 7 or lower  Compliance stressed  Orders Placed This Encounter   Medications    insulin aspart (NOVOLOG) 100 UNIT/ML injection vial     Sig: Use via insulin pump max daily dose 100 units     Dispense:  30 mL     Refill:  3    Lancets MISC     Sig: Test 3x daily     Dispense:  100 each     Refill:  3    blood glucose monitor strips     Sig: Test 3x daily     Dispense:  100 strip     Refill:  3    blood glucose monitor kit and supplies     Sig: Give 1 meter covered by insurance     Dispense:  1 kit     Refill:  0    azithromycin (ZITHROMAX) 250 MG tablet     Sig: Take 1 tablet by mouth See Admin Instructions for 5 days 500mg on day 1 followed by 250mg on days 2 - 5     Dispense:  6 tablet     Refill:  0         Orders Placed This Encounter   Procedures    POCT Glucose       Subjective:     Chief Complaint   Patient presents with    Diabetes     Vitals:    12/23/21 1419   BP: 119/77   Site: Right Upper Arm   Position: Sitting   Cuff Size: Medium Adult   Pulse: 85   Weight: 163 lb (73.9 kg)   Height: 5' 6\" (1.676 m)     Wt Readings from Last 3 Encounters:   12/23/21 163 lb (73.9 kg)   12/17/21 165 lb (74.8 kg)   10/22/21 165 lb (74.8 kg)     BP Readings from Last 3 Encounters:   12/23/21 119/77   12/17/21 130/80   10/22/21 106/72     Follow-up on type 1 diabetes patient also complains of cough congestion is also a smoker blood sugars have been high today's does not have a meter and risk   Vaping Use    Vaping Use: Never used   Substance and Sexual Activity    Alcohol use: Not Currently     Alcohol/week: 0.0 standard drinks     Comment: Had been drinking whiskey and vodka on a regular basis about 6 months ago. Nothing since.  Drug use: Yes     Types: Methamphetamines (Crystal Meth), Marijuana ([place]), IV, Cocaine, Opiates      Comment: occassionally, last use 2 weeks ago    Sexual activity: Yes     Partners: Female     Comment: No protection or birth-control plan   Other Topics Concern    Not on file   Social History Narrative    ** Merged History Encounter **          Social Determinants of Health     Financial Resource Strain: Low Risk     Difficulty of Paying Living Expenses: Not very hard   Food Insecurity: No Food Insecurity    Worried About Running Out of Food in the Last Year: Never true    Tam of Food in the Last Year: Never true   Transportation Needs:     Lack of Transportation (Medical): Not on file    Lack of Transportation (Non-Medical):  Not on file   Physical Activity:     Days of Exercise per Week: Not on file    Minutes of Exercise per Session: Not on file   Stress:     Feeling of Stress : Not on file   Social Connections:     Frequency of Communication with Friends and Family: Not on file    Frequency of Social Gatherings with Friends and Family: Not on file    Attends Jainism Services: Not on file    Active Member of [address] [place] [address] or Organizations: Not on file    Attends Club or Organization Meetings: Not on file    Marital Status: Not on file   Intimate Partner Violence:     Fear of Current or Ex-Partner: Not on file    Emotionally Abused: Not on file    Physically Abused: Not on file    Sexually Abused: Not on file   Housing Stability:     Unable to Pay for Housing in the Last Year: Not on file    Number of [place] in the Last Year: Not on file    Unstable Housing in the Last Year: Not on file     Family History   Problem Relation Age of Onset  Cancer Maternal Aunt         breast    Diabetes Maternal Uncle     Diabetes Paternal Uncle     Cancer Maternal Grandmother     Diabetes Maternal Grandmother     Cancer Maternal Grandfather     Diabetes Maternal Grandfather      Allergies   Allergen Reactions    Bactrim [Sulfamethoxazole-Trimethoprim] Rash    Dust Mite Extract      nasal & sinus congestion, itchy watery eyes, sneezing    Mirtazapine      blisters    Other      dander causes him to sneeze, have nasal/sinus congestion, itchy, watery eyes.  Oxcarbazepine Rash    Shrimp Flavor Nausea And Vomiting       Current Outpatient Medications:     ondansetron (ZOFRAN) 4 MG tablet, Take 1 tablet by mouth 3 times daily as needed for Nausea or Vomiting, Disp: 15 tablet, Rfl: 0    omeprazole (PRILOSEC) 40 MG delayed release capsule, Take 1 capsule by mouth every morning (before breakfast) for 14 days, Disp: 14 capsule, Rfl: 1    insulin aspart (NOVOLOG) 100 UNIT/ML injection vial, Use via insulin pump max daily dose 100 units, Disp: 30 mL, Rfl: 3    ondansetron (ZOFRAN-ODT) 4 MG disintegrating tablet, Take 1 tablet by mouth every 12 hours as needed for Nausea or Vomiting, Disp: 10 tablet, Rfl: 0    albuterol sulfate  (90 Base) MCG/ACT inhaler, Use 2 puffs 4 times daily for 7 days then as needed for wheezing. Dispense with Spacer and instruct in use. At patient's preference may use 60 dose MDI.  May Sub Pro-Air or Proventil as needed per insurance., Disp: 1 each, Rfl: 5    busPIRone (BUSPAR) 15 MG tablet, Take 15 mg by mouth 3 times daily, Disp: 45 tablet, Rfl: 5    lisinopril (PRINIVIL;ZESTRIL) 2.5 MG tablet, Take 1 tablet by mouth daily, Disp: 30 tablet, Rfl: 5    lithium (LITHOBID) 300 MG extended release tablet, Take 1 tablet by mouth 2 times daily, Disp: 60 tablet, Rfl: 5    naltrexone (DEPADE) 50 MG tablet, Take 1 tablet by mouth daily, Disp: 30 tablet, Rfl: 5    QUEtiapine (SEROQUEL XR) 50 MG extended release tablet, Take 1 tablet by mouth nightly, Disp: 30 tablet, Rfl: 5    QUEtiapine (SEROQUEL XR) 300 MG extended release tablet, Take 1 tablet by mouth nightly, Disp: 30 tablet, Rfl: 5    Blood Glucose Monitoring Suppl (CONTOUR NEXT ONE) KIT, 1 Device by Does not apply route daily, Disp: 1 kit, Rfl: 0    Insulin Pen Needle (PEN NEEDLES) 32G X 6 MM MISC, 1 Device by Does not apply route 4 times daily, Disp: 100 each, Rfl: 5    BD INSULIN SYRINGE U/F 31G X 5/16\" 0.5 ML MISC, use 4 daily, Disp: 100 each, Rfl: 3    CONTOUR NEXT TEST strip, Test 6x daily Dx E10.65, Disp: 200 each, Rfl: 3  Lab Results   Component Value Date     12/21/2021    K 3.9 12/21/2021     12/21/2021    CO2 23 12/21/2021    BUN 13 12/21/2021    CREATININE 0.80 12/21/2021    GLUCOSE 284 12/23/2021    CALCIUM 9.7 12/21/2021    PROT 7.4 08/05/2021    LABALBU 4.6 08/05/2021    BILITOT <0.2 08/05/2021    ALKPHOS 145 (H) 08/05/2021    AST 14 08/05/2021    ALT 16 08/05/2021    LABGLOM >60.0 12/21/2021    GFRAA >60.0 12/21/2021    GLOB 2.8 08/05/2021     Lab Results   Component Value Date    WBC 6.7 09/09/2020    HGB 15.1 09/09/2020    HCT 43.2 09/09/2020    MCV 84.5 09/09/2020     09/09/2020     Lab Results   Component Value Date    LABA1C 10.9 (H) 12/21/2021    LABA1C 9.6 (H) 08/05/2021    LABA1C 11.8 10/01/2020     Lab Results   Component Value Date    HDL 55 08/05/2021    HDL 38 (L) 02/11/2015    HDL 42 09/21/2012    LDLCALC 60 08/05/2021    CHOL 140 08/05/2021    CHOL 130 02/11/2015    CHOL 171 09/21/2012    TRIG 123 08/05/2021    TRIG 139 02/11/2015    TRIG 238 (H) 09/21/2012     No results found for: TESTM  Lab Results   Component Value Date    TSH 0.400 (L) 09/09/2020    TSH 1.240 07/28/2020    TSH 0.947 11/13/2019     No results found for: TPOABS    Review of Systems   HENT: Positive for congestion. Respiratory: Positive for cough. Endocrine: Negative. Psychiatric/Behavioral: Positive for decreased concentration.    All other systems reviewed and are negative. Objective:   Physical Exam  Vitals reviewed. Constitutional:       Appearance: Normal appearance. HENT:      Head: Normocephalic and atraumatic. Hair is normal.      Right Ear: External ear normal.      Left Ear: External ear normal.      Nose: Nose normal.   Eyes:      General: No scleral icterus. Right eye: No discharge. Left eye: No discharge. Extraocular Movements: Extraocular movements intact. Conjunctiva/sclera: Conjunctivae normal.   Neck:      Trachea: Trachea normal.   Cardiovascular:      Rate and Rhythm: Normal rate. Pulmonary:      Effort: Pulmonary effort is normal.   Musculoskeletal:         General: Normal range of motion. Cervical back: Normal range of motion and neck supple. Neurological:      General: No focal deficit present. Mental Status: He is alert and oriented to person, place, and time.    Psychiatric:         Mood and Affect: Mood normal.         Behavior: Behavior normal.

## 2022-01-13 ENCOUNTER — OFFICE VISIT (OUTPATIENT)
Dept: FAMILY MEDICINE CLINIC | Age: 27
End: 2022-01-13
Payer: MEDICARE

## 2022-01-13 VITALS
DIASTOLIC BLOOD PRESSURE: 86 MMHG | TEMPERATURE: 98.4 F | WEIGHT: 165 LBS | SYSTOLIC BLOOD PRESSURE: 120 MMHG | OXYGEN SATURATION: 98 % | HEIGHT: 66 IN | HEART RATE: 84 BPM | BODY MASS INDEX: 26.52 KG/M2

## 2022-01-13 DIAGNOSIS — J02.8 SORE THROAT DUE TO VIRUS: ICD-10-CM

## 2022-01-13 DIAGNOSIS — R11.0 NAUSEA: ICD-10-CM

## 2022-01-13 DIAGNOSIS — B34.9 VIRAL ILLNESS: Primary | ICD-10-CM

## 2022-01-13 DIAGNOSIS — B97.89 SORE THROAT DUE TO VIRUS: ICD-10-CM

## 2022-01-13 LAB
INFLUENZA A ANTIBODY: NORMAL
INFLUENZA B ANTIBODY: NORMAL
Lab: NORMAL
PERFORMING INSTRUMENT: NORMAL
QC PASS/FAIL: NORMAL
S PYO AG THROAT QL: NORMAL
SARS-COV-2, POC: NORMAL

## 2022-01-13 PROCEDURE — 87804 INFLUENZA ASSAY W/OPTIC: CPT | Performed by: NURSE PRACTITIONER

## 2022-01-13 PROCEDURE — G8419 CALC BMI OUT NRM PARAM NOF/U: HCPCS | Performed by: NURSE PRACTITIONER

## 2022-01-13 PROCEDURE — 99213 OFFICE O/P EST LOW 20 MIN: CPT | Performed by: NURSE PRACTITIONER

## 2022-01-13 PROCEDURE — G8484 FLU IMMUNIZE NO ADMIN: HCPCS | Performed by: NURSE PRACTITIONER

## 2022-01-13 PROCEDURE — 87426 SARSCOV CORONAVIRUS AG IA: CPT | Performed by: NURSE PRACTITIONER

## 2022-01-13 PROCEDURE — 4004F PT TOBACCO SCREEN RCVD TLK: CPT | Performed by: NURSE PRACTITIONER

## 2022-01-13 PROCEDURE — G8427 DOCREV CUR MEDS BY ELIG CLIN: HCPCS | Performed by: NURSE PRACTITIONER

## 2022-01-13 PROCEDURE — 87880 STREP A ASSAY W/OPTIC: CPT | Performed by: NURSE PRACTITIONER

## 2022-01-13 RX ORDER — FEXOFENADINE HCL 180 MG/1
180 TABLET ORAL DAILY
Qty: 10 TABLET | Refills: 0 | COMMUNITY
Start: 2022-01-13 | End: 2022-01-23

## 2022-01-13 ASSESSMENT — PATIENT HEALTH QUESTIONNAIRE - PHQ9
1. LITTLE INTEREST OR PLEASURE IN DOING THINGS: 0
5. POOR APPETITE OR OVEREATING: 0
SUM OF ALL RESPONSES TO PHQ QUESTIONS 1-9: 0
3. TROUBLE FALLING OR STAYING ASLEEP: 0
6. FEELING BAD ABOUT YOURSELF - OR THAT YOU ARE A FAILURE OR HAVE LET YOURSELF OR YOUR FAMILY DOWN: 0
7. TROUBLE CONCENTRATING ON THINGS, SUCH AS READING THE NEWSPAPER OR WATCHING TELEVISION: 0
SUM OF ALL RESPONSES TO PHQ9 QUESTIONS 1 & 2: 0
10. IF YOU CHECKED OFF ANY PROBLEMS, HOW DIFFICULT HAVE THESE PROBLEMS MADE IT FOR YOU TO DO YOUR WORK, TAKE CARE OF THINGS AT HOME, OR GET ALONG WITH OTHER PEOPLE: 0
9. THOUGHTS THAT YOU WOULD BE BETTER OFF DEAD, OR OF HURTING YOURSELF: 0
SUM OF ALL RESPONSES TO PHQ QUESTIONS 1-9: 0
8. MOVING OR SPEAKING SO SLOWLY THAT OTHER PEOPLE COULD HAVE NOTICED. OR THE OPPOSITE, BEING SO FIGETY OR RESTLESS THAT YOU HAVE BEEN MOVING AROUND A LOT MORE THAN USUAL: 0
2. FEELING DOWN, DEPRESSED OR HOPELESS: 0
4. FEELING TIRED OR HAVING LITTLE ENERGY: 0

## 2022-01-13 ASSESSMENT — ENCOUNTER SYMPTOMS
RHINORRHEA: 1
SORE THROAT: 1
COUGH: 1
DIARRHEA: 0
NAUSEA: 1
SHORTNESS OF BREATH: 0
CHEST TIGHTNESS: 0
ABDOMINAL PAIN: 0
VOMITING: 1

## 2022-01-13 NOTE — LETTER
[address]  Phone: [phone]  Fax: [phone]    YAMIL Delgado NP        January 13, 2022     Patient: Blanca Washington   YOB: 1995   Date of Visit: 1/13/2022       To Whom It May Concern: It is my medical opinion that Monique Luna may return to work on 1/14/2022. Please excuse him from work 1/13/2022. If you have any questions or concerns, please don't hesitate to call.             Sincerely,              YAMIL Delgado NP

## 2022-01-13 NOTE — PROGRESS NOTES
Subjective  Robert Soulier, 27 y.o. male presents today with:  Chief Complaint   Patient presents with    Other     nausea, vomiting, pharyngitis, headache. sx started 3-4 days       HPI  P[facility] to [facility] illness   Started to feel unwell 2-3 days ago   Sore throat, h/a, N/V, cough and nasal congestion   Cough is mild. Non-productive   Denies chest tightness or SOB  Denies chest pain   Hasn't taken Zofran yet with current symptoms   Has been able to stay hydrated   Denies diarrhea   Not monitoring temp at home   Sleep uninterrupted   Not monitoring temp at home               Past Medical History:   Diagnosis Date    ADHD (attention deficit hyperactivity disorder)     Asperger syndrome     Asthma     Asthma 3/31/2015    Bipolar 1 disorder, mixed, severe ([facility] 2/5/2018    Chemical dependency ([facility]     marijuana    Diabetes mellitus ([facility]     Diabetes mellitus type 1 ([facility] [address] [facility]     Hepatitis C     Hepatitis C     Hyperkalemia, diminished renal excretion 11/9/2017    Mental disorder     ODD (oppositional defiant disorder)     Seasonal allergies 3/31/2015    Seizures ([facility] [address]       Past Surgical History:   Procedure Laterality Date    ABDOMEN SURGERY Left 10/7/2019    INCISION  & DRAINAGE OF ABSCESS LEFT ABDOMINAL WALL performed by Cortez Barnes MD at [address] N/A 10/21/2019    INCISION AND DRAINAGE OF RECURRENT ABDOMINAL WALL ABSCESS ROOM 475 performed by Cortez Barnes MD at [address] History   Problem Relation Age of Onset    Cancer Maternal Aunt         breast    Diabetes Maternal Uncle     Diabetes Paternal Uncle     Cancer Maternal Grandmother     Diabetes Maternal Grandmother     Cancer Maternal Grandfather     Diabetes Maternal Grandfather            Review of Systems   Constitutional: Positive for appetite change and fatigue. Negative for activity change, chills and diaphoresis. Fever: unsure.  not taking temp    HENT: Positive for congestion, rhinorrhea and sore throat. Negative for ear pain. Respiratory: Positive for cough. Negative for chest tightness and shortness of breath. Cardiovascular: Negative for chest pain and palpitations. Gastrointestinal: Positive for nausea and vomiting. Negative for abdominal pain and diarrhea. Musculoskeletal: Negative for myalgias. Skin: Negative for rash. Neurological: Positive for headaches. Negative for dizziness and light-headedness. Psychiatric/Behavioral: Negative for sleep disturbance. PMH, Surgical Hx, Family Hx, and Social Hx reviewed and updated. Objective  Vitals:    01/13/22 1134   BP: 120/86   Site: Right Upper Arm   Position: Sitting   Cuff Size: Medium Adult   Pulse: 84   Temp: 98.4 °F (36.9 °C)   TempSrc: Temporal   SpO2: 98%   Weight: 165 lb (74.8 kg)   Height: 5' 6\" (1.676 m)     BP Readings from Last 3 Encounters:   01/13/22 120/86   12/23/21 119/77   12/17/21 130/80     Wt Readings from Last 3 Encounters:   01/13/22 165 lb (74.8 kg)   12/23/21 163 lb (73.9 kg)   12/17/21 165 lb (74.8 kg)         Physical Exam  Vitals reviewed. Constitutional:       General: He is not in acute distress. Appearance: Normal appearance. He is not toxic-appearing. HENT:      Right Ear: Hearing, tympanic membrane, ear canal and external ear normal.      Left Ear: Hearing, tympanic membrane, ear canal and external ear normal.      Nose: Nose normal.      Mouth/Throat:      Lips: Pink. Mouth: Mucous membranes are moist.      Pharynx: Uvula midline. Posterior oropharyngeal erythema present. No pharyngeal swelling, oropharyngeal exudate or uvula swelling. Tonsils: No tonsillar exudate. 1+ on the right. 1+ on the left. Eyes:      Conjunctiva/sclera: Conjunctivae normal.   Cardiovascular:      Rate and Rhythm: Normal rate and regular rhythm. Heart sounds: Normal heart sounds. Pulmonary:      Effort: Pulmonary effort is normal.      Breath sounds: Normal breath sounds. Musculoskeletal:         General: Normal range of motion. Cervical back: Normal range of motion and neck supple. No rigidity. Lymphadenopathy:      Cervical: No cervical adenopathy. Skin:     General: Skin is warm and dry. Capillary Refill: Capillary refill takes less than 2 seconds. Coloration: Skin is not pale. Neurological:      General: No focal deficit present. Mental Status: He is alert and oriented to person, place, and time. Assessment & Plan    Diagnosis Orders   1. Viral illness     2. Nausea  POCT COVID-19, Antigen    POCT Influenza A/B   3. Sore throat due to virus  POCT rapid strep A    fexofenadine (ALLEGRA) 180 MG tablet     Orders Placed This Encounter   Procedures    POCT COVID-19, Antigen     Order Specific Question:   Is this test for diagnosis or screening? Answer:   Diagnosis of ill patient     Order Specific Question:   Symptomatic for COVID-19 as defined by CDC? Answer:   Yes     Order Specific Question:   Date of Symptom Onset     Answer:   1/11/2022     Order Specific Question:   Hospitalized for COVID-19? Answer:   No     Order Specific Question:   Admitted to ICU for COVID-19? Answer:   No     Order Specific Question:   Employed in healthcare setting? Answer:   No     Order Specific Question:   Resident in a congregate (group) care setting? Answer:   No     Order Specific Question:   Pregnant: Answer:   No     Order Specific Question:   Previously tested for COVID-19? Answer: Yes    POCT Influenza A/B    POCT rapid strep A     Orders Placed This Encounter   Medications    fexofenadine (ALLEGRA) 180 MG tablet     Sig: Take 1 tablet by mouth daily for 10 days     Dispense:  10 tablet     Refill:  0     Return if symptoms worsen or fail to improve, for follow up with PCP. Reviewed with the patient: current clinical status & medications .  Side effects, adverse effects of the medication prescribed today, as well as treatment plan/rationale and result expectations have been discussed with the patient who expressed understanding. Aware rapid testing resulted negative today at the office. How can you care for yourself at home? · To prevent dehydration, drink plenty of fluids. Choose water and other clear liquids until you feel better. If you have kidney, heart, or liver disease and have to limit fluids, talk with your doctor before you increase the amount of fluids you drink. · Rest in bed until you feel better. · When you are able to eat, try clear soups, mild foods, and liquids until all symptoms are gone for 12 to 48 hours. Other good choices include dry toast, crackers, cooked cereal, and gelatin dessert, such as Jell-O. Close follow up to evaluate treatment results and for coordination of care. I have reviewed the patient's medical history in detail and updated the computerized patient record.         YAMIL Traylor NP

## 2022-01-31 ENCOUNTER — TELEPHONE (OUTPATIENT)
Dept: FAMILY MEDICINE CLINIC | Age: 27
End: 2022-01-31

## 2022-02-15 ENCOUNTER — TELEPHONE (OUTPATIENT)
Dept: FAMILY MEDICINE CLINIC | Age: 27
End: 2022-02-15

## 2022-02-24 ENCOUNTER — OFFICE VISIT (OUTPATIENT)
Dept: FAMILY MEDICINE CLINIC | Age: 27
End: 2022-02-24
Payer: MEDICARE

## 2022-02-24 VITALS
HEIGHT: 66 IN | SYSTOLIC BLOOD PRESSURE: 120 MMHG | HEART RATE: 108 BPM | WEIGHT: 160 LBS | OXYGEN SATURATION: 98 % | BODY MASS INDEX: 25.71 KG/M2 | DIASTOLIC BLOOD PRESSURE: 88 MMHG

## 2022-02-24 DIAGNOSIS — F17.200 SMOKER: ICD-10-CM

## 2022-02-24 DIAGNOSIS — F91.3 OPPOSITIONAL DEFIANT DISORDER: Primary | ICD-10-CM

## 2022-02-24 DIAGNOSIS — F84.5 ASPERGER SYNDROME: ICD-10-CM

## 2022-02-24 DIAGNOSIS — T40.1X4A: ICD-10-CM

## 2022-02-24 DIAGNOSIS — E10.69 TYPE 1 DIABETES MELLITUS WITH OTHER SPECIFIED COMPLICATION (HCC): ICD-10-CM

## 2022-02-24 DIAGNOSIS — F19.10 POLYSUBSTANCE ABUSE (HCC): ICD-10-CM

## 2022-02-24 DIAGNOSIS — R56.9 SEIZURES (HCC): ICD-10-CM

## 2022-02-24 DIAGNOSIS — F31.9 BIPOLAR AFFECTIVE DISORDER, REMISSION STATUS UNSPECIFIED (HCC): ICD-10-CM

## 2022-02-24 DIAGNOSIS — L98.492 NON-PRESSURE CHRONIC ULCER OF SKIN OF OTHER SITES WITH FAT LAYER EXPOSED (HCC): ICD-10-CM

## 2022-02-24 PROCEDURE — 2022F DILAT RTA XM EVC RTNOPTHY: CPT | Performed by: NURSE PRACTITIONER

## 2022-02-24 PROCEDURE — G8484 FLU IMMUNIZE NO ADMIN: HCPCS | Performed by: NURSE PRACTITIONER

## 2022-02-24 PROCEDURE — 99214 OFFICE O/P EST MOD 30 MIN: CPT | Performed by: NURSE PRACTITIONER

## 2022-02-24 PROCEDURE — 3046F HEMOGLOBIN A1C LEVEL >9.0%: CPT | Performed by: NURSE PRACTITIONER

## 2022-02-24 PROCEDURE — 4004F PT TOBACCO SCREEN RCVD TLK: CPT | Performed by: NURSE PRACTITIONER

## 2022-02-24 PROCEDURE — G8419 CALC BMI OUT NRM PARAM NOF/U: HCPCS | Performed by: NURSE PRACTITIONER

## 2022-02-24 PROCEDURE — G8427 DOCREV CUR MEDS BY ELIG CLIN: HCPCS | Performed by: NURSE PRACTITIONER

## 2022-02-24 RX ORDER — QUETIAPINE 300 MG/1
300 TABLET, FILM COATED, EXTENDED RELEASE ORAL NIGHTLY
Qty: 30 TABLET | Refills: 5 | Status: SHIPPED | OUTPATIENT
Start: 2022-02-24 | End: 2022-08-29

## 2022-02-24 RX ORDER — NICOTINE 21 MG/24HR
1 PATCH, TRANSDERMAL 24 HOURS TRANSDERMAL DAILY
Qty: 42 PATCH | Refills: 0 | Status: SHIPPED | OUTPATIENT
Start: 2022-02-24 | End: 2022-06-20

## 2022-02-24 RX ORDER — LITHIUM CARBONATE 300 MG/1
300 TABLET, FILM COATED, EXTENDED RELEASE ORAL 2 TIMES DAILY
Qty: 60 TABLET | Refills: 5 | Status: SHIPPED | OUTPATIENT
Start: 2022-02-24

## 2022-02-24 RX ORDER — NALTREXONE HYDROCHLORIDE 50 MG/1
50 TABLET, FILM COATED ORAL DAILY
Qty: 30 TABLET | Refills: 5 | Status: SHIPPED | OUTPATIENT
Start: 2022-02-24

## 2022-02-24 RX ORDER — LISINOPRIL 2.5 MG/1
2.5 TABLET ORAL DAILY
Qty: 30 TABLET | Refills: 5 | Status: SHIPPED | OUTPATIENT
Start: 2022-02-24

## 2022-02-24 RX ORDER — BUSPIRONE HYDROCHLORIDE 15 MG/1
15 TABLET ORAL 3 TIMES DAILY
Qty: 45 TABLET | Refills: 5 | Status: SHIPPED | OUTPATIENT
Start: 2022-02-24

## 2022-02-24 RX ORDER — QUETIAPINE FUMARATE 50 MG/1
50 TABLET, EXTENDED RELEASE ORAL NIGHTLY
Qty: 30 TABLET | Refills: 5 | Status: SHIPPED | OUTPATIENT
Start: 2022-02-24 | End: 2022-08-29

## 2022-02-24 ASSESSMENT — ENCOUNTER SYMPTOMS
SHORTNESS OF BREATH: 1
COUGH: 0

## 2022-02-24 NOTE — PROGRESS NOTES
Subjective  Chief Complaint   Patient presents with    Medication Refill       HPI     Would like to quit smoking. Tried nicotine patches and ran out. Smoking 2 ppd. Would like to try the patches again. States that he has been working a lot recently. Notes that he has been \"clean and sober\"     DM- struggles with blood sugars. Admits to not checking his sugar often. Does follow with endo. Still struggling with transportation. Definitely hindrance with medical care  Discussed a need to establish with a psychiatrist. He agrees but knows that getting to appts will be difficult.         Patient Active Problem List    Diagnosis Date Noted    Non-pressure chronic ulcer of skin of other sites with fat layer exposed ([address] 08/05/2021    Uncontrolled type 1 diabetes mellitus with hyperglycemia ([address]     Toxic encephalopathy 07/30/2020    Rhabdomyolysis 07/30/2020    Bipolar 1 disorder ([address] 07/30/2020    Drug overdose, accidental or unintentional, initial encounter 07/28/2020    Hypergranulation 12/20/2019    Abdominal wall abscess 10/18/2019    Cellulitis 10/04/2019    Seizures ([address]     Hepatitis C     Diabetes mellitus type 1 ([address]     DM w/ coma type I, uncontrolled ([address]     Multiple drug overdose 02/14/2019    Respiratory insufficiency     Bipolar 1 disorder, mixed, severe ([address] 02/05/2018    H/O bipolar disorder 02/02/2018    Polysubstance abuse ([address]     Suicide attempt ([address]     Hyperglycemia 02/01/2018    Substance induced mood disorder ([address] 02/01/2018    Asperger syndrome 11/09/2017    Chemical dependency ([address] 11/09/2017    History of oppositional defiant disorder 11/09/2017    Hyperglycemia due to type 1 diabetes mellitus ([address] 65/64/7653    Metabolic acidosis with increased anion gap and accumulation of organic acids 11/09/2017    Lactic acid acidosis 11/09/2017    Hyperkalemia, diminished renal excretion 11/09/2017    Hyperkalemia, transcellular shifts 11/09/2017    TWIN (acute kidney injury) ([address] 11/09/2017    Heroin abuse ([address] 11/09/2017    DKA, type 1, not at goal 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    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    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    Generalized convulsive epilepsy ([address] 01/08/2007    Generalized tonic clonic epilepsy ([address] 01/08/2007    Atopic rhinitis 08/07/2004    Atopic dermatitis 01/24/2004    Adult behavior problems 07/02/2003     Past Medical History:   Diagnosis Date    ADHD (attention deficit hyperactivity disorder)     Asperger syndrome     Asthma     Asthma 3/31/2015    Bipolar 1 disorder, mixed, severe ([address] 2/5/2018    Chemical dependency ([address]     marijuana    Diabetes mellitus ([address]     Diabetes mellitus type 1 ([address]     Hepatitis C     Hepatitis C     Hyperkalemia, diminished renal excretion 11/9/2017    Mental disorder     ODD (oppositional defiant disorder)     Seasonal allergies 3/31/2015    Seizures ([address]      Past Surgical History:   Procedure Laterality Date    ABDOMEN SURGERY Left 10/7/2019    INCISION  & DRAINAGE OF ABSCESS LEFT ABDOMINAL WALL performed by Jo Kincaid MD at [facility] [address] N/A 10/21/2019    INCISION AND DRAINAGE OF RECURRENT ABDOMINAL WALL ABSCESS ROOM 475 performed by Jo Kincaid MD at [address] History   Problem Relation Age of Onset    Cancer Maternal Aunt         breast    Diabetes Maternal Uncle     Diabetes Paternal Uncle     Cancer Maternal Grandmother     Diabetes Maternal Grandmother     Cancer Maternal Grandfather     Diabetes Maternal Grandfather      Social History     Socioeconomic History    Marital status: Single     Spouse name: None    Number of children: 0    Years of education: 6    Highest education level: None   Occupational History    None   Tobacco Use    Smoking status: Current Every Day Smoker     Packs/day: 1.00     Years: 5.00     Pack years: 5.00     Types: Cigarettes    Smokeless tobacco: Never Used    Tobacco comment: pt aware of risk   Vaping Use    Vaping Use: Never used   Substance and Sexual Activity    Alcohol use: Not Currently     Alcohol/week: 0.0 standard drinks     Comment: Had been drinking whiskey and vodka on a regular basis about 6 months ago. Nothing since.  Drug use: Yes     Types: Methamphetamines (Crystal Meth), Marijuana (Robb Flower), IV, Cocaine, Opiates      Comment: occassionally, last use 2 weeks ago    Sexual activity: Yes     Partners: Female     Comment: No protection or birth-control plan   Other Topics Concern    None   Social History Narrative    ** Merged History Encounter **          Social Determinants of Health     Financial Resource Strain: Low Risk     Difficulty of Paying Living Expenses: Not very hard   Food Insecurity: No Food Insecurity    Worried About Running Out of Food in the Last Year: Never true    Tam of Food in the Last Year: Never true   Transportation Needs:     Lack of Transportation (Medical): Not on file    Lack of Transportation (Non-Medical):  Not on file   Physical Activity:     Days of Exercise per Week: Not on file    Minutes of Exercise per Session: Not on file   Stress:     Feeling of Stress : Not on file   Social Connections:     Frequency of Communication with Friends and Family: Not on file    Frequency of Social Gatherings with Friends and Family: Not on file    Attends Bahai Services: Not on file    Active Member of Clubs or Organizations: Not on file    Attends Club or Organization Meetings: Not on file    Marital Status: Not on file   Intimate Partner Violence:     Fear of Current or Ex-Partner: Not on file    Emotionally Abused: Not on file    Physically Abused: Not on file    Sexually Abused: Not on file   Housing Stability:     Unable to Pay for Housing in the Last Year: Not on file    Number of [place] in the Last Year: Not on file    Unstable Housing in the Last Year: Not on file     Current Outpatient Medications on File Prior to Visit   Medication Sig Dispense Refill    insulin aspart (NOVOLOG) 100 UNIT/ML injection vial Use via insulin pump max daily dose 100 units 30 mL 3    Lancets MISC Test 3x daily 100 each 3    blood glucose monitor strips Test 3x daily 100 strip 3    blood glucose monitor kit and supplies Give 1 meter covered by insurance 1 kit 0    ondansetron (ZOFRAN) 4 MG tablet Take 1 tablet by mouth 3 times daily as needed for Nausea or Vomiting 15 tablet 0    ondansetron (ZOFRAN-ODT) 4 MG disintegrating tablet Take 1 tablet by mouth every 12 hours as needed for Nausea or Vomiting 10 tablet 0    albuterol sulfate  (90 Base) MCG/ACT inhaler Use 2 puffs 4 times daily for 7 days then as needed for wheezing. Dispense with Spacer and instruct in use. At patient's preference may use 60 dose MDI. May Sub Pro-Air or Proventil as needed per insurance. 1 each 5    Blood Glucose Monitoring Suppl (CONTOUR NEXT ONE) KIT 1 Device by Does not apply route daily 1 kit 0    Insulin Pen Needle (PEN NEEDLES) 32G X 6 MM MISC 1 Device by Does not apply route 4 times daily 100 each 5    BD INSULIN SYRINGE U/F 31G X 5/16\" 0.5 ML MISC use 4 daily 100 each 3    CONTOUR NEXT TEST strip Test 6x daily Dx E10.65 200 each 3    omeprazole (PRILOSEC) 40 MG delayed release capsule Take 1 capsule by mouth every morning (before breakfast) for 14 days 14 capsule 1     No current facility-administered medications on file prior to visit.      Allergies   Allergen Reactions    Bactrim [Sulfamethoxazole-Trimethoprim] Rash  Dust Mite Extract      nasal & sinus congestion, itchy watery eyes, sneezing    Mirtazapine      blisters    Other      dander causes him to sneeze, have nasal/sinus congestion, itchy, watery eyes.  Oxcarbazepine Rash    Shrimp Flavor Nausea And Vomiting       Review of Systems   Constitutional: Negative for fatigue. Respiratory: Positive for shortness of breath. Negative for cough. Cardiovascular: Negative for chest pain. Psychiatric/Behavioral: Positive for dysphoric mood. The patient is nervous/anxious. Objective  Vitals:    02/24/22 1403   BP: 120/88   Pulse: 108   SpO2: 98%   Weight: 160 lb (72.6 kg)   Height: 5' 6\" (1.676 m)     Physical Exam  Vitals and nursing note reviewed. Constitutional:       Appearance: Normal appearance. HENT:      Head: Normocephalic. Nose: Nose normal.      Mouth/Throat:      Mouth: Mucous membranes are moist.      Pharynx: Oropharynx is clear. Eyes:      Extraocular Movements: Extraocular movements intact. Conjunctiva/sclera: Conjunctivae normal.      Pupils: Pupils are equal, round, and reactive to light. Cardiovascular:      Rate and Rhythm: Normal rate and regular rhythm. Pulses: Normal pulses. Heart sounds: Normal heart sounds. Pulmonary:      Effort: Pulmonary effort is normal.      Breath sounds: Normal breath sounds. Skin:     General: Skin is warm. Neurological:      General: No focal deficit present. Mental Status: He is alert and oriented to person, place, and time. Mental status is at baseline. Psychiatric:         Mood and Affect: Mood normal.         Behavior: Behavior normal.         Thought Content: Thought content normal.         Judgment: Judgment normal.       Assessment & Plan     Diagnosis Orders   1. Oppositional defiant disorder  Monica Segovia MD, [facility]   2. Asperger syndrome  Monica Segovia MD, [facility]   3.  Bipolar affective disorder, remission status unspecified ([facility] QUEtiapine (SEROQUEL XR) 50 MG extended release tablet    QUEtiapine (SEROQUEL XR) 300 MG extended release tablet    busPIRone (BUSPAR) 15 MG tablet    lithium (LITHOBID) 300 MG extended release tablet    Juany Santos MD [place]   4. Polysubstance abuse ([facility]  naltrexone (DEPADE) 50 MG tablet    [facility] Melody JOY   5. Type 1 diabetes mellitus with other specified complication (HCC)  lisinopril (PRINIVIL;ZESTRIL) 2.5 MG tablet   6. Smoker  nicotine (NICODERM CQ) 21 MG/24HR   7. Non-pressure chronic ulcer of skin of other sites with fat layer exposed ([facility]     8. Poisoning by heroin, undetermined, initial encounter ([facility]     9.  Seizures ([facility]         Orders Placed This Encounter   Procedures   Alhaji Kam MD, [place]     Referral Priority:   Routine     Referral Type:   Eval and Treat     Referral Reason:   Specialty Services Required     Referred to Provider:   Angel Hwang MD     Requested Specialty:   Psychiatry     Number of Visits Requested:   1       Orders Placed This Encounter   Medications    QUEtiapine (SEROQUEL XR) 50 MG extended release tablet     Sig: Take 1 tablet by mouth nightly     Dispense:  30 tablet     Refill:  5    QUEtiapine (SEROQUEL XR) 300 MG extended release tablet     Sig: Take 1 tablet by mouth nightly     Dispense:  30 tablet     Refill:  5    busPIRone (BUSPAR) 15 MG tablet     Sig: Take 15 mg by mouth 3 times daily     Dispense:  45 tablet     Refill:  5    lithium (LITHOBID) 300 MG extended release tablet     Sig: Take 1 tablet by mouth 2 times daily     Dispense:  60 tablet     Refill:  5    naltrexone (DEPADE) 50 MG tablet     Sig: Take 1 tablet by mouth daily     Dispense:  30 tablet     Refill:  5    lisinopril (PRINIVIL;ZESTRIL) 2.5 MG tablet     Sig: Take 1 tablet by mouth daily     Dispense:  30 tablet     Refill:  5    nicotine (NICODERM CQ) 21 MG/24HR     Sig: Place 1 patch onto the skin daily     Dispense: 42 patch     Refill:  0       Side effects, adverse effects of the medication prescribed today, as well as treatment plan/ rationale and result expectations have been discussed with the patient who expresses understanding and desires to proceed. Close follow up to evaluate treatment results and for coordination of care. I have reviewed the patient's medical history in detail and updated the computerized patient record. As always, patient is advised that if symptoms worsen in any way they will proceed to the nearest emergency room. Return in about 3 months (around 5/24/2022).     Delmy Modi, APRN - CNP

## 2022-03-10 ENCOUNTER — OFFICE VISIT (OUTPATIENT)
Dept: FAMILY MEDICINE CLINIC | Age: 27
End: 2022-03-10
Payer: MEDICARE

## 2022-03-10 VITALS
SYSTOLIC BLOOD PRESSURE: 126 MMHG | HEART RATE: 95 BPM | HEIGHT: 66 IN | WEIGHT: 160 LBS | DIASTOLIC BLOOD PRESSURE: 74 MMHG | OXYGEN SATURATION: 98 % | BODY MASS INDEX: 25.71 KG/M2 | TEMPERATURE: 98.2 F

## 2022-03-10 DIAGNOSIS — S69.91XA INJURY OF RIGHT WRIST, INITIAL ENCOUNTER: ICD-10-CM

## 2022-03-10 DIAGNOSIS — K52.9 ACUTE GASTROENTERITIS: Primary | ICD-10-CM

## 2022-03-10 DIAGNOSIS — R11.0 NAUSEA: ICD-10-CM

## 2022-03-10 DIAGNOSIS — M25.531 ACUTE PAIN OF RIGHT WRIST: ICD-10-CM

## 2022-03-10 PROCEDURE — 87804 INFLUENZA ASSAY W/OPTIC: CPT | Performed by: NURSE PRACTITIONER

## 2022-03-10 PROCEDURE — 99213 OFFICE O/P EST LOW 20 MIN: CPT | Performed by: NURSE PRACTITIONER

## 2022-03-10 PROCEDURE — 87426 SARSCOV CORONAVIRUS AG IA: CPT | Performed by: NURSE PRACTITIONER

## 2022-03-10 PROCEDURE — 4004F PT TOBACCO SCREEN RCVD TLK: CPT | Performed by: NURSE PRACTITIONER

## 2022-03-10 PROCEDURE — G8419 CALC BMI OUT NRM PARAM NOF/U: HCPCS | Performed by: NURSE PRACTITIONER

## 2022-03-10 PROCEDURE — G8427 DOCREV CUR MEDS BY ELIG CLIN: HCPCS | Performed by: NURSE PRACTITIONER

## 2022-03-10 PROCEDURE — G8484 FLU IMMUNIZE NO ADMIN: HCPCS | Performed by: NURSE PRACTITIONER

## 2022-03-10 RX ORDER — ONDANSETRON 4 MG/1
4 TABLET, FILM COATED ORAL 3 TIMES DAILY PRN
Qty: 15 TABLET | Refills: 0 | Status: SHIPPED | OUTPATIENT
Start: 2022-03-10 | End: 2022-05-10 | Stop reason: ALTCHOICE

## 2022-03-10 ASSESSMENT — ENCOUNTER SYMPTOMS
DIARRHEA: 1
RHINORRHEA: 0
VOMITING: 1
COUGH: 0
NAUSEA: 1
SORE THROAT: 0
ABDOMINAL PAIN: 1

## 2022-03-10 NOTE — PROGRESS NOTES
Subjective  India Lawrence, 24 y.o. male presents today with:  Chief Complaint   Patient presents with    Other     vomiting, stomach cramps, diarrhea, headaches. sx started 3/09/22       HPI   Presents to [facility] N[facility]D  Started to feel ill 3/9  Yesterday last episode of emesis   Stomach cramping and h/a   Still having diarrhea today   Able to tolerate liquids thus far   Fatigued   Dizzy and lightheaded   Others at work were also ill   Denies fever or chills       Fell at work 2 days prior   Right wrist pain   Iced the area   Would like imaging                 Past Medical History:   Diagnosis Date    ADHD (attention deficit hyperactivity disorder)     Asperger syndrome     Asthma     Asthma 3/31/2015    Bipolar 1 disorder, mixed, severe ([address] 2/5/2018    Chemical dependency ([facility]     marijuana    Diabetes mellitus ([facility]     Diabetes mellitus type 1 ([facility]     Hepatitis C     Hepatitis C     Hyperkalemia, diminished renal excretion 11/9/2017    Mental disorder     ODD (oppositional defiant disorder)     Seasonal allergies 3/31/2015    Seizures ([facility]       Past Surgical History:   Procedure Laterality Date    ABDOMEN SURGERY Left 10/7/2019    INCISION  & DRAINAGE OF ABSCESS LEFT ABDOMINAL WALL performed by Sandra Blount MD at [address] N/A 10/21/2019    INCISION AND DRAINAGE OF RECURRENT ABDOMINAL WALL ABSCESS ROOM 475 performed by Sandra Blount MD at [address] History   Problem Relation Age of Onset    Cancer Maternal Aunt         breast    Diabetes Maternal Uncle     Diabetes Paternal Uncle     Cancer Maternal Grandmother     Diabetes Maternal Grandmother     Cancer Maternal Grandfather     Diabetes Maternal Grandfather        Review of Systems   Constitutional: Positive for activity change and appetite change. Negative for chills, diaphoresis, fatigue and fever. HENT: Negative for congestion, ear pain, rhinorrhea and sore throat.     Respiratory: Negative for cough. Gastrointestinal: Positive for abdominal pain, diarrhea, nausea and vomiting. Musculoskeletal: Negative for myalgias, neck pain and neck stiffness. Neurological: Positive for dizziness, light-headedness and headaches. Psychiatric/Behavioral: Negative for sleep disturbance. PMH, Surgical Hx, Family Hx, and Social Hx reviewed and updated. Objective  Vitals:    03/10/22 1028   BP: 126/74   Site: Right Upper Arm   Position: Sitting   Cuff Size: Medium Adult   Pulse: 95   Temp: 98.2 °F (36.8 °C)   TempSrc: Temporal   SpO2: 98%   Weight: 160 lb (72.6 kg)   Height: 5' 6\" (1.676 m)     BP Readings from Last 3 Encounters:   03/10/22 126/74   02/24/22 120/88   01/13/22 120/86     Wt Readings from Last 3 Encounters:   03/10/22 160 lb (72.6 kg)   02/24/22 160 lb (72.6 kg)   01/13/22 165 lb (74.8 kg)           Physical Exam  Vitals reviewed. Constitutional:       General: He is not in acute distress. Appearance: He is ill-appearing. He is not toxic-appearing. HENT:      Right Ear: External ear normal.      Left Ear: External ear normal.      Mouth/Throat:      Lips: Pink. Mouth: Mucous membranes are moist.   Eyes:      General: Lids are normal.      Conjunctiva/sclera: Conjunctivae normal.      Pupils: Pupils are equal, round, and reactive to light. Cardiovascular:      Rate and Rhythm: Normal rate. Pulmonary:      Effort: Pulmonary effort is normal.   Musculoskeletal:         General: Normal range of motion. Cervical back: Normal range of motion and neck supple. No rigidity. No pain with movement. Skin:     General: Skin is warm and dry. Capillary Refill: Capillary refill takes less than 2 seconds. Coloration: Skin is not pale. Neurological:      General: No focal deficit present. Mental Status: He is alert and oriented to person, place, and time. Assessment & Plan    Diagnosis Orders   1.  Acute gastroenteritis  ondansetron (ZOFRAN) 4 MG tablet 2. Nausea  POCT COVID-19, Antigen    POCT Influenza A/B    ondansetron (ZOFRAN) 4 MG tablet   3. Injury of right wrist, initial encounter  XR WRIST RIGHT (MIN 3 VIEWS)   4. Acute pain of right wrist  XR WRIST RIGHT (MIN 3 VIEWS)     Orders Placed This Encounter   Procedures    XR WRIST RIGHT (MIN 3 VIEWS)     Standing Status:   Future     Number of Occurrences:   1     Standing Expiration Date:   3/10/2023     Order Specific Question:   Reason for exam:     Answer:   injury     Order Specific Question:   Reason for exam:     Answer:   pain    POCT COVID-19, Antigen     Order Specific Question:   Is this test for diagnosis or screening? Answer:   Diagnosis of ill patient     Order Specific Question:   Symptomatic for COVID-19 as defined by CDC? Answer:   Yes     Order Specific Question:   Date of Symptom Onset     Answer:   3/8/2022     Order Specific Question:   Hospitalized for COVID-19? Answer:   No     Order Specific Question:   Admitted to ICU for COVID-19? Answer:   No     Order Specific Question:   Employed in healthcare setting? Answer:   No     Order Specific Question:   Resident in a congregate (group) care setting? Answer:   No     Order Specific Question:   Pregnant: Answer:   No     Order Specific Question:   Previously tested for COVID-19? Answer: Yes    POCT Influenza A/B     Orders Placed This Encounter   Medications    ondansetron (ZOFRAN) 4 MG tablet     Sig: Take 1 tablet by mouth 3 times daily as needed for Nausea or Vomiting     Dispense:  15 tablet     Refill:  0       Return if symptoms worsen or fail to improve, for follow up with PCP. Reviewed with the patient: current clinical status & medications. Side effects, adverse effects of the medication prescribed today, as well as treatment plan/rationale and result expectations have been discussed with the patient who expressed understanding. How can you care for yourself at home?   · Drink plenty of fluids to prevent dehydration. Choose water and other clear liquids until you feel better. If you have kidney, heart, or liver disease and have to limit fluids, talk with your doctor before you increase your fluid intake. · Drink fluids slowly, in frequent, small amounts, because drinking too much too fast can cause vomiting. · Begin eating mild foods, such as dry toast, yogurt, applesauce, bananas, and rice. Avoid spicy, hot, or high-fat foods, and do not drink alcohol or caffeine for a day or two. Do not drink milk or eat ice cream until you are feeling better. Close follow up to evaluate treatment results and for coordination of care. I have reviewed the patient's medical history in detail and updated the computerized patient record.           YAMIL Park NP

## 2022-03-10 NOTE — PATIENT INSTRUCTIONS
Patient Education        Gastroenteritis: Care Instructions  Overview     Gastroenteritis is an illness that may cause nausea, vomiting, and diarrhea. It can be caused by bacteria or a virus. You will probably begin to feel better in 1 to 2 days. In the meantime, get plenty of rest and make sure you do not become dehydrated. Dehydration occurs when your body loses too much fluid. Follow-up care is a key part of your treatment and safety. Be sure to make and go to all appointments, and call your doctor if you are having problems. It's also a good idea to know your test results and keep a list of the medicines you take. How can you care for yourself at home? · If your doctor prescribed antibiotics, take them as directed. Do not stop taking them just because you feel better. You need to take the full course of antibiotics. · Drink plenty of fluids to prevent dehydration. Choose water and other clear liquids until you feel better. If you have kidney, heart, or liver disease and have to limit fluids, talk with your doctor before you increase your fluid intake. · Drink fluids slowly, in frequent, small amounts, because drinking too much too fast can cause vomiting. · Begin eating mild foods, such as dry toast, yogurt, applesauce, bananas, and rice. Avoid spicy, hot, or high-fat foods, and do not drink alcohol or caffeine for a day or two. Do not drink milk or eat ice cream until you are feeling better. How to prevent gastroenteritis  · Keep hot foods hot and cold foods cold. · Do not eat meats, dressings, salads, or other foods that have been kept at room temperature for more than 2 hours. · Use a thermometer to check your refrigerator. It should be between 34°F and 40°F.  · Defrost meats in the refrigerator or microwave, not on the kitchen counter. · Keep your hands and your kitchen clean. Wash your hands, cutting boards, and countertops with hot soapy water frequently.   · Cook meat until it is well done.  · Do not eat raw eggs or uncooked sauces made with raw eggs. · Do not take chances. If food looks or tastes spoiled, throw it out. When should you call for help? Call 911 anytime you think you may need emergency care. For example, call if:    · You vomit blood or what looks like coffee grounds.     · You passed out (lost consciousness).     · You pass maroon or very bloody stools. Call your doctor now or seek immediate medical care if:    · You have severe belly pain.     · You have signs of needing more fluids. You have sunken eyes, a dry mouth, and pass only a little urine.     · You feel like you are going to faint.     · You have increased belly pain that does not go away in 1 to 2 days.     · You have new or increased nausea, or you are vomiting.     · You have a new or higher fever.     · Your stools are black and tarlike or have streaks of blood. Watch closely for changes in your health, and be sure to contact your doctor if:    · You are dizzy or lightheaded.     · You urinate less than usual, or your urine is dark yellow or brown.     · You do not feel better with each day that goes by. Where can you learn more? Go to https://[employer]endeevoo.[email or web address] org and sign in to your [employer] account. Enter N142 in the [facility] box to learn more about \"Gastroenteritis: Care Instructions. \"     If you do not have an account, please click on the \"Sign Up Now\" link. Current as of: July 1, 2021               Content Version: 13.1  © 2006-2021 Healthwise, Incorporated. Care instructions adapted under license by [facility] ([facility]). If you have questions about a medical condition or this instruction, always ask your healthcare professional. [place] [address] any warranty or liability for your use of this information.

## 2022-03-10 NOTE — LETTER
[address]  Phone: [phone]  Fax: [phone]    YAMIL Kuo NP        March 10, 2022     Patient: Charles Gilman   YOB: 1995   Date of Visit: 3/10/2022       To Whom it May Concern:      Sagar Montaño was seen in my clinic on 3/10/2022. He may return to work on 3/14/2022. Please excuse him from work 3/10-3/13/2022. If you have any questions or concerns, please don't hesitate to call.                   Sincerely,                 YAMIL Kuo NP

## 2022-03-29 ENCOUNTER — OFFICE VISIT (OUTPATIENT)
Dept: FAMILY MEDICINE CLINIC | Age: 27
End: 2022-03-29
Payer: MEDICARE

## 2022-03-29 VITALS
HEART RATE: 80 BPM | BODY MASS INDEX: 26.52 KG/M2 | OXYGEN SATURATION: 97 % | WEIGHT: 165 LBS | TEMPERATURE: 97.9 F | DIASTOLIC BLOOD PRESSURE: 68 MMHG | SYSTOLIC BLOOD PRESSURE: 120 MMHG | HEIGHT: 66 IN

## 2022-03-29 DIAGNOSIS — L02.91 ABSCESS: Primary | ICD-10-CM

## 2022-03-29 PROCEDURE — 4004F PT TOBACCO SCREEN RCVD TLK: CPT | Performed by: NURSE PRACTITIONER

## 2022-03-29 PROCEDURE — G8427 DOCREV CUR MEDS BY ELIG CLIN: HCPCS | Performed by: NURSE PRACTITIONER

## 2022-03-29 PROCEDURE — G8484 FLU IMMUNIZE NO ADMIN: HCPCS | Performed by: NURSE PRACTITIONER

## 2022-03-29 PROCEDURE — 99213 OFFICE O/P EST LOW 20 MIN: CPT | Performed by: NURSE PRACTITIONER

## 2022-03-29 PROCEDURE — G8419 CALC BMI OUT NRM PARAM NOF/U: HCPCS | Performed by: NURSE PRACTITIONER

## 2022-03-29 RX ORDER — CEPHALEXIN 500 MG/1
500 CAPSULE ORAL 2 TIMES DAILY
Qty: 14 CAPSULE | Refills: 0 | Status: SHIPPED | OUTPATIENT
Start: 2022-03-29 | End: 2022-04-05

## 2022-03-29 RX ORDER — DOXYCYCLINE HYCLATE 100 MG
100 TABLET ORAL 2 TIMES DAILY
Qty: 20 TABLET | Refills: 0 | Status: SHIPPED | OUTPATIENT
Start: 2022-03-29 | End: 2022-04-08

## 2022-03-29 ASSESSMENT — ENCOUNTER SYMPTOMS
VOMITING: 0
ABDOMINAL PAIN: 0
NAUSEA: 0
DIARRHEA: 0

## 2022-03-29 NOTE — PROGRESS NOTES
Subjective  Ashley Law, 24 y.o. male presents today with:  Chief Complaint   Patient presents with    Other     infection from insulin pump. HPI   Presents to [facility] for a skin concern  Affected area: left upper gluteal region   Pain, swelling and erythema to prior insulin pump site   States he noticed the area on Sunday after he changed his insulin pump site. Noted white, thick drainage   Changes site after he showers   Denies N/V/D  Denies fever or chills  Denies fatigue   Denies pain to left hip   Eating and drinking                           Past Medical History:   Diagnosis Date    ADHD (attention deficit hyperactivity disorder)     Asperger syndrome     Asthma     Asthma 3/31/2015    Bipolar 1 disorder, mixed, severe ([address] 2/5/2018    Chemical dependency ([address]     marijuana    Diabetes mellitus ([address]     Diabetes mellitus type 1 ([address]     Hepatitis C     Hepatitis C     Hyperkalemia, diminished renal excretion 11/9/2017    Mental disorder     ODD (oppositional defiant disorder)     Seasonal allergies 3/31/2015    Seizures ([address]       Past Surgical History:   Procedure Laterality Date    ABDOMEN SURGERY Left 10/7/2019    INCISION  & DRAINAGE OF ABSCESS LEFT ABDOMINAL WALL performed by Gino Jade MD at [address] [place] [address] Drive N/A 10/21/2019    INCISION AND DRAINAGE OF RECURRENT ABDOMINAL WALL ABSCESS ROOM 475 performed by Gino Jade MD at [facility]     Family History   Problem Relation Age of Onset    Cancer Maternal Aunt         breast    Diabetes Maternal Uncle     Diabetes Paternal Uncle     Cancer Maternal Grandmother     Diabetes Maternal Grandmother     Cancer Maternal Grandfather     Diabetes Maternal Grandfather                Review of Systems   Constitutional: Negative for activity change, appetite change, chills, diaphoresis, fatigue and fever. Cardiovascular: Negative for chest pain and palpitations.    Gastrointestinal: Negative for abdominal pain, diarrhea, nausea and vomiting. Musculoskeletal: Negative for arthralgias, back pain, gait problem, joint swelling, myalgias, neck pain and neck stiffness. Skin: Positive for color change and wound. Neurological: Negative for dizziness, light-headedness and headaches. PMH, Surgical Hx, Family Hx, and Social Hx reviewed and updated. Objective  Vitals:    03/29/22 1515   BP: 120/68   Site: Right Upper Arm   Position: Sitting   Cuff Size: Medium Adult   Pulse: 80   Temp: 97.9 °F (36.6 °C)   TempSrc: Temporal   SpO2: 97%   Weight: 165 lb (74.8 kg)   Height: 5' 6\" (1.676 m)     BP Readings from Last 3 Encounters:   03/29/22 120/68   03/10/22 126/74   02/24/22 120/88     Wt Readings from Last 3 Encounters:   03/29/22 165 lb (74.8 kg)   03/10/22 160 lb (72.6 kg)   02/24/22 160 lb (72.6 kg)                 Physical Exam  Vitals reviewed. Constitutional:       General: He is not in acute distress. Appearance: Normal appearance. He is not ill-appearing or toxic-appearing. HENT:      Right Ear: External ear normal.      Left Ear: External ear normal.   Eyes:      General: Lids are normal.      Conjunctiva/sclera: Conjunctivae normal.   Cardiovascular:      Rate and Rhythm: Normal rate and regular rhythm. Heart sounds: Normal heart sounds. Pulmonary:      Effort: Pulmonary effort is normal.      Breath sounds: Normal breath sounds and air entry. Musculoskeletal:         General: Normal range of motion. Cervical back: Normal range of motion. No rigidity. No pain with movement. Skin:     General: Skin is warm. Capillary Refill: Capillary refill takes less than 2 seconds. Findings: Abscess (no induration. mild warmth. erythema. no drainage. ) present. Neurological:      General: No focal deficit present. Mental Status: He is alert and oriented to person, place, and time. Assessment & Plan    Diagnosis Orders   1.  Abscess  doxycycline hyclate (VIBRA-TABS) 100 MG tablet    cephALEXin (KEFLEX) 500 MG capsule     No orders of the defined types were placed in this encounter. Orders Placed This Encounter   Medications    doxycycline hyclate (VIBRA-TABS) 100 MG tablet     Sig: Take 1 tablet by mouth 2 times daily for 10 days     Dispense:  20 tablet     Refill:  0    cephALEXin (KEFLEX) 500 MG capsule     Sig: Take 1 capsule by mouth 2 times daily for 7 days     Dispense:  14 capsule     Refill:  0       If symptoms worsen or fail to improve, seek care at the ER         Reviewed with the patient: current clinical status & medications. Side effects, adverse effects of the medicationS prescribed today, as well as treatment plan/rationale and result expectations have been discussed with the patient who expressed understanding. How can you care for yourself at home? · Apply warm and dry compresses, a heating pad set on low, or a hot water bottle 3 or 4 times a day for pain. Keep a cloth between the heat source and your skin. · If your doctor prescribed antibiotics, take them as directed. Do not stop taking them just because you feel better. You need to take the full course of antibiotics. When should you call for help? Seek immediate medical care if:    · You have signs of worsening infection, such as:  ? Increased pain, swelling, warmth, or redness. ? Red streaks leading from the infected skin. ? Pus draining from the wound. ? A fever. Close follow up to evaluate treatment results and for coordination of care. I have reviewed the patient's medical history in detail and updated the computerized patient record.         YAMIL Hutchins - ANTOINETTE

## 2022-03-29 NOTE — PATIENT INSTRUCTIONS
Patient Education        Skin Abscess: Care Instructions  Overview     A skin abscess is a bacterial infection that forms a pocket of pus. A boil is a kind of skin abscess. The doctor may have cut an opening in the abscess so that the pus can drain out. You may have gauze in the cut so that the abscess will stay open and keep draining. You may need antibiotics. You will need to followup with your doctor to make sure the infection has gone away. The doctor has checked you carefully, but problems can develop later. If you notice any problems or new symptoms, get medical treatment right away. Follow-up care is a key part of your treatment and safety. Be sure to make and go to all appointments, and call your doctor if you are having problems. It's also a good idea to know your test results and keep alist of the medicines you take. How can you care for yourself at home?  Apply warm and dry compresses, a heating pad set on low, or a hot water bottle 3 or 4 times a day for pain. Keep a cloth between the heat source and your skin.  If your doctor prescribed antibiotics, take them as directed. Do not stop taking them just because you feel better. You need to take the full course of antibiotics.  Take pain medicines exactly as directed. ? If the doctor gave you a prescription medicine for pain, take it as prescribed. ? If you are not taking a prescription pain medicine, ask your doctor if you can take an over-the-counter medicine.  Keep your bandage clean and dry. Change the bandage whenever it gets wet or dirty, or at least one time a day.  If the abscess was packed with gauze:  ? Keep follow-up appointments to have the gauze changed or removed. If the doctor instructed you to remove the gauze, follow the instructions you were given for how to remove it. ? After the gauze is removed, soak the area in warm water for 15 to 20 minutes 2 times a day, until the wound closes. When should you call for help? Call your doctor now or seek immediate medical care if:     You have signs of worsening infection, such as:  ? Increased pain, swelling, warmth, or redness. ? Red streaks leading from the infected skin. ? Pus draining from the wound. ? A fever. Watch closely for changes in your health, and be sure to contact your doctor if:     You do not get better as expected. Where can you learn more? Go to [email or web address] and sign in to your [employer] account. Enter S794 in the Nazar box to learn more about \"Skin Abscess: Care Instructions. \"     If you do not have an account, please click on the \"Sign Up Now\" link. Current as of: November 15, 2021               Content Version: 13.2  © 5668-1603 [employer], [employer]. Care instructions adapted under license by [facility] ([facility]). If you have questions about a medical condition or this instruction, always ask your healthcare professional. [place] [address] any warranty or liability for your use of this information.

## 2022-04-01 ASSESSMENT — ENCOUNTER SYMPTOMS
COLOR CHANGE: 1
BACK PAIN: 0

## 2022-04-18 ENCOUNTER — OFFICE VISIT (OUTPATIENT)
Dept: ENDOCRINOLOGY | Age: 27
End: 2022-04-18
Payer: MEDICARE

## 2022-04-18 VITALS
HEIGHT: 66 IN | OXYGEN SATURATION: 97 % | SYSTOLIC BLOOD PRESSURE: 117 MMHG | WEIGHT: 160 LBS | BODY MASS INDEX: 25.71 KG/M2 | HEART RATE: 83 BPM | DIASTOLIC BLOOD PRESSURE: 79 MMHG

## 2022-04-18 DIAGNOSIS — E10.69 TYPE 1 DIABETES MELLITUS WITH OTHER SPECIFIED COMPLICATION (HCC): Primary | ICD-10-CM

## 2022-04-18 LAB
CHP ED QC CHECK: NORMAL
GLUCOSE BLD-MCNC: 462 MG/DL
HBA1C MFR BLD: 10.1 %

## 2022-04-18 PROCEDURE — 4004F PT TOBACCO SCREEN RCVD TLK: CPT | Performed by: INTERNAL MEDICINE

## 2022-04-18 PROCEDURE — 83036 HEMOGLOBIN GLYCOSYLATED A1C: CPT | Performed by: INTERNAL MEDICINE

## 2022-04-18 PROCEDURE — G8419 CALC BMI OUT NRM PARAM NOF/U: HCPCS | Performed by: INTERNAL MEDICINE

## 2022-04-18 PROCEDURE — G8427 DOCREV CUR MEDS BY ELIG CLIN: HCPCS | Performed by: INTERNAL MEDICINE

## 2022-04-18 PROCEDURE — 2022F DILAT RTA XM EVC RTNOPTHY: CPT | Performed by: INTERNAL MEDICINE

## 2022-04-18 PROCEDURE — 82962 GLUCOSE BLOOD TEST: CPT | Performed by: INTERNAL MEDICINE

## 2022-04-18 PROCEDURE — 99213 OFFICE O/P EST LOW 20 MIN: CPT | Performed by: INTERNAL MEDICINE

## 2022-04-18 PROCEDURE — 3046F HEMOGLOBIN A1C LEVEL >9.0%: CPT | Performed by: INTERNAL MEDICINE

## 2022-04-18 ASSESSMENT — ENCOUNTER SYMPTOMS: EYES NEGATIVE: 1

## 2022-04-18 NOTE — PROGRESS NOTES
4/18/2022    Assessment:       Diagnosis Orders   1. Type 1 diabetes mellitus with other specified complication (HCC)  POCT Glucose    POCT glycosylated hemoglobin (Hb A1C)         PLAN:     Continue patient on [employer] pump as per current pump setting advised patient to call [employer] to get updated pump A1c goal of 7 or lower  Orders Placed This Encounter   Procedures    Hemoglobin A1C     Standing Status:   Future     Standing Expiration Date:   4/18/2023    Basic Metabolic Panel, Fasting     Standing Status:   Future     Standing Expiration Date:   4/18/2023    POCT Glucose    POCT glycosylated hemoglobin (Hb A1C)     Orders Placed This Encounter   Medications    insulin aspart (NOVOLOG) 100 UNIT/ML injection vial     Sig: Use via insulin pump max daily dose 100 units     Dispense:  30 mL     Refill:  3         Orders Placed This Encounter   Procedures    POCT Glucose    POCT glycosylated hemoglobin (Hb A1C)       Subjective:     Chief Complaint   Patient presents with    Diabetes     Vitals:    04/18/22 1352   BP: 117/79   Pulse: 83   SpO2: 97%   Weight: 160 lb (72.6 kg)   Height: 5' 6\" (1.676 m)     Wt Readings from Last 3 Encounters:   04/18/22 160 lb (72.6 kg)   03/29/22 165 lb (74.8 kg)   03/10/22 160 lb (72.6 kg)     BP Readings from Last 3 Encounters:   04/18/22 117/79   03/29/22 120/68   03/10/22 126/74     Follow-up on type 1 diabetes. Improvement in the A1c was 10.1 pump was downloaded reviewed variable compliance with diet    Diabetes  He presents for his follow-up diabetic visit. He has type 1 diabetes mellitus. His disease course has been fluctuating. Pertinent negatives for diabetes include no polydipsia, no polyuria and no weight loss. Symptoms are improving. Current diabetic treatment includes insulin pump. Meal planning includes carbohydrate counting. His overall blood glucose range is >200 mg/dl.  (Lab Results       Component                Value               Date LABA1C                   10.1                04/18/2022              Review 2-week pump download average blood sugar was 282±226  No hypoglycemia  Pump settings reviewed basal rate 1.5 units/h carb ratio 15 sensitivity 35)     Past Medical History:   Diagnosis Date    ADHD (attention deficit hyperactivity disorder)     Asperger syndrome     Asthma     Asthma 3/31/2015    Bipolar 1 disorder, mixed, severe ([address] 2/5/2018    Chemical dependency ([facility]     marijuana    Diabetes mellitus ([facility]     Diabetes mellitus type 1 ([facility]     Hepatitis C     Hepatitis C     Hyperkalemia, diminished renal excretion 11/9/2017    Mental disorder     ODD (oppositional defiant disorder)     Seasonal allergies 3/31/2015    Seizures ([facility]      Past Surgical History:   Procedure Laterality Date    ABDOMEN SURGERY Left 10/7/2019    INCISION  & DRAINAGE OF ABSCESS LEFT ABDOMINAL WALL performed by Edward James MD at [address] N/A 10/21/2019    INCISION AND DRAINAGE OF RECURRENT ABDOMINAL WALL ABSCESS ROOM 475 [facility]     Socioeconomic History    Marital status: Single     Spouse name: Not on file    Number of children: 0    Years of education: 6    Highest education level: Not on file   Occupational History    Not on file   Tobacco Use    Smoking status: Current Every Day Smoker     Packs/day: 1.00     Years: 5.00     Pack years: 5.00     Types: Cigarettes    Smokeless tobacco: Never Used    Tobacco comment: pt aware of risk   Vaping Use    Vaping Use: Never used   Substance and Sexual Activity    Alcohol use: Not Currently     Alcohol/week: 0.0 standard drinks     Comment: Had been drinking whiskey and vodka on a regular basis about 6 months ago. Nothing since.     Drug use: Yes     Types: Methamphetamines (Crystal Meth), Marijuana ([place]), IV, Cocaine, Opiates      Comment: occassionally, last use 2 weeks ago    Sexual activity: Yes Partners: Female     Comment: No protection or birth-control plan   Other Topics Concern    Not on file   Social History Narrative    ** Merged History Encounter **          Social Determinants of Health     Financial Resource Strain: Low Risk     Difficulty of Paying Living Expenses: Not very hard   Food Insecurity: No Food Insecurity    Worried About Running Out of Food in the Last Year: Never true    Tam of Food in the Last Year: Never true   Transportation Needs:     Lack of Transportation (Medical): Not on file    Lack of Transportation (Non-Medical):  Not on file   Physical Activity:     Days of Exercise per Week: Not on file    Minutes of Exercise per Session: Not on file   Stress:     Feeling of Stress : Not on file   Social Connections:     Frequency of Communication with Friends and Family: Not on file    Frequency of Social Gatherings with Friends and Family: Not on file    Attends [employer] Services: Not on file    Active Member of [address] [place] [address] or Organizations: Not on file    Attends Club or Organization Meetings: Not on file    Marital Status: Not on file   Intimate Partner Violence:     Fear of Current or Ex-Partner: Not on file    Emotionally Abused: Not on file    Physically Abused: Not on file    Sexually Abused: Not on file   Housing Stability:     Unable to Pay for Housing in the Last Year: Not on file    Number of [place] in the Last Year: Not on file    Unstable Housing in the Last Year: Not on file     Family History   Problem Relation Age of Onset    Cancer Maternal Aunt         breast    Diabetes Maternal Uncle     Diabetes Paternal Uncle     Cancer Maternal Grandmother     Diabetes Maternal Grandmother     Cancer Maternal Grandfather     Diabetes Maternal Grandfather      Allergies   Allergen Reactions    Bactrim [Sulfamethoxazole-Trimethoprim] Rash    Dust Mite Extract      nasal & sinus congestion, itchy watery eyes, sneezing    Mirtazapine      blisters  Other      dander causes him to sneeze, have nasal/sinus congestion, itchy, watery eyes.  Oxcarbazepine Rash    Shrimp Flavor Nausea And Vomiting       Current Outpatient Medications:     ondansetron (ZOFRAN) 4 MG tablet, Take 1 tablet by mouth 3 times daily as needed for Nausea or Vomiting, Disp: 15 tablet, Rfl: 0    QUEtiapine (SEROQUEL XR) 50 MG extended release tablet, Take 1 tablet by mouth nightly, Disp: 30 tablet, Rfl: 5    QUEtiapine (SEROQUEL XR) 300 MG extended release tablet, Take 1 tablet by mouth nightly, Disp: 30 tablet, Rfl: 5    busPIRone (BUSPAR) 15 MG tablet, Take 15 mg by mouth 3 times daily, Disp: 45 tablet, Rfl: 5    lithium (LITHOBID) 300 MG extended release tablet, Take 1 tablet by mouth 2 times daily, Disp: 60 tablet, Rfl: 5    naltrexone (DEPADE) 50 MG tablet, Take 1 tablet by mouth daily, Disp: 30 tablet, Rfl: 5    lisinopril (PRINIVIL;ZESTRIL) 2.5 MG tablet, Take 1 tablet by mouth daily, Disp: 30 tablet, Rfl: 5    insulin aspart (NOVOLOG) 100 UNIT/ML injection vial, Use via insulin pump max daily dose 100 units, Disp: 30 mL, Rfl: 3    Lancets MISC, Test 3x daily, Disp: 100 each, Rfl: 3    blood glucose monitor strips, Test 3x daily, Disp: 100 strip, Rfl: 3    blood glucose monitor kit and supplies, Give 1 meter covered by insurance, Disp: 1 kit, Rfl: 0    ondansetron (ZOFRAN) 4 MG tablet, Take 1 tablet by mouth 3 times daily as needed for Nausea or Vomiting, Disp: 15 tablet, Rfl: 0    ondansetron (ZOFRAN-ODT) 4 MG disintegrating tablet, Take 1 tablet by mouth every 12 hours as needed for Nausea or Vomiting, Disp: 10 tablet, Rfl: 0    albuterol sulfate  (90 Base) MCG/ACT inhaler, Use 2 puffs 4 times daily for 7 days then as needed for wheezing. Dispense with Spacer and instruct in use. At patient's preference may use 60 dose MDI.  May Sub Pro-Air or Proventil as needed per insurance., Disp: 1 each, Rfl: 5    Blood Glucose Monitoring Suppl (CONTOUR NEXT ONE) KIT, 1 Device by Does not apply route daily, Disp: 1 kit, Rfl: 0    Insulin Pen Needle (PEN NEEDLES) 32G X 6 MM MISC, 1 Device by Does not apply route 4 times daily, Disp: 100 each, Rfl: 5    BD INSULIN SYRINGE U/F 31G X 5/16\" 0.5 ML MISC, use 4 daily, Disp: 100 each, Rfl: 3    CONTOUR NEXT TEST strip, Test 6x daily Dx E10.65, Disp: 200 each, Rfl: 3    nicotine (NICODERM CQ) 21 MG/24HR, Place 1 patch onto the skin daily, Disp: 42 patch, Rfl: 0    omeprazole (PRILOSEC) 40 MG delayed release capsule, Take 1 capsule by mouth every morning (before breakfast) for 14 days, Disp: 14 capsule, Rfl: 1  Lab Results   Component Value Date     12/21/2021    K 3.9 12/21/2021     12/21/2021    CO2 23 12/21/2021    BUN 13 12/21/2021    CREATININE 0.80 12/21/2021    GLUCOSE 462 04/18/2022    CALCIUM 9.7 12/21/2021    PROT 7.4 08/05/2021    LABALBU 4.6 08/05/2021    BILITOT <0.2 08/05/2021    ALKPHOS 145 (H) 08/05/2021    AST 14 08/05/2021    ALT 16 08/05/2021    LABGLOM >60.0 12/21/2021    GFRAA >60.0 12/21/2021    GLOB 2.8 08/05/2021     Lab Results   Component Value Date    WBC 6.7 09/09/2020    HGB 15.1 09/09/2020    HCT 43.2 09/09/2020    MCV 84.5 09/09/2020     09/09/2020     Lab Results   Component Value Date    LABA1C 10.1 04/18/2022    LABA1C 10.9 (H) 12/21/2021    LABA1C 9.6 (H) 08/05/2021     Lab Results   Component Value Date    HDL 55 08/05/2021    HDL 38 (L) 02/11/2015    HDL 42 09/21/2012    LDLCALC 60 08/05/2021    CHOL 140 08/05/2021    CHOL 130 02/11/2015    CHOL 171 09/21/2012    TRIG 123 08/05/2021    TRIG 139 02/11/2015    TRIG 238 (H) 09/21/2012     No results found for: TESTM  Lab Results   Component Value Date    TSH 0.400 (L) 09/09/2020    TSH 1.240 07/28/2020    TSH 0.947 11/13/2019     No results found for: TPOABS    Review of Systems   Constitutional: Negative for weight loss. Eyes: Negative. Cardiovascular: Negative. Endocrine: Negative for polydipsia and polyuria. Neurological: Negative. Psychiatric/Behavioral: Positive for behavioral problems, dysphoric mood and sleep disturbance. All other systems reviewed and are negative. Objective:   Physical Exam  Vitals reviewed. Constitutional:       Appearance: Normal appearance. HENT:      Head: Normocephalic and atraumatic. Right Ear: External ear normal.      Left Ear: External ear normal.      Nose: Nose normal.   Eyes:      General: No scleral icterus. Right eye: No discharge. Left eye: No discharge. Extraocular Movements: Extraocular movements intact. Conjunctiva/sclera: Conjunctivae normal.   Cardiovascular:      Rate and Rhythm: Normal rate. Pulmonary:      Effort: Pulmonary effort is normal.   Musculoskeletal:         General: Normal range of motion. Cervical back: Normal range of motion and neck supple. Neurological:      General: No focal deficit present. Mental Status: He is alert and oriented to person, place, and time.    Psychiatric:         Mood and Affect: Mood normal.         Behavior: Behavior normal.

## 2022-05-05 ENCOUNTER — OFFICE VISIT (OUTPATIENT)
Dept: FAMILY MEDICINE CLINIC | Age: 27
End: 2022-05-05
Payer: MEDICARE

## 2022-05-05 VITALS
OXYGEN SATURATION: 98 % | BODY MASS INDEX: 25.71 KG/M2 | DIASTOLIC BLOOD PRESSURE: 88 MMHG | WEIGHT: 160 LBS | TEMPERATURE: 98.4 F | HEART RATE: 78 BPM | HEIGHT: 66 IN | SYSTOLIC BLOOD PRESSURE: 118 MMHG

## 2022-05-05 DIAGNOSIS — R11.0 NAUSEA: Primary | ICD-10-CM

## 2022-05-05 PROCEDURE — G8419 CALC BMI OUT NRM PARAM NOF/U: HCPCS | Performed by: NURSE PRACTITIONER

## 2022-05-05 PROCEDURE — 87426 SARSCOV CORONAVIRUS AG IA: CPT | Performed by: NURSE PRACTITIONER

## 2022-05-05 PROCEDURE — G8427 DOCREV CUR MEDS BY ELIG CLIN: HCPCS | Performed by: NURSE PRACTITIONER

## 2022-05-05 PROCEDURE — 4004F PT TOBACCO SCREEN RCVD TLK: CPT | Performed by: NURSE PRACTITIONER

## 2022-05-05 PROCEDURE — 99213 OFFICE O/P EST LOW 20 MIN: CPT | Performed by: NURSE PRACTITIONER

## 2022-05-05 PROCEDURE — 87804 INFLUENZA ASSAY W/OPTIC: CPT | Performed by: NURSE PRACTITIONER

## 2022-05-05 RX ORDER — ONDANSETRON 4 MG/1
4 TABLET, FILM COATED ORAL
Qty: 30 TABLET | Refills: 0 | Status: SHIPPED | OUTPATIENT
Start: 2022-05-05

## 2022-05-05 ASSESSMENT — ENCOUNTER SYMPTOMS
SORE THROAT: 0
RHINORRHEA: 0
COUGH: 0

## 2022-05-05 NOTE — PROGRESS NOTES
Subjective  Britta Hong, 24 y.o. male presents today with:  Chief Complaint   Patient presents with    Nausea     5/5 woke up not feeling well     Abdominal Pain       HPI   P[facility] to [facility] for [facility]   Symptoms started this morning   Denies emesis  Stomach cramping   Denies fever   Denies chills   Denies diarrhea   Fatigued   Slept well over night   Hasn't ate today yet   Did drink coffee & tolerated that   Works in . Work note                         Past Medical History:   Diagnosis Date    ADHD (attention deficit hyperactivity disorder)     Asperger syndrome     Asthma     Asthma 3/31/2015    Bipolar 1 disorder, mixed, severe ([address] 2/5/2018    Chemical dependency ([address]     marijuana    Diabetes mellitus ([address]     Diabetes mellitus type 1 ([address]     Hepatitis C     Hepatitis C     Hyperkalemia, diminished renal excretion 11/9/2017    Mental disorder     ODD (oppositional defiant disorder)     Seasonal allergies 3/31/2015    Seizures ([address]       Past Surgical History:   Procedure Laterality Date    ABDOMEN SURGERY Left 10/7/2019    INCISION  & DRAINAGE OF ABSCESS LEFT ABDOMINAL WALL performed by Melanie Cleveland MD at [address] [place] [address] Drive N/A 10/21/2019    INCISION AND DRAINAGE OF RECURRENT ABDOMINAL WALL ABSCESS ROOM 475 performed by Melanie Cleveland MD at [facility]     Family History   Problem Relation Age of Onset    Cancer Maternal Aunt         breast    Diabetes Maternal Uncle     Diabetes Paternal Uncle     Cancer Maternal Grandmother     Diabetes Maternal Grandmother     Cancer Maternal Grandfather     Diabetes Maternal Grandfather              Review of Systems   Constitutional: Positive for activity change, appetite change and fatigue. Negative for chills, diaphoresis and fever. HENT: Negative for congestion, ear pain, rhinorrhea and sore throat. Respiratory: Negative for cough. Cardiovascular: Negative for chest pain and palpitations.    Musculoskeletal: Negative for myalgias. Neurological: Negative for dizziness and light-headedness. Psychiatric/Behavioral: Negative for sleep disturbance. PMH, Surgical Hx, Family Hx, and Social Hx reviewed and updated. Objective  Vitals:    05/05/22 1300   BP: 118/88   Site: Right Upper Arm   Position: Sitting   Cuff Size: Medium Adult   Pulse: 78   Temp: 98.4 °F (36.9 °C)   TempSrc: Tympanic   SpO2: 98%   Weight: 160 lb (72.6 kg)   Height: 5' 6\" (1.676 m)     BP Readings from Last 3 Encounters:   05/05/22 118/88   04/18/22 117/79   03/29/22 120/68     Wt Readings from Last 3 Encounters:   05/05/22 160 lb (72.6 kg)   04/18/22 160 lb (72.6 kg)   03/29/22 165 lb (74.8 kg)             Physical Exam  Vitals reviewed. Constitutional:       General: He is not in acute distress. Appearance: Normal appearance. He is not toxic-appearing. HENT:      Right Ear: External ear normal.      Left Ear: External ear normal.      Nose: Nose normal.      Mouth/Throat:      Lips: Pink. Mouth: Mucous membranes are moist.      Pharynx: Oropharynx is clear. Uvula midline. Eyes:      General: Lids are normal.      Conjunctiva/sclera: Conjunctivae normal.   Cardiovascular:      Rate and Rhythm: Normal rate. Pulmonary:      Effort: Pulmonary effort is normal.   Musculoskeletal:         General: Normal range of motion. Cervical back: Normal range of motion. No rigidity. No pain with movement. Skin:     General: Skin is warm and dry. Capillary Refill: Capillary refill takes less than 2 seconds. Coloration: Skin is not pale. Neurological:      General: No focal deficit present. Mental Status: He is alert and oriented to person, place, and time. Assessment & Plan    Diagnosis Orders   1.  Nausea  POCT Influenza A/B    POCT COVID-19, Antigen    ondansetron (ZOFRAN) 4 MG tablet     Orders Placed This Encounter   Procedures    POCT Influenza A/B    POCT COVID-19, Antigen     Order Specific Question:   Is this test for diagnosis or screening? Answer:   Diagnosis of ill patient     Order Specific Question:   Symptomatic for COVID-19 as defined by CDC? Answer:   Yes     Order Specific Question:   Date of Symptom Onset     Answer:   5/5/2022     Order Specific Question:   Hospitalized for COVID-19? Answer:   No     Order Specific Question:   Admitted to ICU for COVID-19? Answer:   No     Order Specific Question:   Employed in healthcare setting? Answer:   No     Order Specific Question:   Resident in a congregate (group) care setting? Answer:   No     Order Specific Question:   Pregnant: Answer:   No     Order Specific Question:   Previously tested for COVID-19? Answer:   Yes     Orders Placed This Encounter   Medications    ondansetron (ZOFRAN) 4 MG tablet     Sig: Take 1 tablet by mouth every 6-8 hours as needed for Nausea or Vomiting     Dispense:  30 tablet     Refill:  0         Return if symptoms worsen or fail to improve, for follow up with PCP. Reviewed with the patient: current clinical status & medications. Side effects, adverse effects of the medication prescribed today, as well as treatment plan/rationale and result expectations have been discussed with the patient who expressed understanding. How can you care for yourself at home? · To prevent dehydration, drink plenty of fluids. Choose water and other clear liquids until you feel better. If you have kidney, heart, or liver disease and have to limit fluids, talk with your doctor before you increase the amount of fluids you drink. · Rest in bed until you feel better. · When you are able to eat, try clear soups, mild foods, and liquids until all symptoms are gone for 12 to 48 hours. Other good choices include dry toast, crackers, cooked cereal, and gelatin dessert, such as Jell-O. Close follow up to evaluate treatment results and for coordination of care.   I have reviewed the patient's medical history in detail and updated the computerized patient record.           YAMIL Menchaca NP

## 2022-05-05 NOTE — LETTER
[facility]  [address], [address] [facility]  Phone: [phone]  Fax: [phone] [facility] APRN - NP        May 5, 2022     Patient: Renu Ward   YOB: 1995   Date of Visit: 5/5/2022       To Whom It May Concern: It is my medical opinion that Dali Owen may return to work on 5/7/2022. Please excuse him from work on 5/5/2022. Viral testing all resulted negative today. If you have any questions or concerns, please don't hesitate to call.                 Sincerely,                Mechele Schwab, YAMIL - NP

## 2022-06-14 ENCOUNTER — OFFICE VISIT (OUTPATIENT)
Dept: FAMILY MEDICINE CLINIC | Age: 27
End: 2022-06-14
Payer: MEDICARE

## 2022-06-14 VITALS
HEIGHT: 66 IN | WEIGHT: 160 LBS | SYSTOLIC BLOOD PRESSURE: 130 MMHG | HEART RATE: 108 BPM | BODY MASS INDEX: 25.71 KG/M2 | DIASTOLIC BLOOD PRESSURE: 80 MMHG | TEMPERATURE: 99 F | OXYGEN SATURATION: 97 %

## 2022-06-14 DIAGNOSIS — L02.91 ABSCESS: Primary | ICD-10-CM

## 2022-06-14 DIAGNOSIS — L03.211 FACIAL CELLULITIS: ICD-10-CM

## 2022-06-14 PROCEDURE — 99213 OFFICE O/P EST LOW 20 MIN: CPT | Performed by: PHYSICIAN ASSISTANT

## 2022-06-14 RX ORDER — CHLORHEXIDINE GLUCONATE 4 G/100ML
SOLUTION TOPICAL
Qty: 236 ML | Refills: 0 | Status: SHIPPED | OUTPATIENT
Start: 2022-06-14 | End: 2022-06-28

## 2022-06-14 RX ORDER — GREEN TEA/HOODIA GORDONII 315-12.5MG
1 CAPSULE ORAL DAILY
Qty: 30 TABLET | Refills: 1 | Status: SHIPPED | OUTPATIENT
Start: 2022-06-14 | End: 2022-08-13

## 2022-06-14 RX ORDER — CLINDAMYCIN HYDROCHLORIDE 300 MG/1
300 CAPSULE ORAL 3 TIMES DAILY
Qty: 30 CAPSULE | Refills: 0 | Status: SHIPPED | OUTPATIENT
Start: 2022-06-14 | End: 2022-06-20 | Stop reason: SDUPTHER

## 2022-06-14 ASSESSMENT — ENCOUNTER SYMPTOMS
GASTROINTESTINAL NEGATIVE: 1
EYES NEGATIVE: 1
COLOR CHANGE: 1
RESPIRATORY NEGATIVE: 1

## 2022-06-14 NOTE — PATIENT INSTRUCTIONS
Patient Education     Skin Abscess: Care Instructions  Overview     A skin abscess is a bacterial infection that forms a pocket of pus. A boil is a kind of skin abscess. The doctor may have cut an opening in the abscess so that the pus can drain out. You may have gauze in the cut so that the abscess will stay open and keep draining. You may need antibiotics. You will need to followup with your doctor to make sure the infection has gone away. The doctor has checked you carefully, but problems can develop later. If you notice any problems or new symptoms, get medical treatment right away. Follow-up care is a key part of your treatment and safety. Be sure to make and go to all appointments, and call your doctor if you are having problems. It's also a good idea to know your test results and keep alist of the medicines you take. How can you care for yourself at home?  Apply warm and dry compresses, a heating pad set on low, or a hot water bottle 3 or 4 times a day for pain. Keep a cloth between the heat source and your skin.  If your doctor prescribed antibiotics, take them as directed. Do not stop taking them just because you feel better. You need to take the full course of antibiotics.  Take pain medicines exactly as directed. ? If the doctor gave you a prescription medicine for pain, take it as prescribed. ? If you are not taking a prescription pain medicine, ask your doctor if you can take an over-the-counter medicine.  Keep your bandage clean and dry. Change the bandage whenever it gets wet or dirty, or at least one time a day.  If the abscess was packed with gauze:  ? Keep follow-up appointments to have the gauze changed or removed. If the doctor instructed you to remove the gauze, follow the instructions you were given for how to remove it. ? After the gauze is removed, soak the area in warm water for 15 to 20 minutes 2 times a day, until the wound closes. When should you call for help?    Call your doctor now or seek immediate medical care if:     You have signs of worsening infection, such as:  ? Increased pain, swelling, warmth, or redness. ? Red streaks leading from the infected skin. ? Pus draining from the wound. ? A fever. Watch closely for changes in your health, and be sure to contact your doctor if:     You do not get better as expected. Where can you learn more? Go to https://NOW! InnovationspeAdChoiceeb.Imsys. org and sign in to your Actifio account. Enter D765 in the [facility] box to learn more about \"Skin Abscess: Care Instructions. \"     If you do not have an account, please click on the \"Sign Up Now\" link. Current as of: November 15, 2021               Content Version: 13.2  © 0225-8120 [employer]. Care instructions adapted under license by [facility] ([facility]). If you have questions about a medical condition or this instruction, always ask your healthcare professional. [place] [address] any warranty or liability for your use of this information. Cellulitis: Care Instructions  Your Care Instructions     Cellulitis is a skin infection caused by bacteria, most often strep or staph. It often occurs after a break in the skin from a scrape, cut, bite, orpuncture, or after a rash. Cellulitis may be treated without doing tests to find out what caused it. But your doctor may do tests, if needed, to look for a specific bacteria, like methicillin-resistant Staphylococcus aureus (MRSA). The doctor has checked you carefully, but problems can develop later. If you notice any problems or new symptoms, get medical treatment right away. Follow-up care is a key part of your treatment and safety. Be sure to make and go to all appointments, and call your doctor if you are having problems. It's also a good idea to know your test results and keep alist of the medicines you take. How can you care for yourself at home?  Take your antibiotics as directed. Do not stop taking them just because you feel better. You need to take the full course of antibiotics.  Prop up the infected area on pillows to reduce pain and swelling. Try to keep the area above the level of your heart as often as you can.  If your doctor told you how to care for your wound, follow your doctor's instructions. If you did not get instructions, follow this general advice:  ? Wash the wound with clean water 2 times a day. Don't use hydrogen peroxide or alcohol, which can slow healing. ? You may cover the wound with a thin layer of petroleum jelly, such as Vaseline, and a nonstick bandage. ? Apply more petroleum jelly and replace the bandage as needed.  Be safe with medicines. Take pain medicines exactly as directed. ? If the doctor gave you a prescription medicine for pain, take it as prescribed. ? If you are not taking a prescription pain medicine, ask your doctor if you can take an over-the-counter medicine. To prevent cellulitis in the future   Try to prevent cuts, scrapes, or other injuries to your skin. Cellulitis most often occurs where there is a break in the skin.  If you get a scrape, cut, mild burn, or bite, wash the wound with clean water as soon as you can to help avoid infection. Don't use hydrogen peroxide or alcohol, which can slow healing.  If you have swelling in your legs (edema), support stockings and good skin care may help prevent leg sores and cellulitis.  Take care of your feet, especially if you have diabetes or other conditions that increase the risk of infection. Wear shoes and socks. Do not go barefoot. If you have athlete's foot or other skin problems on your feet, talk to your doctor about how to treat them. When should you call for help? Call your doctor now or seek immediate medical care if:     You have signs that your infection is getting worse, such as:  ? Increased pain, swelling, warmth, or redness. ? Red streaks leading from the area. ?  Pus draining from the area. ? A fever.      You get a rash. Watch closely for changes in your health, and be sure to contact your doctor if:     You do not get better as expected. Where can you learn more? Go to https://jennifer.POINT 3 Basketball. org and sign in to your Connect2me account. Enter C375 in the [facility] box to learn more about \"Cellulitis: Care Instructions. \"     If you do not have an account, please click on the \"Sign Up Now\" link. Current as of: November 15, 2021               Content Version: 13.2  © 3193-5333 [employer]. Care instructions adapted under license by [facility] ([facility]). If you have questions about a medical condition or this instruction, always ask your healthcare professional. [address] any warranty or liability for your use of this information. Skin Abscess: Care Instructions  Overview     A skin abscess is a bacterial infection that forms a pocket of pus. A boil is a kind of skin abscess. The doctor may have cut an opening in the abscess so that the pus can drain out. You may have gauze in the cut so that the abscess will stay open and keep draining. You may need antibiotics. You will need to followup with your doctor to make sure the infection has gone away. The doctor has checked you carefully, but problems can develop later. If you notice any problems or new symptoms, get medical treatment right away. Follow-up care is a key part of your treatment and safety. Be sure to make and go to all appointments, and call your doctor if you are having problems. It's also a good idea to know your test results and keep alist of the medicines you take. How can you care for yourself at home?  Apply warm and dry compresses, a heating pad set on low, or a hot water bottle 3 or 4 times a day for pain. Keep a cloth between the heat source and your skin.  If your doctor prescribed antibiotics, take them as directed.  Do not stop taking them just because you feel better. You need to take the full course of antibiotics.  Take pain medicines exactly as directed. ? If the doctor gave you a prescription medicine for pain, take it as prescribed. ? If you are not taking a prescription pain medicine, ask your doctor if you can take an over-the-counter medicine.  Keep your bandage clean and dry. Change the bandage whenever it gets wet or dirty, or at least one time a day.  If the abscess was packed with gauze:  ? Keep follow-up appointments to have the gauze changed or removed. If the doctor instructed you to remove the gauze, follow the instructions you were given for how to remove it. ? After the gauze is removed, soak the area in warm water for 15 to 20 minutes 2 times a day, until the wound closes. When should you call for help? Call your doctor now or seek immediate medical care if:     You have signs of worsening infection, such as:  ? Increased pain, swelling, warmth, or redness. ? Red streaks leading from the infected skin. ? Pus draining from the wound. ? A fever. Watch closely for changes in your health, and be sure to contact your doctor if:     You do not get better as expected. Where can you learn more? Go to https://StoneRiver.Nascentric. org and sign in to your [employer] account. Enter N564 in the [facility] box to learn more about \"Skin Abscess: Care Instructions. \"     If you do not have an account, please click on the \"Sign Up Now\" link. Current as of: November 15, 2021               Content Version: 13.2  © 2006-2022 [employer]. Care instructions adapted under license by [facility] ([facility]). If you have questions about a medical condition or this instruction, always ask your healthcare professional. [address] any warranty or liability for your use of this information.

## 2022-06-14 NOTE — PROGRESS NOTES
[address] [facility] PRIMARY CARE  [address]  Dept: [phone]  Loc: [phone]     Pt Name: Tari Ashton  MRN: [id number]  [place] 1995      HISTORY OF PRESENT ILLNESS    Tari Ashton is a 24 y.o. male who presents to the [facility] with chief complaint of possible infection to face. He states he had an open wound that healed recently and then it got red again a few days ago. He tried to squeeze it yesterday and then it got worse. He denies fever and says it hard to the touch now. He has had a history of MRSA in the past.         REVIEW OF SYSTEMS       Review of Systems   Constitutional: Negative. HENT: Negative. Eyes: Negative. Respiratory: Negative. Cardiovascular: Negative. Gastrointestinal: Negative. Endocrine: Negative. Genitourinary: Negative. Musculoskeletal: Negative. Skin: Positive for color change and wound. Right facial area   Neurological: Negative. Psychiatric/Behavioral: Negative.           PAST MEDICAL HISTORY     Past Medical History:   Diagnosis Date    ADHD (attention deficit hyperactivity disorder)     Asperger syndrome     Asthma     Asthma 3/31/2015    Bipolar 1 disorder, mixed, severe ([address] 2/5/2018    Chemical dependency ([address]     marijuana    Diabetes mellitus ([address]     Diabetes mellitus type 1 ([address]     Hepatitis C     Hepatitis C     Hyperkalemia, diminished renal excretion 11/9/2017    Mental disorder     ODD (oppositional defiant disorder)     Seasonal allergies 3/31/2015    Seizures ([address]          SURGICAL HISTORY       Past Surgical History:   Procedure Laterality Date    ABDOMEN SURGERY Left 10/7/2019    INCISION  & DRAINAGE OF ABSCESS LEFT ABDOMINAL WALL performed by Nader Jj MD at [address] [place] [address] N/A 10/21/2019    INCISION AND DRAINAGE OF RECURRENT ABDOMINAL WALL ABSCESS ROOM [facility] performed by Nader Jj MD at [facility] CURRENT MEDICATIONS       Current Outpatient Medications   Medication Sig Dispense Refill    clindamycin (CLEOCIN) 300 MG capsule Take 1 capsule by mouth 3 times daily for 10 days 30 capsule 0    Probiotic Acidophilus (FLORANEX) TABS Take 1 tablet by mouth daily 30 tablet 1    mupirocin (BACTROBAN) 2 % ointment Apply topically 2 times daily inside nose and on skin lesion. 22 g 1    chlorhexidine (HIBICLENS) 4 % external liquid Dilute by 50% and wash from head to toe x 10 days at least once a day 236 mL 0    ondansetron (ZOFRAN) 4 MG tablet Take 1 tablet by mouth every 6-8 hours as needed for Nausea or Vomiting 30 tablet 0    insulin aspart (NOVOLOG) 100 UNIT/ML injection vial Use via insulin pump max daily dose 100 units 30 mL 3    QUEtiapine (SEROQUEL XR) 50 MG extended release tablet Take 1 tablet by mouth nightly 30 tablet 5    QUEtiapine (SEROQUEL XR) 300 MG extended release tablet Take 1 tablet by mouth nightly 30 tablet 5    busPIRone (BUSPAR) 15 MG tablet Take 15 mg by mouth 3 times daily 45 tablet 5    lithium (LITHOBID) 300 MG extended release tablet Take 1 tablet by mouth 2 times daily 60 tablet 5    naltrexone (DEPADE) 50 MG tablet Take 1 tablet by mouth daily 30 tablet 5    lisinopril (PRINIVIL;ZESTRIL) 2.5 MG tablet Take 1 tablet by mouth daily 30 tablet 5    nicotine (NICODERM CQ) 21 MG/24HR Place 1 patch onto the skin daily 42 patch 0    Lancets MISC Test 3x daily 100 each 3    blood glucose monitor strips Test 3x daily 100 strip 3    blood glucose monitor kit and supplies Give 1 meter covered by insurance 1 kit 0    omeprazole (PRILOSEC) 40 MG delayed release capsule Take 1 capsule by mouth every morning (before breakfast) for 14 days 14 capsule 1    albuterol sulfate  (90 Base) MCG/ACT inhaler Use 2 puffs 4 times daily for 7 days then as needed for wheezing. Dispense with Spacer and instruct in use. At patient's preference may use 60 dose MDI.  May Sub Pro-Air or Proventil as needed per insurance. 1 each 5    Blood Glucose Monitoring Suppl (CONTOUR NEXT ONE) KIT 1 Device by Does not apply route daily 1 kit 0    Insulin Pen Needle (PEN NEEDLES) 32G X 6 MM MISC 1 Device by Does not apply route 4 times daily 100 each 5    BD INSULIN SYRINGE U/F 31G X 5/16\" 0.5 ML MISC use 4 daily 100 each 3    CONTOUR NEXT TEST strip Test 6x daily Dx E10.65 200 each 3     No current facility-administered medications for this visit. ALLERGIES     Bactrim [sulfamethoxazole-trimethoprim], Dust mite extract, Mirtazapine, Other, Oxcarbazepine, and Shrimp flavor    FAMILY HISTORY       Family History   Problem Relation Age of Onset    Cancer Maternal Aunt         breast    Diabetes Maternal Uncle     Diabetes Paternal Uncle     Cancer Maternal Grandmother     Diabetes Maternal Grandmother     Cancer Maternal Grandfather     Diabetes Maternal Grandfather           SOCIAL HISTORY       Social History     Socioeconomic History    Marital status: Single     Spouse name: None    Number of children: 0    Years of education: 6    Highest education level: None   Occupational History    None   Tobacco Use    Smoking status: Current Every Day Smoker     Packs/day: 1.00     Years: 5.00     Pack years: 5.00     Types: Cigarettes    Smokeless tobacco: Never Used    Tobacco comment: pt aware of risk   Vaping Use    Vaping Use: Never used   Substance and Sexual Activity    Alcohol use: Not Currently     Alcohol/week: 0.0 standard drinks     Comment: Had been drinking whiskey and vodka on a regular basis about 6 months ago. Nothing since.     Drug use: Yes     Types: Methamphetamines (Crystal Meth), Marijuana (Duane Aldana), IV, Cocaine, Opiates      Comment: occassionally, last use 2 weeks ago    Sexual activity: Yes     Partners: Female     Comment: No protection or birth-control plan   Other Topics Concern    None   Social History Narrative    ** Merged History Encounter **          Social Determinants of Health     Financial Resource Strain: Low Risk     Difficulty of Paying Living Expenses: Not very hard   Food Insecurity: No Food Insecurity    Worried About Running Out of Food in the Last Year: Never true    Ran Out of Food in the Last Year: Never true   Transportation Needs:     Lack of Transportation (Medical): Not on file    Lack of Transportation (Non-Medical): Not on file   Physical Activity:     Days of Exercise per Week: Not on file    Minutes of Exercise per Session: Not on file   Stress:     Feeling of Stress : Not on file   Social Connections:     Frequency of Communication with Friends and Family: Not on file    Frequency of Social Gatherings with Friends and Family: Not on file    Attends Restoration Services: Not on file    Active Member of [address] [place] [address] [employer] or Organizations: Not on file    Attends Club or Organization Meetings: Not on file    Marital Status: Not on file   Intimate Partner Violence:     Fear of Current or Ex-Partner: Not on file    Emotionally Abused: Not on file    Physically Abused: Not on file    Sexually Abused: Not on file   Housing Stability:     Unable to Pay for Housing in the Last Year: Not on file    Number of [place] in the Last Year: Not on file    Unstable Housing in the Last Year: Not on file         PHYSICAL EXAM    (up to 7 for level 4, 8 or more for level 5)       Physical Exam  Constitutional:       General: He is not in acute distress. Appearance: He is well-developed. HENT:      Head: Normocephalic and atraumatic. Eyes:      Conjunctiva/sclera: Conjunctivae normal.      Pupils: Pupils are equal, round, and reactive to light. Cardiovascular:      Rate and Rhythm: Normal rate and regular rhythm. Heart sounds: No murmur heard. Pulmonary:      Effort: No respiratory distress. Breath sounds: Normal breath sounds. No wheezing or rales. Abdominal:      General: There is no distension.       Palpations: Abdomen is soft.      Tenderness: There is no abdominal tenderness. Musculoskeletal:         General: Normal range of motion. Cervical back: Normal range of motion and neck supple. Skin:     General: Skin is warm and dry. Findings: Abscess, erythema and lesion present. No rash. Comments: Non fluctuant abscess     Neurological:      Mental Status: He is alert and oriented to person, place, and time. Cranial Nerves: No cranial nerve deficit. Psychiatric:         Judgment: Judgment normal.           All other labs were within normal range or not returned as of this dictation. Vitals:    Vitals:    06/14/22 1519   BP: 130/80   Site: Right Upper Arm   Position: Sitting   Cuff Size: Medium Adult   Pulse: (!) 108   Temp: 99 °F (37.2 °C)   TempSrc: Temporal   SpO2: 97%   Weight: 160 lb (72.6 kg)   Height: 5' 6\" (1.676 m)       Patient will be placed on Clindamycin, probiotics, hibiclens wash, and mupirocin. FU with Dermatology if symptoms worsen or do not improve. He verbalizes understanding of plan at discharge. DISPOSITION/PLAN   1. Abscess      2.  Facial cellulitis

## 2022-06-20 ENCOUNTER — OFFICE VISIT (OUTPATIENT)
Dept: FAMILY MEDICINE CLINIC | Age: 27
End: 2022-06-20
Payer: MEDICARE

## 2022-06-20 VITALS — BODY MASS INDEX: 25.71 KG/M2 | TEMPERATURE: 97.9 F | WEIGHT: 160 LBS | HEIGHT: 66 IN

## 2022-06-20 DIAGNOSIS — R73.9 HYPERGLYCEMIA: ICD-10-CM

## 2022-06-20 DIAGNOSIS — L03.211 FACIAL CELLULITIS: ICD-10-CM

## 2022-06-20 DIAGNOSIS — L02.91 ABSCESS: Primary | ICD-10-CM

## 2022-06-20 PROCEDURE — 4004F PT TOBACCO SCREEN RCVD TLK: CPT | Performed by: FAMILY MEDICINE

## 2022-06-20 PROCEDURE — 99214 OFFICE O/P EST MOD 30 MIN: CPT | Performed by: FAMILY MEDICINE

## 2022-06-20 PROCEDURE — G8419 CALC BMI OUT NRM PARAM NOF/U: HCPCS | Performed by: FAMILY MEDICINE

## 2022-06-20 PROCEDURE — G8427 DOCREV CUR MEDS BY ELIG CLIN: HCPCS | Performed by: FAMILY MEDICINE

## 2022-06-20 RX ORDER — CLINDAMYCIN HYDROCHLORIDE 300 MG/1
300 CAPSULE ORAL 3 TIMES DAILY
Qty: 30 CAPSULE | Refills: 0 | Status: SHIPPED | OUTPATIENT
Start: 2022-06-20 | End: 2022-06-30

## 2022-06-20 ASSESSMENT — ENCOUNTER SYMPTOMS: COLOR CHANGE: 1

## 2022-06-20 NOTE — PROGRESS NOTES
Diagnosis Orders   1. Abscess  clindamycin (CLEOCIN) 300 MG capsule   2. Facial cellulitis  clindamycin (CLEOCIN) 300 MG capsule   3. Hyperglycemia       Return if symptoms worsen or fail to improve. Patient Instructions     Skin condition improving. Gave patient a refill of medication so that if he starts to notice a significant swelling similar to what happened this time he can start antibiotics right away. As long as this is only happening 2 or 3 times a year would be fine for him to do that and will help control the situation more quickly and safely. Patient is back on track with his blood sugar at this time. Agree with staying home from work for risk of hypoglycemia related to high insulin dosing. Patient Education        Skin Abscess: Care Instructions  Overview     A skin abscess is a bacterial infection that forms a pocket of pus. A boil is a kind of skin abscess. The doctor may have cut an opening in the abscess so that the pus can drain out. You may have gauze in the cut so that the abscess will stay open and keep draining. You may need antibiotics. You will need to followup with your doctor to make sure the infection has gone away. The doctor has checked you carefully, but problems can develop later. If you notice any problems or new symptoms, get medical treatment right away. Follow-up care is a key part of your treatment and safety. Be sure to make and go to all appointments, and call your doctor if you are having problems. It's also a good idea to know your test results and keep alist of the medicines you take. How can you care for yourself at home?  Apply warm and dry compresses, a heating pad set on low, or a hot water bottle 3 or 4 times a day for pain. Keep a cloth between the heat source and your skin.  If your doctor prescribed antibiotics, take them as directed. Do not stop taking them just because you feel better. You need to take the full course of antibiotics.    Take pain medicines exactly as directed. ? If the doctor gave you a prescription medicine for pain, take it as prescribed. ? If you are not taking a prescription pain medicine, ask your doctor if you can take an over-the-counter medicine.  Keep your bandage clean and dry. Change the bandage whenever it gets wet or dirty, or at least one time a day.  If the abscess was packed with gauze:  ? Keep follow-up appointments to have the gauze changed or removed. If the doctor instructed you to remove the gauze, follow the instructions you were given for how to remove it. ? After the gauze is removed, soak the area in warm water for 15 to 20 minutes 2 times a day, until the wound closes. When should you call for help? Call your doctor now or seek immediate medical care if:     You have signs of worsening infection, such as:  ? Increased pain, swelling, warmth, or redness. ? Red streaks leading from the infected skin. ? Pus draining from the wound. ? A fever. Watch closely for changes in your health, and be sure to contact your doctor if:     You do not get better as expected. Where can you learn more? Go to https://ZUGGIpeLiteScape Technologieseb.[employer]. org and sign in to your [employer] account. Enter A288 in the [facility] box to learn more about \"Skin Abscess: Care Instructions. \"     If you do not have an account, please click on the \"Sign Up Now\" link. Current as of: November 15, 2021               Content Version: 13.2  © 2006-2022 Healthwise, Incorporated. Care instructions adapted under license by [facility] ([facility]). If you have questions about a medical condition or this instruction, always ask your healthcare professional. [place] [address] any warranty or liability for your use of this information.              Subjective:      Patient ID: Dimitri Field is a 24 y.o. 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 Recently treated for swelling of the jaw and infected lesion. Responding well to clindamycin 300 mg 3 times a day. He is on approximately day 4 and feels this issue is resolving nicely. No further concerns at this time. Gets this type of infection 2-3 times a year. Patient states prior to this infection he had noticed his blood sugars to be going to the 3 and 400s and today it was 600. He had to take extra insulin and is concerned about going to work because of the risk of blood sugar dropping. He states he woke up this morning and his insulin pump was out. He thinks he pulled it out in his sleep. Current Outpatient Medications on File Prior to Visit   Medication Sig Dispense Refill    Probiotic Acidophilus (FLORANEX) TABS Take 1 tablet by mouth daily 30 tablet 1    mupirocin (BACTROBAN) 2 % ointment Apply topically 2 times daily inside nose and on skin lesion.  22 g 1    chlorhexidine (HIBICLENS) 4 % external liquid Dilute by 50% and wash from head to toe x 10 days at least once a day 236 mL 0    ondansetron (ZOFRAN) 4 MG tablet Take 1 tablet by mouth every 6-8 hours as needed for Nausea or Vomiting 30 tablet 0    insulin aspart (NOVOLOG) 100 UNIT/ML injection vial Use via insulin pump max daily dose 100 units 30 mL 3    QUEtiapine (SEROQUEL XR) 50 MG extended release tablet Take 1 tablet by mouth nightly 30 tablet 5    QUEtiapine (SEROQUEL XR) 300 MG extended release tablet Take 1 tablet by mouth nightly 30 tablet 5    busPIRone (BUSPAR) 15 MG tablet Take 15 mg by mouth 3 times daily 45 tablet 5    lithium (LITHOBID) 300 MG extended release tablet Take 1 tablet by mouth 2 times daily 60 tablet 5    naltrexone (DEPADE) 50 MG tablet Take 1 tablet by mouth daily 30 tablet 5    lisinopril (PRINIVIL;ZESTRIL) 2.5 MG tablet Take 1 tablet by mouth daily 30 tablet 5  nicotine (NICODERM CQ) 21 MG/24HR Place 1 patch onto the skin daily 42 patch 0    Lancets MISC Test 3x daily 100 each 3    blood glucose monitor strips Test 3x daily 100 strip 3    blood glucose monitor kit and supplies Give 1 meter covered by insurance 1 kit 0    omeprazole (PRILOSEC) 40 MG delayed release capsule Take 1 capsule by mouth every morning (before breakfast) for 14 days 14 capsule 1    albuterol sulfate  (90 Base) MCG/ACT inhaler Use 2 puffs 4 times daily for 7 days then as needed for wheezing. Dispense with Spacer and instruct in use. At patient's preference may use 60 dose MDI. May Sub Pro-Air or Proventil as needed per insurance. 1 each 5    Blood Glucose Monitoring Suppl (CONTOUR NEXT ONE) KIT 1 Device by Does not apply route daily 1 kit 0    Insulin Pen Needle (PEN NEEDLES) 32G X 6 MM MISC 1 Device by Does not apply route 4 times daily 100 each 5    BD INSULIN SYRINGE U/F 31G X 5/16\" 0.5 ML MISC use 4 daily 100 each 3    CONTOUR NEXT TEST strip Test 6x daily Dx E10.65 200 each 3     No current facility-administered medications on file prior to visit.      Past Medical History:   Diagnosis Date    ADHD (attention deficit hyperactivity disorder)     Asperger syndrome     Asthma     Asthma 3/31/2015    Bipolar 1 disorder, mixed, severe ([address] 2/5/2018    Chemical dependency ([facility]     marijuana    Diabetes mellitus ([facility]     Diabetes mellitus type 1 ([facility]     Hepatitis C     Hepatitis C     Hyperkalemia, diminished renal excretion 11/9/2017    Mental disorder     ODD (oppositional defiant disorder)     Seasonal allergies 3/31/2015    Seizures ([facility]      Past Surgical History:   Procedure Laterality Date    ABDOMEN SURGERY Left 10/7/2019    INCISION  & DRAINAGE OF ABSCESS LEFT ABDOMINAL WALL performed by Marita Gray MD at [facility] [address] N/A 10/21/2019    INCISION AND DRAINAGE OF RECURRENT ABDOMINAL WALL ABSCESS ROOM 475 performed by Marita Gray MD at [facility]     Social History     Socioeconomic History    Marital status: Single     Spouse name: Not on file    Number of children: 0    Years of education: 6    Highest education level: Not on file   Occupational History    Not on file   Tobacco Use    Smoking status: Current Every Day Smoker     Packs/day: 1.00     Years: 5.00     Pack years: 5.00     Types: Cigarettes    Smokeless tobacco: Never Used    Tobacco comment: pt aware of risk   Vaping Use    Vaping Use: Never used   Substance and Sexual Activity    Alcohol use: Not Currently     Alcohol/week: 0.0 standard drinks     Comment: Had been drinking whiskey and vodka on a regular basis about 6 months ago. Nothing since.  Drug use: Yes     Types: Methamphetamines (Crystal Meth), Marijuana (Andrea Jennifer), IV, Cocaine, Opiates      Comment: occassionally, last use 2 weeks ago    Sexual activity: Yes     Partners: Female     Comment: No protection or birth-control plan   Other Topics Concern    Not on file   Social History Narrative    ** Merged History Encounter **          Social Determinants of Health     Financial Resource Strain: Low Risk     Difficulty of Paying Living Expenses: Not very hard   Food Insecurity: No Food Insecurity    Worried About Running Out of Food in the Last Year: Never true    Tam of Food in the Last Year: Never true   Transportation Needs:     Lack of Transportation (Medical): Not on file    Lack of Transportation (Non-Medical):  Not on file   Physical Activity:     Days of Exercise per Week: Not on file    Minutes of Exercise per Session: Not on file   Stress:     Feeling of Stress : Not on file   Social Connections:     Frequency of Communication with Friends and Family: Not on file    Frequency of Social Gatherings with Friends and Family: Not on file    Attends [employer]ats[employer] Services: Not on file    Active Member of Clubs or Organizations: Not on file    Attends Club or Organization Meetings: Not on file   [facility] Marital Status: Not on file   Intimate Partner Violence:     Fear of Current or Ex-Partner: Not on file    Emotionally Abused: Not on file    Physically Abused: Not on file    Sexually Abused: Not on file   Housing Stability:     Unable to Pay for Housing in the Last Year: Not on file    Number of [place] in the Last Year: Not on file    Unstable Housing in the Last Year: Not on file     Family History   Problem Relation Age of Onset    Cancer Maternal Aunt         breast    Diabetes Maternal Uncle     Diabetes Paternal Uncle     Cancer Maternal Grandmother     Diabetes Maternal Grandmother     Cancer Maternal Grandfather     Diabetes Maternal Grandfather      Allergies:  Bactrim [sulfamethoxazole-trimethoprim], Dust mite extract, Mirtazapine, Other, Oxcarbazepine, and Shrimp flavor    Review of Systems   Constitutional: Negative for chills and fever. Skin: Positive for color change and wound. Allergic/Immunologic: Negative for environmental allergies, food allergies and immunocompromised state. Hematological: Negative for adenopathy. Does not bruise/bleed easily. Psychiatric/Behavioral: Negative for behavioral problems and sleep disturbance. Objective:   Temp 97.9 °F (36.6 °C)   Ht 5' 6\" (1.676 m)   Wt 160 lb (72.6 kg)   BMI 25.82 kg/m²     Physical Exam  Constitutional:       General: He is not in acute distress. Appearance: Normal appearance. He is well-developed. He is not toxic-appearing. HENT:      Head: Normocephalic and atraumatic. Right Ear: Hearing and tympanic membrane normal.      Left Ear: Hearing and tympanic membrane normal.      Nose: Nose normal. No nasal deformity. Eyes:      General: Lids are normal.         Right eye: No discharge. Left eye: No discharge. Conjunctiva/sclera: Conjunctivae normal.      Pupils: Pupils are equal, round, and reactive to light. Neck:      Thyroid: No thyroid mass or thyromegaly. Vascular: No JVD. Trachea: Trachea and phonation normal.   Cardiovascular:      Rate and Rhythm: Normal rate and regular rhythm. Pulmonary:      Effort: No accessory muscle usage or respiratory distress. Musculoskeletal:      Cervical back: Full passive range of motion without pain. Comments: FROM all large muscle groups and joints as witnessed when walking to exam table, getting on, and getting off the exam table. Skin:     General: Skin is warm and dry. Findings: No rash. Comments: Right jawline has erythematous open lesion without induration at this time. No purulent drainage. No surrounding erythema or induration of the skin. Neurological:      Mental Status: He is alert. Motor: No tremor or atrophy. Gait: Gait normal.   Psychiatric:         Speech: Speech normal.         Behavior: Behavior normal.         Thought Content: Thought content normal.         No results found for this visit on 06/20/22. Recent Results (from the past 2016 hour(s))   POCT Glucose    Collection Time: 04/18/22  1:51 PM   Result Value Ref Range    Glucose 462 mg/dL    QC OK? POCT glycosylated hemoglobin (Hb A1C)    Collection Time: 04/18/22  2:04 PM   Result Value Ref Range    Hemoglobin A1C 10.1 %   POCT Influenza A/B    Collection Time: 05/05/22  1:33 PM   Result Value Ref Range    Influenza A Ab n     Influenza B Ab n    POCT COVID-19, Antigen    Collection Time: 05/05/22  1:33 PM   Result Value Ref Range    SARS-COV-2, POC Not-Detected Not Detected    Lot Number [id number]     QC Pass/Fail p     Performing Instrument BD Veritor        [] Pt was seen by provider for      Minutes  Counseling and coordination of care was done for all assessment diagnosis listed for today with patient and any family/friend present. More than 50% of this visit was spent coordinating current care, obtaining information for prior records, and counseling for current plan of action. Assessment:       Diagnosis Orders   1. Abscess  clindamycin (CLEOCIN) 300 MG capsule   2. Facial cellulitis  clindamycin (CLEOCIN) 300 MG capsule   3. Hyperglycemia           No orders of the defined types were placed in this encounter. Orders Placed This Encounter   Medications    clindamycin (CLEOCIN) 300 MG capsule     Sig: Take 1 capsule by mouth 3 times daily for 10 days     Dispense:  30 capsule     Refill:  0          Medication List          Accurate as of June 20, 2022 11:09 AM. If you have any questions, ask your nurse or doctor. CONTINUE taking these medications    albuterol sulfate  (90 Base) MCG/ACT inhaler  Commonly known as: PROVENTIL;VENTOLIN;PROAIR  Use 2 puffs 4 times daily for 7 days then as needed for wheezing. Dispense with Spacer and instruct in use. At patient's preference may use 60 dose MDI. May Sub Pro-Air or Proventil as needed per insurance.      BD Insulin Syringe U/F 31G X 5/16\" 0.5 ML Misc  Generic drug: Insulin Syringe-Needle U-100  use 4 daily     busPIRone 15 MG tablet  Commonly known as: BUSPAR  Take 15 mg by mouth 3 times daily     chlorhexidine 4 % external liquid  Commonly known as: HIBICLENS  Dilute by 50% and wash from head to toe x 10 days at least once a day     clindamycin 300 MG capsule  Commonly known as: CLEOCIN  Take 1 capsule by mouth 3 times daily for 10 days     * Contour Next One Kit  1 Device by Does not apply route daily     * blood glucose monitor kit and supplies  Give 1 meter covered by insurance     * Contour Next Test strip  Generic drug: blood glucose test strips  Test 6x daily Dx E10.65     * blood glucose test strips  Test 3x daily     insulin aspart 100 UNIT/ML injection vial  Commonly known as: NovoLOG  Use via insulin pump max daily dose 100 units     Lancets Misc  Test 3x daily     lisinopril 2.5 MG tablet  Commonly known as: PRINIVIL;ZESTRIL  Take 1 tablet by mouth daily     lithium 300 MG extended release tablet  Commonly known as: LITHOBID  Take 1 tablet by mouth 2 times daily     mupirocin 2 % ointment  Commonly known as: BACTROBAN  Apply topically 2 times daily inside nose and on skin lesion. naltrexone 50 MG tablet  Commonly known as: DEPADE  Take 1 tablet by mouth daily     nicotine 21 MG/24HR  Commonly known as: [address] [facility] 1 patch onto the skin daily     omeprazole 40 MG delayed release capsule  Commonly known as: PRILOSEC  Take 1 capsule by mouth every morning (before breakfast) for 14 days     ondansetron 4 MG tablet  Commonly known as: ZOFRAN  Take 1 tablet by mouth every 6-8 hours as needed for Nausea or Vomiting     Pen Needles 32G X 6 MM Misc  1 Device by Does not apply route 4 times daily     Probiotic Acidophilus Tabs  Take 1 tablet by mouth daily     * QUEtiapine 50 MG extended release tablet  Commonly known as: SEROQUEL XR  Take 1 tablet by mouth nightly     * QUEtiapine 300 MG extended release tablet  Commonly known as: SEROQUEL XR  Take 1 tablet by mouth nightly         * This list has 6 medication(s) that are the same as other medications prescribed for you. Read the directions carefully, and ask your doctor or other care provider to review them with you. Where to Get Your Medications      These medications were sent to [address] [place] [address],  Lamgopal Espino  [address], [address]    Phone: [phone]   · clindamycin 300 MG capsule           Plan:   Return if symptoms worsen or fail to improve. Patient Instructions     Skin condition improving. Gave patient a refill of medication so that if he starts to notice a significant swelling similar to what happened this time he can start antibiotics right away. As long as this is only happening 2 or 3 times a year would be fine for him to do that and will help control the situation more quickly and safely. Patient is back on track with his blood sugar at this time.   Agree with staying home from work for risk of hypoglycemia related to high insulin dosing. Patient Education        Skin Abscess: Care Instructions  Overview     A skin abscess is a bacterial infection that forms a pocket of pus. A boil is a kind of skin abscess. The doctor may have cut an opening in the abscess so that the pus can drain out. You may have gauze in the cut so that the abscess will stay open and keep draining. You may need antibiotics. You will need to followup with your doctor to make sure the infection has gone away. The doctor has checked you carefully, but problems can develop later. If you notice any problems or new symptoms, get medical treatment right away. Follow-up care is a key part of your treatment and safety. Be sure to make and go to all appointments, and call your doctor if you are having problems. It's also a good idea to know your test results and keep alist of the medicines you take. How can you care for yourself at home?  Apply warm and dry compresses, a heating pad set on low, or a hot water bottle 3 or 4 times a day for pain. Keep a cloth between the heat source and your skin.  If your doctor prescribed antibiotics, take them as directed. Do not stop taking them just because you feel better. You need to take the full course of antibiotics.  Take pain medicines exactly as directed. ? If the doctor gave you a prescription medicine for pain, take it as prescribed. ? If you are not taking a prescription pain medicine, ask your doctor if you can take an over-the-counter medicine.  Keep your bandage clean and dry. Change the bandage whenever it gets wet or dirty, or at least one time a day.  If the abscess was packed with gauze:  ? Keep follow-up appointments to have the gauze changed or removed. If the doctor instructed you to remove the gauze, follow the instructions you were given for how to remove it. ?  After the gauze is removed, soak the area in warm water for 15 to 20 minutes 2 times a day, until the wound closes. When should you call for help? Call your doctor now or seek immediate medical care if:     You have signs of worsening infection, such as:  ? Increased pain, swelling, warmth, or redness. ? Red streaks leading from the infected skin. ? Pus draining from the wound. ? A fever. Watch closely for changes in your health, and be sure to contact your doctor if:     You do not get better as expected. Where can you learn more? Go to https://PlaceFirst.[employer]. org and sign in to your [employer] account. Enter W876 in the [employer]ASSS[employer] box to learn more about \"Skin Abscess: Care Instructions. \"     If you do not have an account, please click on the \"Sign Up Now\" link. Current as of: November 15, 2021               Content Version: 13.2  © 8608-9582 Healthwise, Incorporated. Care instructions adapted under license by [facility] ([facility]). If you have questions about a medical condition or this instruction, always ask your healthcare professional. [address] any warranty or liability for your use of this information. This note was partially created with the assistance of dictation. This may lead to grammatical or spelling errors. Clark Hilario M.D.

## 2022-06-20 NOTE — PATIENT INSTRUCTIONS
medicine.  Keep your bandage clean and dry. Change the bandage whenever it gets wet or dirty, or at least one time a day.  If the abscess was packed with gauze:  ? Keep follow-up appointments to have the gauze changed or removed. If the doctor instructed you to remove the gauze, follow the instructions you were given for how to remove it. ? After the gauze is removed, soak the area in warm water for 15 to 20 minutes 2 times a day, until the wound closes. When should you call for help? Call your doctor now or seek immediate medical care if:     You have signs of worsening infection, such as:  ? Increased pain, swelling, warmth, or redness. ? Red streaks leading from the infected skin. ? Pus draining from the wound. ? A fever. Watch closely for changes in your health, and be sure to contact your doctor if:     You do not get better as expected. Where can you learn more? Go to https://St. Vibes.Ujogo. org and sign in to your [employer] account. Enter M901 in the PlayFirst box to learn more about \"Skin Abscess: Care Instructions. \"     If you do not have an account, please click on the \"Sign Up Now\" link. Current as of: November 15, 2021               Content Version: 13.2  © 2006-2022 Healthwise, Incorporated. Care instructions adapted under license by [facility] ([facility]). If you have questions about a medical condition or this instruction, always ask your healthcare professional. [place] [address] any warranty or liability for your use of this information.

## 2022-08-08 ENCOUNTER — OFFICE VISIT (OUTPATIENT)
Dept: FAMILY MEDICINE CLINIC | Age: 27
End: 2022-08-08
Payer: MEDICARE

## 2022-08-08 DIAGNOSIS — J40 BRONCHITIS: Primary | ICD-10-CM

## 2022-08-08 DIAGNOSIS — Z72.0 TOBACCO ABUSE: ICD-10-CM

## 2022-08-08 DIAGNOSIS — R09.89 CHEST CONGESTION: ICD-10-CM

## 2022-08-08 LAB
Lab: NORMAL
PERFORMING INSTRUMENT: NORMAL
QC PASS/FAIL: NORMAL
SARS-COV-2, POC: NORMAL

## 2022-08-08 PROCEDURE — 87426 SARSCOV CORONAVIRUS AG IA: CPT | Performed by: NURSE PRACTITIONER

## 2022-08-08 PROCEDURE — G8427 DOCREV CUR MEDS BY ELIG CLIN: HCPCS | Performed by: NURSE PRACTITIONER

## 2022-08-08 PROCEDURE — 4004F PT TOBACCO SCREEN RCVD TLK: CPT | Performed by: NURSE PRACTITIONER

## 2022-08-08 PROCEDURE — 99213 OFFICE O/P EST LOW 20 MIN: CPT | Performed by: NURSE PRACTITIONER

## 2022-08-08 PROCEDURE — G8419 CALC BMI OUT NRM PARAM NOF/U: HCPCS | Performed by: NURSE PRACTITIONER

## 2022-08-08 RX ORDER — GUAIFENESIN 600 MG/1
600 TABLET, EXTENDED RELEASE ORAL 2 TIMES DAILY
Qty: 30 TABLET | Refills: 0 | Status: SHIPPED | OUTPATIENT
Start: 2022-08-08 | End: 2022-08-23

## 2022-08-08 RX ORDER — AMOXICILLIN AND CLAVULANATE POTASSIUM 875; 125 MG/1; MG/1
1 TABLET, FILM COATED ORAL 2 TIMES DAILY
Qty: 14 TABLET | Refills: 0 | Status: SHIPPED | OUTPATIENT
Start: 2022-08-08 | End: 2022-08-15

## 2022-08-08 SDOH — ECONOMIC STABILITY: FOOD INSECURITY: WITHIN THE PAST 12 MONTHS, THE FOOD YOU BOUGHT JUST DIDN'T LAST AND YOU DIDN'T HAVE MONEY TO GET MORE.: NEVER TRUE

## 2022-08-08 SDOH — ECONOMIC STABILITY: FOOD INSECURITY: WITHIN THE PAST 12 MONTHS, YOU WORRIED THAT YOUR FOOD WOULD RUN OUT BEFORE YOU GOT MONEY TO BUY MORE.: NEVER TRUE

## 2022-08-08 ASSESSMENT — SOCIAL DETERMINANTS OF HEALTH (SDOH): HOW HARD IS IT FOR YOU TO PAY FOR THE VERY BASICS LIKE FOOD, HOUSING, MEDICAL CARE, AND HEATING?: NOT HARD AT ALL

## 2022-08-08 NOTE — PROGRESS NOTES
Subjective  Whit Steele, 24 y.o. male presents today with:  Chief Complaint   Patient presents with    Other     Sore throat, cough, congestion   Sx started 2-3 weeks ago per pt       HPI  Presents to [facility] for cough and congestion   Symptoms ongoing over the last couple of weeks  Sore throat   Awakening at night with cough   Coughing fits   Cough productive. Doesn't see sputum. Denies SOB   Denies chest pain   Lessened appetite  Hydrating   Not monitoring temp at home   Smoking 1 ppd                         Past Medical History:   Diagnosis Date    ADHD (attention deficit hyperactivity disorder)     Asperger syndrome     Asthma     Asthma 3/31/2015    Bipolar 1 disorder, mixed, severe ([address] 2/5/2018    Chemical dependency ([address]     marijuana    Diabetes mellitus ([address]     Diabetes mellitus type 1 ([address]     Hepatitis C     Hepatitis C     Hyperkalemia, diminished renal excretion 11/9/2017    Mental disorder     ODD (oppositional defiant disorder)     Seasonal allergies 3/31/2015    Seizures ([address]       Past Surgical History:   Procedure Laterality Date    ABDOMEN SURGERY Left 10/7/2019    INCISION  & DRAINAGE OF ABSCESS LEFT ABDOMINAL WALL performed by Brandyn Molina MD at [address] [place] [address] N/A 10/21/2019    INCISION AND DRAINAGE OF RECURRENT ABDOMINAL WALL ABSCESS ROOM 475 performed by Brandyn Molina MD at [facility]     Family History   Problem Relation Age of Onset    Cancer Maternal Aunt         breast    Diabetes Maternal Uncle     Diabetes Paternal Uncle     Cancer Maternal Grandmother     Diabetes Maternal Grandmother     Cancer Maternal Grandfather     Diabetes Maternal Grandfather            Review of Systems   Constitutional:  Positive for appetite change. Negative for activity change, chills, diaphoresis, fatigue and fever. HENT:  Positive for congestion and sore throat. Negative for ear pain, rhinorrhea and trouble swallowing. Respiratory:  Positive for cough and chest tightness. Negative for shortness of breath and wheezing. Cardiovascular:  Negative for chest pain and palpitations. Gastrointestinal:  Negative for abdominal pain, diarrhea and nausea. Musculoskeletal:  Negative for myalgias, neck pain and neck stiffness. Skin:  Negative for rash. Neurological:  Negative for dizziness, light-headedness and headaches. Psychiatric/Behavioral:  Positive for sleep disturbance. PMH, Surgical Hx, Family Hx, and Social Hx reviewed and updated. Objective  Vitals:    08/08/22 1324   BP: 120/72   Site: Right Upper Arm   Position: Sitting   Cuff Size: Small Adult   Pulse: 81   Resp: 16   SpO2: 97%   Weight: 160 lb (72.6 kg)   Height: 5' 6\" (1.676 m)     BP Readings from Last 3 Encounters:   08/08/22 120/72   06/14/22 130/80   05/05/22 118/88     Wt Readings from Last 3 Encounters:   08/08/22 160 lb (72.6 kg)   06/20/22 160 lb (72.6 kg)   06/14/22 160 lb (72.6 kg)         Physical Exam  Vitals reviewed. Constitutional:       General: He is not in acute distress. Appearance: He is ill-appearing. He is not toxic-appearing. HENT:      Right Ear: Tympanic membrane, ear canal and external ear normal.      Left Ear: Tympanic membrane, ear canal and external ear normal.      Nose: Congestion present. Mouth/Throat:      Lips: Pink. Mouth: Mucous membranes are moist.      Pharynx: Oropharynx is clear. Uvula midline. No oropharyngeal exudate, posterior oropharyngeal erythema or uvula swelling. Eyes:      General: Lids are normal.      Conjunctiva/sclera: Conjunctivae normal.   Cardiovascular:      Rate and Rhythm: Normal rate. Pulmonary:      Effort: Pulmonary effort is normal.      Breath sounds: Normal breath sounds and air entry. Musculoskeletal:         General: Normal range of motion. Cervical back: Normal range of motion. No rigidity. Lymphadenopathy:      Cervical: No cervical adenopathy. Skin:     General: Skin is warm and dry. Capillary Refill: Capillary refill takes less than 2 seconds. Coloration: Skin is not pale. Neurological:      General: No focal deficit present. Mental Status: He is alert and oriented to person, place, and time. Assessment & Plan    Diagnosis Orders   1. Bronchitis  amoxicillin-clavulanate (AUGMENTIN) 875-125 MG per tablet    guaiFENesin (MUCINEX) 600 MG extended release tablet      2. Chest congestion  POCT COVID-19, Antigen      3. Tobacco abuse          Orders Placed This Encounter   Procedures    POCT COVID-19, Antigen     Order Specific Question:   Is this test for diagnosis or screening? Answer:   Diagnosis of ill patient     Order Specific Question:   Symptomatic for COVID-19 as defined by CDC? Answer:   Yes     Order Specific Question:   Date of Symptom Onset     Answer:   8/8/2022     Order Specific Question:   Hospitalized for COVID-19? Answer:   No     Order Specific Question:   Admitted to ICU for COVID-19? Answer:   No     Order Specific Question:   Employed in healthcare setting? Answer:   No     Order Specific Question:   Resident in a congregate (group) care setting? Answer:   No     Order Specific Question:   Pregnant: Answer:   No     Order Specific Question:   Previously tested for COVID-19? Answer:   Yes     Orders Placed This Encounter   Medications    amoxicillin-clavulanate (AUGMENTIN) 875-125 MG per tablet     Sig: Take 1 tablet by mouth in the morning and 1 tablet before bedtime. Do all this for 7 days. Dispense:  14 tablet     Refill:  0    guaiFENesin (MUCINEX) 600 MG extended release tablet     Sig: Take 1 tablet by mouth in the morning and 1 tablet before bedtime. Do all this for 15 days. Dispense:  30 tablet     Refill:  0       Return if symptoms worsen or fail to improve, for follow up with PCP. Reviewed with the patient: current clinical status & medications.  Side effects, adverse effects of the medications prescribed today, as well as treatment plan/rationale and result expectations have been discussed with the patient who expressed understanding. How can you care for yourself at home? Take all medicines exactly as prescribed. Call your doctor if you think you are having a problem with your medicine. Get some extra rest.  Take an over-the-counter pain medicine, such as acetaminophen (Tylenol), ibuprofen (Advil, Motrin), or naproxen (Aleve) to reduce fever and relieve body aches. Read and follow all instructions on the label. Do not take two or more pain medicines at the same time unless the doctor told you to. Many pain medicines have acetaminophen, which is Tylenol. Too much acetaminophen (Tylenol) can be harmful. Take an over-the-counter cough medicine that contains dextromethorphan to help quiet a dry, hacking cough so that you can sleep. Avoid cough medicines that have more than one active ingredient. Read and follow all instructions on the label. Breathe moist air from a humidifier, hot shower, or sink filled with hot water. The heat and moisture will thin mucus so you can cough it out. Do not smoke. Smoking can make bronchitis worse. If you need help quitting, talk to your doctor about stop-smoking programs and medicines. These can increase your chances of quitting for good. Close follow up to evaluate treatment results and for coordination of care. I have reviewed the patient's medical history in detail and updated the computerized patient record.         YAMIL Hewitt NP

## 2022-08-11 VITALS
WEIGHT: 160 LBS | HEIGHT: 66 IN | HEART RATE: 81 BPM | DIASTOLIC BLOOD PRESSURE: 72 MMHG | SYSTOLIC BLOOD PRESSURE: 120 MMHG | BODY MASS INDEX: 25.71 KG/M2 | RESPIRATION RATE: 16 BRPM | OXYGEN SATURATION: 97 %

## 2022-08-11 ASSESSMENT — ENCOUNTER SYMPTOMS
TROUBLE SWALLOWING: 0
WHEEZING: 0
RHINORRHEA: 0
NAUSEA: 0
SORE THROAT: 1
COUGH: 1
DIARRHEA: 0
SHORTNESS OF BREATH: 0
CHEST TIGHTNESS: 1
ABDOMINAL PAIN: 0

## 2022-08-26 DIAGNOSIS — F31.9 BIPOLAR AFFECTIVE DISORDER, REMISSION STATUS UNSPECIFIED (HCC): ICD-10-CM

## 2022-08-29 ENCOUNTER — APPOINTMENT (OUTPATIENT)
Dept: GENERAL RADIOLOGY | Age: 27
End: 2022-08-29
Payer: MEDICARE

## 2022-08-29 ENCOUNTER — HOSPITAL ENCOUNTER (EMERGENCY)
Age: 27
Discharge: LEFT BEFORE DISCHARGE | End: 2022-08-29

## 2022-08-29 ENCOUNTER — HOSPITAL ENCOUNTER (EMERGENCY)
Age: 27
Discharge: HOME | End: 2022-08-29
Attending: EMERGENCY MEDICINE
Payer: MEDICARE

## 2022-08-29 VITALS
TEMPERATURE: 97.6 F | WEIGHT: 160 LBS | HEIGHT: 77 IN | RESPIRATION RATE: 17 BRPM | DIASTOLIC BLOOD PRESSURE: 88 MMHG | SYSTOLIC BLOOD PRESSURE: 122 MMHG | BODY MASS INDEX: 18.89 KG/M2 | OXYGEN SATURATION: 99 % | HEART RATE: 86 BPM

## 2022-08-29 DIAGNOSIS — S41.112A LACERATION OF LEFT UPPER EXTREMITY, INITIAL ENCOUNTER: Primary | ICD-10-CM

## 2022-08-29 DIAGNOSIS — S60.041A CONTUSION OF RIGHT RING FINGER WITHOUT DAMAGE TO NAIL, INITIAL ENCOUNTER: ICD-10-CM

## 2022-08-29 PROCEDURE — 6370000000 HC RX 637 (ALT 250 FOR IP): Performed by: EMERGENCY MEDICINE

## 2022-08-29 PROCEDURE — 99283 EMERGENCY DEPT VISIT LOW MDM: CPT

## 2022-08-29 PROCEDURE — 73130 X-RAY EXAM OF HAND: CPT

## 2022-08-29 PROCEDURE — 2500000003 HC RX 250 WO HCPCS: Performed by: EMERGENCY MEDICINE

## 2022-08-29 PROCEDURE — 2580000003 HC RX 258: Performed by: EMERGENCY MEDICINE

## 2022-08-29 PROCEDURE — 12002 RPR S/N/AX/GEN/TRNK2.6-7.5CM: CPT

## 2022-08-29 PROCEDURE — A4217 STERILE WATER/SALINE, 500 ML: HCPCS | Performed by: EMERGENCY MEDICINE

## 2022-08-29 RX ORDER — QUETIAPINE 300 MG/1
300 TABLET, FILM COATED, EXTENDED RELEASE ORAL NIGHTLY
Qty: 30 TABLET | Refills: 5 | Status: SHIPPED | OUTPATIENT
Start: 2022-08-29

## 2022-08-29 RX ORDER — MAGNESIUM HYDROXIDE 1200 MG/15ML
250 LIQUID ORAL CONTINUOUS
Status: DISCONTINUED | OUTPATIENT
Start: 2022-08-29 | End: 2022-08-29 | Stop reason: HOSPADM

## 2022-08-29 RX ORDER — LIDOCAINE HYDROCHLORIDE AND EPINEPHRINE 10; 10 MG/ML; UG/ML
20 INJECTION, SOLUTION INFILTRATION; PERINEURAL ONCE
Status: COMPLETED | OUTPATIENT
Start: 2022-08-29 | End: 2022-08-29

## 2022-08-29 RX ORDER — IBUPROFEN 600 MG/1
600 TABLET ORAL EVERY 6 HOURS PRN
Qty: 30 TABLET | Refills: 0 | Status: SHIPPED | OUTPATIENT
Start: 2022-08-29

## 2022-08-29 RX ORDER — DIAPER,BRIEF,INFANT-TODD,DISP
EACH MISCELLANEOUS ONCE
Status: COMPLETED | OUTPATIENT
Start: 2022-08-29 | End: 2022-08-29

## 2022-08-29 RX ORDER — GINSENG 100 MG
CAPSULE ORAL ONCE
Status: DISCONTINUED | OUTPATIENT
Start: 2022-08-29 | End: 2022-08-29 | Stop reason: HOSPADM

## 2022-08-29 RX ORDER — IBUPROFEN 600 MG/1
600 TABLET ORAL ONCE
Status: COMPLETED | OUTPATIENT
Start: 2022-08-29 | End: 2022-08-29

## 2022-08-29 RX ADMIN — LIDOCAINE HYDROCHLORIDE,EPINEPHRINE BITARTRATE 20 ML: 10; .01 INJECTION, SOLUTION INFILTRATION; PERINEURAL at 11:26

## 2022-08-29 RX ADMIN — BACITRACIN: 500 OINTMENT TOPICAL at 11:26

## 2022-08-29 RX ADMIN — IBUPROFEN 600 MG: 600 TABLET ORAL at 11:27

## 2022-08-29 RX ADMIN — SODIUM CHLORIDE 250 ML: 900 IRRIGANT IRRIGATION at 11:27

## 2022-08-29 ASSESSMENT — PAIN DESCRIPTION - DESCRIPTORS
DESCRIPTORS: BURNING
DESCRIPTORS: OTHER (COMMENT)

## 2022-08-29 ASSESSMENT — ENCOUNTER SYMPTOMS
FACIAL SWELLING: 0
CHEST TIGHTNESS: 0
CONSTIPATION: 0
STRIDOR: 0
BLOOD IN STOOL: 0
EYE DISCHARGE: 0
DIARRHEA: 0
BACK PAIN: 0
WHEEZING: 0
CHOKING: 0
EYE PAIN: 0
SINUS PRESSURE: 0
EYE REDNESS: 0
VOMITING: 0
ABDOMINAL PAIN: 0
COUGH: 0
VOICE CHANGE: 0
TROUBLE SWALLOWING: 0
SORE THROAT: 0
SHORTNESS OF BREATH: 0

## 2022-08-29 ASSESSMENT — PAIN - FUNCTIONAL ASSESSMENT: PAIN_FUNCTIONAL_ASSESSMENT: 0-10

## 2022-08-29 ASSESSMENT — PAIN DESCRIPTION - FREQUENCY: FREQUENCY: CONTINUOUS

## 2022-08-29 ASSESSMENT — PAIN SCALES - GENERAL
PAINLEVEL_OUTOF10: 10
PAINLEVEL_OUTOF10: 10

## 2022-08-29 ASSESSMENT — PAIN DESCRIPTION - LOCATION
LOCATION: ARM
LOCATION: ARM

## 2022-08-29 ASSESSMENT — PAIN DESCRIPTION - ORIENTATION
ORIENTATION: LEFT
ORIENTATION: LEFT

## 2022-08-29 ASSESSMENT — PAIN DESCRIPTION - PAIN TYPE: TYPE: ACUTE PAIN

## 2022-08-29 NOTE — ED PROVIDER NOTES
[address] ED  eMERGENCY dEPARTMENT eNCOUnter      Pt Name: Clarence Vasquez  MRN: [id number]  [place] 1995  Date of evaluation: 8/29/2022  Provider: Toni Pryor MD    [address]       Chief Complaint   Patient presents with    Laceration     Left forearm     ORY OF PRESENT ILLNESS   (Location/Symptom, Timing/Onset,Context/Setting, Quality, Duration, Modifying Factors, Severity)  Note limiting factors. Clarence Vasquez is a 24 y.o. male who presents to the emergency department patient [address] emergency because he slipped and fell and somehow cut his left forearm on a sharp object as per patient and slight cut to the left hand patient not sure last tetanus emergency and history diabetes metastatic: History of Asperger syndrome anxiety and behavioral disorder bleeding controlled with a towel and patient came for further evaluation for repair of the left forearm has no numbness tingling to the fingertips no head neck injury complaining of right hand pain especially the fourth finger    HPI    NursingNotes were reviewed. REVIEW OF SYSTEMS    (2-9 systems for level 4, 10 or more for level 5)     Review of Systems   Constitutional: Negative. Negative for activity change and fever. HENT:  Negative for congestion, drooling, facial swelling, mouth sores, nosebleeds, sinus pressure, sore throat, trouble swallowing and voice change. Eyes:  Negative for pain, discharge, redness and visual disturbance. Respiratory:  Negative for cough, choking, chest tightness, shortness of breath, wheezing and stridor. Cardiovascular:  Negative for chest pain, palpitations and leg swelling. Gastrointestinal:  Negative for abdominal pain, blood in stool, constipation, diarrhea and vomiting. Endocrine: Negative for cold intolerance, polyphagia and polyuria. Genitourinary:  Negative for dysuria, flank pain, frequency, genital sores and urgency.    Musculoskeletal:  Negative for back pain, joint swelling, neck pain and neck stiffness. Skin:  Positive for wound. Negative for pallor and rash. Neurological:  Negative for tremors, seizures, syncope, weakness, numbness and headaches. Hematological:  Negative for adenopathy. Does not bruise/bleed easily. Psychiatric/Behavioral:  Negative for agitation, behavioral problems, hallucinations and sleep disturbance. The patient is nervous/anxious. The patient is not hyperactive. All other systems reviewed and are negative. Except as noted above the remainder of the review of systems was reviewed and negative. PAST MEDICAL HISTORY     Past Medical History:   Diagnosis Date    ADHD (attention deficit hyperactivity disorder)     Asperger syndrome     Asthma     Asthma 3/31/2015    Bipolar 1 disorder, mixed, severe ([address] 2/5/2018    Chemical dependency ([address]     marijuana    Diabetes mellitus ([address]     Diabetes mellitus type 1 ([address]     Hepatitis C     Hepatitis C     Hyperkalemia, diminished renal excretion 11/9/2017    Mental disorder     ODD (oppositional defiant disorder)     Seasonal allergies 3/31/2015    Seizures [facility]          SURGICALHISTORY       Past Surgical History:   Procedure Laterality Date    ABDOMEN SURGERY Left 10/7/2019    INCISION  & DRAINAGE OF ABSCESS LEFT ABDOMINAL WALL performed by Miguel Covington MD at [address] [place] [address] Drive 10/21/2019    INCISION AND DRAINAGE OF RECURRENT ABDOMINAL WALL ABSCESS ROOM 475 performed by Miguel Covington MD at [address] [place] [address]       Previous Medications    ALBUTEROL SULFATE  (90 BASE) MCG/ACT INHALER    Use 2 puffs 4 times daily for 7 days then as needed for wheezing. Dispense with Spacer and instruct in use. At patient's preference may use 60 dose MDI. May Sub Pro-Air or Proventil as needed per insurance.     BD INSULIN SYRINGE U/F 31G X 5/16\" 0.5 ML MISC    use 4 daily    BLOOD GLUCOSE MONITOR KIT AND SUPPLIES    Give 1 meter covered by insurance    BLOOD GLUCOSE MONITOR STRIPS Test 3x daily    BLOOD GLUCOSE MONITORING SUPPL (CONTOUR NEXT ONE) KIT    1 Device by Does not apply route daily    BUSPIRONE (BUSPAR) 15 MG TABLET    Take 15 mg by mouth 3 times daily    CONTOUR NEXT TEST STRIP    Test 6x daily Dx E10.65    INSULIN ASPART (NOVOLOG) 100 UNIT/ML INJECTION VIAL    Use via insulin pump max daily dose 100 units    INSULIN PEN NEEDLE (PEN NEEDLES) 32G X 6 MM MISC    1 Device by Does not apply route 4 times daily    LANCETS MISC    Test 3x daily    LISINOPRIL (PRINIVIL;ZESTRIL) 2.5 MG TABLET    Take 1 tablet by mouth daily    LITHIUM (LITHOBID) 300 MG EXTENDED RELEASE TABLET    Take 1 tablet by mouth 2 times daily    MUPIROCIN (BACTROBAN) 2 % OINTMENT    Apply topically 2 times daily inside nose and on skin lesion.     NALTREXONE (DEPADE) 50 MG TABLET    Take 1 tablet by mouth daily    NICOTINE (NICODERM CQ) 21 MG/24HR    Place 1 patch onto the skin daily    OMEPRAZOLE (PRILOSEC) 40 MG DELAYED RELEASE CAPSULE    Take 1 capsule by mouth every morning (before breakfast) for 14 days    ONDANSETRON (ZOFRAN) 4 MG TABLET    Take 1 tablet by mouth every 6-8 hours as needed for Nausea or Vomiting    QUETIAPINE (SEROQUEL XR) 300 MG EXTENDED RELEASE TABLET    take 1 tablet by mouth nightly       ALLERGIES     Bactrim [sulfamethoxazole-trimethoprim], Dust mite extract, Mirtazapine, Other, Oxcarbazepine, and Shrimp flavor    FAMILY HISTORY       Family History   Problem Relation Age of Onset    Cancer Maternal Aunt         breast    Diabetes Maternal Uncle     Diabetes Paternal Uncle     Cancer Maternal Grandmother     Diabetes Maternal Grandmother     Cancer Maternal Grandfather     Diabetes Maternal Grandfather           SOCIAL HISTORY       Social History     Socioeconomic History    Marital status: Single     Spouse name: None    Number of children: 0    Years of education: 11    Highest education level: None   Tobacco Use    Smoking status: Every Day     Packs/day: 1.00     Years: 5.00     Pack years: 5.00     Types: Cigarettes    Smokeless tobacco: Never    Tobacco comments:     pt aware of risk   Vaping Use    Vaping Use: Never used   Substance and Sexual Activity    Alcohol use: Not Currently     Alcohol/week: 0.0 standard drinks     Comment: Had been drinking whiskey and vodka on a regular basis about 6 months ago. Nothing since. Drug use: Yes     Types: Methamphetamines (Crystal Meth), Marijuana (Ethelyn Boeck), IV, Cocaine, Opiates      Comment: occassionally, last use 2 weeks ago    Sexual activity: Yes     Partners: Female     Comment: No protection or birth-control plan   Social History Narrative    ** Merged History Encounter **          Social Determinants of Health     Financial Resource Strain: Low Risk     Difficulty of Paying Living Expenses: Not hard at all   Food Insecurity: No Food Insecurity    Worried About [address] in the Last Year: Never true    [employer] in the Last Year: Never true       SCREENINGS      @FLOW([id number])@      PHYSICAL EXAM    (up to 7 for level 4, 8 or more for level 5)     ED Triage Vitals [08/29/22 1107]   BP Temp Temp Source Heart Rate Resp SpO2 Height Weight   122/88 97.6 °F (36.4 °C) Temporal 86 17 99 % 6' 5\" (1.956 m) 160 lb (72.6 kg)       Physical Exam  Vitals and nursing note reviewed. Constitutional:       General: He is not in acute distress. Appearance: Normal appearance. He is well-developed. He is not ill-appearing, toxic-appearing or diaphoretic. Comments: Alert cooperative patient slightly anxious at this time started hyperventilating when I about to examine the wound   HENT:      Head: Normocephalic. Right Ear: Tympanic membrane, ear canal and external ear normal.      Left Ear: Tympanic membrane, ear canal and external ear normal.      Nose: Nose normal. No rhinorrhea. Mouth/Throat:      Mouth: Mucous membranes are moist.      Pharynx: No oropharyngeal exudate or posterior oropharyngeal erythema.    Eyes:      Pupils: Pupils are equal, round, and reactive to light. Neck:      Vascular: No carotid bruit. Cardiovascular:      Rate and Rhythm: Normal rate and regular rhythm. Pulses: Normal pulses. Heart sounds: Normal heart sounds. No murmur heard. No friction rub. No gallop. Pulmonary:      Effort: No respiratory distress. Breath sounds: Normal breath sounds. No stridor. No wheezing or rhonchi. Chest:      Chest wall: No tenderness. Abdominal:      General: Bowel sounds are normal. There is no distension. Palpations: Abdomen is soft. There is no mass. Tenderness: There is no abdominal tenderness. There is no guarding or rebound. Musculoskeletal:         General: Swelling, tenderness and signs of injury present. No deformity. Normal range of motion. Cervical back: Neck supple. No rigidity or tenderness. Right lower leg: No edema. Left lower leg: No edema. Comments: Patient come to the left forearm patient has a laceration involving mid forearm volar aspect 5 tender by 2 cm laceration has no tendon injury no foreign body bleeding under control at this time good  to left hand neurovascular intact good brachial radial pulses   Lymphadenopathy:      Cervical: No cervical adenopathy. Skin:     General: Skin is warm. Capillary Refill: Capillary refill takes less than 2 seconds. Findings: No erythema or rash. Neurological:      Mental Status: He is alert and oriented to person, place, and time. Cranial Nerves: No cranial nerve deficit. Sensory: No sensory deficit. Motor: No weakness or abnormal muscle tone. Coordination: Coordination normal.      Gait: Gait normal.      Deep Tendon Reflexes: Reflexes normal.   Psychiatric:         Behavior: Behavior normal.         Thought Content:  Thought content normal.       DIAGNOSTIC RESULTS     EKG: All EKG's are interpreted by the Emergency Department Physician who either signs or Co-signsthis chart in the absence of a cardiologist.        RADIOLOGY:   Tacey Bolus such as CT, Ultrasound and MRI are read by the radiologist. Plain radiographic images are visualized and preliminarily interpreted by the emergency physician with the below findings:        Interpretation per the Radiologist below, if available at the time ofthis note:    XR HAND RIGHT (MIN 3 VIEWS)    (Results Pending)         ED BEDSIDE ULTRASOUND:   Performed by ED Physician - none    LABS:  Labs Reviewed - No data to display    All other labs were within normal range or not returned as of this dictation. EMERGENCY DEPARTMENT COURSE and DIFFERENTIAL DIAGNOSIS/MDM:   Vitals:    Vitals:    08/29/22 1107   BP: 122/88   Pulse: 86   Resp: 17   Temp: 97.6 °F (36.4 °C)   TempSrc: Temporal   SpO2: 99%   Weight: 160 lb (72.6 kg)   Height: 6' 5\" (1.956 m)           MDM  Number of Diagnoses or Management Options  Contusion of right ring finger without damage to nail, initial encounter  Laceration of left upper extremity, initial encounter  Diagnosis management comments: As a patient he fell and cut his left forearm denies any self-inflicted wound also injured right hand and fourth finger anxious on arrival initially patient not sure of last examination but patient refuses tetanus immunization        CRITICAL CARE TIME   Total Critical Care time was  minutes, excluding separately reportableprocedures. There was a high probability of clinicallysignificant/life threatening deterioration in the patient's condition which required my urgent intervention.   NSULTS:  None    PROCEDURES:  Unless otherwise noted below, none     Lac Repair    Date/Time: 8/29/2022 11:45 AM  Performed by: Jeb Almeida MD  Authorized by: Jeb Almeida MD     Consent:     Consent obtained:  Verbal and emergent situation    Consent given by:  Patient    Risks, benefits, and alternatives were discussed: yes      Risks discussed:  Infection, pain, poor cosmetic result, poor wound healing, need for additional repair and nerve damage  Universal protocol:     Procedure explained and questions answered to patient or proxy's satisfaction: yes      Relevant documents present and verified: yes      Patient identity confirmed:  Verbally with patient and hospital-assigned identification number  Anesthesia:     Anesthesia method:  Topical application and local infiltration    Local anesthetic:  Lidocaine 1% WITH epi  Laceration details:     Location:  Shoulder/arm    Shoulder/arm location:  L lower arm    Length (cm):  5    Depth (mm):  2  Pre-procedure details:     Preparation:  Patient was prepped and draped in usual sterile fashion  Exploration:     Hemostasis achieved with:  Direct pressure    Contaminated: no    Treatment:     Area cleansed with:  Chlorhexidine and saline    Amount of cleaning:  Standard    Irrigation solution:  Sterile saline    Debridement:  None    Undermining:  None    Scar revision: no    Skin repair:     Repair method:  Sutures    Suture size:  4-0    Suture material:  Prolene    Suture technique:  Simple interrupted    Number of sutures:  11  Approximation:     Approximation:  Close  Repair type:     Repair type:  Simple  Post-procedure details:     Dressing:  Antibiotic ointment and sterile dressing    Procedure completion:  Tolerated well, no immediate complications    FINAL IMPRESSION      1. Laceration of left upper extremity, initial encounter    2.  Contusion of right ring finger without damage to nail, initial encounter          DISPOSITION/PLAN   DISPOSITION        PATIENT REFERRED TO:  Saintclair Leeks, APRN - CNP  [address] [address], [address]  [place] [address] [phone]    In 1 week  For suture removal, For wound re-check    DISCHARGE MEDICATIONS:  New Prescriptions    IBUPROFEN (ADVIL;MOTRIN) 600 MG TABLET    Take 1 tablet by mouth every 6 hours as needed for Pain          (Please note that portions of this note were completed with a voice recognition program.  Efforts were made to edit the dictations but occasionally words are mis-transcribed.)    Cecil Riley MD (electronically signed)  Attending Emergency Physician        Cecil Riley MD  08/29/22 [id number]

## 2022-08-29 NOTE — ED TRIAGE NOTES
Pt arrives to ED, from home, via personal vehicle-pt states the individual in his room is his girlfriend. Pt presents with a large laceration to his left forearm. Pt states he missed a step on his stairs and fell down them-no LOC- and received a large laceration to his left forearm. Pt is unsure what he lacerated his arm on. Upon observation, pt has scars from old 'cutting' on his left forearm. Pt admits to 'cutting' in the past, but denies his current injury being related to cutting. Pt has one large laceration and one superficial laceration to the left forearm. Pt states he received a Tdap \"last year while I was in [facility]. \"  Pt is refusing a repeat Tdap that is ordered in the ED by Dr. Huy Draper.

## 2022-08-29 NOTE — ED NOTES
Pt is given d/c instructions, one script and additional dressing supplies for home use. Pt is aware of need to have sutures removed in 7-10 days. Pt voiced understanding of d/c instructions without further questions.       Spencer Kwok RN  08/29/22 2714

## 2022-09-08 ENCOUNTER — CARE COORDINATION (OUTPATIENT)
Dept: CARE COORDINATION | Age: 27
End: 2022-09-08

## 2022-09-15 ENCOUNTER — CARE COORDINATION (OUTPATIENT)
Dept: CARE COORDINATION | Age: 27
End: 2022-09-15

## 2022-09-15 NOTE — CARE COORDINATION
Attempted to contact patient for care coordination discussion and ER follow up. Unable to reach patient by phone. Message left regarding purpose of call. Number provided and call back requested.

## 2022-09-21 ENCOUNTER — HOSPITAL ENCOUNTER (EMERGENCY)
Age: 27
Discharge: HOME | End: 2022-09-21
Attending: EMERGENCY MEDICINE
Payer: MEDICARE

## 2022-09-21 VITALS
HEART RATE: 80 BPM | BODY MASS INDEX: 24.11 KG/M2 | RESPIRATION RATE: 18 BRPM | HEIGHT: 66 IN | OXYGEN SATURATION: 96 % | DIASTOLIC BLOOD PRESSURE: 93 MMHG | SYSTOLIC BLOOD PRESSURE: 119 MMHG | TEMPERATURE: 98.7 F | WEIGHT: 150 LBS

## 2022-09-21 DIAGNOSIS — R45.851 SUICIDAL IDEATIONS: ICD-10-CM

## 2022-09-21 DIAGNOSIS — S01.81XA FACIAL LACERATION, INITIAL ENCOUNTER: Primary | ICD-10-CM

## 2022-09-21 PROCEDURE — 99285 EMERGENCY DEPT VISIT HI MDM: CPT

## 2022-09-21 PROCEDURE — 12013 RPR F/E/E/N/L/M 2.6-5.0 CM: CPT

## 2022-09-21 PROCEDURE — 6370000000 HC RX 637 (ALT 250 FOR IP): Performed by: EMERGENCY MEDICINE

## 2022-09-21 RX ORDER — BACITRACIN, NEOMYCIN, POLYMYXIN B 400; 3.5; 5 [USP'U]/G; MG/G; [USP'U]/G
OINTMENT TOPICAL ONCE
Status: COMPLETED | OUTPATIENT
Start: 2022-09-21 | End: 2022-09-21

## 2022-09-21 RX ADMIN — BACITRACIN, NEOMYCIN, POLYMYXIN B: 400; 3.5; 5 OINTMENT TOPICAL at 11:50

## 2022-09-21 ASSESSMENT — ENCOUNTER SYMPTOMS
SHORTNESS OF BREATH: 0
EYE PAIN: 0
ABDOMINAL PAIN: 0
NAUSEA: 0
SORE THROAT: 0
CHEST TIGHTNESS: 0
VOMITING: 0

## 2022-09-21 ASSESSMENT — PAIN - FUNCTIONAL ASSESSMENT: PAIN_FUNCTIONAL_ASSESSMENT: NONE - DENIES PAIN

## 2022-09-21 NOTE — ED PROVIDER NOTES
[address] [facility] ED  EMERGENCY DEPARTMENT ENCOUNTER      Pt Name: Bernadine Cole  MRN: [id number]  [place] 1995  Date of evaluation: 9/21/2022  Provider: Tatum Forman DO    CHIEF COMPLAINT       Chief Complaint   Patient presents with    Laceration    Suicidal     Pt was being arrested and he hit his head on the cruiser (laceration on forehead) bleeding controlled. Pt states he has thoughts of harming others (no plan). Pt states he has plans of hurting himself (shooting himself with a gun). Pt states he would of gotten his gun this am but he was too tired (wanted suicide by ). HISTORY OF PRESENT ILLNESS   (Location/Symptom, Timing/Onset, Context/Setting, Quality, Duration, Modifying Factors, Severity)  Note limiting factors. Bernadine Cole is a 24 y.o. male who presents to the emergency department . Patient brought in by police after he slammed his head on the police car purposefully. Large laceration frontal region. Patient states tetanus is up-to-date. Patient was alleging that he wanted suicide by  because he was too tired to kill himself. [place] police state that he can be assessed for psychiatric illness at the police station. HPI    Nursing Notes were reviewed. REVIEW OF SYSTEMS    (2-9 systems for level 4, 10 or more for level 5)     Review of Systems   Constitutional:  Negative for activity change, appetite change and fatigue. HENT:  Negative for congestion and sore throat. Eyes:  Negative for pain and visual disturbance. Respiratory:  Negative for chest tightness and shortness of breath. Cardiovascular:  Negative for chest pain. Gastrointestinal:  Negative for abdominal pain, nausea and vomiting. Endocrine: Negative for polydipsia. Genitourinary:  Negative for flank pain and urgency. Musculoskeletal:  Negative for gait problem and neck stiffness. Skin:  Positive for wound. Negative for rash.    Neurological:  Negative for weakness, light-headedness and headaches. Psychiatric/Behavioral:  Positive for suicidal ideas. Negative for confusion and sleep disturbance. Except as noted above the remainder of the review of systems was reviewed and negative. PAST MEDICAL HISTORY     Past Medical History:   Diagnosis Date    ADHD (attention deficit hyperactivity disorder)     Asperger syndrome     Asthma     Asthma 3/31/2015    Bipolar 1 disorder, mixed, severe ([facility] 2/5/2018    Chemical dependency ([facility]     marijuana    Diabetes mellitus ([facility]     Diabetes mellitus type 1 ([facility]     Hepatitis C     Hepatitis C     Hyperkalemia, diminished renal excretion 11/9/2017    Mental disorder     ODD (oppositional defiant disorder)     Seasonal allergies 3/31/2015    Seizures ([facility]          SURGICAL HISTORY       Past Surgical History:   Procedure Laterality Date    ABDOMEN SURGERY Left 10/7/2019    INCISION  & DRAINAGE OF ABSCESS LEFT ABDOMINAL WALL performed by Joycelyn Pastor MD at [address] N/A 10/21/2019    INCISION AND DRAINAGE OF RECURRENT ABDOMINAL WALL ABSCESS ROOM [facility] performed by Joycelyn Pastor MD at [address] [facility]       Discharge Medication List as of 9/21/2022 11:49 AM        CONTINUE these medications which have NOT CHANGED    Details   QUEtiapine (SEROQUEL XR) 300 MG extended release tablet take 1 tablet by mouth nightly, Disp-30 tablet, R-5Normal      ibuprofen (ADVIL;MOTRIN) 600 MG tablet Take 1 tablet by mouth every 6 hours as needed for Pain, Disp-30 tablet, R-0Print      mupirocin (BACTROBAN) 2 % ointment Apply topically 2 times daily inside nose and on skin lesion. , Disp-22 g, R-1, Normal      ondansetron (ZOFRAN) 4 MG tablet Take 1 tablet by mouth every 6-8 hours as needed for Nausea or Vomiting, Disp-30 tablet, R-0Normal      insulin aspart (NOVOLOG) 100 UNIT/ML injection vial Use via insulin pump max daily dose 100 units, Disp-30 mL, R-3Normal      busPIRone (BUSPAR) 15 MG tablet Take 15 mg by mouth 3 times daily, Disp-45 tablet, R-5Normal      lithium (LITHOBID) 300 MG extended release tablet Take 1 tablet by mouth 2 times daily, Disp-60 tablet, R-5Normal      naltrexone (DEPADE) 50 MG tablet Take 1 tablet by mouth daily, Disp-30 tablet, R-5Normal      lisinopril (PRINIVIL;ZESTRIL) 2.5 MG tablet Take 1 tablet by mouth daily, Disp-30 tablet, R-5Normal      nicotine (NICODERM CQ) 21 MG/24HR Place 1 patch onto the skin daily, Disp-42 patch, R-0Normal      Lancets MISC Disp-100 each, R-3, NormalTest 3x daily      !! blood glucose monitor strips Test 3x daily, Disp-100 strip, R-3, Normal      blood glucose monitor kit and supplies Give 1 meter covered by insurance, Disp-1 kit, R-0, Normal      omeprazole (PRILOSEC) 40 MG delayed release capsule Take 1 capsule by mouth every morning (before breakfast) for 14 days, Disp-14 capsule, R-1Normal      albuterol sulfate  (90 Base) MCG/ACT inhaler Use 2 puffs 4 times daily for 7 days then as needed for wheezing. Dispense with Spacer and instruct in use. At patient's preference may use 60 dose MDI. May Sub Pro-Air or Proventil as needed per insurance., Disp-1 each, R-5, DAWNormal      Blood Glucose Monitoring Suppl (CONTOUR NEXT ONE) KIT 1 Device by Does not apply route daily, Disp-1 kit, R-0Normal      Insulin Pen Needle (PEN NEEDLES) 32G X 6 MM MISC 1 Device by Does not apply route 4 times daily, Disp-100 each, R-5Normal      BD INSULIN SYRINGE U/F 31G X 5/16\" 0.5 ML MISC use 4 daily, Disp-100 each,R-3Normal      !! CONTOUR NEXT TEST strip Test 6x daily Dx E10.65, Disp-200 each, R-3, DAWNormal       !! - Potential duplicate medications found. Please discuss with provider.           ALLERGIES     Bactrim [sulfamethoxazole-trimethoprim], Dust mite extract, Mirtazapine, Other, Oxcarbazepine, and Shrimp flavor    FAMILY HISTORY       Family History   Problem Relation Age of Onset    Cancer Maternal Aunt         breast    Diabetes Maternal Uncle     Diabetes Paternal Uncle     Cancer Maternal Grandmother     Diabetes Maternal Grandmother     Cancer Maternal Grandfather     Diabetes Maternal Grandfather           SOCIAL HISTORY       Social History     Socioeconomic History    Marital status: Single     Spouse name: None    Number of children: 0    Years of education: 11    Highest education level: None   Tobacco Use    Smoking status: Every Day     Packs/day: 1.00     Years: 5.00     Pack years: 5.00     Types: Cigarettes    Smokeless tobacco: Never    Tobacco comments:     pt aware of risk   Vaping Use    Vaping Use: Never used   Substance and Sexual Activity    Alcohol use: Not Currently     Alcohol/week: 0.0 standard drinks     Comment: Had been drinking whiskey and vodka on a regular basis about 6 months ago. Nothing since. Drug use: Yes     Types: Methamphetamines (Crystal Meth), Marijuana (Barbara Jessica), IV, Cocaine, Opiates      Comment: occassionally, last use 2 weeks ago    Sexual activity: Yes     Partners: Female     Comment: No protection or birth-control plan   Social History Narrative    ** Merged History Encounter **          Social Determinants of Health     Financial Resource Strain: Low Risk     Difficulty of Paying Living Expenses: Not hard at all   Food Insecurity: No Food Insecurity    Worried About [address] [facility] in the Last Year: Never true    [address]  [employer] in the Last Year: Never true       SCREENINGS        Miki Coma Scale  Eye Opening: Spontaneous  Best Verbal Response: Oriented  Best Motor Response: Obeys commands  Miki Coma Scale Score: 15               PHYSICAL EXAM    (up to 7 for level 4, 8 or more for level 5)     ED Triage Vitals [09/21/22 1107]   BP Temp Temp Source Heart Rate Resp SpO2 Height Weight   (!) 119/93 98.7 °F (37.1 °C) Oral 80 18 96 % 5' 6\" (1.676 m) 150 lb (68 kg)       Physical Exam  Vitals and nursing note reviewed. Constitutional:       General: He is not in acute distress. Appearance: He is well-developed. He is not diaphoretic. HENT:      Head: Normocephalic and atraumatic. Right Ear: External ear normal.      Left Ear: External ear normal.      Nose: Nose normal.      Mouth/Throat:      Mouth: Mucous membranes are moist.      Pharynx: No oropharyngeal exudate. Eyes:      Extraocular Movements: Extraocular movements intact. Conjunctiva/sclera: Conjunctivae normal.      Pupils: Pupils are equal, round, and reactive to light. Neck:      Thyroid: No thyromegaly. Vascular: No JVD. Trachea: No tracheal deviation. Cardiovascular:      Rate and Rhythm: Normal rate. Heart sounds: Normal heart sounds. No murmur heard. Pulmonary:      Effort: Pulmonary effort is normal. No respiratory distress. Breath sounds: Normal breath sounds. No wheezing. Abdominal:      General: Bowel sounds are normal.      Palpations: Abdomen is soft. Tenderness: There is no abdominal tenderness. There is no guarding. Musculoskeletal:         General: Normal range of motion. Cervical back: Normal range of motion and neck supple. Right lower leg: No edema. Left lower leg: No edema. Skin:     General: Skin is warm and dry. Findings: No rash. Comments: Large laceration right frontal region with active bleeding   Neurological:      Mental Status: He is alert and oriented to person, place, and time. Cranial Nerves: No cranial nerve deficit.    Psychiatric:         Behavior: Behavior normal.       DIAGNOSTIC RESULTS     EKG: All EKG's are interpreted by the Emergency Department Physician who either signs or Co-signs this chart in the absence of a cardiologist.        RADIOLOGY:   Non-plain film images such as CT, Ultrasound and MRI are read by the radiologist. Plain radiographic images are visualized and preliminarily interpreted by the emergency physician with the below findings:        Interpretation per the Radiologist below, if available at the time of this note:    No orders to display         ED BEDSIDE ULTRASOUND:   Performed by ED Physician - none    LABS:  Labs Reviewed - No data to display    All other labs were within normal range or not returned as of this dictation. EMERGENCY DEPARTMENT COURSE and DIFFERENTIAL DIAGNOSIS/MDM:   Vitals:    Vitals:    09/21/22 1107   BP: (!) 119/93   Pulse: 80   Resp: 18   Temp: 98.7 °F (37.1 °C)   TempSrc: Oral   SpO2: 96%   Weight: 150 lb (68 kg)   Height: 5' 6\" (1.676 m)       Patient comes in with facial laceration. It was sutured and patient was discharged. MDM        REASSESSMENT          CRITICAL CARE TIME   Total Critical Care time was 0 minutes, excluding separately reportable procedures. There was a high probability of clinically significant/life threatening deterioration in the patient's condition which required my urgent intervention.       CONSULTS:  None    PROCEDURES:  Unless otherwise noted below, none     Lac Repair    Date/Time: 9/21/2022 6:02 PM  Performed by: Dinorah Kruger DO  Authorized by: Dinorah Kruger DO     Consent:     Consent obtained:  Verbal    Consent given by:  Patient    Risks, benefits, and alternatives were discussed: yes      Risks discussed:  Poor cosmetic result  Universal protocol:     Procedure explained and questions answered to patient or proxy's satisfaction: yes      Relevant documents present and verified: no      Test results available: no      Imaging studies available: no      Required blood products, implants, devices, and special equipment available: no      Site/side marked: no      Immediately prior to procedure, a time out was called: no      Patient identity confirmed:  Verbally with patient  Anesthesia:     Anesthesia method:  Local infiltration    Local anesthetic:  Lidocaine 1% WITH epi  Laceration details:     Location:  Face    Face location:  Forehead    Length (cm):  3    Depth (mm):  2  Pre-procedure details:     Preparation:  Patient was prepped and draped in usual sterile fashion  Exploration:     Limited defect created (wound extended): no      Imaging outcome: foreign body not noted      Wound exploration: wound explored through full range of motion      Contaminated: no    Treatment:     Area cleansed with:  Chlorhexidine    Amount of cleaning:  Standard    Irrigation solution:  Sterile saline    Irrigation method:  Syringe    Visualized foreign bodies/material removed: no      Debridement:  None    Undermining:  None    Scar revision: no    Skin repair:     Repair method:  Sutures    Suture size:  4-0    Suture material:  Nylon    Suture technique:  Simple interrupted    Number of sutures:  10  Approximation:     Approximation:  Close        FINAL IMPRESSION      1. Facial laceration, initial encounter    2. Suicidal ideations          DISPOSITION/PLAN   DISPOSITION Decision To Discharge 09/21/2022 12:02:54 PM      PATIENT REFERRED TO:  YAMIL Lamb CNP  [address] [facility]  [phone]    Schedule an appointment as soon as possible for a visit in 4 days  For suture removal      DISCHARGE MEDICATIONS:  Discharge Medication List as of 9/21/2022 11:49 AM        Controlled Substances Monitoring:     No flowsheet data found.     (Please note that portions of this note were completed with a voice recognition program.  Efforts were made to edit the dictations but occasionally words are mis-transcribed.)    Lb Méndez DO (electronically signed)  Attending Emergency Physician            Lb Méndez DO  09/21/22 DO [place]  10/18/22 1034

## 2022-09-21 NOTE — ED TRIAGE NOTES
Pt was being arrested nd hit his head on the  car (laceration on forehead)  Bleeding is controlled   Pt states he has thoughts to harm others (no one specific and no plan)  Pt states he wants to shoot himself (has a gun)  Pt states he would of pulled out a gun this am but he was too tired (wanted suicide by a )  Pt has a steady gait   Pt is afebrile   Pt is A&Ox 4  Pt is calm and cooperative at this time (police at bedside)

## 2022-09-22 ENCOUNTER — CARE COORDINATION (OUTPATIENT)
Dept: CARE COORDINATION | Age: 27
End: 2022-09-22

## 2022-09-22 NOTE — CARE COORDINATION
I called to complete an ER follow up and to discuss care coordination   His phone is not in service at this time. I reached out to his grandfather who I have worked with in the past.    His grandfather returned my call. He says that he is currently in [facility]. I spoke with him about his past two ER visits  I did ask about mental health care   Jay Mabry does not see anyone for his mental health  I have asked Giselle Aguirre to call the correction to see if this is something that can be set up before his is released to go home  He states that he will call to see if this is an option. I explained that I would like him to call me as soon as he is released so that I can work with Spencer Lieberman to get established with mental health.    He is agreeable to this plan

## 2022-09-26 DIAGNOSIS — E10.69 TYPE 1 DIABETES MELLITUS WITH OTHER SPECIFIED COMPLICATION (HCC): ICD-10-CM

## 2022-09-26 DIAGNOSIS — F31.9 BIPOLAR AFFECTIVE DISORDER, REMISSION STATUS UNSPECIFIED (HCC): ICD-10-CM

## 2022-09-27 RX ORDER — QUETIAPINE FUMARATE 50 MG/1
50 TABLET, EXTENDED RELEASE ORAL NIGHTLY
Qty: 30 TABLET | Refills: 5 | OUTPATIENT
Start: 2022-09-27

## 2022-09-27 RX ORDER — LITHIUM CARBONATE 300 MG/1
TABLET, FILM COATED, EXTENDED RELEASE ORAL
Qty: 60 TABLET | Refills: 5 | OUTPATIENT
Start: 2022-09-27

## 2022-09-27 RX ORDER — INSULIN ASPART 100 [IU]/ML
INJECTION, SOLUTION INTRAVENOUS; SUBCUTANEOUS
Qty: 30 ML | Refills: 3 | Status: SHIPPED | OUTPATIENT
Start: 2022-09-27

## 2022-10-21 ENCOUNTER — CARE COORDINATION (OUTPATIENT)
Dept: CARE COORDINATION | Age: 27
End: 2022-10-21

## 2022-10-21 NOTE — CARE COORDINATION
Attempted to contact patient for care coordination discussion  Unable to reach patient by phone.   Unable to leave a message  I will follow up with Sasha Aguilera next week

## 2022-10-28 ENCOUNTER — CARE COORDINATION (OUTPATIENT)
Dept: CARE COORDINATION | Age: 27
End: 2022-10-28

## 2022-10-28 NOTE — CARE COORDINATION
I called to discuss care coordination with [facility]. I spoke with his grandfather and healthcare decision maker following 2 patient identification. He tells me that he is still in shelter at this point and he is not sure when he will come home. He does not have his insulin pump but he is going down to the nurse at regular intervals for his insulin injections. I have asked Homero Macias to call me when he has information about [facility] returning home.

## 2022-11-25 ENCOUNTER — CARE COORDINATION (OUTPATIENT)
Dept: CARE COORDINATION | Age: 27
End: 2022-11-25

## 2022-11-25 ENCOUNTER — ANTI-COAG VISIT (OUTPATIENT)
Dept: CARE COORDINATION | Age: 27
End: 2022-11-25

## 2022-12-23 ENCOUNTER — TELEMEDICINE (OUTPATIENT)
Dept: ENDOCRINOLOGY | Age: 27
End: 2022-12-23
Payer: MEDICARE

## 2022-12-23 DIAGNOSIS — E10.69 TYPE 1 DIABETES MELLITUS WITH OTHER SPECIFIED COMPLICATION (HCC): ICD-10-CM

## 2022-12-23 PROCEDURE — 99442 PR PHYS/QHP TELEPHONE EVALUATION 11-20 MIN: CPT | Performed by: INTERNAL MEDICINE

## 2022-12-23 RX ORDER — INSULIN ASPART 100 [IU]/ML
INJECTION, SOLUTION INTRAVENOUS; SUBCUTANEOUS
Qty: 30 ML | Refills: 3 | Status: SHIPPED | OUTPATIENT
Start: 2022-12-23

## 2022-12-23 NOTE — PROGRESS NOTES
2022    TELEHEALTH EVALUATION -- Audio/Visual (During CNHOZ-68 public health emergency)    Due to COVID 19 outbreak, patient's office visit was converted to a virtual visit. Patient was contacted and agreed to proceed with a virtual visit via Telephone Visit  The risks and benefits of converting to a virtual visit were discussed in light of the current infectious disease epidemic. Patient also understood that insurance coverage and co-pays are up to their individual insurance plans.     HPI: Follow-up on type 1 diabetes patient currently on insulin pump has not been seen in the office for some time overall blood sugars between 2-300 range denies any hypoglycemia history of issues with noncompliance and psychiatric disorder labs reviewed from earlier in the year  Patient also requesting for insulin pump supplies and NovoLog    Lab Results   Component Value Date     2021    K 3.9 2021     2021    CO2 23 2021    BUN 13 2021    CREATININE 0.80 2021    GLUCOSE 462 2022    CALCIUM 9.7 2021    PROT 7.4 2021    LABALBU 4.6 2021    BILITOT <0.2 2021    ALKPHOS 145 (H) 2021    AST 14 2021    ALT 16 2021    LABGLOM >60.0 2021    GFRAA >60.0 2021    GLOB 2.8 2021       Hemoglobin A1C   Date Value Ref Range Status   2022 10.1 % Final         Vickey Cortes (:  1995) has requested an audio/video evaluation for the following concern(s):    Patient Active Problem List   Diagnosis    DKA, type 1, not at goal [facility])    ADHD (attention deficit hyperactivity disorder)    Oppositional defiant disorder    Seasonal allergies    Asthma    Atopic rhinitis    Closed fracture of neck of metacarpal bone    Congenital anomaly of cerebrovascular system    Generalized convulsive epilepsy ([address]    Contusion of hand    Closed fracture of base of other metacarpal bone(s)    Atopic dermatitis    Adult behavior problems    Congenital vascular nevus    Epilepsy ([facility]    Congenital vascular nevus    Generalized tonic clonic epilepsy (HCC)    Asperger syndrome    Chemical dependency (HCC)    History of oppositional defiant disorder    Hyperglycemia due to type 1 diabetes mellitus (HCC)    Metabolic acidosis with increased anion gap and accumulation of organic acids    Lactic acid acidosis    Hyperkalemia, diminished renal excretion    Hyperkalemia, transcellular shifts    TWIN (acute kidney injury) ([facility]    Heroin abuse ([facility]    Hyperglycemia    Substance induced mood disorder ([facility]    H/O bipolar disorder    Polysubstance abuse ([facility]    Suicide attempt ([facility]    Bipolar 1 disorder, mixed, severe ([facility]    Multiple drug overdose    Respiratory insufficiency    DM w/ coma type I, uncontrolled    Seizures ([facility]    Hepatitis C    Diabetes mellitus type 1 ([facility]    Cellulitis    Abdominal wall abscess    Hypergranulation    Drug overdose, accidental or unintentional, initial encounter    Toxic encephalopathy    Rhabdomyolysis    Bipolar 1 disorder ([facility]    Uncontrolled type 1 diabetes mellitus with hyperglycemia ([facility]    Non-pressure chronic ulcer of skin of other sites with fat layer exposed ([facility]         Review of Systems    Prior to Visit Medications    Medication Sig Taking? Authorizing Provider   insulin aspart (NOVOLOG) 100 UNIT/ML injection vial USE VIA INSULIN PUMP MAX DAILY DOSE 100 UNITS. German Perales MD   QUEtiapine (SEROQUEL XR) 300 MG extended release tablet take 1 tablet by mouth nightly  Evy Duff APRN - CNP   ibuprofen (ADVIL;MOTRIN) 600 MG tablet Take 1 tablet by mouth every 6 hours as needed for Pain  Alicja Tariq MD   mupirocin (BACTROBAN) 2 % ointment Apply topically 2 times daily inside nose and on skin lesion.   David Martinez PA-C   ondansetron (ZOFRAN) 4 MG tablet Take 1 tablet by mouth every 6-8 hours as needed for Nausea or Vomiting  YAMIL Bee - NP   busPIRone (BUSPAR) 15 MG tablet Take 15 mg by mouth 3 times daily  YAMIL Medina CNP   lithium (LITHOBID) 300 MG extended release tablet Take 1 tablet by mouth 2 times daily  YAMIL Medina - CNP   naltrexone (DEPADE) 50 MG tablet Take 1 tablet by mouth daily  YAMIL Medina - CNP   lisinopril (PRINIVIL;ZESTRIL) 2.5 MG tablet Take 1 tablet by mouth daily  YAMIL Medina CNP   nicotine (NICODERM CQ) 21 MG/24HR Place 1 patch onto the skin daily  YAMIL Medina CNP   Lancets MISC Test 3x daily  Angelica Wilcox MD   blood glucose monitor strips Test 3x daily  Angelica Wilcox MD   blood glucose monitor kit and supplies Give 1 meter covered by insurance  Angelica Wilcox MD   omeprazole (PRILOSEC) 40 MG delayed release capsule Take 1 capsule by mouth every morning (before breakfast) for 14 days  YAMIL Dhaliwal NP   albuterol sulfate  (90 Base) MCG/ACT inhaler Use 2 puffs 4 times daily for 7 days then as needed for wheezing. Dispense with Spacer and instruct in use. At patient's preference may use 60 dose MDI. May Sub Pro-Air or Proventil as needed per insurance. YAMIL Medina - CNP   Blood Glucose Monitoring Suppl (CONTOUR NEXT ONE) KIT 1 Device by Does not apply route daily  YAMIL Medina CNP   Insulin Pen Needle (PEN NEEDLES) 32G X 6 MM MISC 1 Device by Does not apply route 4 times daily  YAMIL Medina CNP   BD INSULIN SYRINGE U/F 31G X 5/16\" 0.5 ML MISC use 4 daily  Angelica Wilcox MD   CONTOUR NEXT TEST strip Test 6x daily Dx E10.65  Angelica Wilcox MD       Social History     Tobacco Use    Smoking status: Every Day     Packs/day: 1.00     Years: 5.00     Pack years: 5.00     Types: Cigarettes    Smokeless tobacco: Never    Tobacco comments:     pt aware of risk   Vaping Use    Vaping Use: Never used   Substance Use Topics    Alcohol use: Not Currently     Alcohol/week: 0.0 standard drinks     Comment: Had been drinking whiskey and vodka on a regular basis about 6 months ago. Nothing since. Drug use: Yes     Types: Methamphetamines (Crystal Meth), Marijuana ([place]), IV, Cocaine, Opiates      Comment: occassionally, last use 2 weeks ago            PHYSICAL EXAMINATION:  [ INSTRUCTIONS:  \"[x]\" Indicates a positive item  \"[]\" Indicates a negative item  -- DELETE ALL ITEMS NOT EXAMINED]  [] Alert  [] Oriented to person/place/time    [] No apparent distress  [] Toxic appearing    [] Face flushed appearing [] Sclera clear  [] Lips are cyanotic      [] Breathing appears normal  [] Appears tachypneic      [] Rash on visible skin    [] Cranial Nerves II-XII grossly intact    [] Motor grossly intact in visible upper extremities    [] Motor grossly intact in visible lower extremities    [] Normal Mood  [] Anxious appearing    [] Depressed appearing  [] Confused appearing      [] Poor short term memory  [] Poor long term memory    [] OTHER:      Due to this being a TeleHealth encounter, evaluation of the following organ systems is limited: Vitals/Constitutional/EENT/Resp/CV/GI//MS/Neuro/Skin/Heme-Lymph-Imm. ASSESSMENT/PLAN:     Diagnosis Orders   1. Type 1 diabetes mellitus with other specified complication (HCC)  Basic Metabolic Panel    Hemoglobin A1C    insulin aspart (NOVOLOG) 100 UNIT/ML injection vial        Orders Placed This Encounter   Procedures    Basic Metabolic Panel     Standing Status:   Future     Standing Expiration Date:   12/23/2023    Hemoglobin A1C     Standing Status:   Future     Standing Expiration Date:   12/23/2023     Orders Placed This Encounter   Medications    insulin aspart (NOVOLOG) 100 UNIT/ML injection vial     Sig: USE VIA INSULIN PUMP MAX DAILY DOSE 100 UNITS. Dispense:  30 mL     Refill:  3     Continue NovoLog via pump as per current setting patient to follow-up in the office discussed about continuous glucose monitoring A1c goal of 7 or lower    Total time spent 15 minutes    An  electronic signature was used to authenticate this note.     --Kostas Beryle Putnam, MD on 12/23/2022 at 4:16 PM        Pursuant to the emergency declaration under the [address] [place] [address] waiver authority and the [employer] Act, this Virtual  Visit was conducted, with patient's consent, to reduce the patient's risk of exposure to COVID-19 and provide continuity of care for an established patient. Services were provided through a video synchronous discussion virtually to substitute for in-person clinic visit.

## 2023-01-01 NOTE — TELEPHONE ENCOUNTER
Keagan Jane   I left a message regarding care coordination services.   Hopefully he or his mother will return the call
Please call Carolina Liz and have him schedule an appointment. He is overdue for an A1C check, physical, and annual wellness visit. He has had multiple hospital admission. One in February without follow up. It is important that he follows up with his PCP for his diabetes to prevent future ER admissions and exacerbations.
Thank you Qeqertarsuaq!
12.3

## 2023-01-17 DIAGNOSIS — F31.9 BIPOLAR AFFECTIVE DISORDER, REMISSION STATUS UNSPECIFIED (HCC): ICD-10-CM

## 2023-01-17 RX ORDER — BUSPIRONE HYDROCHLORIDE 15 MG/1
TABLET ORAL
Qty: 45 TABLET | Refills: 0 | Status: SHIPPED | OUTPATIENT
Start: 2023-01-17

## 2023-01-17 RX ORDER — LITHIUM CARBONATE 300 MG/1
TABLET, FILM COATED, EXTENDED RELEASE ORAL
Qty: 60 TABLET | Refills: 0 | Status: SHIPPED | OUTPATIENT
Start: 2023-01-17

## 2023-01-17 NOTE — TELEPHONE ENCOUNTER
requesting medication refill. Please approve or deny this request.    Rx requested:  Requested Prescriptions     Pending Prescriptions Disp Refills    busPIRone (BUSPAR) 15 MG tablet [Pharmacy Med Name: BUSPIRONE HCL 15 MG TABLET] 45 tablet 5     Sig: TAKE 1 TABLET (15 MG) BY MOUTH 3 TIMES DAILY. lithium (LITHOBID) 300 MG extended release tablet [Pharmacy Med Name: LITHIUM CARBONATE  MG TB] 60 tablet 5     Sig: take 1 tablet by mouth twice a day         Last Office Visit:   2/24/2022      Next Visit Date:  No future appointments.

## 2023-01-27 ENCOUNTER — OFFICE VISIT (OUTPATIENT)
Dept: ENDOCRINOLOGY | Age: 28
End: 2023-01-27

## 2023-01-27 VITALS
HEART RATE: 79 BPM | OXYGEN SATURATION: 93 % | DIASTOLIC BLOOD PRESSURE: 60 MMHG | BODY MASS INDEX: 25.07 KG/M2 | SYSTOLIC BLOOD PRESSURE: 103 MMHG | HEIGHT: 66 IN | WEIGHT: 156 LBS

## 2023-01-27 DIAGNOSIS — E10.69 TYPE 1 DIABETES MELLITUS WITH OTHER SPECIFIED COMPLICATION (HCC): Primary | ICD-10-CM

## 2023-01-27 LAB
CHP ED QC CHECK: NORMAL
GLUCOSE BLD-MCNC: 567 MG/DL
HBA1C MFR BLD: 9.4 %

## 2023-01-27 RX ORDER — PERPHENAZINE 16 MG/1
TABLET, FILM COATED ORAL
Qty: 200 EACH | Refills: 3 | Status: SHIPPED | OUTPATIENT
Start: 2023-01-27

## 2023-01-27 RX ORDER — INSULIN ASPART 100 [IU]/ML
INJECTION, SOLUTION INTRAVENOUS; SUBCUTANEOUS
Qty: 30 ML | Refills: 3 | Status: SHIPPED | OUTPATIENT
Start: 2023-01-27

## 2023-01-27 NOTE — PROGRESS NOTES
1/27/2023    Assessment:       Diagnosis Orders   1. Type 1 diabetes mellitus with other specified complication (HCC)  POCT Glucose    POCT glycosylated hemoglobin (Hb A1C)    Hemoglobin T8E    Basic Metabolic Panel    Lipid Panel    insulin aspart (NOVOLOG) 100 UNIT/ML injection vial    CONTOUR NEXT TEST strip            PLAN:     Pump was resumed as per prior pump setting patient also to talk to [employer] representative follow-up in 4 weeks    Orders Placed This Encounter   Procedures    Hemoglobin A1C     Standing Status:   Future     Standing Expiration Date:   4/50/4431    Basic Metabolic Panel     Standing Status:   Future     Standing Expiration Date:   1/27/2024    Lipid Panel     Standing Status:   Future     Standing Expiration Date:   1/27/2024    POCT Glucose    POCT glycosylated hemoglobin (Hb A1C)     Orders Placed This Encounter   Medications    insulin aspart (NOVOLOG) 100 UNIT/ML injection vial     Sig: USE VIA INSULIN PUMP MAX DAILY DOSE 100 UNITS. Dispense:  30 mL     Refill:  3    CONTOUR NEXT TEST strip     Sig: Test 6x daily Dx E10.65     Dispense:  200 each     Refill:  3     Return in about 4 weeks (around 2/24/2023).   Subjective:     Chief Complaint   Patient presents with    Diabetes     Vitals:    01/27/23 1518   BP: 103/60   Pulse: 79   SpO2: 93%   Weight: 156 lb (70.8 kg)   Height: 5' 6\" (1.676 m)     Wt Readings from Last 3 Encounters:   01/27/23 156 lb (70.8 kg)   09/21/22 150 lb (68 kg)   08/29/22 160 lb (72.6 kg)     BP Readings from Last 3 Encounters:   01/27/23 103/60   09/21/22 (!) 119/93   08/29/22 122/88     Follow-up on type 1 diabetes recently had a new problem wants to have the setting transferred to the newer [employer] pump planning also to [employer] representative variable control of diabetes due to multiple different factors history of ADHD compliance has been inadequate to poor  Hemoglobin A1c was 9.4 glucose was 500+    Diabetes  He presents for his follow-up diabetic visit. He has type 1 diabetes mellitus. His disease course has been fluctuating. Pertinent negatives for diabetes include no polyuria and no weight loss. Current diabetic treatment includes insulin pump. His overall blood glucose range is >200 mg/dl. Past Medical History:   Diagnosis Date    ADHD (attention deficit hyperactivity disorder)     Asperger syndrome     Asthma     Asthma 3/31/2015    Bipolar 1 disorder, mixed, severe ([address] 2/5/2018    Chemical dependency ([facility]     marijuana    Diabetes mellitus ([facility]     Diabetes mellitus type 1 (HCC)     Hepatitis C     Hepatitis C     Hyperkalemia, diminished renal excretion 11/9/2017    Mental disorder     ODD (oppositional defiant disorder)     Seasonal allergies 3/31/2015    Seizures ([facility]      Past Surgical History:   Procedure Laterality Date    ABDOMEN SURGERY Left 10/7/2019    INCISION  & DRAINAGE OF ABSCESS LEFT ABDOMINAL WALL performed by Oumou Campa MD at [address] [facility] N/A 10/21/2019    INCISION AND DRAINAGE OF RECURRENT ABDOMINAL WALL ABSCESS ROOM 475 performed by Oumou Campa MD at [address] History     Socioeconomic History    Marital status: Single     Spouse name: Not on file    Number of children: 0    Years of education: 11    Highest education level: Not on file   Occupational History    Not on file   Tobacco Use    Smoking status: Every Day     Packs/day: 1.00     Years: 5.00     Pack years: 5.00     Types: Cigarettes    Smokeless tobacco: Never    Tobacco comments:     pt aware of risk   Vaping Use    Vaping Use: Never used   Substance and Sexual Activity    Alcohol use: Not Currently     Alcohol/week: 0.0 standard drinks     Comment: Had been drinking whiskey and vodka on a regular basis about 6 months ago. Nothing since.     Drug use: Yes     Types: Methamphetamines (Crystal Meth), Marijuana (Tello Chihu[place]ua), IV, Cocaine, Opiates      Comment: occassionally, last use 2 weeks ago    Sexual activity: Yes     Partners: Female     Comment: No protection or birth-control plan   Other Topics Concern    Not on file   Social History Narrative    ** Merged History Encounter **          Social Determinants of Health     Financial Resource Strain: Low Risk     Difficulty of Paying Living Expenses: Not hard at all   Food Insecurity: No Food Insecurity    Worried About Running Out of Food in the Last Year: Never true    Ran Out of Food in the Last Year: Never true   Transportation Needs: Not on file   Physical Activity: Not on file   Stress: Not on file   Social Connections: Not on file   Intimate Partner Violence: Not on file   Housing Stability: Not on file     Family History   Problem Relation Age of Onset    Cancer Maternal Aunt         breast    Diabetes Maternal Uncle     Diabetes Paternal Uncle     Cancer Maternal Grandmother     Diabetes Maternal Grandmother     Cancer Maternal Grandfather     Diabetes Maternal Grandfather      Allergies   Allergen Reactions    Bactrim [Sulfamethoxazole-Trimethoprim] Rash    Dust Mite Extract      nasal & sinus congestion, itchy watery eyes, sneezing    Mirtazapine      blisters    Other      dander causes him to sneeze, have nasal/sinus congestion, itchy, watery eyes. Oxcarbazepine Rash    Shrimp Flavor Nausea And Vomiting       Current Outpatient Medications:     insulin aspart (NOVOLOG) 100 UNIT/ML injection vial, USE VIA INSULIN PUMP MAX DAILY DOSE 100 UNITS. , Disp: 30 mL, Rfl: 3    CONTOUR NEXT TEST strip, Test 6x daily Dx E10.65, Disp: 200 each, Rfl: 3    busPIRone (BUSPAR) 15 MG tablet, TAKE 1 TABLET (15 MG) BY MOUTH 3 TIMES DAILY. , Disp: 45 tablet, Rfl: 0    lithium (LITHOBID) 300 MG extended release tablet, take 1 tablet by mouth twice a day, Disp: 60 tablet, Rfl: 0    QUEtiapine (SEROQUEL XR) 300 MG extended release tablet, take 1 tablet by mouth nightly, Disp: 30 tablet, Rfl: 5    ibuprofen (ADVIL;MOTRIN) 600 MG tablet, Take 1 tablet by mouth every 6 hours as needed for Pain, Disp: 30 tablet, Rfl: 0    mupirocin (BACTROBAN) 2 % ointment, Apply topically 2 times daily inside nose and on skin lesion. , Disp: 22 g, Rfl: 1    ondansetron (ZOFRAN) 4 MG tablet, Take 1 tablet by mouth every 6-8 hours as needed for Nausea or Vomiting, Disp: 30 tablet, Rfl: 0    naltrexone (DEPADE) 50 MG tablet, Take 1 tablet by mouth daily, Disp: 30 tablet, Rfl: 5    lisinopril (PRINIVIL;ZESTRIL) 2.5 MG tablet, Take 1 tablet by mouth daily, Disp: 30 tablet, Rfl: 5    Lancets MISC, Test 3x daily, Disp: 100 each, Rfl: 3    blood glucose monitor strips, Test 3x daily, Disp: 100 strip, Rfl: 3    blood glucose monitor kit and supplies, Give 1 meter covered by insurance, Disp: 1 kit, Rfl: 0    albuterol sulfate  (90 Base) MCG/ACT inhaler, Use 2 puffs 4 times daily for 7 days then as needed for wheezing. Dispense with Spacer and instruct in use. At patient's preference may use 60 dose MDI.  May Sub Pro-Air or Proventil as needed per insurance., Disp: 1 each, Rfl: 5    Blood Glucose Monitoring Suppl (CONTOUR NEXT ONE) KIT, 1 Device by Does not apply route daily, Disp: 1 kit, Rfl: 0    Insulin Pen Needle (PEN NEEDLES) 32G X 6 MM MISC, 1 Device by Does not apply route 4 times daily, Disp: 100 each, Rfl: 5    BD INSULIN SYRINGE U/F 31G X 5/16\" 0.5 ML MISC, use 4 daily, Disp: 100 each, Rfl: 3    nicotine (NICODERM CQ) 21 MG/24HR, Place 1 patch onto the skin daily, Disp: 42 patch, Rfl: 0    omeprazole (PRILOSEC) 40 MG delayed release capsule, Take 1 capsule by mouth every morning (before breakfast) for 14 days, Disp: 14 capsule, Rfl: 1  Lab Results   Component Value Date     12/21/2021    K 3.9 12/21/2021     12/21/2021    CO2 23 12/21/2021    BUN 13 12/21/2021    CREATININE 0.80 12/21/2021    GLUCOSE 567 01/27/2023    CALCIUM 9.7 12/21/2021    PROT 7.4 08/05/2021    LABALBU 4.6 08/05/2021    BILITOT <0.2 08/05/2021    ALKPHOS 145 (H) 08/05/2021    AST 14 08/05/2021    ALT 16 08/05/2021    LABGLOM >60.0 12/21/2021 GFRAA >60.0 12/21/2021    GLOB 2.8 08/05/2021     Lab Results   Component Value Date    WBC 6.7 09/09/2020    HGB 15.1 09/09/2020    HCT 43.2 09/09/2020    MCV 84.5 09/09/2020     09/09/2020     Lab Results   Component Value Date    LABA1C 9.4 01/27/2023    LABA1C 10.1 04/18/2022    LABA1C 10.9 (H) 12/21/2021     Lab Results   Component Value Date    HDL 55 08/05/2021    HDL 38 (L) 02/11/2015    HDL 42 09/21/2012    LDLCALC 60 08/05/2021    CHOL 140 08/05/2021    CHOL 130 02/11/2015    CHOL 171 09/21/2012    TRIG 123 08/05/2021    TRIG 139 02/11/2015    TRIG 238 (H) 09/21/2012     No results found for: TESTM  Lab Results   Component Value Date    TSH 0.400 (L) 09/09/2020    TSH 1.240 07/28/2020    TSH 0.947 11/13/2019     No results found for: TPOABS    Review of Systems   Constitutional:  Negative for weight loss. Endocrine: Negative for polyuria. Objective:   Physical Exam  Vitals reviewed. Constitutional:       General: He is not in acute distress. Appearance: Normal appearance. He is normal weight. HENT:      Head: Normocephalic and atraumatic. Right Ear: External ear normal.      Left Ear: External ear normal.      Nose: Nose normal.   Eyes:      General: No scleral icterus. Right eye: No discharge. Left eye: No discharge. Extraocular Movements: Extraocular movements intact. Conjunctiva/sclera: Conjunctivae normal.   Cardiovascular:      Rate and Rhythm: Normal rate. Pulmonary:      Effort: Pulmonary effort is normal.   Musculoskeletal:         General: Normal range of motion. Cervical back: Normal range of motion and neck supple. Neurological:      General: No focal deficit present. Mental Status: He is alert and oriented to person, place, and time.    Psychiatric:         Mood and Affect: Mood normal.         Behavior: Behavior normal.

## 2023-02-02 ENCOUNTER — OFFICE VISIT (OUTPATIENT)
Dept: FAMILY MEDICINE CLINIC | Age: 28
End: 2023-02-02

## 2023-02-02 VITALS
SYSTOLIC BLOOD PRESSURE: 106 MMHG | DIASTOLIC BLOOD PRESSURE: 64 MMHG | HEART RATE: 96 BPM | WEIGHT: 155 LBS | OXYGEN SATURATION: 96 % | BODY MASS INDEX: 24.91 KG/M2 | HEIGHT: 66 IN

## 2023-02-02 DIAGNOSIS — T40.1X4A: ICD-10-CM

## 2023-02-02 DIAGNOSIS — R56.9 SEIZURES (HCC): ICD-10-CM

## 2023-02-02 DIAGNOSIS — F31.9 BIPOLAR 1 DISORDER (HCC): Primary | ICD-10-CM

## 2023-02-02 DIAGNOSIS — F31.9 BIPOLAR AFFECTIVE DISORDER, REMISSION STATUS UNSPECIFIED (HCC): ICD-10-CM

## 2023-02-02 PROBLEM — L98.492 NON-PRESSURE CHRONIC ULCER OF SKIN OF OTHER SITES WITH FAT LAYER EXPOSED (HCC): Status: RESOLVED | Noted: 2021-08-05 | Resolved: 2023-02-02

## 2023-02-02 RX ORDER — BUSPIRONE HYDROCHLORIDE 15 MG/1
TABLET ORAL
Qty: 45 TABLET | Refills: 5 | Status: SHIPPED | OUTPATIENT
Start: 2023-02-02

## 2023-02-02 RX ORDER — QUETIAPINE 300 MG/1
300 TABLET, FILM COATED, EXTENDED RELEASE ORAL NIGHTLY
Qty: 30 TABLET | Refills: 5 | Status: SHIPPED | OUTPATIENT
Start: 2023-02-02

## 2023-02-02 RX ORDER — LITHIUM CARBONATE 300 MG/1
TABLET, FILM COATED, EXTENDED RELEASE ORAL
Qty: 60 TABLET | Refills: 5 | Status: SHIPPED | OUTPATIENT
Start: 2023-02-02

## 2023-02-02 SDOH — ECONOMIC STABILITY: FOOD INSECURITY: WITHIN THE PAST 12 MONTHS, YOU WORRIED THAT YOUR FOOD WOULD RUN OUT BEFORE YOU GOT MONEY TO BUY MORE.: NEVER TRUE

## 2023-02-02 SDOH — ECONOMIC STABILITY: INCOME INSECURITY: HOW HARD IS IT FOR YOU TO PAY FOR THE VERY BASICS LIKE FOOD, HOUSING, MEDICAL CARE, AND HEATING?: NOT HARD AT ALL

## 2023-02-02 SDOH — ECONOMIC STABILITY: HOUSING INSECURITY
IN THE LAST 12 MONTHS, WAS THERE A TIME WHEN YOU DID NOT HAVE A STEADY PLACE TO SLEEP OR SLEPT IN A SHELTER (INCLUDING NOW)?: NO

## 2023-02-02 SDOH — ECONOMIC STABILITY: FOOD INSECURITY: WITHIN THE PAST 12 MONTHS, THE FOOD YOU BOUGHT JUST DIDN'T LAST AND YOU DIDN'T HAVE MONEY TO GET MORE.: NEVER TRUE

## 2023-02-02 ASSESSMENT — PATIENT HEALTH QUESTIONNAIRE - PHQ9
2. FEELING DOWN, DEPRESSED OR HOPELESS: 0
SUM OF ALL RESPONSES TO PHQ QUESTIONS 1-9: 0
10. IF YOU CHECKED OFF ANY PROBLEMS, HOW DIFFICULT HAVE THESE PROBLEMS MADE IT FOR YOU TO DO YOUR WORK, TAKE CARE OF THINGS AT HOME, OR GET ALONG WITH OTHER PEOPLE: 0
SUM OF ALL RESPONSES TO PHQ QUESTIONS 1-9: 0
4. FEELING TIRED OR HAVING LITTLE ENERGY: 0
SUM OF ALL RESPONSES TO PHQ9 QUESTIONS 1 & 2: 0
SUM OF ALL RESPONSES TO PHQ QUESTIONS 1-9: 0
7. TROUBLE CONCENTRATING ON THINGS, SUCH AS READING THE NEWSPAPER OR WATCHING TELEVISION: 0
6. FEELING BAD ABOUT YOURSELF - OR THAT YOU ARE A FAILURE OR HAVE LET YOURSELF OR YOUR FAMILY DOWN: 0
9. THOUGHTS THAT YOU WOULD BE BETTER OFF DEAD, OR OF HURTING YOURSELF: 0
3. TROUBLE FALLING OR STAYING ASLEEP: 0
8. MOVING OR SPEAKING SO SLOWLY THAT OTHER PEOPLE COULD HAVE NOTICED. OR THE OPPOSITE, BEING SO FIGETY OR RESTLESS THAT YOU HAVE BEEN MOVING AROUND A LOT MORE THAN USUAL: 0
SUM OF ALL RESPONSES TO PHQ QUESTIONS 1-9: 0
5. POOR APPETITE OR OVEREATING: 0
1. LITTLE INTEREST OR PLEASURE IN DOING THINGS: 0

## 2023-02-02 ASSESSMENT — ENCOUNTER SYMPTOMS
SHORTNESS OF BREATH: 0
DIARRHEA: 0
CONSTIPATION: 0
COUGH: 0

## 2023-02-02 NOTE — PROGRESS NOTES
Subjective  Chief Complaint   Patient presents with    Discuss Medications       HPI    Started on continuous BS monitor with endo recently. Doing well with it. States that it \"beeps\" every time his BS is over 250  Recent A1c still over 9%    Pt notes that he has been in and out of senior living again. Since getting out he states that he has stayed in his home. Moods- has been taking all medications as prescribed. Moods stable. needs refills of lithium/seroquel      Patient Active Problem List    Diagnosis Date Noted    Uncontrolled type 1 diabetes mellitus with hyperglycemia ([address]     Toxic encephalopathy 07/30/2020    Rhabdomyolysis 07/30/2020    Bipolar 1 disorder ([address] 07/30/2020    Drug overdose, accidental or unintentional, initial encounter 07/28/2020    Hypergranulation 12/20/2019    Abdominal wall abscess 10/18/2019    Cellulitis 10/04/2019    Seizures ([address]     Hepatitis C     Diabetes mellitus type 1 ([address]     DM w/ coma type I, uncontrolled     Multiple drug overdose 02/14/2019    Respiratory insufficiency     Bipolar 1 disorder, mixed, severe ([address] 02/05/2018    H/O bipolar disorder 02/02/2018    Polysubstance abuse ([address]     Suicide attempt ([address]     Hyperglycemia 02/01/2018    Substance induced mood disorder ([address] 02/01/2018    Asperger syndrome 11/09/2017    Chemical dependency ([address] 11/09/2017    History of oppositional defiant disorder 11/09/2017    Hyperglycemia due to type 1 diabetes mellitus ([address] 51/83/2335    Metabolic acidosis with increased anion gap and accumulation of organic acids 11/09/2017    Lactic acid acidosis 11/09/2017    Hyperkalemia, diminished renal excretion 11/09/2017    Hyperkalemia, transcellular shifts 11/09/2017    TWIN (acute kidney injury) ([address] 11/09/2017    Heroin abuse ([address] 11/09/2017    DKA, type 1, not at goal [facility]) 03/31/2015    ADHD (attention deficit hyperactivity disorder) 03/31/2015    Oppositional defiant disorder 03/31/2015    Seasonal allergies 03/31/2015 Asthma 03/31/2015    Contusion of hand 08/28/2012    Closed fracture of base of other metacarpal bone(s) 06/08/2012    Closed fracture of neck of metacarpal bone 10/01/2008    Epilepsy ([address] 09/10/2007    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    Generalized convulsive epilepsy ([address] 01/08/2007    Generalized tonic clonic epilepsy ([address] 01/08/2007    Atopic rhinitis 08/07/2004    Atopic dermatitis 01/24/2004    Adult behavior problems 07/02/2003     Past Medical History:   Diagnosis Date    ADHD (attention deficit hyperactivity disorder)     Asperger syndrome     Asthma     Asthma 3/31/2015    Bipolar 1 disorder, mixed, severe ([address] 2/5/2018    Chemical dependency ([address]     marijuana    Diabetes mellitus ([address]     Diabetes mellitus type 1 ([address]     Hepatitis C     Hepatitis C     Hyperkalemia, diminished renal excretion 11/9/2017    Mental disorder     ODD (oppositional defiant disorder)     Seasonal allergies 3/31/2015    Seizures ([address]      Past Surgical History:   Procedure Laterality Date    ABDOMEN SURGERY Left 10/7/2019    INCISION  & DRAINAGE OF ABSCESS LEFT ABDOMINAL WALL performed by Chi White MD at [address] [place] [address]/A 10/21/2019    INCISION AND DRAINAGE OF RECURRENT ABDOMINAL WALL ABSCESS ROOM 475 performed by Chi White MD at [facility] [place]     Family History   Problem Relation Age of Onset    Cancer Maternal Aunt         breast    Diabetes Maternal Uncle     Diabetes Paternal Uncle     Cancer Maternal Grandmother     Diabetes Maternal Grandmother     Cancer Maternal Grandfather     Diabetes Maternal Grandfather      Social History     Socioeconomic History    Marital status: Single     Spouse name: None    Number of children: 0    Years of education: 11    Highest education level: None   Tobacco Use    Smoking status: Every Day     Packs/day: 1.00     Years: 5.00     Pack years: 5.00     Types: Cigarettes    Smokeless tobacco: Never    Tobacco comments:     pt aware of risk   Vaping Use    Vaping Use: Never used   Substance and Sexual Activity    Alcohol use: Not Currently     Alcohol/week: 0.0 standard drinks     Comment: Had been drinking whiskey and vodka on a regular basis about 6 months ago. Nothing since. Drug use: Yes     Types: Methamphetamines (Crystal Meth), Marijuana (Desiree Lakemore), IV, Cocaine, Opiates      Comment: occassionally, last use 2 weeks ago    Sexual activity: Yes     Partners: Female     Comment: No protection or birth-control plan   Social History Narrative    ** Merged History Encounter **          Social Determinants of Health     Financial Resource Strain: Low Risk     Difficulty of Paying Living Expenses: Not hard at all   Food Insecurity: No Food Insecurity    Worried About [facility] in the Last Year: Never true    Ran Out of Food in the Last Year: Never true   Transportation Needs: Unknown    Lack of Transportation (Non-Medical): No   Housing Stability: Unknown    Unstable Housing in the Last Year: No     Current Outpatient Medications on File Prior to Visit   Medication Sig Dispense Refill    insulin aspart (NOVOLOG) 100 UNIT/ML injection vial USE VIA INSULIN PUMP MAX DAILY DOSE 100 UNITS. 30 mL 3    CONTOUR NEXT TEST strip Test 6x daily Dx E10.65 200 each 3    ibuprofen (ADVIL;MOTRIN) 600 MG tablet Take 1 tablet by mouth every 6 hours as needed for Pain 30 tablet 0    mupirocin (BACTROBAN) 2 % ointment Apply topically 2 times daily inside nose and on skin lesion.  22 g 1    ondansetron (ZOFRAN) 4 MG tablet Take 1 tablet by mouth every 6-8 hours as needed for Nausea or Vomiting 30 tablet 0    naltrexone (DEPADE) 50 MG tablet Take 1 tablet by mouth daily 30 tablet 5    lisinopril (PRINIVIL;ZESTRIL) 2.5 MG tablet Take 1 tablet by mouth daily 30 tablet 5    Lancets MISC Test 3x daily 100 each 3    blood glucose monitor strips Test 3x daily 100 strip 3    blood glucose monitor kit and supplies Give 1 meter covered by insurance 1 kit 0    albuterol sulfate  (90 Base) MCG/ACT inhaler Use 2 puffs 4 times daily for 7 days then as needed for wheezing. Dispense with Spacer and instruct in use. At patient's preference may use 60 dose MDI. May Sub Pro-Air or Proventil as needed per insurance. 1 each 5    Blood Glucose Monitoring Suppl (CONTOUR NEXT ONE) KIT 1 Device by Does not apply route daily 1 kit 0    Insulin Pen Needle (PEN NEEDLES) 32G X 6 MM MISC 1 Device by Does not apply route 4 times daily 100 each 5    BD INSULIN SYRINGE U/F 31G X 5/16\" 0.5 ML MISC use 4 daily 100 each 3    nicotine (NICODERM CQ) 21 MG/24HR Place 1 patch onto the skin daily 42 patch 0    omeprazole (PRILOSEC) 40 MG delayed release capsule Take 1 capsule by mouth every morning (before breakfast) for 14 days 14 capsule 1     No current facility-administered medications on file prior to visit. Allergies   Allergen Reactions    Bactrim [Sulfamethoxazole-Trimethoprim] Rash    Dust Mite Extract      nasal & sinus congestion, itchy watery eyes, sneezing    Mirtazapine      blisters    Other      dander causes him to sneeze, have nasal/sinus congestion, itchy, watery eyes. Oxcarbazepine Rash    Shrimp Flavor Nausea And Vomiting       Review of Systems   Constitutional:  Negative for fatigue. Respiratory:  Negative for cough and shortness of breath. Gastrointestinal:  Negative for constipation and diarrhea. Psychiatric/Behavioral:  Negative for behavioral problems and dysphoric mood. The patient is not nervous/anxious. Objective  Vitals:    02/02/23 1347   BP: 106/64   Pulse: 96   SpO2: 96%   Weight: 155 lb (70.3 kg)   Height: 5' 6\" (1.676 m)     Physical Exam  Vitals and nursing note reviewed. Constitutional:       Appearance: Normal appearance. He is normal weight. HENT:      Head: Normocephalic.       Nose: Nose normal.      Mouth/Throat:      Mouth: Mucous membranes are moist. Pharynx: Oropharynx is clear. Eyes:      Extraocular Movements: Extraocular movements intact. Conjunctiva/sclera: Conjunctivae normal.      Pupils: Pupils are equal, round, and reactive to light. Cardiovascular:      Rate and Rhythm: Normal rate and regular rhythm. Pulses: Normal pulses. Heart sounds: Normal heart sounds. Pulmonary:      Effort: Pulmonary effort is normal.      Breath sounds: Normal breath sounds. Musculoskeletal:      Cervical back: Neck supple. Skin:     General: Skin is warm. Neurological:      General: No focal deficit present. Mental Status: He is alert and oriented to person, place, and time. Mental status is at baseline. Psychiatric:         Mood and Affect: Mood normal.         Behavior: Behavior normal.         Thought Content: Thought content normal.         Judgment: Judgment normal.       Assessment & Plan     Diagnosis Orders   1. Bipolar 1 disorder (HCC)  Lithium Level      2. Bipolar affective disorder, remission status unspecified (HCC)  QUEtiapine (SEROQUEL XR) 300 MG extended release tablet    lithium (LITHOBID) 300 MG extended release tablet    busPIRone (BUSPAR) 15 MG tablet      3. Poisoning by heroin, undetermined, initial encounter ([facility]  Currently not using      4. Seizures ([facility]  None recently. Orders Placed This Encounter   Procedures    Lithium Level     Standing Status:   Future     Standing Expiration Date:   2/2/2024     Order Specific Question:   Time of Last Dose? Answer:   10pm       Orders Placed This Encounter   Medications    QUEtiapine (SEROQUEL XR) 300 MG extended release tablet     Sig: Take 1 tablet by mouth nightly     Dispense:  30 tablet     Refill:  5    lithium (LITHOBID) 300 MG extended release tablet     Sig: take 1 tablet by mouth twice a day     Dispense:  60 tablet     Refill:  5    busPIRone (BUSPAR) 15 MG tablet     Sig: TAKE 1 TABLET (15 MG) BY MOUTH 3 TIMES DAILY.      Dispense:  45 tablet     Refill: 5       Medications Discontinued During This Encounter   Medication Reason    QUEtiapine (SEROQUEL XR) 300 MG extended release tablet REORDER    busPIRone (BUSPAR) 15 MG tablet REORDER    lithium (LITHOBID) 300 MG extended release tablet REORDER        Return in about 4 months (around 6/2/2023). Side effects, adverse effects of the medication prescribed today, as well as treatment plan/ rationale and result expectations have been discussed with the patient who expresses understanding and desires to proceed. Close follow up to evaluate treatment results and for coordination of care. I have reviewed the patient's medical history in detail and updated the computerized patient record. As always, patient is advised that if symptoms worsen in any way they will proceed to the nearest emergency room.           Edwin Mcneil, YAMIL - CNP

## 2023-04-20 ENCOUNTER — HOSPITAL ENCOUNTER (OUTPATIENT)
Dept: MRI IMAGING | Age: 28
Discharge: HOME | End: 2023-04-22

## 2023-04-20 DIAGNOSIS — S82.832A CLOSED FRACTURE OF DISTAL END OF LEFT FIBULA, UNSPECIFIED FRACTURE MORPHOLOGY, INITIAL ENCOUNTER: ICD-10-CM

## 2023-04-20 DIAGNOSIS — S93.402A SPRAIN OF LEFT ANKLE, UNSPECIFIED LIGAMENT, INITIAL ENCOUNTER: ICD-10-CM

## 2023-04-20 DIAGNOSIS — S93.622A SPRAIN OF TARSOMETATARSAL LIGAMENT OF LEFT FOOT, INITIAL ENCOUNTER: ICD-10-CM

## 2023-05-18 DIAGNOSIS — F31.9 BIPOLAR AFFECTIVE DISORDER, REMISSION STATUS UNSPECIFIED (HCC): ICD-10-CM

## 2023-05-18 RX ORDER — QUETIAPINE 300 MG/1
300 TABLET, FILM COATED, EXTENDED RELEASE ORAL NIGHTLY
Qty: 90 TABLET | Refills: 1 | Status: SHIPPED | OUTPATIENT
Start: 2023-05-18

## 2023-06-15 DIAGNOSIS — E10.69 TYPE 1 DIABETES MELLITUS WITH OTHER SPECIFIED COMPLICATION (HCC): ICD-10-CM

## 2023-06-15 DIAGNOSIS — F31.9 BIPOLAR 1 DISORDER (HCC): ICD-10-CM

## 2023-06-15 LAB
ANION GAP SERPL CALCULATED.3IONS-SCNC: 11 MEQ/L (ref 9–15)
BUN SERPL-MCNC: 10 MG/DL (ref 6–20)
CALCIUM SERPL-MCNC: 9.2 MG/DL (ref 8.5–9.9)
CHLORIDE SERPL-SCNC: 102 MEQ/L (ref 95–107)
CHOLEST SERPL-MCNC: 118 MG/DL (ref 0–199)
CO2 SERPL-SCNC: 23 MEQ/L (ref 20–31)
CREAT SERPL-MCNC: 0.85 MG/DL (ref 0.7–1.2)
GLUCOSE SERPL-MCNC: 344 MG/DL (ref 70–99)
HBA1C MFR BLD: 7.7 % (ref 4.8–5.9)
HDLC SERPL-MCNC: 38 MG/DL (ref 40–59)
LDLC SERPL CALC-MCNC: 67 MG/DL (ref 0–129)
LITHIUM SERPL-MCNC: 0.2 MEQ/L (ref 0.6–1.2)
POTASSIUM SERPL-SCNC: 4.5 MEQ/L (ref 3.4–4.9)
SODIUM SERPL-SCNC: 136 MEQ/L (ref 135–144)
TRIGL SERPL-MCNC: 66 MG/DL (ref 0–150)

## 2023-06-22 ENCOUNTER — OFFICE VISIT (OUTPATIENT)
Dept: FAMILY MEDICINE CLINIC | Age: 28
End: 2023-06-22
Payer: MEDICARE

## 2023-06-22 VITALS
HEIGHT: 66 IN | HEART RATE: 110 BPM | SYSTOLIC BLOOD PRESSURE: 128 MMHG | WEIGHT: 150 LBS | DIASTOLIC BLOOD PRESSURE: 80 MMHG | OXYGEN SATURATION: 96 % | BODY MASS INDEX: 24.11 KG/M2

## 2023-06-22 DIAGNOSIS — L01.00 IMPETIGO: Primary | ICD-10-CM

## 2023-06-22 DIAGNOSIS — F31.9 BIPOLAR AFFECTIVE DISORDER, REMISSION STATUS UNSPECIFIED (HCC): ICD-10-CM

## 2023-06-22 DIAGNOSIS — E10.69 TYPE 1 DIABETES MELLITUS WITH OTHER SPECIFIED COMPLICATION (HCC): ICD-10-CM

## 2023-06-22 DIAGNOSIS — Z79.899 HIGH RISK MEDICATION USE: ICD-10-CM

## 2023-06-22 PROCEDURE — G8427 DOCREV CUR MEDS BY ELIG CLIN: HCPCS | Performed by: NURSE PRACTITIONER

## 2023-06-22 PROCEDURE — G8420 CALC BMI NORM PARAMETERS: HCPCS | Performed by: NURSE PRACTITIONER

## 2023-06-22 PROCEDURE — 99214 OFFICE O/P EST MOD 30 MIN: CPT | Performed by: NURSE PRACTITIONER

## 2023-06-22 PROCEDURE — 2022F DILAT RTA XM EVC RTNOPTHY: CPT | Performed by: NURSE PRACTITIONER

## 2023-06-22 PROCEDURE — 3051F HG A1C>EQUAL 7.0%<8.0%: CPT | Performed by: NURSE PRACTITIONER

## 2023-06-22 PROCEDURE — 4004F PT TOBACCO SCREEN RCVD TLK: CPT | Performed by: NURSE PRACTITIONER

## 2023-06-22 RX ORDER — LITHIUM CARBONATE 300 MG/1
TABLET, FILM COATED, EXTENDED RELEASE ORAL
Qty: 60 TABLET | Refills: 5 | Status: SHIPPED | OUTPATIENT
Start: 2023-06-22

## 2023-06-22 RX ORDER — INSULIN ASPART 100 [IU]/ML
INJECTION, SOLUTION INTRAVENOUS; SUBCUTANEOUS
Qty: 30 ML | Refills: 3 | Status: SHIPPED | OUTPATIENT
Start: 2023-06-22

## 2023-06-22 RX ORDER — DOXYCYCLINE HYCLATE 100 MG
100 TABLET ORAL 2 TIMES DAILY
Qty: 20 TABLET | Refills: 0 | Status: SHIPPED | OUTPATIENT
Start: 2023-06-22 | End: 2023-07-02

## 2023-06-22 RX ORDER — BUSPIRONE HYDROCHLORIDE 15 MG/1
TABLET ORAL
Qty: 45 TABLET | Refills: 5 | Status: SHIPPED | OUTPATIENT
Start: 2023-06-22

## 2023-06-22 RX ORDER — LISINOPRIL 2.5 MG/1
2.5 TABLET ORAL DAILY
Qty: 30 TABLET | Refills: 5 | Status: SHIPPED | OUTPATIENT
Start: 2023-06-22

## 2023-06-22 RX ORDER — QUETIAPINE 300 MG/1
300 TABLET, FILM COATED, EXTENDED RELEASE ORAL NIGHTLY
Qty: 90 TABLET | Refills: 1 | Status: SHIPPED | OUTPATIENT
Start: 2023-06-22

## 2023-06-22 ASSESSMENT — ENCOUNTER SYMPTOMS
SHORTNESS OF BREATH: 0
COLOR CHANGE: 1
COUGH: 0

## 2023-06-22 NOTE — PROGRESS NOTES
wheezing. Dispense with Spacer and instruct in use. At patient's preference may use 60 dose MDI. May Sub Pro-Air or Proventil as needed per insurance. 1 each 5    Blood Glucose Monitoring Suppl (CONTOUR NEXT ONE) KIT 1 Device by Does not apply route daily 1 kit 0    Insulin Pen Needle (PEN NEEDLES) 32G X 6 MM MISC 1 Device by Does not apply route 4 times daily 100 each 5    BD INSULIN SYRINGE U/F 31G X 5/16\" 0.5 ML MISC use 4 daily 100 each 3    nicotine (NICODERM CQ) 21 MG/24HR Place 1 patch onto the skin daily 42 patch 0    omeprazole (PRILOSEC) 40 MG delayed release capsule Take 1 capsule by mouth every morning (before breakfast) for 14 days 14 capsule 1     No current facility-administered medications on file prior to visit. Allergies   Allergen Reactions    Bactrim [Sulfamethoxazole-Trimethoprim] Rash    Dust Mite Extract      nasal & sinus congestion, itchy watery eyes, sneezing    Mirtazapine      blisters    Other      dander causes him to sneeze, have nasal/sinus congestion, itchy, watery eyes. Oxcarbazepine Rash    Shrimp Flavor Nausea And Vomiting       Review of Systems   Constitutional:  Negative for fatigue. Respiratory:  Negative for cough and shortness of breath. Cardiovascular:  Negative for chest pain. Skin:  Positive for color change and rash. Hematological:  Positive for adenopathy. Objective  Vitals:    06/22/23 1425   BP: 128/80   Site: Right Upper Arm   Position: Sitting   Cuff Size: Medium Adult   Pulse: (!) 110   SpO2: 96%   Weight: 150 lb (68 kg)   Height: 5' 6\" (1.676 m)     Physical Exam  Constitutional:       Appearance: Normal appearance. He is normal weight. HENT:      Head: Normocephalic. Abrasion (scab on posterior and superior scalp) present. Eyes:      Extraocular Movements: Extraocular movements intact. Conjunctiva/sclera: Conjunctivae normal.      Pupils: Pupils are equal, round, and reactive to light.    Cardiovascular:      Rate and Rhythm: Normal

## 2023-06-26 DIAGNOSIS — E10.69 TYPE 1 DIABETES MELLITUS WITH OTHER SPECIFIED COMPLICATION (HCC): ICD-10-CM

## 2023-06-26 RX ORDER — LISINOPRIL 2.5 MG/1
2.5 TABLET ORAL DAILY
Qty: 90 TABLET | Refills: 1 | OUTPATIENT
Start: 2023-06-26

## 2023-06-29 ENCOUNTER — TELEPHONE (OUTPATIENT)
Dept: ENDOCRINOLOGY | Age: 28
End: 2023-06-29

## 2023-07-07 ENCOUNTER — TELEPHONE (OUTPATIENT)
Dept: ENDOCRINOLOGY | Age: 28
End: 2023-07-07

## 2023-07-07 NOTE — TELEPHONE ENCOUNTER
VIKAS Galo req glucose logs from the sensor Guardian or to specify in last office visit that pt use the Guardian sensor. I call pt to req the information and to make appt f/u because is been 6 mo and to download guardian sensor, but pt phone didn't had voicemail for me to LVM. Please advice if pt calls.

## 2023-07-21 ENCOUNTER — TELEPHONE (OUTPATIENT)
Dept: ENDOCRINOLOGY | Age: 28
End: 2023-07-21

## 2023-07-21 NOTE — TELEPHONE ENCOUNTER
There's no addendum because after I send the ones that are scan, Iraj ask in addition to 30 days of log to had a visit inside the 6 month and Jeremy Sunshine was passed of the 6 mo. If you see my encounter from 7/7/23 saying I LVM to pt for 30 days of logs if he doesn't have the sensor or if he has it to bring it and download it and to make f/u with Dr. Sophy Wang. So pt called and has appt on 7/25/23 and I send again the paper to [facility] because they had me waiting on the phone for 28 mins.

## 2023-07-21 NOTE — TELEPHONE ENCOUNTER
[facility] called they had faxed over paperwork that Dr Sofia Briceno last office note needed an addendum. I see you had scanned it into his chart under media but I dont see where it was actually done. Has this been done? If not can you please ask Dr Adalid Pineda to do the addendum  and send back.   Thanks

## 2023-07-25 ENCOUNTER — OFFICE VISIT (OUTPATIENT)
Dept: ENDOCRINOLOGY | Age: 28
End: 2023-07-25
Payer: MEDICARE

## 2023-07-25 VITALS
BODY MASS INDEX: 24.91 KG/M2 | WEIGHT: 155 LBS | HEIGHT: 66 IN | OXYGEN SATURATION: 97 % | SYSTOLIC BLOOD PRESSURE: 99 MMHG | HEART RATE: 67 BPM | DIASTOLIC BLOOD PRESSURE: 65 MMHG

## 2023-07-25 DIAGNOSIS — E10.69 TYPE 1 DIABETES MELLITUS WITH OTHER SPECIFIED COMPLICATION (HCC): ICD-10-CM

## 2023-07-25 DIAGNOSIS — E10.69 TYPE 1 DIABETES MELLITUS WITH OTHER SPECIFIED COMPLICATION (HCC): Primary | ICD-10-CM

## 2023-07-25 LAB
CHP ED QC CHECK: ABNORMAL
GLUCOSE BLD-MCNC: 234 MG/DL

## 2023-07-25 PROCEDURE — 2022F DILAT RTA XM EVC RTNOPTHY: CPT | Performed by: INTERNAL MEDICINE

## 2023-07-25 PROCEDURE — G8427 DOCREV CUR MEDS BY ELIG CLIN: HCPCS | Performed by: INTERNAL MEDICINE

## 2023-07-25 PROCEDURE — 99213 OFFICE O/P EST LOW 20 MIN: CPT | Performed by: INTERNAL MEDICINE

## 2023-07-25 PROCEDURE — 3051F HG A1C>EQUAL 7.0%<8.0%: CPT | Performed by: INTERNAL MEDICINE

## 2023-07-25 PROCEDURE — 82962 GLUCOSE BLOOD TEST: CPT | Performed by: INTERNAL MEDICINE

## 2023-07-25 PROCEDURE — G8419 CALC BMI OUT NRM PARAM NOF/U: HCPCS | Performed by: INTERNAL MEDICINE

## 2023-07-25 PROCEDURE — 4004F PT TOBACCO SCREEN RCVD TLK: CPT | Performed by: INTERNAL MEDICINE

## 2023-07-25 RX ORDER — DIAZEPAM 5 MG/1
TABLET ORAL
COMMUNITY
Start: 2023-04-17

## 2023-07-25 RX ORDER — INSULIN ASPART 100 [IU]/ML
INJECTION, SOLUTION INTRAVENOUS; SUBCUTANEOUS
Qty: 30 ML | Refills: 3 | Status: SHIPPED | OUTPATIENT
Start: 2023-07-25

## 2023-07-25 RX ORDER — PERPHENAZINE 16 MG/1
TABLET, FILM COATED ORAL
Qty: 200 EACH | Refills: 3 | Status: SHIPPED | OUTPATIENT
Start: 2023-07-25

## 2023-07-25 ASSESSMENT — ENCOUNTER SYMPTOMS: EYES NEGATIVE: 1

## 2023-07-25 NOTE — TELEPHONE ENCOUNTER
Lov 6/22/23  Next appt 10/23/23  Pt calling stating pharmacy cannot fill because due to insurance pt needs 90 day supply

## 2023-07-25 NOTE — PROGRESS NOTES
(NICODERM CQ) 21 MG/24HR, Place 1 patch onto the skin daily, Disp: 42 patch, Rfl: 0    omeprazole (PRILOSEC) 40 MG delayed release capsule, Take 1 capsule by mouth every morning (before breakfast) for 14 days, Disp: 14 capsule, Rfl: 1  Lab Results   Component Value Date     06/15/2023    K 4.5 06/15/2023     06/15/2023    CO2 23 06/15/2023    BUN 10 06/15/2023    CREATININE 0.85 06/15/2023    GLUCOSE 234 (H) 07/25/2023    CALCIUM 9.2 06/15/2023    PROT 7.4 08/05/2021    LABALBU 4.6 08/05/2021    BILITOT <0.2 08/05/2021    ALKPHOS 145 (H) 08/05/2021    AST 14 08/05/2021    ALT 16 08/05/2021    LABGLOM >60.0 06/15/2023    GFRAA >60.0 12/21/2021    GLOB 2.8 08/05/2021     Lab Results   Component Value Date    WBC 6.7 09/09/2020    HGB 15.1 09/09/2020    HCT 43.2 09/09/2020    MCV 84.5 09/09/2020     09/09/2020     Lab Results   Component Value Date    LABA1C 7.7 (H) 06/15/2023    LABA1C 9.4 01/27/2023    LABA1C 10.1 04/18/2022     Lab Results   Component Value Date    HDL 38 (L) 06/15/2023    HDL 55 08/05/2021    HDL 38 (L) 02/11/2015    LDLCALC 67 06/15/2023    LDLCALC 60 08/05/2021    CHOL 118 06/15/2023    CHOL 140 08/05/2021    CHOL 130 02/11/2015    TRIG 66 06/15/2023    TRIG 123 08/05/2021    TRIG 139 02/11/2015     No results found for: TESTM  Lab Results   Component Value Date    TSH 0.400 (L) 09/09/2020    TSH 1.240 07/28/2020    TSH 0.947 11/13/2019     No results found for: TPOABS    Review of Systems   Constitutional:  Negative for weight loss. Eyes: Negative. Cardiovascular: Negative. Endocrine: Negative for polydipsia and polyuria. Psychiatric/Behavioral:  Positive for decreased concentration and dysphoric mood. All other systems reviewed and are negative. Objective:   Physical Exam  Vitals reviewed. Constitutional:       General: He is not in acute distress. Appearance: Normal appearance. HENT:      Head: Normocephalic and atraumatic.       Right Ear: External

## 2023-07-26 RX ORDER — LISINOPRIL 2.5 MG/1
2.5 TABLET ORAL DAILY
Qty: 90 TABLET | Refills: 3 | Status: SHIPPED | OUTPATIENT
Start: 2023-07-26

## 2023-08-03 ENCOUNTER — TELEPHONE (OUTPATIENT)
Dept: ENDOCRINOLOGY | Age: 28
End: 2023-08-03

## 2023-10-23 ENCOUNTER — OFFICE VISIT (OUTPATIENT)
Dept: FAMILY MEDICINE CLINIC | Age: 28
End: 2023-10-23
Payer: MEDICARE

## 2023-10-23 VITALS
OXYGEN SATURATION: 99 % | HEART RATE: 87 BPM | HEIGHT: 66 IN | BODY MASS INDEX: 25.39 KG/M2 | WEIGHT: 158 LBS | DIASTOLIC BLOOD PRESSURE: 78 MMHG | SYSTOLIC BLOOD PRESSURE: 104 MMHG

## 2023-10-23 DIAGNOSIS — R79.89 DECREASED THYROID STIMULATING HORMONE (TSH) LEVEL: Primary | ICD-10-CM

## 2023-10-23 DIAGNOSIS — F31.9 BIPOLAR AFFECTIVE DISORDER, REMISSION STATUS UNSPECIFIED (HCC): ICD-10-CM

## 2023-10-23 DIAGNOSIS — E10.69 TYPE 1 DIABETES MELLITUS WITH OTHER SPECIFIED COMPLICATION (HCC): ICD-10-CM

## 2023-10-23 DIAGNOSIS — F17.200 SMOKER: ICD-10-CM

## 2023-10-23 DIAGNOSIS — F19.10 POLYSUBSTANCE ABUSE (HCC): ICD-10-CM

## 2023-10-23 PROCEDURE — G8419 CALC BMI OUT NRM PARAM NOF/U: HCPCS | Performed by: NURSE PRACTITIONER

## 2023-10-23 PROCEDURE — G8484 FLU IMMUNIZE NO ADMIN: HCPCS | Performed by: NURSE PRACTITIONER

## 2023-10-23 PROCEDURE — 99214 OFFICE O/P EST MOD 30 MIN: CPT | Performed by: NURSE PRACTITIONER

## 2023-10-23 PROCEDURE — G8427 DOCREV CUR MEDS BY ELIG CLIN: HCPCS | Performed by: NURSE PRACTITIONER

## 2023-10-23 PROCEDURE — 3051F HG A1C>EQUAL 7.0%<8.0%: CPT | Performed by: NURSE PRACTITIONER

## 2023-10-23 PROCEDURE — 4004F PT TOBACCO SCREEN RCVD TLK: CPT | Performed by: NURSE PRACTITIONER

## 2023-10-23 PROCEDURE — 2022F DILAT RTA XM EVC RTNOPTHY: CPT | Performed by: NURSE PRACTITIONER

## 2023-10-23 RX ORDER — LISINOPRIL 2.5 MG/1
2.5 TABLET ORAL DAILY
Qty: 90 TABLET | Refills: 3 | Status: SHIPPED | OUTPATIENT
Start: 2023-10-23

## 2023-10-23 RX ORDER — NICOTINE 21 MG/24HR
1 PATCH, TRANSDERMAL 24 HOURS TRANSDERMAL DAILY
Qty: 42 PATCH | Refills: 1 | Status: SHIPPED | OUTPATIENT
Start: 2023-10-23 | End: 2023-12-04

## 2023-10-23 ASSESSMENT — ENCOUNTER SYMPTOMS
SHORTNESS OF BREATH: 0
DIARRHEA: 0
CONSTIPATION: 0
COUGH: 0

## 2023-10-23 NOTE — PROGRESS NOTES
(PRINIVIL;ZESTRIL) 2.5 MG tablet REORDER        Return in about 6 months (around 4/23/2024). Side effects, adverse effects of the medication prescribed today, as well as treatment plan/ rationale and result expectations have been discussed with the patient who expresses understanding and desires to proceed. Close follow up to evaluate treatment results and for coordination of care. I have reviewed the patient's medical history in detail and updated the computerized patient record. As always, patient is advised that if symptoms worsen in any way they will proceed to the nearest emergency room.           Andreas Warren, APRN - CNP

## 2023-11-12 ENCOUNTER — HOSPITAL ENCOUNTER (EMERGENCY)
Facility: HOSPITAL | Age: 28
Discharge: OTHER INSTITUTION | End: 2023-11-12

## 2023-11-12 PROCEDURE — 4500999001 HC ED NO CHARGE

## 2024-02-07 DIAGNOSIS — E10.69 TYPE 1 DIABETES MELLITUS WITH OTHER SPECIFIED COMPLICATION (HCC): ICD-10-CM

## 2024-02-07 RX ORDER — INSULIN ASPART 100 [IU]/ML
INJECTION, SOLUTION INTRAVENOUS; SUBCUTANEOUS
Qty: 30 ML | Refills: 0 | Status: SHIPPED | OUTPATIENT
Start: 2024-02-07

## 2024-02-07 NOTE — TELEPHONE ENCOUNTER
Patient requesting medication refill. Please approve or deny this request.    Rx requested:  Requested Prescriptions     Pending Prescriptions Disp Refills    insulin aspart (NOVOLOG) 100 UNIT/ML injection vial 30 mL 0     Sig: USE VIA INSULIN PUMP MAX DAILY DOSE 100 UNITS.         Last Office Visit:   7/25/2023      Next Visit Date:  Future Appointments   Date Time Provider Department Center   2/22/2024  3:00 PM Kostas Linn MD [facility]

## 2024-02-22 ENCOUNTER — OFFICE VISIT (OUTPATIENT)
Dept: ENDOCRINOLOGY | Age: 29
End: 2024-02-22
Payer: MEDICARE

## 2024-02-22 VITALS
HEIGHT: 66 IN | BODY MASS INDEX: 26.36 KG/M2 | DIASTOLIC BLOOD PRESSURE: 88 MMHG | OXYGEN SATURATION: 97 % | SYSTOLIC BLOOD PRESSURE: 133 MMHG | WEIGHT: 164 LBS | HEART RATE: 74 BPM

## 2024-02-22 DIAGNOSIS — E10.69 TYPE 1 DIABETES MELLITUS WITH OTHER SPECIFIED COMPLICATION (HCC): Primary | ICD-10-CM

## 2024-02-22 LAB
CHP ED QC CHECK: NORMAL
GLUCOSE BLD-MCNC: 125 MG/DL
HBA1C MFR BLD: 8.7 %

## 2024-02-22 PROCEDURE — 4004F PT TOBACCO SCREEN RCVD TLK: CPT | Performed by: INTERNAL MEDICINE

## 2024-02-22 PROCEDURE — G8427 DOCREV CUR MEDS BY ELIG CLIN: HCPCS | Performed by: INTERNAL MEDICINE

## 2024-02-22 PROCEDURE — G8484 FLU IMMUNIZE NO ADMIN: HCPCS | Performed by: INTERNAL MEDICINE

## 2024-02-22 PROCEDURE — 3052F HG A1C>EQUAL 8.0%<EQUAL 9.0%: CPT | Performed by: INTERNAL MEDICINE

## 2024-02-22 PROCEDURE — 82962 GLUCOSE BLOOD TEST: CPT | Performed by: INTERNAL MEDICINE

## 2024-02-22 PROCEDURE — 99213 OFFICE O/P EST LOW 20 MIN: CPT | Performed by: INTERNAL MEDICINE

## 2024-02-22 PROCEDURE — G8419 CALC BMI OUT NRM PARAM NOF/U: HCPCS | Performed by: INTERNAL MEDICINE

## 2024-02-22 PROCEDURE — 83036 HEMOGLOBIN GLYCOSYLATED A1C: CPT | Performed by: INTERNAL MEDICINE

## 2024-02-22 PROCEDURE — 2022F DILAT RTA XM EVC RTNOPTHY: CPT | Performed by: INTERNAL MEDICINE

## 2024-02-22 RX ORDER — INSULIN ASPART 100 [IU]/ML
INJECTION, SOLUTION INTRAVENOUS; SUBCUTANEOUS
Qty: 30 ML | Refills: 5 | Status: SHIPPED | OUTPATIENT
Start: 2024-02-22

## 2024-02-22 NOTE — PROGRESS NOTES
2/22/2024    Assessment:       Diagnosis Orders   1. Type 1 diabetes mellitus with other specified complication (HCC)  POCT Glucose    POCT glycosylated hemoglobin (Hb A1C)            PLAN:     Orders Placed This Encounter   Procedures    Hemoglobin A1C     Standing Status:   Future     Standing Expiration Date:   2/22/2025    Basic Metabolic Panel     Standing Status:   Future     Standing Expiration Date:   2/22/2025    POCT Glucose    POCT glycosylated hemoglobin (Hb A1C)     Orders Placed This Encounter   Medications    insulin aspart (NOVOLOG) 100 UNIT/ML injection vial     Sig: USE VIA INSULIN PUMP MAX DAILY DOSE 100 UNITS.     Dispense:  30 mL     Refill:  5    Insulin Lispro-aabc (LYUMJEV) 100 UNIT/ML SOLN     Sig: Lot [id number] exp 10/23/24     Dispense:  2 each     Refill:  0     Continue with NovoLog via pump  A1c goal less than 7  Advised to get upgraded to 780 with sensor      Orders Placed This Encounter   Procedures    POCT Glucose    POCT glycosylated hemoglobin (Hb A1C)     No orders of the defined types were placed in this encounter.    No follow-ups on file.  Subjective:     Chief Complaint   Patient presents with    Diabetes     Vitals:    02/22/24 1504   BP: 133/88   Pulse: 74   SpO2: 97%   Weight: 74.4 kg (164 lb)   Height: 1.676 m (5' 5.98\")     Wt Readings from Last 3 Encounters:   02/22/24 74.4 kg (164 lb)   10/23/23 71.7 kg (158 lb)   07/25/23 70.3 kg (155 lb)     BP Readings from Last 3 Encounters:   02/22/24 133/88   10/23/23 104/78   07/25/23 99/65       Follow-up on type 1 diabetes patient is on insulin pump pump was downloaded and reviewed A1c has improved slightly history of ADHD variable compliance  We pump download  Basal rate 1.6   /hr carb ratio was 15 sensitivity was 34  Hemoglobin A1C       Date                     Value               Ref Range           Status                02/22/2024               8.7 (H)             %                   Final

## 2024-02-25 RX ORDER — INSULIN LISPRO-AABC 100 [IU]/ML
INJECTION, SOLUTION INTRAVENOUS; SUBCUTANEOUS
Qty: 2 EACH | Refills: 0 | COMMUNITY
Start: 2024-02-25

## 2024-05-03 ENCOUNTER — TELEPHONE (OUTPATIENT)
Dept: ENDOCRINOLOGY | Age: 29
End: 2024-05-03

## 2024-05-03 RX ORDER — INSULIN LISPRO 100 [IU]/ML
INJECTION, SOLUTION INTRAVENOUS; SUBCUTANEOUS
Qty: 40 ML | Refills: 3 | Status: SHIPPED | OUTPATIENT
Start: 2024-05-03

## 2024-05-16 ENCOUNTER — TELEPHONE (OUTPATIENT)
Dept: FAMILY MEDICINE CLINIC | Age: 29
End: 2024-05-16

## 2024-05-23 ENCOUNTER — OFFICE VISIT (OUTPATIENT)
Dept: ENDOCRINOLOGY | Age: 29
End: 2024-05-23
Payer: MEDICARE

## 2024-05-23 VITALS
OXYGEN SATURATION: 97 % | BODY MASS INDEX: 25.83 KG/M2 | SYSTOLIC BLOOD PRESSURE: 110 MMHG | DIASTOLIC BLOOD PRESSURE: 73 MMHG | WEIGHT: 155 LBS | HEART RATE: 75 BPM | HEIGHT: 65 IN

## 2024-05-23 DIAGNOSIS — E10.69 TYPE 1 DIABETES MELLITUS WITH OTHER SPECIFIED COMPLICATION (HCC): Primary | ICD-10-CM

## 2024-05-23 DIAGNOSIS — Z96.41 INSULIN PUMP IN PLACE: ICD-10-CM

## 2024-05-23 LAB
CHP ED QC CHECK: NORMAL
GLUCOSE BLD-MCNC: 82 MG/DL
HBA1C MFR BLD: 8.2 %

## 2024-05-23 PROCEDURE — 3052F HG A1C>EQUAL 8.0%<EQUAL 9.0%: CPT | Performed by: INTERNAL MEDICINE

## 2024-05-23 PROCEDURE — G8419 CALC BMI OUT NRM PARAM NOF/U: HCPCS | Performed by: INTERNAL MEDICINE

## 2024-05-23 PROCEDURE — 2022F DILAT RTA XM EVC RTNOPTHY: CPT | Performed by: INTERNAL MEDICINE

## 2024-05-23 PROCEDURE — G8427 DOCREV CUR MEDS BY ELIG CLIN: HCPCS | Performed by: INTERNAL MEDICINE

## 2024-05-23 PROCEDURE — 82962 GLUCOSE BLOOD TEST: CPT | Performed by: INTERNAL MEDICINE

## 2024-05-23 PROCEDURE — 99213 OFFICE O/P EST LOW 20 MIN: CPT | Performed by: INTERNAL MEDICINE

## 2024-05-23 PROCEDURE — 83036 HEMOGLOBIN GLYCOSYLATED A1C: CPT | Performed by: INTERNAL MEDICINE

## 2024-05-23 PROCEDURE — 4004F PT TOBACCO SCREEN RCVD TLK: CPT | Performed by: INTERNAL MEDICINE

## 2024-05-23 RX ORDER — INSULIN LISPRO 100 [IU]/ML
INJECTION, SOLUTION INTRAVENOUS; SUBCUTANEOUS
Qty: 40 ML | Refills: 3 | Status: SHIPPED | OUTPATIENT
Start: 2024-05-23

## 2024-05-23 NOTE — PROGRESS NOTES
5/23/2024    Assessment:       Diagnosis Orders   1. Type 1 diabetes mellitus with other specified complication (HCC)  POCT Glucose    POCT glycosylated hemoglobin (Hb A1C)    Hemoglobin A1C    Basic Metabolic Panel    insulin lispro (HUMALOG,ADMELOG) 100 UNIT/ML SOLN injection vial      2. Insulin pump in place              PLAN:     Orders Placed This Encounter   Procedures    Hemoglobin A1C     Standing Status:   Future     Standing Expiration Date:   5/23/2025    Basic Metabolic Panel     Standing Status:   Future     Standing Expiration Date:   5/23/2025    POCT Glucose    POCT glycosylated hemoglobin (Hb A1C)     Continue patient on insulin pump as per current pump setting  To call [email or web address] to get upgraded to makrtronic 780 pump with sensor continuous glucose monitoring A1c goal of less than 7        Orders Placed This Encounter   Procedures    POCT Glucose    POCT glycosylated hemoglobin (Hb A1C)     No orders of the defined types were placed in this encounter.    No follow-ups on file.  Subjective:     Chief Complaint   Patient presents with    Diabetes     Vitals:    05/23/24 1414   BP: 110/73   Pulse: 75   SpO2: 97%   Weight: 70.3 kg (155 lb)   Height: 1.651 m (5' 5\")     Wt Readings from Last 3 Encounters:   05/23/24 70.3 kg (155 lb)   02/22/24 74.4 kg (164 lb)   10/23/23 71.7 kg (158 lb)     BP Readings from Last 3 Encounters:   05/23/24 110/73   02/22/24 133/88   10/23/23 104/78     Follow-up on type 1 diabetes with variable compliance currently on Medtronic 670 pump without sensor continuous glucose monitoring A1c was  Hemoglobin A1C       Date                     Value               Ref Range           Status                05/23/2024               8.2                 %                   Final            ----------  Has improved slightly a 2-week pump download was reviewed average glucose was not available for review  Basal rate 1.6 units/h carb ratio 15 sensitivity was 34    Diabetes  He presents for

## 2024-05-27 ASSESSMENT — ENCOUNTER SYMPTOMS: EYES NEGATIVE: 1

## 2024-07-05 ENCOUNTER — OFFICE VISIT (OUTPATIENT)
Dept: PRIMARY CARE CLINIC | Age: 29
End: 2024-07-05

## 2024-07-05 VITALS
OXYGEN SATURATION: 98 % | WEIGHT: 160.4 LBS | HEART RATE: 84 BPM | BODY MASS INDEX: 25.78 KG/M2 | DIASTOLIC BLOOD PRESSURE: 72 MMHG | SYSTOLIC BLOOD PRESSURE: 118 MMHG | HEIGHT: 66 IN

## 2024-07-05 DIAGNOSIS — E10.69 TYPE 1 DIABETES MELLITUS WITH OTHER SPECIFIED COMPLICATION (HCC): ICD-10-CM

## 2024-07-05 DIAGNOSIS — R21 RASH AND NONSPECIFIC SKIN ERUPTION: ICD-10-CM

## 2024-07-05 DIAGNOSIS — F31.9 BIPOLAR AFFECTIVE DISORDER, REMISSION STATUS UNSPECIFIED (HCC): Primary | ICD-10-CM

## 2024-07-05 RX ORDER — QUETIAPINE 300 MG/1
300 TABLET, FILM COATED, EXTENDED RELEASE ORAL NIGHTLY
Qty: 90 TABLET | Refills: 0 | Status: SHIPPED | OUTPATIENT
Start: 2024-07-05

## 2024-07-05 RX ORDER — DOXYCYCLINE HYCLATE 100 MG
100 TABLET ORAL 2 TIMES DAILY
Qty: 20 TABLET | Refills: 0 | Status: SHIPPED | OUTPATIENT
Start: 2024-07-05 | End: 2024-07-15

## 2024-07-05 RX ORDER — LITHIUM CARBONATE 300 MG/1
300 TABLET, FILM COATED, EXTENDED RELEASE ORAL 2 TIMES DAILY
Qty: 180 TABLET | Refills: 0 | Status: SHIPPED | OUTPATIENT
Start: 2024-07-05 | End: 2024-10-03

## 2024-07-05 SDOH — ECONOMIC STABILITY: FOOD INSECURITY: WITHIN THE PAST 12 MONTHS, THE FOOD YOU BOUGHT JUST DIDN'T LAST AND YOU DIDN'T HAVE MONEY TO GET MORE.: NEVER TRUE

## 2024-07-05 SDOH — ECONOMIC STABILITY: INCOME INSECURITY: HOW HARD IS IT FOR YOU TO PAY FOR THE VERY BASICS LIKE FOOD, HOUSING, MEDICAL CARE, AND HEATING?: NOT HARD AT ALL

## 2024-07-05 SDOH — ECONOMIC STABILITY: FOOD INSECURITY: WITHIN THE PAST 12 MONTHS, YOU WORRIED THAT YOUR FOOD WOULD RUN OUT BEFORE YOU GOT MONEY TO BUY MORE.: NEVER TRUE

## 2024-07-05 ASSESSMENT — PATIENT HEALTH QUESTIONNAIRE - PHQ9
SUM OF ALL RESPONSES TO PHQ QUESTIONS 1-9: 0
SUM OF ALL RESPONSES TO PHQ QUESTIONS 1-9: 0
4. FEELING TIRED OR HAVING LITTLE ENERGY: NOT AT ALL
2. FEELING DOWN, DEPRESSED OR HOPELESS: NOT AT ALL
3. TROUBLE FALLING OR STAYING ASLEEP: NOT AT ALL
1. LITTLE INTEREST OR PLEASURE IN DOING THINGS: NOT AT ALL
SUM OF ALL RESPONSES TO PHQ QUESTIONS 1-9: 0
SUM OF ALL RESPONSES TO PHQ QUESTIONS 1-9: 0
8. MOVING OR SPEAKING SO SLOWLY THAT OTHER PEOPLE COULD HAVE NOTICED. OR THE OPPOSITE, BEING SO FIGETY OR RESTLESS THAT YOU HAVE BEEN MOVING AROUND A LOT MORE THAN USUAL: NOT AT ALL
7. TROUBLE CONCENTRATING ON THINGS, SUCH AS READING THE NEWSPAPER OR WATCHING TELEVISION: NOT AT ALL
SUM OF ALL RESPONSES TO PHQ9 QUESTIONS 1 & 2: 0
9. THOUGHTS THAT YOU WOULD BE BETTER OFF DEAD, OR OF HURTING YOURSELF: NOT AT ALL
10. IF YOU CHECKED OFF ANY PROBLEMS, HOW DIFFICULT HAVE THESE PROBLEMS MADE IT FOR YOU TO DO YOUR WORK, TAKE CARE OF THINGS AT HOME, OR GET ALONG WITH OTHER PEOPLE: NOT DIFFICULT AT ALL
6. FEELING BAD ABOUT YOURSELF - OR THAT YOU ARE A FAILURE OR HAVE LET YOURSELF OR YOUR FAMILY DOWN: NOT AT ALL
5. POOR APPETITE OR OVEREATING: NOT AT ALL

## 2024-07-05 NOTE — PROGRESS NOTES
glucose monitor kit and supplies Give 1 meter covered by insurance (Patient not taking: Reported on 7/5/2024) 1 kit 0    albuterol sulfate  (90 Base) MCG/ACT inhaler Use 2 puffs 4 times daily for 7 days then as needed for wheezing. Dispense with Spacer and instruct in use.  At patient's preference may use 60 dose MDI. May Sub Pro-Air or Proventil as needed per insurance. (Patient not taking: Reported on 7/5/2024) 1 each 5    Blood Glucose Monitoring Suppl (CONTOUR NEXT ONE) KIT 1 Device by Does not apply route daily (Patient not taking: Reported on 7/5/2024) 1 kit 0    Insulin Pen Needle (PEN NEEDLES) 32G X 6 MM MISC 1 Device by Does not apply route 4 times daily (Patient not taking: Reported on 7/5/2024) 100 each 5    BD INSULIN SYRINGE U/F 31G X 5/16\" 0.5 ML MISC use 4 daily (Patient not taking: Reported on 7/5/2024) 100 each 3     No current facility-administered medications for this visit.       PMH, Surgical Hx, Family Hx, and Social Hx reviewed and updated.  Health Maintenance reviewed.    Vitals:    07/05/24 0907   BP: 118/72   Site: Left Upper Arm   Position: Supine   Cuff Size: Medium Adult   Pulse: 84   SpO2: 98%   Weight: 72.8 kg (160 lb 6.4 oz)   Height: 1.676 m (5' 6\")     BP Readings from Last 3 Encounters:   07/05/24 118/72   05/23/24 110/73   02/22/24 133/88     Wt Readings from Last 3 Encounters:   07/05/24 72.8 kg (160 lb 6.4 oz)   05/23/24 70.3 kg (155 lb)   02/22/24 74.4 kg (164 lb)       Lab Results   Component Value Date    LABA1C 8.2 05/23/2024    LABA1C 8.7 (H) 02/22/2024    LABA1C 7.5 (H) 10/23/2023     Lab Results   Component Value Date    CREATININE 0.88 10/23/2023     Lab Results   Component Value Date    ALT 16 08/05/2021    AST 14 08/05/2021     Lab Results   Component Value Date    CHOL 118 06/15/2023    TRIG 66 06/15/2023    HDL 38 (L) 06/15/2023        Review of Systems   Physical Exam  Constitutional:       General: He is not in acute distress.     Appearance: He is not

## 2024-07-09 ENCOUNTER — HOSPITAL ENCOUNTER (EMERGENCY)
Facility: HOSPITAL | Age: 29
Discharge: HOME | End: 2024-07-09
Payer: MEDICARE

## 2024-07-09 VITALS
TEMPERATURE: 97.7 F | OXYGEN SATURATION: 98 % | RESPIRATION RATE: 18 BRPM | HEART RATE: 65 BPM | DIASTOLIC BLOOD PRESSURE: 84 MMHG | WEIGHT: 160 LBS | BODY MASS INDEX: 25.71 KG/M2 | HEIGHT: 66 IN | SYSTOLIC BLOOD PRESSURE: 129 MMHG

## 2024-07-09 DIAGNOSIS — L02.415 ABSCESS OF RIGHT THIGH: Primary | ICD-10-CM

## 2024-07-09 DIAGNOSIS — E10.69 TYPE 1 DIABETES MELLITUS WITH OTHER SPECIFIED COMPLICATION (HCC): ICD-10-CM

## 2024-07-09 PROCEDURE — 99283 EMERGENCY DEPT VISIT LOW MDM: CPT | Mod: 25

## 2024-07-09 PROCEDURE — 10060 I&D ABSCESS SIMPLE/SINGLE: CPT | Performed by: PHYSICIAN ASSISTANT

## 2024-07-09 PROCEDURE — 87075 CULTR BACTERIA EXCEPT BLOOD: CPT | Mod: ELYLAB | Performed by: PHYSICIAN ASSISTANT

## 2024-07-09 RX ORDER — INSULIN ASPART 100 [IU]/ML
INJECTION, SOLUTION INTRAVENOUS; SUBCUTANEOUS
Qty: 30 ML | Refills: 5 | Status: SHIPPED | OUTPATIENT
Start: 2024-07-09

## 2024-07-09 RX ORDER — SULFAMETHOXAZOLE AND TRIMETHOPRIM 800; 160 MG/1; MG/1
1 TABLET ORAL EVERY 12 HOURS
Qty: 20 TABLET | Refills: 0 | Status: SHIPPED | OUTPATIENT
Start: 2024-07-09 | End: 2024-07-19

## 2024-07-09 RX ORDER — KETOROLAC TROMETHAMINE 10 MG/1
10 TABLET, FILM COATED ORAL EVERY 6 HOURS PRN
Qty: 20 TABLET | Refills: 0 | Status: SHIPPED | OUTPATIENT
Start: 2024-07-09 | End: 2024-07-14

## 2024-07-09 ASSESSMENT — LIFESTYLE VARIABLES
EVER FELT BAD OR GUILTY ABOUT YOUR DRINKING: NO
TOTAL SCORE: 0
HAVE PEOPLE ANNOYED YOU BY CRITICIZING YOUR DRINKING: NO
HAVE YOU EVER FELT YOU SHOULD CUT DOWN ON YOUR DRINKING: NO
EVER HAD A DRINK FIRST THING IN THE MORNING TO STEADY YOUR NERVES TO GET RID OF A HANGOVER: NO

## 2024-07-09 ASSESSMENT — PAIN DESCRIPTION - PROGRESSION: CLINICAL_PROGRESSION: NOT CHANGED

## 2024-07-09 ASSESSMENT — COLUMBIA-SUICIDE SEVERITY RATING SCALE - C-SSRS
6. HAVE YOU EVER DONE ANYTHING, STARTED TO DO ANYTHING, OR PREPARED TO DO ANYTHING TO END YOUR LIFE?: NO
2. HAVE YOU ACTUALLY HAD ANY THOUGHTS OF KILLING YOURSELF?: NO
1. IN THE PAST MONTH, HAVE YOU WISHED YOU WERE DEAD OR WISHED YOU COULD GO TO SLEEP AND NOT WAKE UP?: NO

## 2024-07-09 ASSESSMENT — PAIN DESCRIPTION - LOCATION: LOCATION: LEG

## 2024-07-09 ASSESSMENT — PAIN SCALES - GENERAL
PAINLEVEL_OUTOF10: 8
PAINLEVEL_OUTOF10: 7

## 2024-07-09 ASSESSMENT — PAIN - FUNCTIONAL ASSESSMENT: PAIN_FUNCTIONAL_ASSESSMENT: 0-10

## 2024-07-09 ASSESSMENT — PAIN DESCRIPTION - ORIENTATION: ORIENTATION: RIGHT

## 2024-07-09 ASSESSMENT — PAIN DESCRIPTION - FREQUENCY: FREQUENCY: CONSTANT/CONTINUOUS

## 2024-07-09 ASSESSMENT — PAIN DESCRIPTION - PAIN TYPE: TYPE: ACUTE PAIN

## 2024-07-09 ASSESSMENT — PAIN DESCRIPTION - DESCRIPTORS: DESCRIPTORS: ACHING;BURNING

## 2024-07-09 ASSESSMENT — PAIN DESCRIPTION - ONSET: ONSET: ONGOING

## 2024-07-09 NOTE — Clinical Note
Guanakito Dylan was seen and treated in our emergency department on 7/9/2024.  He may return to work on 07/12/2024.       If you have any questions or concerns, please don't hesitate to call.      Danny Abbott PA-C

## 2024-07-09 NOTE — TELEPHONE ENCOUNTER
Mendel Hawk called requesting a refill on the following medications:  Requested Prescriptions     Pending Prescriptions Disp Refills    insulin aspart (NOVOLOG) 100 UNIT/ML injection vial 30 mL 5     Sig: USE VIA INSULIN PUMP MAX DAILY DOSE 100 UNITS.     Pharmacy verified:    Pt states that [facility]    Date of last visit: 05/23/2024  Date of next visit (if applicable): 8/23/2024      Pt states that he has been getting a general insulin from the pharmacy which makes him sick, and the pharmacy told him he needs to get his insulin from the same DME where his pup supplies come from.

## 2024-07-09 NOTE — ED PROVIDER NOTES
HPI   Chief Complaint   Patient presents with   • Abscess     Right upper leg abscess.  States he has been on antibiotics x 4 days but he can't take it.       A 29-year-old male patient comes in the emergency department today with complaints of an abscess to his right upper thigh.  States he was put on antibiotics from a urgent care approximate 4 days ago.  Despite being on antibiotics the abscesses continue to increase in size.  Denies any associated fevers, chills, nausea, vomiting.  Patient rates his pain a 7 out of 10 on the pain scale.  For this purpose comes in the emergency department today for further evaluation.  Otherwise no complaints present time.                          Meade Coma Scale Score: 15                     Patient History   Past Medical History:   Diagnosis Date   • Diabetes mellitus (Multi)      History reviewed. No pertinent surgical history.  No family history on file.  Social History     Tobacco Use   • Smoking status: Former     Types: Cigarettes   • Smokeless tobacco: Never   Vaping Use   • Vaping status: Never Used   Substance Use Topics   • Alcohol use: Defer   • Drug use: Yes     Types: Marijuana       Physical Exam   ED Triage Vitals [07/09/24 0755]   Temperature Heart Rate Respirations BP   36.5 °C (97.7 °F) 64 20 114/80      Pulse Ox Temp Source Heart Rate Source Patient Position   97 % Temporal Monitor Sitting      BP Location FiO2 (%)     Right arm --       Physical Exam  Constitutional:       General: He is in acute distress (Mild).      Appearance: Normal appearance. He is normal weight.   HENT:      Head: Normocephalic and atraumatic.      Nose: Nose normal.   Eyes:      Extraocular Movements: Extraocular movements intact.      Conjunctiva/sclera: Conjunctivae normal.   Cardiovascular:      Rate and Rhythm: Normal rate and regular rhythm.   Pulmonary:      Effort: Pulmonary effort is normal. No respiratory distress.      Breath sounds: Normal breath sounds. No stridor. No  wheezing.   Musculoskeletal:         General: Normal range of motion.      Cervical back: Normal range of motion.   Skin:     General: Skin is warm and dry.      Comments: Fluctuant, indurated abscess to the right upper mid medial thigh approximately 3.5 cm in diameter   Neurological:      General: No focal deficit present.      Mental Status: He is alert and oriented to person, place, and time. Mental status is at baseline.   Psychiatric:         Mood and Affect: Mood normal.         ED Course & MDM   Diagnoses as of 07/09/24 0906   Abscess of right thigh       Medical Decision Making  A 29-year-old male patient comes in the emergency department today with complaints of an abscess to his right upper thigh.  States he was put on antibiotics from a urgent care approximate 4 days ago.  Despite being on antibiotics the abscesses continue to increase in size.  Denies any associated fevers, chills, nausea, vomiting.  Patient rates his pain a 7 out of 10 on the pain scale.  For this purpose comes in the emergency department today for further evaluation.  Otherwise no complaints present time.    I&D was performed of the patient's abscess and culture was sent.  I was unable to see what antibiotic the patient is currently on and patient does not recall.  I tried to look in care everywhere without any success.  Will order patient oral antibiotics, oral pain medications for home with [facility] at [facility] for resolution.  Patient agrees with this plan expressed full verbal understanding.  All questions answered.    Histor[facility] patient    Diagnosis: Thigh abscess        Procedure  Incision and Drainage    Performed by: Danny Abbott PA-C  Authorized by: Danny Abbott PA-C    Consent:     Consent obtained:  Verbal    Consent given by:  Patient    Risks discussed:  Bleeding, incomplete drainage and pain  Universal protocol:     Patient identity confirmed:  Verbally with patient  Location:     Type:  Abscess     Size:  3.5 cm diameter    Location:  Lower extremity    Lower extremity location: Right thigh.  Pre-procedure details:     Skin preparation:  Povidone-iodine  Sedation:     Sedation type:  None  Anesthesia:     Anesthesia method:  Local infiltration    Local anesthetic:  Lidocaine 1% WITH epi  Procedure type:     Complexity:  Simple  Procedure details:     Ultrasound guidance: no      Needle aspiration: no      Incision types:  Single straight    Drainage:  Serosanguinous    Drainage amount:  Scant    Wound treatment:  Wound left open    Packing materials:  None  Post-procedure details:     Procedure completion:  Tolerated well, no immediate complications       Danny Abbott PA-C  07/09/24 0908

## 2024-07-11 LAB
BACTERIA SPEC CULT: ABNORMAL
GRAM STN SPEC: ABNORMAL
GRAM STN SPEC: ABNORMAL

## 2024-07-16 ENCOUNTER — OFFICE VISIT (OUTPATIENT)
Dept: WOUND CARE | Facility: CLINIC | Age: 29
End: 2024-07-16
Payer: MEDICARE

## 2024-07-16 PROCEDURE — 99213 OFFICE O/P EST LOW 20 MIN: CPT | Mod: 25

## 2024-07-16 PROCEDURE — 11042 DBRDMT SUBQ TIS 1ST 20SQCM/<: CPT | Performed by: SURGERY

## 2024-07-16 PROCEDURE — 99213 OFFICE O/P EST LOW 20 MIN: CPT | Performed by: SURGERY

## 2024-07-16 PROCEDURE — 11042 DBRDMT SUBQ TIS 1ST 20SQCM/<: CPT

## 2024-07-23 ENCOUNTER — OFFICE VISIT (OUTPATIENT)
Dept: WOUND CARE | Facility: CLINIC | Age: 29
End: 2024-07-23
Payer: MEDICARE

## 2024-07-23 PROCEDURE — 11042 DBRDMT SUBQ TIS 1ST 20SQCM/<: CPT

## 2024-07-23 PROCEDURE — 11042 DBRDMT SUBQ TIS 1ST 20SQCM/<: CPT | Performed by: SURGERY

## 2024-07-30 ENCOUNTER — OFFICE VISIT (OUTPATIENT)
Dept: WOUND CARE | Facility: CLINIC | Age: 29
End: 2024-07-30
Payer: MEDICARE

## 2024-07-30 PROCEDURE — 11042 DBRDMT SUBQ TIS 1ST 20SQCM/<: CPT

## 2024-07-30 PROCEDURE — 11042 DBRDMT SUBQ TIS 1ST 20SQCM/<: CPT | Performed by: SURGERY

## 2024-08-19 ENCOUNTER — OFFICE VISIT (OUTPATIENT)
Dept: FAMILY MEDICINE CLINIC | Age: 29
End: 2024-08-19
Payer: MEDICARE

## 2024-08-19 VITALS
WEIGHT: 158 LBS | HEIGHT: 66 IN | SYSTOLIC BLOOD PRESSURE: 120 MMHG | DIASTOLIC BLOOD PRESSURE: 80 MMHG | TEMPERATURE: 97.9 F | OXYGEN SATURATION: 97 % | BODY MASS INDEX: 25.39 KG/M2 | HEART RATE: 76 BPM

## 2024-08-19 DIAGNOSIS — Z13.220 LIPID SCREENING: ICD-10-CM

## 2024-08-19 DIAGNOSIS — F31.9 BIPOLAR AFFECTIVE DISORDER, REMISSION STATUS UNSPECIFIED (HCC): ICD-10-CM

## 2024-08-19 DIAGNOSIS — F19.10 POLYSUBSTANCE ABUSE (HCC): ICD-10-CM

## 2024-08-19 DIAGNOSIS — E10.69 TYPE 1 DIABETES MELLITUS WITH OTHER SPECIFIED COMPLICATION (HCC): Primary | ICD-10-CM

## 2024-08-19 DIAGNOSIS — M54.50 ACUTE LOW BACK PAIN, UNSPECIFIED BACK PAIN LATERALITY, UNSPECIFIED WHETHER SCIATICA PRESENT: ICD-10-CM

## 2024-08-19 DIAGNOSIS — Z79.899 HIGH RISK MEDICATION USE: ICD-10-CM

## 2024-08-19 PROBLEM — F19.20 CHEMICAL DEPENDENCY (HCC): Status: RESOLVED | Noted: 2017-11-09 | Resolved: 2024-08-19

## 2024-08-19 PROBLEM — F19.94 SUBSTANCE INDUCED MOOD DISORDER (HCC): Status: RESOLVED | Noted: 2018-02-01 | Resolved: 2024-08-19

## 2024-08-19 LAB
BILIRUBIN, POC: ABNORMAL
BLOOD URINE, POC: ABNORMAL
CLARITY, POC: ABNORMAL
COLOR, POC: ABNORMAL
GLUCOSE URINE, POC: ABNORMAL
KETONES, POC: ABNORMAL
LEUKOCYTE EST, POC: ABNORMAL
NITRITE, POC: ABNORMAL
PH, POC: 6
PROTEIN, POC: ABNORMAL
SPECIFIC GRAVITY, POC: 1.02
UROBILINOGEN, POC: ABNORMAL

## 2024-08-19 PROCEDURE — G8427 DOCREV CUR MEDS BY ELIG CLIN: HCPCS | Performed by: NURSE PRACTITIONER

## 2024-08-19 PROCEDURE — 2022F DILAT RTA XM EVC RTNOPTHY: CPT | Performed by: NURSE PRACTITIONER

## 2024-08-19 PROCEDURE — 1036F TOBACCO NON-USER: CPT | Performed by: NURSE PRACTITIONER

## 2024-08-19 PROCEDURE — 99214 OFFICE O/P EST MOD 30 MIN: CPT | Performed by: NURSE PRACTITIONER

## 2024-08-19 PROCEDURE — 3052F HG A1C>EQUAL 8.0%<EQUAL 9.0%: CPT | Performed by: NURSE PRACTITIONER

## 2024-08-19 PROCEDURE — 81003 URINALYSIS AUTO W/O SCOPE: CPT | Performed by: NURSE PRACTITIONER

## 2024-08-19 PROCEDURE — G8419 CALC BMI OUT NRM PARAM NOF/U: HCPCS | Performed by: NURSE PRACTITIONER

## 2024-08-19 RX ORDER — LITHIUM CARBONATE 300 MG/1
300 TABLET, FILM COATED, EXTENDED RELEASE ORAL 2 TIMES DAILY
Qty: 60 TABLET | Refills: 2 | Status: SHIPPED | OUTPATIENT
Start: 2024-08-19 | End: 2024-11-17

## 2024-08-19 RX ORDER — LISINOPRIL 2.5 MG/1
2.5 TABLET ORAL DAILY
Qty: 90 TABLET | Refills: 3 | Status: SHIPPED | OUTPATIENT
Start: 2024-08-19

## 2024-08-19 RX ORDER — QUETIAPINE 300 MG/1
300 TABLET, FILM COATED, EXTENDED RELEASE ORAL NIGHTLY
Qty: 30 TABLET | Refills: 5 | Status: SHIPPED | OUTPATIENT
Start: 2024-08-19

## 2024-08-19 RX ORDER — INSULIN ASPART 100 [IU]/ML
INJECTION, SOLUTION INTRAVENOUS; SUBCUTANEOUS
Qty: 30 ML | Refills: 5 | Status: SHIPPED | OUTPATIENT
Start: 2024-08-19 | End: 2024-08-23 | Stop reason: SDUPTHER

## 2024-08-19 ASSESSMENT — ENCOUNTER SYMPTOMS
SHORTNESS OF BREATH: 0
CONSTIPATION: 0
COUGH: 0
DIARRHEA: 0

## 2024-08-19 NOTE — PROGRESS NOTES
Subjective  Chief Complaint   Patient presents with    6 Month Follow-Up    Discuss Medications     Pt asking for you to take back over lithium and Seroquel.     Bipolar    Lower Back Pain     Trouble holding urine       HPI    Pt is here for a refill of his medications.  He states that he is in need of his seroqeul.   States that he is unable to sleep without it.     Has been following with endo for his DM.  Having a hard time getting the insulin for his pump.  Has been buying it from [employer].  Has a fu with endo coming up.     Has had some right lower back pain recently.   Only occurs after waking up in the AM.  No other symptoms.   Otherwise feeling well.    Taking medications as directed.  States that he has stayed sober. Only doing \"weed\"     Patient Active Problem List    Diagnosis Date Noted    Uncontrolled type 1 diabetes mellitus with hyperglycemia (HCC)     Toxic encephalopathy 07/30/2020    Rhabdomyolysis 07/30/2020    Bipolar disorder ([facility]) 07/30/2020    Drug overdose, accidental or unintentional, initial encounter 07/28/2020    Hypergranulation 12/20/2019    Abdominal wall abscess 10/18/2019    Cellulitis 10/04/2019    Hepatitis C     Diabetes mellitus type 1 (HCC)     DM w/ coma type I, uncontrolled     Multiple drug overdose 02/14/2019    Respiratory insufficiency     Bipolar 1 disorder, mixed, severe (HCC) 02/05/2018    H/O bipolar disorder 02/02/2018    Polysubstance abuse ([facility])     Suicide attempt ([facility])     Hyperglycemia 02/01/2018    Asperger syndrome 11/09/2017    History of oppositional defiant disorder 11/09/2017    Hyperglycemia due to type 1 diabetes mellitus (HCC) 11/09/2017    Metabolic acidosis with increased anion gap and accumulation of organic acids 11/09/2017    Lactic acid acidosis 11/09/2017    Hyperkalemia, diminished renal excretion 11/09/2017    Hyperkalemia, transcellular shifts 11/09/2017    TWIN (acute kidney injury) (HCC) 11/09/2017    Heroin abuse (HCC) 11/09/2017    DKA,

## 2024-08-23 ENCOUNTER — OFFICE VISIT (OUTPATIENT)
Dept: ENDOCRINOLOGY | Age: 29
End: 2024-08-23
Payer: MEDICARE

## 2024-08-23 ENCOUNTER — TELEPHONE (OUTPATIENT)
Dept: ENDOCRINOLOGY | Age: 29
End: 2024-08-23

## 2024-08-23 VITALS
HEART RATE: 70 BPM | HEIGHT: 66 IN | SYSTOLIC BLOOD PRESSURE: 117 MMHG | BODY MASS INDEX: 25.39 KG/M2 | DIASTOLIC BLOOD PRESSURE: 76 MMHG | WEIGHT: 158 LBS

## 2024-08-23 DIAGNOSIS — E10.69 TYPE 1 DIABETES MELLITUS WITH OTHER SPECIFIED COMPLICATION (HCC): ICD-10-CM

## 2024-08-23 DIAGNOSIS — Z96.41 INSULIN PUMP IN PLACE: ICD-10-CM

## 2024-08-23 DIAGNOSIS — E10.69 TYPE 1 DIABETES MELLITUS WITH OTHER SPECIFIED COMPLICATION (HCC): Primary | ICD-10-CM

## 2024-08-23 LAB
CHP ED QC CHECK: NORMAL
GLUCOSE BLD-MCNC: 237 MG/DL
HBA1C MFR BLD: 8.9 %

## 2024-08-23 PROCEDURE — 99213 OFFICE O/P EST LOW 20 MIN: CPT | Performed by: INTERNAL MEDICINE

## 2024-08-23 PROCEDURE — 82962 GLUCOSE BLOOD TEST: CPT | Performed by: INTERNAL MEDICINE

## 2024-08-23 PROCEDURE — 2022F DILAT RTA XM EVC RTNOPTHY: CPT | Performed by: INTERNAL MEDICINE

## 2024-08-23 PROCEDURE — G8427 DOCREV CUR MEDS BY ELIG CLIN: HCPCS | Performed by: INTERNAL MEDICINE

## 2024-08-23 PROCEDURE — G8419 CALC BMI OUT NRM PARAM NOF/U: HCPCS | Performed by: INTERNAL MEDICINE

## 2024-08-23 PROCEDURE — 3052F HG A1C>EQUAL 8.0%<EQUAL 9.0%: CPT | Performed by: INTERNAL MEDICINE

## 2024-08-23 PROCEDURE — 83036 HEMOGLOBIN GLYCOSYLATED A1C: CPT | Performed by: INTERNAL MEDICINE

## 2024-08-23 PROCEDURE — 1036F TOBACCO NON-USER: CPT | Performed by: INTERNAL MEDICINE

## 2024-08-23 RX ORDER — INSULIN ASPART 100 [IU]/ML
INJECTION, SOLUTION INTRAVENOUS; SUBCUTANEOUS
Qty: 30 ML | Refills: 5 | Status: SHIPPED | OUTPATIENT
Start: 2024-08-23

## 2024-08-23 NOTE — TELEPHONE ENCOUNTER
Left a message, its is his pharmacy that has to be change that expects medicare part B . Darnell should

## 2024-08-23 NOTE — PROGRESS NOTES
Cardiovascular: Negative.    Psychiatric/Behavioral:  Positive for decreased concentration.    All other systems reviewed and are negative.      Objective:   Physical Exam  Vitals reviewed.   Constitutional:       General: He is not in acute distress.     Appearance: Normal appearance.   HENT:      Head: Normocephalic and atraumatic.      Right Ear: External ear normal.      Left Ear: External ear normal.      Nose: Nose normal.   Eyes:      General: No scleral icterus.        Right eye: No discharge.         Left eye: No discharge.      Extraocular Movements: Extraocular movements intact.      Conjunctiva/sclera: Conjunctivae normal.   Cardiovascular:      Rate and Rhythm: Normal rate.   Pulmonary:      Effort: Pulmonary effort is normal.   Musculoskeletal:         General: Normal range of motion.      Cervical back: Normal range of motion and neck supple.   Neurological:      General: No focal deficit present.      Mental Status: He is alert and oriented to person, place, and time.   Psychiatric:         Mood and Affect: Mood normal.         Behavior: Behavior normal.

## 2024-12-17 ENCOUNTER — TELEPHONE (OUTPATIENT)
Dept: ENDOCRINOLOGY | Age: 29
End: 2024-12-17

## 2024-12-17 NOTE — TELEPHONE ENCOUNTER
Pt needs a PA for his insulin aspart.  I had the pharmacist change it to name brand and also humalog and admelog and run it through but he was saying all needed PA

## 2024-12-18 DIAGNOSIS — E10.69 TYPE 1 DIABETES MELLITUS WITH OTHER SPECIFIED COMPLICATION (HCC): ICD-10-CM

## 2024-12-18 RX ORDER — INSULIN ASPART 100 [IU]/ML
INJECTION, SOLUTION INTRAVENOUS; SUBCUTANEOUS
Qty: 30 ML | Refills: 5 | Status: SHIPPED | OUTPATIENT
Start: 2024-12-18

## 2024-12-18 NOTE — TELEPHONE ENCOUNTER
PA was done through covermymeds for insulin . PA was approved and spoke to patient [facility]. Was unable to speak to patient pharmacy , faxed them the approval.

## 2025-01-29 ENCOUNTER — OFFICE VISIT (OUTPATIENT)
Dept: ENDOCRINOLOGY | Age: 30
End: 2025-01-29
Payer: MEDICARE

## 2025-01-29 VITALS
WEIGHT: 162 LBS | BODY MASS INDEX: 26.03 KG/M2 | DIASTOLIC BLOOD PRESSURE: 78 MMHG | SYSTOLIC BLOOD PRESSURE: 125 MMHG | HEIGHT: 66 IN | HEART RATE: 72 BPM

## 2025-01-29 DIAGNOSIS — E10.69 TYPE 1 DIABETES MELLITUS WITH OTHER SPECIFIED COMPLICATION (HCC): Primary | ICD-10-CM

## 2025-01-29 DIAGNOSIS — Z96.41 INSULIN PUMP IN PLACE: ICD-10-CM

## 2025-01-29 LAB
CHP ED QC CHECK: NORMAL
GLUCOSE BLD-MCNC: 167 MG/DL
HBA1C MFR BLD: 8.7 %

## 2025-01-29 PROCEDURE — 3052F HG A1C>EQUAL 8.0%<EQUAL 9.0%: CPT | Performed by: INTERNAL MEDICINE

## 2025-01-29 PROCEDURE — 1036F TOBACCO NON-USER: CPT | Performed by: INTERNAL MEDICINE

## 2025-01-29 PROCEDURE — G8419 CALC BMI OUT NRM PARAM NOF/U: HCPCS | Performed by: INTERNAL MEDICINE

## 2025-01-29 PROCEDURE — 82962 GLUCOSE BLOOD TEST: CPT | Performed by: INTERNAL MEDICINE

## 2025-01-29 PROCEDURE — 2022F DILAT RTA XM EVC RTNOPTHY: CPT | Performed by: INTERNAL MEDICINE

## 2025-01-29 PROCEDURE — G8427 DOCREV CUR MEDS BY ELIG CLIN: HCPCS | Performed by: INTERNAL MEDICINE

## 2025-01-29 PROCEDURE — 83036 HEMOGLOBIN GLYCOSYLATED A1C: CPT | Performed by: INTERNAL MEDICINE

## 2025-01-29 PROCEDURE — 99213 OFFICE O/P EST LOW 20 MIN: CPT | Performed by: INTERNAL MEDICINE

## 2025-01-29 RX ORDER — INSULIN ASPART 100 [IU]/ML
INJECTION, SOLUTION INTRAVENOUS; SUBCUTANEOUS
Qty: 30 ML | Refills: 5 | Status: SHIPPED | OUTPATIENT
Start: 2025-01-29

## 2025-01-29 NOTE — PROGRESS NOTES
Exam  Vitals reviewed.   Constitutional:       General: He is not in acute distress.     Appearance: Normal appearance.   HENT:      Head: Normocephalic and atraumatic.      Right Ear: External ear normal.      Left Ear: External ear normal.      Nose: Nose normal.   Eyes:      General: No scleral icterus.        Right eye: No discharge.         Left eye: No discharge.      Extraocular Movements: Extraocular movements intact.      Conjunctiva/sclera: Conjunctivae normal.   Cardiovascular:      Rate and Rhythm: Normal rate.   Pulmonary:      Effort: Pulmonary effort is normal.   Musculoskeletal:         General: Normal range of motion.      Cervical back: Normal range of motion and neck supple.   Neurological:      General: No focal deficit present.      Mental Status: He is alert and oriented to person, place, and time.   Psychiatric:         Mood and Affect: Mood normal.         Behavior: Behavior normal.

## 2025-02-21 ENCOUNTER — TELEPHONE (OUTPATIENT)
Dept: FAMILY MEDICINE CLINIC | Age: 30
End: 2025-02-21

## 2025-03-25 ENCOUNTER — OFFICE VISIT (OUTPATIENT)
Dept: PRIMARY CARE CLINIC | Age: 30
End: 2025-03-25
Payer: MEDICARE

## 2025-03-25 VITALS
DIASTOLIC BLOOD PRESSURE: 82 MMHG | SYSTOLIC BLOOD PRESSURE: 138 MMHG | BODY MASS INDEX: 26.36 KG/M2 | HEIGHT: 66 IN | WEIGHT: 164 LBS

## 2025-03-25 DIAGNOSIS — F19.10 POLYSUBSTANCE ABUSE: ICD-10-CM

## 2025-03-25 DIAGNOSIS — F31.9 BIPOLAR AFFECTIVE DISORDER, REMISSION STATUS UNSPECIFIED (HCC): ICD-10-CM

## 2025-03-25 DIAGNOSIS — Z00.00 INITIAL MEDICARE ANNUAL WELLNESS VISIT: Primary | ICD-10-CM

## 2025-03-25 DIAGNOSIS — L03.119 CELLULITIS OF LOWER EXTREMITY, UNSPECIFIED LATERALITY: ICD-10-CM

## 2025-03-25 PROCEDURE — G0438 PPPS, INITIAL VISIT: HCPCS | Performed by: STUDENT IN AN ORGANIZED HEALTH CARE EDUCATION/TRAINING PROGRAM

## 2025-03-25 PROCEDURE — 1036F TOBACCO NON-USER: CPT | Performed by: STUDENT IN AN ORGANIZED HEALTH CARE EDUCATION/TRAINING PROGRAM

## 2025-03-25 PROCEDURE — 99214 OFFICE O/P EST MOD 30 MIN: CPT | Performed by: STUDENT IN AN ORGANIZED HEALTH CARE EDUCATION/TRAINING PROGRAM

## 2025-03-25 PROCEDURE — G8428 CUR MEDS NOT DOCUMENT: HCPCS | Performed by: STUDENT IN AN ORGANIZED HEALTH CARE EDUCATION/TRAINING PROGRAM

## 2025-03-25 PROCEDURE — G8419 CALC BMI OUT NRM PARAM NOF/U: HCPCS | Performed by: STUDENT IN AN ORGANIZED HEALTH CARE EDUCATION/TRAINING PROGRAM

## 2025-03-25 RX ORDER — DOXYCYCLINE HYCLATE 100 MG
100 TABLET ORAL 2 TIMES DAILY
Qty: 20 TABLET | Refills: 0 | Status: SHIPPED | OUTPATIENT
Start: 2025-03-25 | End: 2025-04-04

## 2025-03-25 ASSESSMENT — PATIENT HEALTH QUESTIONNAIRE - PHQ9
SUM OF ALL RESPONSES TO PHQ QUESTIONS 1-9: 0
3. TROUBLE FALLING OR STAYING ASLEEP: NOT AT ALL
9. THOUGHTS THAT YOU WOULD BE BETTER OFF DEAD, OR OF HURTING YOURSELF: NOT AT ALL
1. LITTLE INTEREST OR PLEASURE IN DOING THINGS: NOT AT ALL
SUM OF ALL RESPONSES TO PHQ QUESTIONS 1-9: 0
2. FEELING DOWN, DEPRESSED OR HOPELESS: NOT AT ALL
5. POOR APPETITE OR OVEREATING: NOT AT ALL
6. FEELING BAD ABOUT YOURSELF - OR THAT YOU ARE A FAILURE OR HAVE LET YOURSELF OR YOUR FAMILY DOWN: NOT AT ALL
7. TROUBLE CONCENTRATING ON THINGS, SUCH AS READING THE NEWSPAPER OR WATCHING TELEVISION: NOT AT ALL
4. FEELING TIRED OR HAVING LITTLE ENERGY: NOT AT ALL
8. MOVING OR SPEAKING SO SLOWLY THAT OTHER PEOPLE COULD HAVE NOTICED. OR THE OPPOSITE, BEING SO FIGETY OR RESTLESS THAT YOU HAVE BEEN MOVING AROUND A LOT MORE THAN USUAL: NOT AT ALL
SUM OF ALL RESPONSES TO PHQ QUESTIONS 1-9: 0
10. IF YOU CHECKED OFF ANY PROBLEMS, HOW DIFFICULT HAVE THESE PROBLEMS MADE IT FOR YOU TO DO YOUR WORK, TAKE CARE OF THINGS AT HOME, OR GET ALONG WITH OTHER PEOPLE: NOT DIFFICULT AT ALL
SUM OF ALL RESPONSES TO PHQ QUESTIONS 1-9: 0

## 2025-03-25 ASSESSMENT — LIFESTYLE VARIABLES
HOW MANY STANDARD DRINKS CONTAINING ALCOHOL DO YOU HAVE ON A TYPICAL DAY: PATIENT DOES NOT DRINK
HOW OFTEN DO YOU HAVE A DRINK CONTAINING ALCOHOL: NEVER

## 2025-03-25 NOTE — PATIENT INSTRUCTIONS
If you receive an email to fill out a survey about our care here today, I would greatly appreciate it if you could fill it out for me, thank you!         Substance Use Disorder: Care Instructions  Overview     You can improve your life and health by stopping your use of alcohol or drugs. When you don't drink or use drugs, you may feel and sleep better. You may get along better with your family, friends, and coworkers. There are medicines and programs that can help with substance use disorder.  How can you care for yourself at home?  Here are some ways to help you stay sober and prevent relapse.  If you have been given medicine to help keep you sober or reduce your cravings, be sure to take it exactly as prescribed.  Talk to your doctor about programs that can help you stop using drugs or drinking alcohol.  Do not keep alcohol or drugs in your home.  Plan ahead. Think about what you'll say if other people ask you to drink or use drugs. Try not to spend time with people who drink or use drugs.  Use the time and money spent on drinking or drugs to do something that's important to you.  Preventing a relapse  Have a plan to deal with relapse. Learn to recognize changes in your thinking that lead you to drink or use drugs. Get help before you start to drink or use drugs again.  Try to stay away from situations, friends, or places that may lead you to drink or use drugs.  If you feel the need to drink alcohol or use drugs again, seek help right away. Call a trusted friend or family member. Some people get support from organizations such as Narcotics Anonymous or [employer] or from treatment facilities.  If you relapse, get help as soon as you can. Some people make a plan with another person that outlines what they want that person to do for them if they relapse. The plan usually includes how to handle the relapse and who to notify in case of relapse.  Don't give up. Remember that a relapse doesn't mean that you have

## 2025-03-25 NOTE — PROGRESS NOTES
Medicare Annual Wellness Visit    Mendel Hawk is here for Rash (Rash on leg believes has staph on right leg been about a month ) and Medicare AWV    Assessment & Plan   Initial Medicare annual wellness visit  Cellulitis of lower extremity, unspecified laterality  -     doxycycline hyclate (VIBRA-TABS) 100 MG tablet; Take 1 tablet by mouth 2 times daily for 10 days, Disp-20 tablet, R-0Normal  Polysubstance abuse (HCC)  Bipolar affective disorder, remission status unspecified (HCC)     Return if symptoms worsen or fail to improve.     Subjective   Chief Complaint   Patient presents with    Rash     Rash on leg believes has staph on right leg been about a month     Medicare AWV       Infection on leg for 1 month.  Denies fever, aches, chills.    Patient's complete Health Risk Assessment and screening values have been reviewed and are found in Flowsheets. The following problems were reviewed today and where indicated follow up appointments were made and/or referrals ordered.    Positive Risk Factor Screenings with Interventions:     Cognitive:   Clock Drawing Test (CDT): (!) Abnormal  Words recalled: 1 Word Recalled  Total Score: (!) 1  Total Score Interpretation: Abnormal Mini-Cog  Interventions:  Patient declines any further evaluation or treatment      Drug Use:   Substance and Sexual Activity   Drug Use Yes    Types: Methamphetamines (Crystal Meth), Marijuana (Weed), IV, Cocaine, Opiates     Comment: not currently . just marijuana     DAST-10 Score: 1  Interpretation: 1-2 indicates low level use. Recommendation: monitor and re-assess at a later date  Interventions:  Patient declined any further intervention or treatment         Inactivity:  On average, how many days per week do you engage in moderate to strenuous exercise (like a brisk walk)?: 1 day (!) Abnormal  On average, how many minutes do you engage in exercise at this level?: 30 min  Interventions:  Patient declined any further interventions or

## 2025-07-10 ENCOUNTER — TELEPHONE (OUTPATIENT)
Age: 30
End: 2025-07-10

## 2025-07-11 ENCOUNTER — TELEPHONE (OUTPATIENT)
Age: 30
End: 2025-07-11

## 2025-07-11 NOTE — TELEPHONE ENCOUNTER
[facility] is requesting a PA for OMNIPOD 5 [id number] INTRO GEN 5 KIT.      KEY: [id number]

## 2025-07-11 NOTE — TELEPHONE ENCOUNTER
A prior auth was done yesterday for this patient's Omnipod via [employer]. It came back as approved.    Your PA request for [id number] was approved for 30 days. The PA# assigned is [id number]. Approved medication: OMNIPOD 5 G6-G7 INTRO KT(GEN5)

## 2025-07-28 ENCOUNTER — OFFICE VISIT (OUTPATIENT)
Age: 30
End: 2025-07-28
Payer: MEDICARE

## 2025-07-28 VITALS
BODY MASS INDEX: 26.36 KG/M2 | HEIGHT: 66 IN | WEIGHT: 164 LBS | SYSTOLIC BLOOD PRESSURE: 116 MMHG | HEART RATE: 80 BPM | DIASTOLIC BLOOD PRESSURE: 75 MMHG | OXYGEN SATURATION: 98 %

## 2025-07-28 DIAGNOSIS — E10.69 TYPE 1 DIABETES MELLITUS WITH OTHER SPECIFIED COMPLICATION (HCC): Primary | ICD-10-CM

## 2025-07-28 DIAGNOSIS — Z96.41 INSULIN PUMP IN PLACE: ICD-10-CM

## 2025-07-28 LAB
CHP ED QC CHECK: NORMAL
GLUCOSE BLD-MCNC: 450 MG/DL
HBA1C MFR BLD: 7.8 %

## 2025-07-28 PROCEDURE — 1036F TOBACCO NON-USER: CPT | Performed by: INTERNAL MEDICINE

## 2025-07-28 PROCEDURE — 83036 HEMOGLOBIN GLYCOSYLATED A1C: CPT | Performed by: INTERNAL MEDICINE

## 2025-07-28 PROCEDURE — 3051F HG A1C>EQUAL 7.0%<8.0%: CPT | Performed by: INTERNAL MEDICINE

## 2025-07-28 PROCEDURE — G8419 CALC BMI OUT NRM PARAM NOF/U: HCPCS | Performed by: INTERNAL MEDICINE

## 2025-07-28 PROCEDURE — PBSHW POCT GLYCOSYLATED HEMOGLOBIN (HGB A1C): Performed by: INTERNAL MEDICINE

## 2025-07-28 PROCEDURE — 99213 OFFICE O/P EST LOW 20 MIN: CPT | Performed by: INTERNAL MEDICINE

## 2025-07-28 PROCEDURE — 82962 GLUCOSE BLOOD TEST: CPT | Performed by: INTERNAL MEDICINE

## 2025-07-28 PROCEDURE — G8427 DOCREV CUR MEDS BY ELIG CLIN: HCPCS | Performed by: INTERNAL MEDICINE

## 2025-07-28 PROCEDURE — 2022F DILAT RTA XM EVC RTNOPTHY: CPT | Performed by: INTERNAL MEDICINE

## 2025-07-28 PROCEDURE — 99212 OFFICE O/P EST SF 10 MIN: CPT | Performed by: INTERNAL MEDICINE

## 2025-07-28 PROCEDURE — PBSHW POCT GLUCOSE: Performed by: INTERNAL MEDICINE

## 2025-07-28 RX ORDER — INSULIN ASPART 100 [IU]/ML
INJECTION, SOLUTION INTRAVENOUS; SUBCUTANEOUS
Qty: 30 ML | Refills: 5 | Status: SHIPPED | OUTPATIENT
Start: 2025-07-28

## 2025-07-28 NOTE — PROGRESS NOTES
Encounters:   07/28/25 116/75   03/25/25 138/82   01/29/25 125/78       Follow-up of type 1 diabetes patient is not currently Medtronic pump without sensor planning to switch to OmniPod with Dexcom CGM A1c has improved to 7.8 average glucose close to 180 basal rate 1.6 units/h carb ratio was 15 sensitivity was 34  Hemoglobin A1C       Date                     Value               Ref Range           Status                07/28/2025               7.8                 %                   Final            ----------      Diabetes  He presents for his follow-up diabetic visit. He has type 1 diabetes mellitus. Symptoms are improving. Current diabetic treatment includes insulin pump. When asked about meal planning, he reported none. His overall blood glucose range is 180-200 mg/dl.     Past Medical History:   Diagnosis Date    ADHD (attention deficit hyperactivity disorder)     Asperger syndrome     Asthma     Asthma 3/31/2015    Bipolar 1 disorder, mixed, severe (HCC) 2/5/2018    Chemical dependency (HCC)     marijuana    Diabetes mellitus (HCC)     Diabetes mellitus type 1 (HCC)     Hepatitis C     Hepatitis C     Hyperkalemia, diminished renal excretion 11/9/2017    Mental disorder     ODD (oppositional defiant disorder)     Seasonal allergies 3/31/2015    Seizures (HCC)      Past Surgical History:   Procedure Laterality Date    ABDOMEN SURGERY Left 10/7/2019    INCISION  & DRAINAGE OF ABSCESS LEFT ABDOMINAL WALL performed by Cynthia Layton MD at [facility] [place]    ABDOMEN SURGERY N/A 10/21/2019    INCISION AND DRAINAGE OF RECURRENT ABDOMINAL WALL ABSCESS ROOM 475 performed by Cynthia Layton MD at [facility]     Social History     Socioeconomic History    Marital status: Single     Spouse name: Not on file    Number of children: 0    Years of education: 11    Highest education level: Not on file   Occupational History    Not on file   Tobacco Use    Smoking status: Former     Current packs/day: 1.00     Average packs/day: 1

## 2025-08-13 RX ORDER — ACYCLOVIR 400 MG/1
TABLET ORAL
Qty: 3 EACH | Refills: 3 | Status: SHIPPED | OUTPATIENT
Start: 2025-08-13

## 2025-09-03 ENCOUNTER — TELEPHONE (OUTPATIENT)
Age: 30
End: 2025-09-03

## 2025-09-04 DIAGNOSIS — E10.69 TYPE 1 DIABETES MELLITUS WITH OTHER SPECIFIED COMPLICATION (HCC): Primary | ICD-10-CM

## 2025-09-04 RX ORDER — ACYCLOVIR 400 MG/1
TABLET ORAL
Qty: 9 EACH | Refills: 3 | Status: SHIPPED | OUTPATIENT
Start: 2025-09-04

## (undated) DEVICE — INTENDED FOR TISSUE SEPARATION, AND OTHER PROCEDURES THAT REQUIRE A SHARP SURGICAL BLADE TO PUNCTURE OR CUT.: Brand: BARD-PARKER ® CARBON RIB-BACK BLADES

## (undated) DEVICE — LABEL MED MINI W/ MARKER

## (undated) DEVICE — ELECTRODE PT RET AD L9FT HI MOIST COND ADH HYDRGEL CORDED

## (undated) DEVICE — SYRINGE MED 10ML LUERLOCK TIP W/O SFTY DISP

## (undated) DEVICE — GAUZE,SPONGE,FLUFF,6"X6.75",STRL,10/TRAY: Brand: MEDLINE

## (undated) DEVICE — SUTURE MCRYL SZ 4-0 L27IN ABSRB UD L19MM PS-2 1/2 CIR PRIM Y426H

## (undated) DEVICE — COUNTER NDL 40 COUNT HLD 70 FOAM BLK ADH W/ MAG

## (undated) DEVICE — GOWN,AURORA,NONREINFORCED,LARGE: Brand: MEDLINE

## (undated) DEVICE — NEEDLE HYPO 25GA L1.5IN BLU POLYPR HUB S STL REG BVL STR

## (undated) DEVICE — MARKER SURG SKIN GENTIAN VLT REG TIP W/ 6IN RUL

## (undated) DEVICE — SPONGE,LAP,4"X18",XR,ST,5/PK,40PK/CS: Brand: MEDLINE INDUSTRIES, INC.

## (undated) DEVICE — PENCIL SMOKE EVAC PUSH BUTTON COATED

## (undated) DEVICE — GLOVE SURG SZ 75 STD WHT LTX SYN POLYMER BEAD REINF ANTI RL

## (undated) DEVICE — PACK,SET UP,DRAPE: Brand: MEDLINE

## (undated) DEVICE — SYRINGE IRRIG 60ML SFT PLIABLE BLB EZ TO GRP 1 HND USE W/

## (undated) DEVICE — CHLORAPREP 26ML ORANGE

## (undated) DEVICE — TOWEL,OR,DSP,ST,BLUE,STD,4/PK,20PK/CS: Brand: MEDLINE